# Patient Record
Sex: FEMALE | Race: WHITE | NOT HISPANIC OR LATINO | Employment: OTHER | URBAN - METROPOLITAN AREA
[De-identification: names, ages, dates, MRNs, and addresses within clinical notes are randomized per-mention and may not be internally consistent; named-entity substitution may affect disease eponyms.]

---

## 2017-02-28 ENCOUNTER — TRANSFERRED RECORDS (OUTPATIENT)
Dept: HEALTH INFORMATION MANAGEMENT | Facility: CLINIC | Age: 82
End: 2017-02-28

## 2017-03-03 ENCOUNTER — TRANSFERRED RECORDS (OUTPATIENT)
Dept: HEALTH INFORMATION MANAGEMENT | Facility: CLINIC | Age: 82
End: 2017-03-03

## 2017-03-07 ENCOUNTER — TRANSFERRED RECORDS (OUTPATIENT)
Dept: HEALTH INFORMATION MANAGEMENT | Facility: CLINIC | Age: 82
End: 2017-03-07

## 2017-03-16 ENCOUNTER — HOSPITAL ENCOUNTER (OUTPATIENT)
Dept: BONE DENSITY | Facility: CLINIC | Age: 82
End: 2017-03-16
Attending: FAMILY MEDICINE
Payer: MEDICARE

## 2017-03-16 ENCOUNTER — HOSPITAL ENCOUNTER (OUTPATIENT)
Dept: MAMMOGRAPHY | Facility: CLINIC | Age: 82
Discharge: HOME | End: 2017-03-16
Attending: FAMILY MEDICINE | Admitting: FAMILY MEDICINE
Payer: MEDICARE

## 2017-03-16 DIAGNOSIS — Z12.31 ENCOUNTER FOR SCREENING MAMMOGRAM FOR MALIGNANT NEOPLASM OF BREAST: ICD-10-CM

## 2017-03-16 DIAGNOSIS — M89.9 DISORDER OF BONE: ICD-10-CM

## 2017-03-16 DIAGNOSIS — Z00.00 ENCOUNTER FOR ROUTINE ADULT HEALTH EXAMINATION WITHOUT ABNORMAL FINDINGS: ICD-10-CM

## 2017-03-16 PROCEDURE — G0202 SCR MAMMO BI INCL CAD: HCPCS

## 2017-03-16 PROCEDURE — 77080 DXA BONE DENSITY AXIAL: CPT

## 2017-03-16 PROCEDURE — 77063 BREAST TOMOSYNTHESIS BI: CPT

## 2017-03-23 ENCOUNTER — HOSPITAL ENCOUNTER (OUTPATIENT)
Dept: ULTRASOUND IMAGING | Facility: CLINIC | Age: 82
Discharge: HOME | End: 2017-03-23
Attending: FAMILY MEDICINE | Admitting: FAMILY MEDICINE
Payer: MEDICARE

## 2017-03-23 DIAGNOSIS — R92.8 ABNORMAL MAMMOGRAM: ICD-10-CM

## 2017-03-23 PROCEDURE — 76642 ULTRASOUND BREAST LIMITED: CPT | Mod: LT

## 2017-04-12 ENCOUNTER — TRANSFERRED RECORDS (OUTPATIENT)
Dept: HEALTH INFORMATION MANAGEMENT | Facility: CLINIC | Age: 82
End: 2017-04-12

## 2017-04-18 ENCOUNTER — OFFICE VISIT (OUTPATIENT)
Dept: SURGERY | Facility: CLINIC | Age: 82
End: 2017-04-18
Attending: NEUROLOGICAL SURGERY
Payer: MEDICARE

## 2017-04-18 DIAGNOSIS — M54.16 LUMBAR RADICULOPATHY, RIGHT: Primary | ICD-10-CM

## 2017-04-18 NOTE — LETTER
4/18/2017       RE: Indy Reyes  [address] [facility] [address]     Dear Colleague,    Thank you for referring your patient, Indy Reyes, to the [facility] SURGICAL CARE OUTPATIENT at [facility]. Please see a copy of my visit note below.    It was a pleasure to see Indy Reyes today in Neurosurgery Clinic. she is a 86 year old female with a few months of RLE pain. These may have been brought on by a long car trip. This pain starts in the back/buttock area and radiates down the lateral leg to the top of the foot. She has tried physical therapy and medications and some kind of cyst drainage procedure without significant improvement in her symptoms. She does not have any symptoms in the LLE or bowel or bladder symptoms. No neck or arm symptoms.    She is otherwise in good health.    There were no vitals filed for this visit.  There is no height or weight on file to calculate BMI.  Data Unavailable     Awake, alert.    Strength 5/5 BLE. Sensation intact to light touch. Reflexes normal at patella/achilles.    MRI report is available to me. There are significant degenerative changes, but the main problems appear to be at L4-5, likely contributing to her right L5 symptoms.    A: Lumbar spondylosis with right L5 radiculopathy.    P: Once I am able to review her images, we will work on a surgical plan as she has failed conservative measures to this point. We discussed a likely right L4-5 decompression. The risks benefits and alternatives to the procedure were discussed, questions solicited and answered, and the patient wishes to proceed with surgery.           Again, thank you for allowing me to participate in the care of your patient.      Sincerely,    SOC PROVIDER

## 2017-04-18 NOTE — MR AVS SNAPSHOT
After Visit Summary   4/18/2017    Indy Reyes    MRN: [id number]           Patient Information     Date Of Birth          2/27/1931        Visit Information        Provider Department      4/18/2017 1:00 PM [facility] SOC PROVIDER [facility] Surgical Care Outpatient        Today's Diagnoses     Lumbar radiculopathy, right    -  1       Follow-ups after your visit        Your next 10 appointments already scheduled     Apr 28, 2017   Procedure with Fady Woo MD   [facility], [place], Same Day Surgery (--)    [address]  [place] [id number] [phone]              Who to contact     Please call your clinic at [phone] to:    Ask questions about your health    Make or cancel appointments    Discuss your medicines    Learn about your test results    Speak to your doctor   If you have compliments or concerns about an experience at your clinic, or if you wish to file a complaint, please contact [facility] Patient Relations at [phone] or email us at [email or web address]         Additional Information About Your Visit        MyChart Information     [employer] gives you secure access to your electronic health record. If you see a primary care provider, you can also send messages to your care team and make appointments. If you have questions, please call your primary care clinic.  If you do not have a primary care provider, please call [phone] and they will assist you.      [employer] is an electronic gateway that provides easy, online access to your medical records. With [employer], you can request a clinic appointment, read your test results, renew a prescription or communicate with your care team.     To access your existing account, please contact your [facility] Clinic or call [phone] for assistance.        Care EveryWhere ID     This is your Care EveryWhere ID. This could be used by other organizations to access your [place]  medical records  [id number]         Blood Pressure from Last 3 Encounters:   04/25/17 147/77   11/30/16 116/64   11/16/16 110/62    Weight from Last 3 Encounters:   04/25/17 53.3 kg (117 lb 6.4 oz)   11/30/16 51.7 kg (114 lb)   11/16/16 53.1 kg (117 lb)              Today, you had the following     No orders found for display       Primary Care Provider Office Phone # Fax #    Batool Hermelindo Kulkarni [phone]       [facility] [address] [address] [facility] [address]        Thank you!     Thank you for choosing [facility] SURGICAL CARE OUTPATIENT  for your care. Our goal is always to provide you with excellent care. Hearing back from our patients is one way we can continue to improve our services. Please take a few minutes to complete the written survey that you may receive in the mail after your visit with us. Thank you!             Your Updated Medication List - Protect others around you: Learn how to safely use, store and throw away your medicines at www.disposemymeds.org.          This list is accurate as of: 4/18/17 11:59 PM.  Always use your most recent med list.                   Brand Name Dispense Instructions for use    fluticasone 50 MCG/ACT spray    FLONASE    16 g    Spray 2 sprays into both nostrils daily

## 2017-04-18 NOTE — PROGRESS NOTES
It was a pleasure to see Indy Reyes today in Neurosurgery Clinic. she is a 86 year old female with a few months of RLE pain. These may have been brought on by a long car trip. This pain starts in the back/buttock area and radiates down the lateral leg to the top of the foot. She has tried physical therapy and medications and some kind of cyst drainage procedure without significant improvement in her symptoms. She does not have any symptoms in the LLE or bowel or bladder symptoms. No neck or arm symptoms.    She is otherwise in good health.    There were no vitals filed for this visit.  There is no height or weight on file to calculate BMI.  Data Unavailable     Awake, alert.    Strength 5/5 BLE. Sensation intact to light touch. Reflexes normal at patella/achilles.    MRI report is available to me. There are significant degenerative changes, but the main problems appear to be at L4-5, likely contributing to her right L5 symptoms.    A: Lumbar spondylosis with right L5 radiculopathy.    P: Once I am able to review her images, we will work on a surgical plan as she has failed conservative measures to this point. We discussed a likely right L4-5 decompression. The risks benefits and alternatives to the procedure were discussed, questions solicited and answered, and the patient wishes to proceed with surgery.

## 2017-04-19 ENCOUNTER — TELEPHONE (OUTPATIENT)
Dept: NEUROSURGERY | Facility: CLINIC | Age: 82
End: 2017-04-19

## 2017-04-19 DIAGNOSIS — M54.16 LUMBAR RADICULOPATHY, RIGHT: Primary | ICD-10-CM

## 2017-04-19 NOTE — TELEPHONE ENCOUNTER
Spoke to patient and  about MRI findings. I have recommended R L4-5 foraminotomy. The risks benefits and alternatives to the procedure were discussed, questions solicited and answered, and the patient wishes to proceed with surgery.

## 2017-04-24 ENCOUNTER — TELEPHONE (OUTPATIENT)
Dept: NEUROSURGERY | Facility: CLINIC | Age: 82
End: 2017-04-24

## 2017-04-24 DIAGNOSIS — M54.16 LUMBAR RADICULOPATHY: Primary | ICD-10-CM

## 2017-04-24 NOTE — TELEPHONE ENCOUNTER
Telephone Pre-op Teaching    Procedure: Right L4-5 Foraminotomy  Planned Surgery Date: 4/28/2017  Surgeon: Chilo                  Follow-up call to discuss pre-op instructions/routine and requirements to include:  surgical procedure, post-op recovery and expectations, need for H&P/PAC visit, NPO prior to OR, pre-op antibacterial showers, pain control and importance of follow-up visits.  Surgery scheduling will coordinate OR time/date and update patient as appropriate.  3C will call with more instructions 24-48 hour pre-op.  Ample time was provided for patient questions and in-depth discussion of topics of heightened interest.  Reviewed medication list and provided instructions regaring what medications to stop prior to surgery: None.   Two four ounce bottles of antibacterial soap solution will be provided at PAC visit as well as specific instructions for use.  Patient verbalized understanding of instructions.  Approximately 15 minutes spent on the telephone with patient discussing and reviewing specific instructions.      Patient was provided triage contact number for questions or concerns that may arise prior to surgery.

## 2017-04-25 ENCOUNTER — ALLIED HEALTH/NURSE VISIT (OUTPATIENT)
Dept: SURGERY | Facility: CLINIC | Age: 82
End: 2017-04-25

## 2017-04-25 ENCOUNTER — OFFICE VISIT (OUTPATIENT)
Dept: SURGERY | Facility: CLINIC | Age: 82
End: 2017-04-25

## 2017-04-25 ENCOUNTER — ANESTHESIA EVENT (OUTPATIENT)
Dept: SURGERY | Facility: CLINIC | Age: 82
End: 2017-04-25
Payer: MEDICARE

## 2017-04-25 VITALS
BODY MASS INDEX: 20.04 KG/M2 | OXYGEN SATURATION: 97 % | WEIGHT: 117.4 LBS | HEIGHT: 64 IN | SYSTOLIC BLOOD PRESSURE: 147 MMHG | RESPIRATION RATE: 16 BRPM | HEART RATE: 100 BPM | DIASTOLIC BLOOD PRESSURE: 77 MMHG | TEMPERATURE: 98.4 F

## 2017-04-25 DIAGNOSIS — Z01.818 PREOP GENERAL PHYSICAL EXAM: ICD-10-CM

## 2017-04-25 DIAGNOSIS — Z01.818 PREOP GENERAL PHYSICAL EXAM: Primary | ICD-10-CM

## 2017-04-25 LAB
ALBUMIN UR-MCNC: NEGATIVE MG/DL
ANION GAP SERPL CALCULATED.3IONS-SCNC: 8 MMOL/L (ref 3–14)
APPEARANCE UR: CLEAR
BILIRUB UR QL STRIP: NEGATIVE
BUN SERPL-MCNC: 15 MG/DL (ref 7–30)
CALCIUM SERPL-MCNC: 9.2 MG/DL (ref 8.5–10.1)
CHLORIDE SERPL-SCNC: 103 MMOL/L (ref 94–109)
CO2 SERPL-SCNC: 28 MMOL/L (ref 20–32)
COLOR UR AUTO: YELLOW
CREAT SERPL-MCNC: 0.58 MG/DL (ref 0.52–1.04)
ERYTHROCYTE [DISTWIDTH] IN BLOOD BY AUTOMATED COUNT: 15.1 % (ref 10–15)
GFR SERPL CREATININE-BSD FRML MDRD: ABNORMAL ML/MIN/1.7M2
GLUCOSE SERPL-MCNC: 120 MG/DL (ref 70–99)
GLUCOSE UR STRIP-MCNC: NEGATIVE MG/DL
HCT VFR BLD AUTO: 38.5 % (ref 35–47)
HGB BLD-MCNC: 12.6 G/DL (ref 11.7–15.7)
HGB UR QL STRIP: NEGATIVE
KETONES UR STRIP-MCNC: NEGATIVE MG/DL
LEUKOCYTE ESTERASE UR QL STRIP: NEGATIVE
MCH RBC QN AUTO: 27.2 PG (ref 26.5–33)
MCHC RBC AUTO-ENTMCNC: 32.7 G/DL (ref 31.5–36.5)
MCV RBC AUTO: 83 FL (ref 78–100)
NITRATE UR QL: NEGATIVE
PH UR STRIP: 7 PH (ref 5–7)
PLATELET # BLD AUTO: 166 10E9/L (ref 150–450)
POTASSIUM SERPL-SCNC: 4.1 MMOL/L (ref 3.4–5.3)
RBC # BLD AUTO: 4.63 10E12/L (ref 3.8–5.2)
SODIUM SERPL-SCNC: 139 MMOL/L (ref 133–144)
SP GR UR STRIP: 1.01 (ref 1–1.03)
URN SPEC COLLECT METH UR: NORMAL
UROBILINOGEN UR STRIP-MCNC: 0 MG/DL (ref 0–2)
WBC # BLD AUTO: 6.7 10E9/L (ref 4–11)

## 2017-04-25 RX ORDER — VIT A/VIT C/VIT E/ZINC/COPPER 7160-113
2 TABLET, DELAYED RELEASE (ENTERIC COATED) ORAL EVERY MORNING
COMMUNITY
End: 2017-06-09

## 2017-04-25 RX ORDER — MV-MN/OM3/DHA/EPA/FISH/LUT/ZEA 250-5-1 MG
2 CAPSULE ORAL EVERY MORNING
COMMUNITY
End: 2018-05-22

## 2017-04-25 RX ORDER — PHENOL 1.4 %
2 AEROSOL, SPRAY (ML) MUCOUS MEMBRANE EVERY MORNING
COMMUNITY
End: 2022-06-01

## 2017-04-25 ASSESSMENT — LIFESTYLE VARIABLES: TOBACCO_USE: 1

## 2017-04-25 NOTE — H&P
Pre-Operative H & P     CC:  Preoperative exam to assess for increased cardiopulmonary risk while undergoing surgery and anesthesia.    Date of Encounter: 4/25/2017  Primary Care Physician:  Batool Kulkarni    Indy Reyes is a 87 yo female scheduled for Right Lumbar 4-5 Foraminotomy on 4/28/2017 by Dr. Woo. PAC referral by Dr. Woo for assessment and optimization of anesthesia due to back pain. Previous anesthesia without complications.  1) Cardiac: No current cardiac diagnosis or symptom. EKG today NSR.  2) Pulmonary: Former smoker, quit many years ago. No current pulmonary symptoms.  3) Msk: back pain radiating down RLE. Currently uses a walker for long distances due to pain    Bilateral hearing aides. Feasible airway      Past Medical History  Past Medical History:   Diagnosis Date     Back pain        Past Surgical History  Past Surgical History:   Procedure Laterality Date     APPENDECTOMY      at age of 16     CATARACT IOL, RT/LT  5/09    both     TONSILLECTOMY  1954       Hx of Blood transfusions/reactions: none    Hx of abnormal bleeding or anti-platelet use: none    Menstrual history: No LMP recorded. Patient is postmenopausal.:     Steroid use in the last year: none    Personal or FH with difficulty with Anesthesia:  None          Prior to Admission Medications  Current Outpatient Prescriptions   Medication Sig Dispense Refill     Multiple Vitamins-Minerals (OCUVITE ADULT 50+) CAPS Take 2 capsules by mouth every morning       Multiple Vitamins-Minerals (ICAPS AREDS FORMULA) TABS Take 2 capsules by mouth every morning       Multiple Vitamins-Minerals (MULTIVITAMIN ADULTS 50+) TABS Take 2 capsules by mouth every morning       fluticasone (FLONASE) 50 MCG/ACT nasal spray Spray 2 sprays into both nostrils daily (Patient taking differently: Spray 2 sprays into both nostrils as needed ) 16 g 6       Allergies  Allergies   Allergen Reactions     Tetracycline Rash       Social History  Social  History     Social History     Marital status:      Spouse name: N/A     Number of children: 4     Years of education: N/A     Occupational History      Retired     Social History Main Topics     Smoking status: Former Smoker     Types: Cigarettes     Quit date: 1960     Smokeless tobacco: Never Used      Comment:      Alcohol use Yes      Comment: SOCIAL-2 drinks per month      Drug use: No     Sexual activity: No     Other Topics Concern     Parent/Sibling W/ Cabg, Mi Or Angioplasty Before 65f 55m? No     Social History Narrative     since -going well, working partime at [employer], 3 kids, one daughter  with pancreatic cancer at age of 42-in , 3 alive kids-3 sons, 9 grandkids,no pets-3  great grandkids        Family History  Family History   Problem Relation Age of Onset     Family History Negative Mother       in accident     CANCER Father      prostate     HEART DISEASE Brother      60s-4-one brother with heart attack in his 60's     CANCER Sister      breast-7-one  wtih breast ca,one with stroke, one sis  from diabetes     Breast Cancer Sister      diagnosed at age of 52- 5 sisters alive     Cancer - colorectal No family hx of                  Anesthesia Evaluation     . Pt has had prior anesthetic. Type: General and MAC           ROS/MED HX    ENT/Pulmonary:     (+)tobacco use, Past use , . .    Neurologic:  - neg neurologic ROS     Cardiovascular:  - neg cardiovascular ROS   (+) ----. : . . . :. . Previous cardiac testing date:results:date: results:ECG reviewed date:2017 results: date: results:          METS/Exercise Tolerance:  4 - Raking leaves, gardening   Hematologic:  - neg hematologic  ROS       Musculoskeletal:   (+) , , other musculoskeletal- back pain      GI/Hepatic:  - neg GI/hepatic ROS       Renal/Genitourinary:  - ROS Renal section negative       Endo:  - neg endo ROS       Psychiatric:  - neg psychiatric ROS       Infectious Disease:  - neg  "infectious disease ROS       Malignancy:      - no malignancy   Other:    (+) No chance of pregnancy C-spine cleared: N/A, no H/O Chronic Pain,no other significant disability   - neg other ROS         Physical Exam  Normal systems: cardiovascular, pulmonary and dental    Airway   Mallampati: II  TM distance: >3 FB  Neck ROM: full    Dental   Normal    Cardiovascular   Rhythm and rate: regular and normal      Pulmonary    breath sounds clear to auscultation               The complete review of systems is negative other than noted in the HPI or here.   Temp: 98.4  F (36.9  C) Temp src: Oral BP: 147/77 Pulse: 100   Resp: 16 SpO2: 97 %         117 lbs 6.4 oz  5' 4\"   Body mass index is 20.15 kg/(m^2).       Physical Exam  Constitutional: Awake, alert, cooperative, no apparent distress, and appears stated age.  Eyes: Pupils equal, round and reactive to light, extra ocular muscles intact, sclera clear, conjunctiva normal.  HENT: Normocephalic, oral pharynx with moist mucus membranes, good dentition. No goiter appreciated. Bilateral hearing aides.  Respiratory: Clear to auscultation bilaterally, no crackles or wheezing.  Cardiovascular: Regular rate and rhythm, normal S1 and S2, and no murmur noted.  Carotids +2, no bruits. No edema. Palpable pulses to radial  DP and PT arteries.   GI: Normal bowel sounds, soft, non-distended, non-tender, no masses palpated, no hepatosplenomegaly.    Lymph/Hematologic: No cervical lymphadenopathy and no supraclavicular lymphadenopathy.  Genitourinary: deferred  Skin: Warm and dry.  No rashes at anticipated surgical site.   Musculoskeletal: Full ROM of neck. There is no redness, warmth, or swelling of the joints. General weakness  Neurologic: Awake, alert, oriented to name, place and time. Cranial nerves II-XII are grossly intact. Gait is slow due to back and right leg pain  Neuropsychiatric: Calm, cooperative. Normal affect.     Labs: (personally reviewed)  Lab Results   Component Value " Date    WBC 6.7 2017     Lab Results   Component Value Date    RBC 4.63 2017     Lab Results   Component Value Date    HGB 12.6 2017     Lab Results   Component Value Date    HCT 38.5 2017     No components found for: MCT  Lab Results   Component Value Date    MCV 83 2017     Lab Results   Component Value Date    MCH 27.2 2017     Lab Results   Component Value Date    MCHC 32.7 2017     Lab Results   Component Value Date    RDW 15.1 2017     Lab Results   Component Value Date     2017       Last Basic Metabolic Panel:  Lab Results   Component Value Date     2017      Lab Results   Component Value Date    POTASSIUM 4.1 2017     Lab Results   Component Value Date    CHLORIDE 103 2017     Lab Results   Component Value Date    IMAN 9.2 2017     Lab Results   Component Value Date    CO2 28 2017     Lab Results   Component Value Date    BUN 15 2017     Lab Results   Component Value Date    CR 0.58 2017     Lab Results   Component Value Date     2017       UA RESULTS:  Recent Labs   Lab Test  17   1104   COLOR  Yellow   APPEARANCE  Clear   URINEGLC  Negative   URINEBILI  Negative   URINEKETONE  Negative   SG  1.011   UBLD  Negative   URINEPH  7.0   PROTEIN  Negative   NITRITE  Negative   LEUKEST  Negative         EKG: Personally reviewed but formal cardiology read pendin2017 NSR, possible LVH        ASSESSMENT and PLAN  Indy Reyes is a 86 year old female scheduled to undergo Right Lumbar 4-5 Foraminotomy. She has the following specific operative considerations:   - RCRI : Low serious cardiac risks.  0.4% risk of major adverse cardiac event.   - Anesthesia considerations:  Refer to PAC assessment in anesthesia records  - VTE risk: 0.5% due to age  - TIM # of risks 2/8 = 25%  - Post-op delirium risk: high risk due to age    Pt optimized for surgery. AVS with information on surgery  time/arrival time, meds and NPO status given by nursing staff        Patient was discussed with Dr Llanes.    RAMANA Shaw CNS  Preoperative Assessment Center  [facility]  Clinic and Surgery Center  Phone: [phone]  Fax: [phone]

## 2017-04-25 NOTE — PATIENT INSTRUCTIONS
Preparing for Your Surgery      Name:  Indy Reyes   MRN:  [id number]   :  1931   Today's Date:  2017     Arriving for surgery:  Surgery date:  17  Surgery time:  4:10 pm  Arrival time:  2:10 pm  Please come to:       [facility] [address].   Please note the following visitor restrictions: No visitors under 5 are allowed to visit. Also, visitors who are unvaccinated for measles and those who are currently ill are also asked to refrain from visiting  [address] [place]  [address]    -  [employer] parking is available in front of the hospital from 5:15 am to 8:00 pm    -  Stop at the Information Desk in the lobby. Ask for a wheelchair if needed.    -   Inform the information person that you are here for surgery. An escort to 3c will be provided. If you would not like an escort, please proceed to 3C on the 3rd floor. [phone].  What can I eat or drink?   -  You may have solid food or milk products until 8 hours prior to your surgery- 8:10 am  -  You may have water, apple juice or 7up/Sprite until 2 hours prior to your surgery - 2:10 pm    Which medicines can I take? No Aspirin, vitamins, or supplements for 7 days before surgery. No Naproxen/Aleve for 2 days before surgery. No Ibuprofen for 24 hours before surgery.    -  It is OKAY to take these medications:   Flonase if needed.    How do I prepare myself?  -  Take two showers: one the night before surgery; and one the morning of surgery.         Use 1/2 bottle of Scrubcare to wash from neck down for each shower.  You may use your own shampoo and conditioner.   -  Do NOT use lotion, powder, deodorant, or hair spray/gel on the day of your surgery.  -  Do NOT wear any makeup, fingernail polish or jewelry.    -Do not bring your own medications to the hospital.  -  Bring your ID and insurance card.    Questions or Concerns:  If you have questions or concerns, please call the  Preoperative Assessment Center,  Monday-Friday 7AM-7PM:  [phone]    AFTER YOUR SURGERY  Breathing exercises   Breathing exercises help you recover faster. Take deep breaths and let the air out slowly. This will:     Help you wake up after surgery.    Help prevent complications like pneumonia.  Preventing complications will help you go home sooner.   We may give you a breathing device (incentive spirometer) to encourage you to breathe deeply.   Nausea and vomiting   You may feel sick to your stomach after surgery; if so, let your nurse know.    Pain control:  After surgery, you may have pain. Our goal is to help you manage your pain. Pain medicine will help you feel comfortable enough to do activities that will help you heal.  These activities may include breathing exercises, walking and physical therapy.   To help your health care team treat your pain we will ask: 1) If you have pain  2) where it is located 3) describe your pain in your words  Methods of pain control include medications given by mouth, vein or by nerve block for some surgeries.  We may give you a pain control pump that will:  1) Deliver the medicine through a tube placed in your vein  2) Control the amount of medicine you receive  3) Allow you to push a button to deliver a dose of pain medicine  Sequential Compression Device (SCD) or Pneumo Boots:  You may need to wear SCD S on your legs or feet. These are wraps connected to a machine that pumps in air and releases it. The repeated pumping helps prevent blood clots from forming.

## 2017-04-25 NOTE — ANESTHESIA PREPROCEDURE EVALUATION
Anesthesia Evaluation     . Pt has had prior anesthetic. Type: General and MAC           ROS/MED HX    ENT/Pulmonary:     (+)tobacco use, Past use , . .    Neurologic:  - neg neurologic ROS     Cardiovascular:  - neg cardiovascular ROS   (+) ----. : . . . :. . Previous cardiac testing date:results:date: results:ECG reviewed date:4/25/2017 results:NSR date: results:          METS/Exercise Tolerance:  4 - Raking leaves, gardening   Hematologic:  - neg hematologic  ROS       Musculoskeletal:   (+) , , other musculoskeletal- back pain      GI/Hepatic:  - neg GI/hepatic ROS       Renal/Genitourinary:  - ROS Renal section negative       Endo:  - neg endo ROS   (+) thyroid problem .      Psychiatric:  - neg psychiatric ROS       Infectious Disease:  - neg infectious disease ROS       Malignancy:      - no malignancy   Other:    (+) No chance of pregnancy C-spine cleared: N/A, no H/O Chronic Pain,no other significant disability   - neg other ROS                 Physical Exam  Normal systems: cardiovascular, pulmonary and dental    Airway   Mallampati: II  TM distance: >3 FB  Neck ROM: full    Dental     Cardiovascular   Rhythm and rate: regular and normal      Pulmonary    breath sounds clear to auscultation    Other findings: Lab Results      Component                Value               Date                      WBC                      6.7                 04/25/2017            Lab Results      Component                Value               Date                      RBC                      4.63                04/25/2017            Lab Results      Component                Value               Date                      HGB                      12.6                04/25/2017            Lab Results      Component                Value               Date                      HCT                      38.5                04/25/2017            No components found for: MCT  Lab Results      Component                Value                Date                      MCV                      83                  04/25/2017            Lab Results      Component                Value               Date                      MCH                      27.2                04/25/2017            Lab Results      Component                Value               Date                      MCHC                     32.7                04/25/2017            Lab Results      Component                Value               Date                      RDW                      15.1                04/25/2017            Lab Results      Component                Value               Date                      PLT                      166                 04/25/2017                Last Basic Metabolic Panel:  Lab Results      Component                Value               Date                      NA                       139                 04/25/2017             Lab Results      Component                Value               Date                      POTASSIUM                4.1                 04/25/2017            Lab Results      Component                Value               Date                      CHLORIDE                 103                 04/25/2017            Lab Results      Component                Value               Date                      IMAN                      9.2                 04/25/2017            Lab Results      Component                Value               Date                      CO2                      28                  04/25/2017            Lab Results      Component                Value               Date                      BUN                      15                  04/25/2017            Lab Results      Component                Value               Date                      CR                       0.58                04/25/2017            Lab Results      Component                Value               Date                      GLC                       120                 04/25/2017                UA RESULTS:  Lab Test        04/25/17                       1104          COLOR        Yellow        APPEARANCE   Clear         URINEGLC     Negative      URINEBILI    Negative      URINEKETONE  Negative      SG           1.011         UBLD         Negative      URINEPH      7.0           PROTEIN      Negative      NITRITE      Negative      LEUKEST      Negative               PAC Discussion and Assessment    ASA Classification: 2  Case is suitable for: [place]  Anesthetic techniques and relevant risks discussed: GA  Invasive monitoring and risk discussed: Yes  Types:   Possibility and Risk of blood transfusion discussed: Yes  NPO instructions given:   Additional anesthetic preparation and risks discussed:   Needs early admission to pre-op area:   Other:     PAC Resident/NP Anesthesia Assessment:  Indy Reyes is a 85 yo female scheduled for Right Lumbar 4-5 Foraminotomy on 4/28/2017 by Dr. Woo. PAC referral by Dr. Woo for assessment and optimization of anesthesia due to back pain. Previous anesthesia without complications.    1) Cardiac: No current cardiac diagnosis or symptom. EKG today NSR.  2) Pulmonary: Former smoker, quit many years ago. No current pulmonary symptoms.  3) Msk: back pain radiating down RLE. Currently uses a walker for long distances due to pain    Bilateral hearing aides. Feasible airway     Pt also evaluated by Dr. Llanes. See recommendations below. Labs drawn today.  I spent 20 minutes face to face with pt, assessing, examining, and educating        Reviewed and Signed by PAC Mid-Level Provider/Resident  Mid-Level Provider/Resident: RAMANA Mccord  Date: 4/25/2017  Time: 1000    Attending Anesthesiologist Anesthesia Assessment:  I have reviewed the chart and examined the patient.  I have discussed the patient with the ZAID and concur with her assessment.  The patient is appropriate for general anesthetic without further testing per  ACC/AHA guidelines.      Reviewed and Signed by PAC Anesthesiologist  Anesthesiologist: Jose F Llanes MD  Date: 04/25/2017  Time: 1037  Pass/Fail:   Disposition:     PAC Pharmacist Assessment:        Pharmacist:   Date:   Time:      Anesthesia Plan      History & Physical Review  History and physical reviewed and following examination; no interval change.    ASA Status:  2 .    NPO Status:  > 2 hours and > 8 hours (Ate a sandwich at 1130am )    Plan for General and ETT with Intravenous induction. Maintenance will be Balanced.    PONV prophylaxis:  Ondansetron (or other 5HT-3) and Dexamethasone or Solumedrol  Additional equipment: 2nd IV      Postoperative Care  Postoperative pain management:  Oral pain medications and Multi-modal analgesia.      Consents  Anesthetic plan, risks, benefits and alternatives discussed with:  Patient..                          .

## 2017-04-28 ENCOUNTER — HOSPITAL ENCOUNTER (OUTPATIENT)
Facility: CLINIC | Age: 82
Discharge: HOME | End: 2017-04-29
Attending: NEUROLOGICAL SURGERY | Admitting: NEUROLOGICAL SURGERY
Payer: MEDICARE

## 2017-04-28 ENCOUNTER — ANESTHESIA (OUTPATIENT)
Dept: SURGERY | Facility: CLINIC | Age: 82
End: 2017-04-28
Payer: MEDICARE

## 2017-04-28 ENCOUNTER — SURGERY (OUTPATIENT)
Age: 82
End: 2017-04-28

## 2017-04-28 ENCOUNTER — APPOINTMENT (OUTPATIENT)
Dept: GENERAL RADIOLOGY | Facility: CLINIC | Age: 82
End: 2017-04-28
Attending: NEUROLOGICAL SURGERY
Payer: MEDICARE

## 2017-04-28 DIAGNOSIS — M54.16 LUMBAR RADICULOPATHY, RIGHT: Primary | ICD-10-CM

## 2017-04-28 PROCEDURE — 25000128 H RX IP 250 OP 636: Performed by: NEUROLOGICAL SURGERY

## 2017-04-28 PROCEDURE — 25000128 H RX IP 250 OP 636: Performed by: ANESTHESIOLOGY

## 2017-04-28 PROCEDURE — 36000066 ZZH SURGERY LEVEL 4 W FLUORO 1ST 30 MIN - UMMC: Performed by: NEUROLOGICAL SURGERY

## 2017-04-28 PROCEDURE — 36000064 ZZH SURGERY LEVEL 4 EA 15 ADDTL MIN - UMMC: Performed by: NEUROLOGICAL SURGERY

## 2017-04-28 PROCEDURE — 25000132 ZZH RX MED GY IP 250 OP 250 PS 637: Mod: GY | Performed by: STUDENT IN AN ORGANIZED HEALTH CARE EDUCATION/TRAINING PROGRAM

## 2017-04-28 PROCEDURE — 25000125 ZZHC RX 250: Performed by: ANESTHESIOLOGY

## 2017-04-28 PROCEDURE — 37000009 ZZH ANESTHESIA TECHNICAL FEE, EACH ADDTL 15 MIN: Performed by: NEUROLOGICAL SURGERY

## 2017-04-28 PROCEDURE — 25800025 ZZH RX 258: Performed by: ANESTHESIOLOGY

## 2017-04-28 PROCEDURE — 37000008 ZZH ANESTHESIA TECHNICAL FEE, 1ST 30 MIN: Performed by: NEUROLOGICAL SURGERY

## 2017-04-28 PROCEDURE — 40000170 ZZH STATISTIC PRE-PROCEDURE ASSESSMENT II: Performed by: NEUROLOGICAL SURGERY

## 2017-04-28 PROCEDURE — 71000014 ZZH RECOVERY PHASE 1 LEVEL 2 FIRST HR: Performed by: NEUROLOGICAL SURGERY

## 2017-04-28 PROCEDURE — 25000131 ZZH RX MED GY IP 250 OP 636 PS 637: Mod: GY | Performed by: STUDENT IN AN ORGANIZED HEALTH CARE EDUCATION/TRAINING PROGRAM

## 2017-04-28 PROCEDURE — 27210995 ZZH RX 272: Performed by: NEUROLOGICAL SURGERY

## 2017-04-28 PROCEDURE — 25000125 ZZHC RX 250: Performed by: NEUROLOGICAL SURGERY

## 2017-04-28 PROCEDURE — 25000128 H RX IP 250 OP 636: Performed by: STUDENT IN AN ORGANIZED HEALTH CARE EDUCATION/TRAINING PROGRAM

## 2017-04-28 PROCEDURE — A9270 NON-COVERED ITEM OR SERVICE: HCPCS | Mod: GY | Performed by: STUDENT IN AN ORGANIZED HEALTH CARE EDUCATION/TRAINING PROGRAM

## 2017-04-28 PROCEDURE — 25000565 ZZH ISOFLURANE, EA 15 MIN: Performed by: NEUROLOGICAL SURGERY

## 2017-04-28 PROCEDURE — 40000278 XR SURGERY CARM FLUORO LESS THAN 5 MIN: Mod: TC

## 2017-04-28 PROCEDURE — 27210794 ZZH OR GENERAL SUPPLY STERILE: Performed by: NEUROLOGICAL SURGERY

## 2017-04-28 RX ORDER — ONDANSETRON 4 MG/1
4 TABLET, ORALLY DISINTEGRATING ORAL EVERY 6 HOURS PRN
Status: DISCONTINUED | OUTPATIENT
Start: 2017-04-28 | End: 2017-04-29 | Stop reason: HOSPADM

## 2017-04-28 RX ORDER — FLUTICASONE PROPIONATE 50 MCG
2 SPRAY, SUSPENSION (ML) NASAL DAILY PRN
Status: DISCONTINUED | OUTPATIENT
Start: 2017-04-28 | End: 2017-04-29 | Stop reason: HOSPADM

## 2017-04-28 RX ORDER — METHYLPREDNISOLONE 4 MG/1
4 TABLET ORAL
Status: DISCONTINUED | OUTPATIENT
Start: 2017-04-29 | End: 2017-04-29 | Stop reason: HOSPADM

## 2017-04-28 RX ORDER — ACETAMINOPHEN 650 MG/1
650 SUPPOSITORY RECTAL EVERY 4 HOURS PRN
Status: DISCONTINUED | OUTPATIENT
Start: 2017-04-28 | End: 2017-04-29 | Stop reason: HOSPADM

## 2017-04-28 RX ORDER — NALOXONE HYDROCHLORIDE 0.4 MG/ML
.1-.4 INJECTION, SOLUTION INTRAMUSCULAR; INTRAVENOUS; SUBCUTANEOUS
Status: DISCONTINUED | OUTPATIENT
Start: 2017-04-28 | End: 2017-04-29 | Stop reason: HOSPADM

## 2017-04-28 RX ORDER — SODIUM CHLORIDE 9 MG/ML
INJECTION, SOLUTION INTRAVENOUS CONTINUOUS
Status: ACTIVE | OUTPATIENT
Start: 2017-04-28 | End: 2017-04-29

## 2017-04-28 RX ORDER — SODIUM CHLORIDE, SODIUM LACTATE, POTASSIUM CHLORIDE, CALCIUM CHLORIDE 600; 310; 30; 20 MG/100ML; MG/100ML; MG/100ML; MG/100ML
INJECTION, SOLUTION INTRAVENOUS CONTINUOUS
Status: DISCONTINUED | OUTPATIENT
Start: 2017-04-28 | End: 2017-04-28 | Stop reason: HOSPADM

## 2017-04-28 RX ORDER — HYDROMORPHONE HCL/0.9% NACL/PF 0.2MG/0.2
0.2 SYRINGE (ML) INTRAVENOUS
Status: DISPENSED | OUTPATIENT
Start: 2017-04-28 | End: 2017-04-29

## 2017-04-28 RX ORDER — METHYLPREDNISOLONE 8 MG/1
8 TABLET ORAL ONCE
Status: COMPLETED | OUTPATIENT
Start: 2017-04-29 | End: 2017-04-29

## 2017-04-28 RX ORDER — OXYCODONE HYDROCHLORIDE 5 MG/1
5 TABLET ORAL
Status: DISCONTINUED | OUTPATIENT
Start: 2017-04-28 | End: 2017-04-29 | Stop reason: HOSPADM

## 2017-04-28 RX ORDER — METHYLPREDNISOLONE 4 MG/1
4 TABLET ORAL AT BEDTIME
Status: DISCONTINUED | OUTPATIENT
Start: 2017-04-30 | End: 2017-04-29 | Stop reason: HOSPADM

## 2017-04-28 RX ORDER — ONDANSETRON 2 MG/ML
INJECTION INTRAMUSCULAR; INTRAVENOUS PRN
Status: DISCONTINUED | OUTPATIENT
Start: 2017-04-28 | End: 2017-04-28

## 2017-04-28 RX ORDER — METHYLPREDNISOLONE 8 MG/1
8 TABLET ORAL AT BEDTIME
Status: DISCONTINUED | OUTPATIENT
Start: 2017-04-29 | End: 2017-04-29 | Stop reason: HOSPADM

## 2017-04-28 RX ORDER — METHYLPREDNISOLONE 4 MG/1
4 TABLET ORAL ONCE
Status: COMPLETED | OUTPATIENT
Start: 2017-04-29 | End: 2017-04-29

## 2017-04-28 RX ORDER — ACETAMINOPHEN 325 MG/1
650 TABLET ORAL EVERY 4 HOURS PRN
Status: DISCONTINUED | OUTPATIENT
Start: 2017-04-28 | End: 2017-04-29 | Stop reason: HOSPADM

## 2017-04-28 RX ORDER — LIDOCAINE HYDROCHLORIDE 20 MG/ML
INJECTION, SOLUTION INFILTRATION; PERINEURAL PRN
Status: DISCONTINUED | OUTPATIENT
Start: 2017-04-28 | End: 2017-04-28

## 2017-04-28 RX ORDER — ACETAMINOPHEN 325 MG/1
650 TABLET ORAL EVERY 4 HOURS PRN
Qty: 100 TABLET | Refills: 0 | Status: SHIPPED | OUTPATIENT
Start: 2017-04-28 | End: 2022-05-24

## 2017-04-28 RX ORDER — SODIUM CHLORIDE, SODIUM LACTATE, POTASSIUM CHLORIDE, CALCIUM CHLORIDE 600; 310; 30; 20 MG/100ML; MG/100ML; MG/100ML; MG/100ML
INJECTION, SOLUTION INTRAVENOUS CONTINUOUS PRN
Status: DISCONTINUED | OUTPATIENT
Start: 2017-04-28 | End: 2017-04-28

## 2017-04-28 RX ORDER — CEFAZOLIN SODIUM 2 G/100ML
2 INJECTION, SOLUTION INTRAVENOUS
Status: COMPLETED | OUTPATIENT
Start: 2017-04-28 | End: 2017-04-28

## 2017-04-28 RX ORDER — BUPIVACAINE HYDROCHLORIDE AND EPINEPHRINE 2.5; 5 MG/ML; UG/ML
INJECTION, SOLUTION INFILTRATION; PERINEURAL PRN
Status: DISCONTINUED | OUTPATIENT
Start: 2017-04-28 | End: 2017-04-28 | Stop reason: HOSPADM

## 2017-04-28 RX ORDER — KETAMINE HYDROCHLORIDE 10 MG/ML
INJECTION, SOLUTION INTRAMUSCULAR; INTRAVENOUS PRN
Status: DISCONTINUED | OUTPATIENT
Start: 2017-04-28 | End: 2017-04-28

## 2017-04-28 RX ORDER — DEXAMETHASONE SODIUM PHOSPHATE 4 MG/ML
INJECTION, SOLUTION INTRA-ARTICULAR; INTRALESIONAL; INTRAMUSCULAR; INTRAVENOUS; SOFT TISSUE PRN
Status: DISCONTINUED | OUTPATIENT
Start: 2017-04-28 | End: 2017-04-28

## 2017-04-28 RX ORDER — FENTANYL CITRATE 50 UG/ML
INJECTION, SOLUTION INTRAMUSCULAR; INTRAVENOUS PRN
Status: DISCONTINUED | OUTPATIENT
Start: 2017-04-28 | End: 2017-04-28

## 2017-04-28 RX ORDER — GLYCOPYRROLATE 0.2 MG/ML
INJECTION, SOLUTION INTRAMUSCULAR; INTRAVENOUS PRN
Status: DISCONTINUED | OUTPATIENT
Start: 2017-04-28 | End: 2017-04-28

## 2017-04-28 RX ORDER — NEOSTIGMINE METHYLSULFATE 1 MG/ML
VIAL (ML) INJECTION PRN
Status: DISCONTINUED | OUTPATIENT
Start: 2017-04-28 | End: 2017-04-28

## 2017-04-28 RX ORDER — NALOXONE HYDROCHLORIDE 0.4 MG/ML
.1-.4 INJECTION, SOLUTION INTRAMUSCULAR; INTRAVENOUS; SUBCUTANEOUS
Status: DISCONTINUED | OUTPATIENT
Start: 2017-04-28 | End: 2017-04-28 | Stop reason: HOSPADM

## 2017-04-28 RX ORDER — FENTANYL CITRATE 50 UG/ML
25-50 INJECTION, SOLUTION INTRAMUSCULAR; INTRAVENOUS
Status: DISCONTINUED | OUTPATIENT
Start: 2017-04-28 | End: 2017-04-28 | Stop reason: HOSPADM

## 2017-04-28 RX ORDER — ONDANSETRON 2 MG/ML
4 INJECTION INTRAMUSCULAR; INTRAVENOUS EVERY 30 MIN PRN
Status: DISCONTINUED | OUTPATIENT
Start: 2017-04-28 | End: 2017-04-28 | Stop reason: HOSPADM

## 2017-04-28 RX ORDER — ONDANSETRON 4 MG/1
4 TABLET, ORALLY DISINTEGRATING ORAL EVERY 30 MIN PRN
Status: DISCONTINUED | OUTPATIENT
Start: 2017-04-28 | End: 2017-04-28 | Stop reason: HOSPADM

## 2017-04-28 RX ORDER — HYDROMORPHONE HCL/0.9% NACL/PF 0.2MG/0.2
.2-.4 SYRINGE (ML) INTRAVENOUS EVERY 10 MIN PRN
Status: DISCONTINUED | OUTPATIENT
Start: 2017-04-28 | End: 2017-04-28 | Stop reason: HOSPADM

## 2017-04-28 RX ORDER — CEFAZOLIN SODIUM 1 G/3ML
1 INJECTION, POWDER, FOR SOLUTION INTRAMUSCULAR; INTRAVENOUS SEE ADMIN INSTRUCTIONS
Status: DISCONTINUED | OUTPATIENT
Start: 2017-04-28 | End: 2017-04-28 | Stop reason: HOSPADM

## 2017-04-28 RX ORDER — ONDANSETRON 2 MG/ML
4 INJECTION INTRAMUSCULAR; INTRAVENOUS EVERY 6 HOURS PRN
Status: DISCONTINUED | OUTPATIENT
Start: 2017-04-28 | End: 2017-04-29 | Stop reason: HOSPADM

## 2017-04-28 RX ORDER — HYDROXYZINE HYDROCHLORIDE 25 MG/1
25 TABLET, FILM COATED ORAL EVERY 6 HOURS PRN
Status: DISCONTINUED | OUTPATIENT
Start: 2017-04-28 | End: 2017-04-29 | Stop reason: HOSPADM

## 2017-04-28 RX ORDER — PROPOFOL 10 MG/ML
INJECTION, EMULSION INTRAVENOUS PRN
Status: DISCONTINUED | OUTPATIENT
Start: 2017-04-28 | End: 2017-04-28

## 2017-04-28 RX ADMIN — SODIUM CHLORIDE 800 ML: 900 IRRIGANT IRRIGATION at 20:23

## 2017-04-28 RX ADMIN — PROPOFOL 30 MG: 10 INJECTION, EMULSION INTRAVENOUS at 19:46

## 2017-04-28 RX ADMIN — SODIUM CHLORIDE 100 ML/HR: 9 INJECTION, SOLUTION INTRAVENOUS at 21:09

## 2017-04-28 RX ADMIN — DEXAMETHASONE SODIUM PHOSPHATE 8 MG: 4 INJECTION, SOLUTION INTRA-ARTICULAR; INTRALESIONAL; INTRAMUSCULAR; INTRAVENOUS; SOFT TISSUE at 19:26

## 2017-04-28 RX ADMIN — CEFAZOLIN SODIUM 2 G: 2 INJECTION, SOLUTION INTRAVENOUS at 19:26

## 2017-04-28 RX ADMIN — ROCURONIUM BROMIDE 30 MG: 10 INJECTION INTRAVENOUS at 19:26

## 2017-04-28 RX ADMIN — PROPOFOL 20 MG: 10 INJECTION, EMULSION INTRAVENOUS at 19:26

## 2017-04-28 RX ADMIN — GLYCOPYRROLATE 0.5 MG: 0.2 INJECTION, SOLUTION INTRAMUSCULAR; INTRAVENOUS at 20:39

## 2017-04-28 RX ADMIN — FENTANYL CITRATE 25 MCG: 50 INJECTION INTRAMUSCULAR; INTRAVENOUS at 21:08

## 2017-04-28 RX ADMIN — KETAMINE HYDROCHLORIDE 20 MG: 10 INJECTION, SOLUTION INTRAMUSCULAR; INTRAVENOUS at 19:26

## 2017-04-28 RX ADMIN — FENTANYL CITRATE 50 MCG: 50 INJECTION, SOLUTION INTRAMUSCULAR; INTRAVENOUS at 19:26

## 2017-04-28 RX ADMIN — SODIUM CHLORIDE, POTASSIUM CHLORIDE, SODIUM LACTATE AND CALCIUM CHLORIDE: 600; 310; 30; 20 INJECTION, SOLUTION INTRAVENOUS at 19:26

## 2017-04-28 RX ADMIN — SODIUM CHLORIDE, POTASSIUM CHLORIDE, SODIUM LACTATE AND CALCIUM CHLORIDE: 600; 310; 30; 20 INJECTION, SOLUTION INTRAVENOUS at 19:13

## 2017-04-28 RX ADMIN — FENTANYL CITRATE 25 MCG: 50 INJECTION, SOLUTION INTRAMUSCULAR; INTRAVENOUS at 20:46

## 2017-04-28 RX ADMIN — FENTANYL CITRATE 25 MCG: 50 INJECTION INTRAMUSCULAR; INTRAVENOUS at 21:11

## 2017-04-28 RX ADMIN — HYDROMORPHONE HYDROCHLORIDE 0.2 MG: 1 INJECTION, SOLUTION INTRAMUSCULAR; INTRAVENOUS; SUBCUTANEOUS at 21:38

## 2017-04-28 RX ADMIN — Medication 2.5 MG: at 20:39

## 2017-04-28 RX ADMIN — METHYLPREDNISOLONE 8 MG: 8 TABLET ORAL at 23:38

## 2017-04-28 RX ADMIN — LIDOCAINE HYDROCHLORIDE 100 MG: 20 INJECTION, SOLUTION INFILTRATION; PERINEURAL at 19:26

## 2017-04-28 RX ADMIN — THROMBIN, TOPICAL (BOVINE) 5000 UNITS: KIT at 20:23

## 2017-04-28 RX ADMIN — FENTANYL CITRATE 50 MCG: 50 INJECTION INTRAMUSCULAR; INTRAVENOUS at 21:20

## 2017-04-28 RX ADMIN — OXYCODONE HYDROCHLORIDE 5 MG: 5 TABLET ORAL at 23:38

## 2017-04-28 RX ADMIN — HYDROMORPHONE HYDROCHLORIDE 0.3 MG: 1 INJECTION, SOLUTION INTRAMUSCULAR; INTRAVENOUS; SUBCUTANEOUS at 21:28

## 2017-04-28 RX ADMIN — HYDROMORPHONE HYDROCHLORIDE 0.3 MG: 1 INJECTION, SOLUTION INTRAMUSCULAR; INTRAVENOUS; SUBCUTANEOUS at 21:51

## 2017-04-28 RX ADMIN — ONDANSETRON 4 MG: 2 INJECTION INTRAMUSCULAR; INTRAVENOUS at 20:25

## 2017-04-28 RX ADMIN — PROPOFOL 20 MG: 10 INJECTION, EMULSION INTRAVENOUS at 20:23

## 2017-04-28 RX ADMIN — ROCURONIUM BROMIDE 10 MG: 10 INJECTION INTRAVENOUS at 19:56

## 2017-04-28 RX ADMIN — BUPIVACAINE HYDROCHLORIDE AND EPINEPHRINE BITARTRATE 20 ML: 2.5; .005 INJECTION, SOLUTION INFILTRATION; PERINEURAL at 19:45

## 2017-04-28 ASSESSMENT — PAIN DESCRIPTION - DESCRIPTORS: DESCRIPTORS: ACHING

## 2017-04-28 NOTE — PROGRESS NOTES
SPIRITUAL HEALTH SERVICES  [facility] ([place] 3C   PRE-SURGERY VISIT    Had pre-surgery visit with pt, , and two daughters.  Provided spiritual support, prayer.   Willi Islas M.Div (Bill)., [facility]  Staff   Pager [phone]

## 2017-04-28 NOTE — IP AVS SNAPSHOT
Unit 6D Observation [address] [address]    Phone:  [phone]    Fax:  [phone]                                       After Visit Summary   4/28/2017    Indy Reyes    MRN: [id number]           After Visit Summary Signature Page     I have received my discharge instructions, and my questions have been answered. I have discussed any challenges I see with this plan with the nurse or doctor.    ..........................................................................................................................................  Patient/Patient Representative Signature      ..........................................................................................................................................  Patient Representative Print Name and Relationship to Patient    ..................................................               ................................................  Date                                            Time    ..........................................................................................................................................  Reviewed by Signature/Title    ...................................................              ..............................................  Date                                                            Time

## 2017-04-28 NOTE — IP AVS SNAPSHOT
MRN:[id number]                      After Visit Summary   4/28/2017    Indy Reyes    MRN: [id number]           Thank you!     Thank you for choosing [facility] for your care. Our goal is always to provide you with excellent care. Hearing back from our patients is one way we can continue to improve our services. Please take a few minutes to complete the written survey that you may receive in the mail after you visit with us. Thank you!        Patient Information     Date Of Birth          2/27/1931        Designated Caregiver       Most Recent Value    Caregiver    Will someone help with your care after discharge? yes    Name of designated caregiver Darnell    Phone number of caregiver [phone]    Caregiver address [address] [facility] [address] [facility] [address]      About your hospital stay     You were admitted on:  April 28, 2017 You last received care in the:  Unit 6D Observation [facility]    You were discharged on:  April 29, 2017        Reason for your hospital stay       Postop L4 radiculopathy                  Who to Call     For medical emergencies, please call 911.  For non-urgent questions about your medical care, please call your primary care provider or clinic, [phone]  For questions related to your surgery, please call your surgery clinic        Attending Provider     Provider Specialty    Fady Woo MD Neurosurgery       Primary Care Provider Office Phone # Fax #    [place] Hermelindo Kulkarni [phone]       [place] [address] [address] [place] [id number]         When to contact your care team       Please call or go to the closest Emergency Room if you have increased pain, redness, drainage, or swelling at your incision, temperature > 101.5 degrees Farenheit, changes in neurologic status (such as headache, lightheadedness, dizziness, confusion, or numbness, tingling, or weakness in your face, arms, or legs) or if you have any other  questions or concerns.    You may reach the Neurosurgery clinic at [phone] during regular business hours. You can call the hospital  at [phone] and ask for the on-call Pediatric Neurosurgery Resident at all other times.                  After Care Instructions     Activity       Your activity upon discharge: activity as tolerated            Diet       Follow this diet upon discharge: Regular Diet            Wound care and dressings       You have absorbable sutures. You should keep the wound undressed and open to air. You are allowed to take showers and get the wound wet starting on post-operative day #3 5/1/2017, but you may not scrub or soak the wound or keep it submerged under water. If you do happen to get the wound wet, be sure to pat dry it rather than scrubbing it with a towel.                  Follow-up Appointments     Adult [facility] Follow-up and recommended labs and tests       You should follow up with Dr. Woo or associate in neurosurgery clinic in 2 weeks for wound check, and general post-operative care. You should call [phone] or [phone] if you have not heard from the hospital within 7 days of discharge regarding your follow-up appointment.      Appointments on [place] and/or [facility] (with [facility] or [facility] provider or service). Call [phone] if you haven't heard regarding these appointments within 7 days of discharge.                  Further instructions from your care team              Tips for taking pain medications    To get the best pain relief possible , remember these points:      Take pain medications as directed, before pain becomes severe      Pain medication can upset your stomach: taking it with food may help      Constipation is a common side effect of pain medication. Drink plenty of  Fluids      Eat foods high in fiber. Take a stool softener  if recommended by your doctor or  Pharmacist.        Do not drink alcohol, drive or operate  machinery while taking pain medications.      Ask about other ways to control pain, such as with heat, ice or relaxation.    [facility], [place]  Same-Day Surgery   Adult Discharge Orders & Instructions     For 24 hours after surgery    1. Get plenty of rest.  A responsible adult must stay with you for at least 24 hours after you leave the hospital.   2. Do not drive or use heavy equipment.  If you have weakness or tingling, don't drive or use heavy equipment until this feeling goes away.  3. Do not drink alcohol.  4. Avoid strenuous or risky activities.  Ask for help when climbing stairs.   5. You may feel lightheaded.  IF so, sit for a few minutes before standing.  Have someone help you get up.   6. If you have nausea (feel sick to your stomach): Drink only clear liquids such as apple juice, ginger ale, broth or 7-Up.  Rest may also help.  Be sure to drink enough fluids.  Move to a regular diet as you feel able.  7. You may have a slight fever. Call the doctor if your fever is over 100 F (37.7 C) (taken under the tongue) or lasts longer than 24 hours.  8. You may have a dry mouth, a sore throat, muscle aches or trouble sleeping.  These should go away after 24 hours.  9. Do not make important or legal decisions.   Call your doctor for any of the followin.  Signs of infection (fever, growing tenderness at the surgery site, a large amount of drainage or bleeding, severe pain, foul-smelling drainage, redness, swelling).    2. It has been over 8 to 10 hours since surgery and you are still not able to urinate (pass water).    3.  Headache for over 24 hours.      To contact a doctor, call: Dr Woo's office @ [phone] or:        [phone] and ask for the resident on call for   ____Neurological Surgery / Surgery____ (answered 24 hours a day)      Emergency Department:    [facility]: [phone]       (TTY for hearing impaired: [phone])    [facility]:  "[phone]       (TTY for hearing impaired: [phone])        Pending Results     No orders found for last 3 day(s).            Statement of Approval     Ordered          04/29/17 0803  I have reviewed and agree with all the recommendations and orders detailed in this document.  EFFECTIVE NOW     Approved and electronically signed by:  Summer Jackson MD             Admission Information     Date & Time Provider Department Dept. Phone    4/28/2017 Fady Woo MD Unit 6D Observation [facility] [place] [phone]      Your Vitals Were     Blood Pressure Pulse Temperature Respirations Height Weight    134/83 99 97.8  F (36.6  C) (Oral) 16 1.63 m (5' 4.17\") 53.3 kg (117 lb 8.1 oz)    Pulse Oximetry BMI (Body Mass Index)                99% 20.06 kg/m2          MyChart Information     [employer] gives you secure access to your electronic health record. If you see a primary care provider, you can also send messages to your care team and make appointments. If you have questions, please call your primary care clinic.  If you do not have a primary care provider, please call [phone] and they will assist you.        Care EveryWhere ID     This is your Care EveryWhere ID. This could be used by other organizations to access your [place] medical records  [id number]           Review of your medicines      START taking        Dose / Directions    acetaminophen 325 MG tablet   Commonly known as:  TYLENOL   Used for:  Lumbar radiculopathy, right        Dose:  650 mg   Take 2 tablets (650 mg) by mouth every 4 hours as needed for other (mild pain)   Quantity:  100 tablet   Refills:  0       methylPREDNISolone 4 MG tablet   Commonly known as:  MEDROL DOSEPAK   Used for:  Lumbar radiculopathy, right        Follow package instructions   Quantity:  21 tablet   Refills:  0       oxyCODONE 5 MG IR tablet   Commonly known as:  ROXICODONE   Used for:  Lumbar radiculopathy, right        Dose:  5 mg   Take 1 tablet (5 mg) by " mouth every 3 hours as needed for moderate to severe pain   Quantity:  25 tablet   Refills:  0       senna 8.6 MG tablet   Commonly known as:  SENOKOT   Used for:  Lumbar radiculopathy, right        Dose:  1 tablet   Take 1 tablet by mouth daily   Quantity:  120 tablet   Refills:  0         CONTINUE these medicines which may have CHANGED, or have new prescriptions. If we are uncertain of the size of tablets/capsules you have at home, strength may be listed as something that might have changed.        Dose / Directions    fluticasone 50 MCG/ACT spray   Commonly known as:  FLONASE   This may have changed:    - when to take this  - reasons to take this   Used for:  Postnasal drip        Dose:  2 spray   Spray 2 sprays into both nostrils daily   Quantity:  16 g   Refills:  6         CONTINUE these medicines which have NOT CHANGED        Dose / Directions    * OCUVITE ADULT 50+ Caps        Dose:  2 capsule   Take 2 capsules by mouth every morning   Refills:  0       * ICAPS AREDS FORMULA Tabs        Dose:  2 capsule   Take 2 capsules by mouth every morning   Refills:  0       * MULTIVITAMIN ADULTS 50+ Tabs        Dose:  2 capsule   Take 2 capsules by mouth every morning   Refills:  0       * Notice:  This list has 3 medication(s) that are the same as other medications prescribed for you. Read the directions carefully, and ask your doctor or other care provider to review them with you.         Where to get your medicines      These medications were sent to [facility] [place], [place] - [address] [place] [address]     Phone:  [phone]     acetaminophen 325 MG tablet    methylPREDNISolone 4 MG tablet    senna 8.6 MG tablet         Some of these will need a paper prescription and others can be bought over the counter. Ask your nurse if you have questions.     Bring a paper prescription for each of these medications     oxyCODONE 5 MG IR tablet                Protect  others around you: Learn how to safely use, store and throw away your medicines at www.disposemymeds.org.             Medication List: This is a list of all your medications and when to take them. Check marks below indicate your daily home schedule. Keep this list as a reference.      Medications           Morning Afternoon Evening Bedtime As Needed    acetaminophen 325 MG tablet   Commonly known as:  TYLENOL   Take 2 tablets (650 mg) by mouth every 4 hours as needed for other (mild pain)                                fluticasone 50 MCG/ACT spray   Commonly known as:  FLONASE   Spray 2 sprays into both nostrils daily                                methylPREDNISolone 4 MG tablet   Commonly known as:  MEDROL DOSEPAK   Follow package instructions                                * OCUVITE ADULT 50+ Caps   Take 2 capsules by mouth every morning                                * ICAPS AREDS FORMULA Tabs   Take 2 capsules by mouth every morning                                * MULTIVITAMIN ADULTS 50+ Tabs   Take 2 capsules by mouth every morning                                oxyCODONE 5 MG IR tablet   Commonly known as:  ROXICODONE   Take 1 tablet (5 mg) by mouth every 3 hours as needed for moderate to severe pain   Last time this was given:  5 mg on 4/28/2017 11:38 PM                                senna 8.6 MG tablet   Commonly known as:  SENOKOT   Take 1 tablet by mouth daily                                * Notice:  This list has 3 medication(s) that are the same as other medications prescribed for you. Read the directions carefully, and ask your doctor or other care provider to review them with you.

## 2017-04-29 VITALS
DIASTOLIC BLOOD PRESSURE: 83 MMHG | TEMPERATURE: 97.8 F | HEIGHT: 64 IN | SYSTOLIC BLOOD PRESSURE: 134 MMHG | BODY MASS INDEX: 20.06 KG/M2 | WEIGHT: 117.5 LBS | OXYGEN SATURATION: 99 % | RESPIRATION RATE: 16 BRPM | HEART RATE: 99 BPM

## 2017-04-29 LAB — GLUCOSE BLDC GLUCOMTR-MCNC: 138 MG/DL (ref 70–99)

## 2017-04-29 PROCEDURE — 25000128 H RX IP 250 OP 636: Performed by: STUDENT IN AN ORGANIZED HEALTH CARE EDUCATION/TRAINING PROGRAM

## 2017-04-29 PROCEDURE — 40000893 ZZH STATISTIC PT IP EVAL DEFER

## 2017-04-29 PROCEDURE — 25000131 ZZH RX MED GY IP 250 OP 636 PS 637: Mod: GY | Performed by: STUDENT IN AN ORGANIZED HEALTH CARE EDUCATION/TRAINING PROGRAM

## 2017-04-29 PROCEDURE — 82962 GLUCOSE BLOOD TEST: CPT

## 2017-04-29 RX ORDER — METHYLPREDNISOLONE 4 MG
TABLET, DOSE PACK ORAL
Qty: 21 TABLET | Refills: 0 | Status: SHIPPED | OUTPATIENT
Start: 2017-04-29 | End: 2017-06-09

## 2017-04-29 RX ORDER — METHYLPREDNISOLONE 4 MG
TABLET, DOSE PACK ORAL
Qty: 21 TABLET | Refills: 0 | Status: SHIPPED | OUTPATIENT
Start: 2017-04-29 | End: 2017-04-29

## 2017-04-29 RX ORDER — SENNOSIDES A AND B 8.6 MG/1
1 TABLET, FILM COATED ORAL DAILY
Qty: 120 TABLET | Refills: 0 | Status: SHIPPED | OUTPATIENT
Start: 2017-04-29 | End: 2017-06-09

## 2017-04-29 RX ORDER — OXYCODONE HYDROCHLORIDE 5 MG/1
5 TABLET ORAL
Qty: 25 TABLET | Refills: 0 | Status: SHIPPED | OUTPATIENT
Start: 2017-04-29 | End: 2017-06-09

## 2017-04-29 RX ADMIN — METHYLPREDNISOLONE 4 MG: 4 TABLET ORAL at 08:48

## 2017-04-29 RX ADMIN — HYDROMORPHONE HYDROCHLORIDE 0.2 MG: 10 INJECTION, SOLUTION INTRAMUSCULAR; INTRAVENOUS; SUBCUTANEOUS at 02:08

## 2017-04-29 RX ADMIN — METHYLPREDNISOLONE 8 MG: 8 TABLET ORAL at 00:04

## 2017-04-29 RX ADMIN — METHYLPREDNISOLONE 4 MG: 4 TABLET ORAL at 00:04

## 2017-04-29 RX ADMIN — METHYLPREDNISOLONE 4 MG: 4 TABLET ORAL at 00:03

## 2017-04-29 ASSESSMENT — PAIN DESCRIPTION - DESCRIPTORS
DESCRIPTORS: ACHING
DESCRIPTORS: ACHING

## 2017-04-29 NOTE — BRIEF OP NOTE
[facility], [place]    Pre-operative diagnosis: Right Lumbar 5 Radiculopathy    Post-operative diagnosis * No post-op diagnosis entered *   Procedure: Procedure(s):  Right Lumbar 4-5 Foraminotomy - Wound Class: I-Clean   Surgeon: Fady Woo MD   Assistants(s): Noe Rodrigues MD   Anesthesia: General    Estimated blood loss: Less than 50 ml    Total IV fluids: (See anesthesia record)   Blood transfusion: No transfusion was given during surgery   Total urine output: (See anesthesia record)  Not measured   Drains: None   Specimens: None   Implants: None   Findings: None.   Complications: None.   Condition: Stable   Comments: See dictated operative report for full details

## 2017-04-29 NOTE — DISCHARGE INSTRUCTIONS
Tips for taking pain medications    To get the best pain relief possible , remember these points:      Take pain medications as directed, before pain becomes severe      Pain medication can upset your stomach: taking it with food may help      Constipation is a common side effect of pain medication. Drink plenty of  Fluids      Eat foods high in fiber. Take a stool softener  if recommended by your doctor or  Pharmacist.        Do not drink alcohol, drive or operate machinery while taking pain medications.      Ask about other ways to control pain, such as with heat, ice or relaxation.    [facility], [place]  Same-Day Surgery   Adult Discharge Orders & Instructions     For 24 hours after surgery    1. Get plenty of rest.  A responsible adult must stay with you for at least 24 hours after you leave the hospital.   2. Do not drive or use heavy equipment.  If you have weakness or tingling, don't drive or use heavy equipment until this feeling goes away.  3. Do not drink alcohol.  4. Avoid strenuous or risky activities.  Ask for help when climbing stairs.   5. You may feel lightheaded.  IF so, sit for a few minutes before standing.  Have someone help you get up.   6. If you have nausea (feel sick to your stomach): Drink only clear liquids such as apple juice, ginger ale, broth or 7-Up.  Rest may also help.  Be sure to drink enough fluids.  Move to a regular diet as you feel able.  7. You may have a slight fever. Call the doctor if your fever is over 100 F (37.7 C) (taken under the tongue) or lasts longer than 24 hours.  8. You may have a dry mouth, a sore throat, muscle aches or trouble sleeping.  These should go away after 24 hours.  9. Do not make important or legal decisions.   Call your doctor for any of the followin.  Signs of infection (fever, growing tenderness at the surgery site, a large amount of drainage or bleeding, severe pain, foul-smelling drainage, redness,  swelling).    2. It has been over 8 to 10 hours since surgery and you are still not able to urinate (pass water).    3.  Headache for over 24 hours.      To contact a doctor, call: Dr Woo's office @ [phone] or:        [phone] and ask for the resident on call for   ____Neurological Surgery / Surgery____ (answered 24 hours a day)      Emergency Department:    [facility]: [phone]       (TTY for hearing impaired: [phone])    [facility]: [phone]       (TTY for hearing impaired: [phone])

## 2017-04-29 NOTE — PROGRESS NOTES
"/83  Pulse 99  Temp 97.8  F (36.6  C) (Oral)  Resp 16  Ht 1.63 m (5' 4.17\")  Wt 53.3 kg (117 lb 8.1 oz)  SpO2 99%  BMI 20.06 kg/m2    All goals met for discharge. Patient denies pain Dressing remains CDI. Discharge instructions given to patient and her son, all questions answered. Both patient and her son were able to verbalize understanding of instructions. PIV discontinued. All belongings with patient. Patient discharged to home.   "

## 2017-04-29 NOTE — DISCHARGE SUMMARY
[facility] Discharge Summary and Instructions    Indy Reyes MRN# [id number]   Age: 86 year old YOB: 1931     Date of Admission:  4/28/2017  Date of Discharge::  4/29/2017  Admitting Physician:  Fady Woo MD  Discharge Physician:  Fady Woo MD          Admission Diagnoses:   Right Lumbar 5 Radiculopathy   Lumbar radiculopathy          Discharge Diagnosis:   Right Lumbar 5 Radiculopathy   Lumbar radiculopathy          Procedures:   Right lumbar 4-5 foraminotomy (4/28/17)           Brief History of Illness:   Ms. Reyes is an 86 year old female with a few months of RLE pain, perhaps after a long car trip, described as having the pain in her back radiating down the side of her right leg to the top of her foot. She has tried conservative methods of treatment including physical therapy and medications, without significant improvement. For this reason, the risks, benefits, and alternatives of a surgical decompression to address her L4-5 foraminal stenosis seen on imaging that is likely causing her symptoms, was discussed. Patient elected to undergo above-mentioned procedure.           Hospital Course:   Patient underwent above-mentioned procedure on 4/28/17. The operation was uncomplicated and she was admitted to the surgical floor for routine post operative cares. On post operative day 1 she was doing well and transferred to the floor. On post operative day 1, she was ambulating, voiding without a billings, eating a regular diet, pain was well controlled and therefore she was discharged home today. Patient states her pain is completely resolved today prior to discharge.           Discharge Medications:     Current Discharge Medication List      START taking these medications    Details   oxyCODONE (ROXICODONE) 5 MG IR tablet Take 1 tablet (5 mg) by mouth every 3 hours as needed for moderate to severe pain  Qty: 25 tablet, Refills: 0    Associated Diagnoses: Lumbar  radiculopathy, right      senna (SENOKOT) 8.6 MG tablet Take 1 tablet by mouth daily  Qty: 120 tablet, Refills: 0    Comments: Take while on oxycodone  Associated Diagnoses: Lumbar radiculopathy, right      methylPREDNISolone (MEDROL DOSEPAK) 4 MG tablet Follow package instructions  Qty: 21 tablet, Refills: 0    Associated Diagnoses: Lumbar radiculopathy, right      acetaminophen (TYLENOL) 325 MG tablet Take 2 tablets (650 mg) by mouth every 4 hours as needed for other (mild pain)  Qty: 100 tablet, Refills: 0    Associated Diagnoses: Lumbar radiculopathy, right         CONTINUE these medications which have NOT CHANGED    Details   Multiple Vitamins-Minerals (OCUVITE ADULT 50+) CAPS Take 2 capsules by mouth every morning      !! Multiple Vitamins-Minerals (ICAPS AREDS FORMULA) TABS Take 2 capsules by mouth every morning      !! Multiple Vitamins-Minerals (MULTIVITAMIN ADULTS 50+) TABS Take 2 capsules by mouth every morning      fluticasone (FLONASE) 50 MCG/ACT nasal spray Spray 2 sprays into both nostrils daily  Qty: 16 g, Refills: 6    Associated Diagnoses: Postnasal drip       !! - Potential duplicate medications found. Please discuss with provider.                  Discharge Instructions and Follow-Up:   Discharge diet: Regular   Discharge activity: You may advance activity as tolerated. No strenuous exercise or heay lifting greater than 10 lbs for 4 weeks or until seen and cleared in clinic.   Discharge follow-up: Follow-up with Dr. Fady Woo MD in 2 weeks.    You underwent surgery on your  lumbar spine for decompression by Dr. Fady Woo MD, Chester Beckham MD.     - You will have follow up with your Primary Care Provider in 2 weeks for a wound check or in neurosurgery clinic with our neurosurgical PA.  Your sutures are absorbable and do not need to be removed.      - You will see your surgeon at 6 weeks and 3 months with your surgeon.      - You may take medications like Advil, Motrin, Ibuprofen,  Celebrex, Aleve or aspirin to help with your pain control if needed.     -If you have not heard from our clinic about your follow up visit by 3-4 days following your discharge, please call our clinic at [phone] to schedule an appointment with Dr. Woo.     After discharge, your activity restrictions are:   - We encourage short frequent walks, increasing as tolerated.  - Avoid housework, vacuuming, laundry, leaf raking, lawn mowing and snow removal.   - No driving until you are seen in clinic and cleared by your neurosurgeon. You should not drive while on narcotic pain medication.   -If you have a brace, please wear it as directed until you are seen in follow up.  Often times these need to be worn for up to 3 months.  - No strenuous activity.  - No lifting more than 10 pounds until you are seen in clinic (a gallon of milk weighs approximately 8 pounds)  - You are ok to shower, but do not soak your incisions. Pat them dry if they get damp.   - Avoid coloring your hair or permanent styling until cleared by your surgeon  - No baths, hot tubs or pools for 4-6 weeks after surgery.     Wound cares after surgery  - You are ok to shower, but do not soak your incisions. Pat them dry if they get damp.   - Avoid coloring your hair or permanent styling until cleared by your surgeon  - No baths, hot tubs or pools for 4-6 weeks after surgery.   - Sutures are absorbable and do not need to be removed.       Call if you have any of the followin. Temperature greater than 101.5 F.   2. Any redness, swelling or discharge from the wound.   3. Any new weakness, numbness or altered mental status.  4. Worsening pain that is not improving with the pain medications you were prescribed.     Call [phone] or after 5:00 pm or on weekends call [phone] and ask for the neurosurgery resident on call. Thank You.         Wound care: Ok to shower,however no scrubbing of the wound and no soaking of the wound, meaning no bathtubs or  swimming pools. Pat dry only. Leave wound open to air.  Sutures are absorbable and do not need to be removed.      Please call if you have:  1. increased pain, redness, drainage, swelling at your incision  2. fevers > 101.5 F degrees  3. with any questions or concerns.  You may reach the Neurosurgery clinic at [phone] during regular work hours. ER at [phone].    and ask for the Neurosurgery Resident on call at [phone], during off hours or weekends.         Discharge Disposition:   Discharged to home        Agree with resident's note.  Seen and examined today with team.  Chester Beckham

## 2017-04-29 NOTE — PLAN OF CARE
Problem: Goal Outcome Summary  Goal: Goal Outcome Summary  PT 6D: Discussed with RN/pt as well as reviewed chart. No acute PT needs identified. Safe to d/c home this AM. Will sign off.

## 2017-04-29 NOTE — ANESTHESIA CARE TRANSFER NOTE
Patient: Indy Reyes    Procedure(s):  Right Lumbar 4-5 Foraminotomy - Wound Class: I-Clean    Diagnosis: Right Lumbar 5 Radiculopathy   Diagnosis Additional Information: No value filed.    Anesthesia Type:   General, ETT     Note:  Airway :Face Mask  Patient transferred to:PACU  Comments: VSS. Breathing spontaneously at a regular rate with adequate tidal volumes and maintaining O2 sats on 6L facemask. Denies nausea or pain. No apparent complications from anesthesia.     Bang Multani MD      Vitals: (Last set prior to Anesthesia Care Transfer)    CRNA VITALS  4/28/2017 2019 - 4/28/2017 2058 4/28/2017             Resp Rate (set): 10                Electronically Signed By: Bang Multani MD  April 28, 2017  8:58 PM

## 2017-04-29 NOTE — OR NURSING
Dr. Wilson notified for sign out on pt. MD aware of SBP in 160's, denies need for intervention. MD states to send pt up to 6D and will place sign out when able.

## 2017-04-29 NOTE — ANESTHESIA POSTPROCEDURE EVALUATION
Patient: Indy Reyes    Procedure(s):  Right Lumbar 4-5 Foraminotomy - Wound Class: I-Clean    Diagnosis:Right Lumbar 5 Radiculopathy   Diagnosis Additional Information: No value filed.    Anesthesia Type:  General, ETT    Note:  Anesthesia Post Evaluation    Patient location during evaluation: PACU  Patient participation: Able to fully participate in evaluation  Level of consciousness: awake and alert  Pain management: adequate  Airway patency: patent  Cardiovascular status: acceptable  Respiratory status: acceptable  Hydration status: acceptable  PONV: none     Anesthetic complications: None          Last vitals:  Vitals:    04/28/17 2130 04/28/17 2145 04/28/17 2150   BP:      Resp: 16 12 12   Temp: 36.8  C (98.3  F)  36.8  C (98.3  F)   SpO2:            Electronically Signed By: Jacquelyn Wilson MD  April 28, 2017  10:16 PM

## 2017-04-29 NOTE — PROGRESS NOTES
"Neurosurgery Daily Progress Note  4/29/2017    Overnight events/subjective: No issues overnight. States that she is pain-free in her R leg.     O/ /62 (BP Location: Left arm)  Pulse 99  Temp 98.2  F (36.8  C) (Oral)  Resp 17  Ht 1.63 m (5' 4.17\")  Wt 53.3 kg (117 lb 8.1 oz)  SpO2 98%  BMI 20.06 kg/m2  Exam:   Gen: Laying in bed, not in acute distress  MS: A&Ox3, Speech fluent and conversant   CN: Pupils round and reactive, extraocular movements intact, face symmetric, tongue midline, uvula & palate elevate symmetrically   Motor: 5/5 in b/l UE& LEs  Sensory: intact to light touch   No drift    Non labored breathing    IMG: no postop imaging     LABS: no new labs      A/P: Indy Reyes is a 86 year old POD#1 s/p R L4/L5 foraminotomy   -pain under adequate control  -ambulate and may then d/c this AM   -tolerating diet this AM     Anticipate d/c this AM.     Noe Rodrigues MD   Neurosurgery PGY-3  Please contact the neurosurgery resident on call with questions by dialing * [phone], then entering [phone] when prompted    Agree with resident's note.  Seen and examined today with team.  Chester Beckham            "

## 2017-04-30 NOTE — OP NOTE
DATE OF SERVICE:  04/28/2017.        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      PROCEDURES:   1.  Right L4-L5 hemilaminectomy and foraminotomy and excision of synovial cyst.   2.  Intraoperative microdissection.   3.  Intraoperative fluoroscopy.      ATTENDING SURGEON:  Fady Woo MD      ASSISTANT:  Noe Rodrigues MD      ANESTHESIA:  General endotracheal anesthesia plus local anesthetic.      ESTIMATED BLOOD LOSS:  Less than 50 mL.      INDICATIONS:  Indy Reyes is an 86-year-old female with right leg pain consistent with an L5 radiculopathy.  This has failed conservative measures and she has clear compression of the right L5 nerve root on her MRI from L4-L5 spondylosis and a synovial cyst.  She was brought for surgical decompression of the right L5 nerve.      DESCRIPTION OF PROCEDURE:  The patient was brought to the operating room, general endotracheal anesthesia was induced.  The patient was then rolled into the prone position on the Jorge Luis frame and the back was prepped and draped in a sterile fashion.  The C-arm was brought into the field sterilely draped and the L4-L5 level localized.  A midline incision was made and a right-sided exposure performed at the L4-5 level.  The fluoroscopy was again used to confirm that the right L4-L5 level was exposed.  Once this was done, the microscope was sterilely draped and brought into the field.  The high-speed drill was used to remove the right hemilamina and medial facet at L4-L5.  The ligamentum flavum was exposed.  A synovial cyst was also noted underneath the ligamentum flavum.  The ligament was removed with a combination of curettes and Kerrison punches.  The underlying thecal sac was identified and was noted to be well decompressed from across the segment.  Once this was done, hemostasis was achieved, the wound was  copiously irrigated.  More local anesthetic was infiltrated into the wound.  The fascia was closed with 0 interrupted Vicryl, 2-0 inverted interrupted Vicryl was used for subcutaneous layer, 4-0 Monocryl was used for subcuticular stitch and Dermabond as a sterile dressing.  The patient was then rolled back in supine position, awakened, extubated and taken to the recovery room in good condition.         DIONISIO OLGUIN MD             D: 2017 20:54   T: 2017 21:05   MT:       Name:     KVNG JACOBSEN   MRN:      [id number]        Account:        [id number]   :      1931           Procedure Date: 2017      Document: [id number]

## 2017-05-12 ENCOUNTER — OFFICE VISIT (OUTPATIENT)
Dept: NEUROSURGERY | Facility: CLINIC | Age: 82
End: 2017-05-12

## 2017-05-12 VITALS
OXYGEN SATURATION: 98 % | SYSTOLIC BLOOD PRESSURE: 130 MMHG | HEART RATE: 82 BPM | DIASTOLIC BLOOD PRESSURE: 52 MMHG | HEIGHT: 64 IN | RESPIRATION RATE: 20 BRPM | TEMPERATURE: 98.2 F

## 2017-05-12 DIAGNOSIS — Z98.890 POST-OPERATIVE STATE: ICD-10-CM

## 2017-05-12 DIAGNOSIS — M54.16 LUMBAR RADICULOPATHY, RIGHT: Primary | ICD-10-CM

## 2017-05-12 ASSESSMENT — PAIN SCALES - GENERAL: PAINLEVEL: NO PAIN (0)

## 2017-05-12 NOTE — MR AVS SNAPSHOT
"              After Visit Summary   5/12/2017    Indy Reyes    MRN: [id number]           Patient Information     Date Of Birth          2/27/1931        Visit Information        Provider Department      5/12/2017 12:00 PM Libby Gavin PA-C [facility] Neurosurgery        Today's Diagnoses     Lumbar radiculopathy, right    -  1    Post-operative state           Follow-ups after your visit        Who to contact     Please call your clinic at [phone] to:    Ask questions about your health    Make or cancel appointments    Discuss your medicines    Learn about your test results    Speak to your doctor   If you have compliments or concerns about an experience at your clinic, or if you wish to file a complaint, please contact [facility] Patient Relations at [phone] or email us at [email or web address]         Additional Information About Your Visit        MyChart Information     [employer] gives you secure access to your electronic health record. If you see a primary care provider, you can also send messages to your care team and make appointments. If you have questions, please call your primary care clinic.  If you do not have a primary care provider, please call [phone] and they will assist you.      [employer] is an electronic gateway that provides easy, online access to your medical records. With [employer], you can request a clinic appointment, read your test results, renew a prescription or communicate with your care team.     To access your existing account, please contact your [facility] Clinic or call [phone] for assistance.        Care EveryWhere ID     This is your Care EveryWhere ID. This could be used by other organizations to access your [facility] medical records  [id number]        Your Vitals Were     Pulse Temperature Respirations Height Pulse Oximetry       82 98.2  F (36.8  C) (Oral) 20 1.632 m (5' 4.25\") 98%        Blood " Pressure from Last 3 Encounters:   05/12/17 130/52   04/29/17 134/83   04/25/17 147/77    Weight from Last 3 Encounters:   04/28/17 53.3 kg (117 lb 8.1 oz)   04/25/17 53.3 kg (117 lb 6.4 oz)   11/30/16 51.7 kg (114 lb)              Today, you had the following     No orders found for display         Today's Medication Changes          These changes are accurate as of: 5/12/17 12:16 PM.  If you have any questions, ask your nurse or doctor.               These medicines have changed or have updated prescriptions.        Dose/Directions    fluticasone 50 MCG/ACT spray   Commonly known as:  FLONASE   This may have changed:    - when to take this  - reasons to take this   Used for:  Postnasal drip        Dose:  2 spray   Spray 2 sprays into both nostrils daily   Quantity:  16 g   Refills:  6                Primary Care Provider Office Phone # Fax #    Batool Kulkarni [phone]       [address] [address]        Thank you!     Thank you for choosing [facility]  for your care. Our goal is always to provide you with excellent care. Hearing back from our patients is one way we can continue to improve our services. Please take a few minutes to complete the written survey that you may receive in the mail after your visit with us. Thank you!             Your Updated Medication List - Protect others around you: Learn how to safely use, store and throw away your medicines at www.disposemymeds.org.          This list is accurate as of: 5/12/17 12:16 PM.  Always use your most recent med list.                   Brand Name Dispense Instructions for use    acetaminophen 325 MG tablet    TYLENOL    100 tablet    Take 2 tablets (650 mg) by mouth every 4 hours as needed for other (mild pain)       fluticasone 50 MCG/ACT spray    FLONASE    16 g    Spray 2 sprays into both nostrils daily       methylPREDNISolone 4 MG tablet    MEDROL DOSEPAK    21 tablet     Follow package instructions       * OCUVITE ADULT 50+ Caps      Take 2 capsules by mouth every morning       * ICAPS AREDS FORMULA Tabs      Take 2 capsules by mouth every morning       * MULTIVITAMIN ADULTS 50+ Tabs      Take 2 capsules by mouth every morning       oxyCODONE 5 MG IR tablet    ROXICODONE    25 tablet    Take 1 tablet (5 mg) by mouth every 3 hours as needed for moderate to severe pain       senna 8.6 MG tablet    SENOKOT    120 tablet    Take 1 tablet by mouth daily       * Notice:  This list has 3 medication(s) that are the same as other medications prescribed for you. Read the directions carefully, and ask your doctor or other care provider to review them with you.

## 2017-05-12 NOTE — PROGRESS NOTES
"  Neurosurgery clinic post op wound check  Date of visit: 5/12/2017      DATE OF SURGERY: 04/28/2017. 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       PROCEDURES:   1. Right L4-L5 hemilaminectomy and foraminotomy and excision of synovial cyst.   2. Intraoperative microdissection.   3. Intraoperative fluoroscopy.       INDICATIONS: Indy Reyes is an 86-year-old female with right leg pain consistent with an L5 radiculopathy. This has failed conservative measures and she has clear compression of the right L5 nerve root on her MRI from L4-L5 spondylosis and a synovial cyst. She was brought for surgical decompression of the right L5 nerve.  HPI:   She is now 2 weeks status post the above procedure.   The procedure itself was without incident. She was admitted to the surgical floor for routine post operative cares.  On post operative day 1, she was ambulating, voiding without a billings, eating a regular diet, pain was well controlled and therefore she was discharged home. Patient stated her pain is completely resolved today prior to discharge. .  Since then her leg feels \"awesome\".  Her incision is fine but the skin is tender around it.   She presents for routine wound check.    STATUS REPORT  WORK:          Retired  ACTIVITY:     Has been following activity restrictions.  Now wants to start: gardening     MEDS:                  Pain          Taking nothing        NICOTINE:   No:    Patient Supplied Answers To the [facility] Pain Questionnaire  [facility] Pain -  Patient Entered Questionnaire/Answers 5/11/2017   What number best describes your pain right now:  0 = No pain  to  10 = Worst pain imaginable 1   How would you describe the pain? other   Which of the following worsen your pain? standing   Which of the following improve or reduce your pain?  lying down   What number best describes your average pain for the past week:  0 = No pain  to  10 = Worst pain imaginable 1   What number best " "describes your LOWEST pain in past 24 hours:  0 = No pain  to  10 = Worst pain imaginable 0   What number best describes your WORST pain in past 24 hours:  0 = No pain  to  10 = Worst pain imaginable 1   When is your pain worst? AM   What non-medicine treatments have you already had for your pain? none   Have you tried treating your pain with medication?  Yes   Are you currently taking medications for your pain? No       Current Outpatient Prescriptions:      oxyCODONE (ROXICODONE) 5 MG IR tablet, Take 1 tablet (5 mg) by mouth every 3 hours as needed for moderate to severe pain, Disp: 25 tablet, Rfl: 0     senna (SENOKOT) 8.6 MG tablet, Take 1 tablet by mouth daily, Disp: 120 tablet, Rfl: 0     methylPREDNISolone (MEDROL DOSEPAK) 4 MG tablet, Follow package instructions, Disp: 21 tablet, Rfl: 0     acetaminophen (TYLENOL) 325 MG tablet, Take 2 tablets (650 mg) by mouth every 4 hours as needed for other (mild pain), Disp: 100 tablet, Rfl: 0     Multiple Vitamins-Minerals (OCUVITE ADULT 50+) CAPS, Take 2 capsules by mouth every morning, Disp: , Rfl:      Multiple Vitamins-Minerals (ICAPS AREDS FORMULA) TABS, Take 2 capsules by mouth every morning, Disp: , Rfl:      Multiple Vitamins-Minerals (MULTIVITAMIN ADULTS 50+) TABS, Take 2 capsules by mouth every morning, Disp: , Rfl:      fluticasone (FLONASE) 50 MCG/ACT nasal spray, Spray 2 sprays into both nostrils daily (Patient taking differently: Spray 2 sprays into both nostrils as needed ), Disp: 16 g, Rfl: 6  Allergies   Allergen Reactions     Tetracycline Rash   PMH, SOC HIST, FAM HIST, PROBLEM LIST:  All reviewed in EPIC.    OBJECTIVE:  /52 (BP Location: Right arm, Patient Position: Chair, Cuff Size: Adult Regular)  Pulse 82  Temp 98.2  F (36.8  C) (Oral)  Resp 20  Ht 1.632 m (5' 4.25\")  SpO2 98%    Imaging:  None new.    EXAM:  Well developed well nourished female found seated comfortably in exam chair.  No apparent distress. She is accompanied.  A&O X3.  " Mood and affect WNL. Language and fund of knowledge intact.  Is able to sit and rise independently.   She has a nicely healing incision.  A little resolving ecchymosis distal to the incision.     Assessment:  1. Lumbar radiculopathy, right    2. Post-operative state    3.     Indy Reyes is doing well and feels so good she wants to get very active again.  We discussed that at length and she has agreed to keep her activities a little quiet for at least a couple more weeks. She may or may not need PT at that point.  I think she won't but she'll let us know and we can order it then. The wound is healthy .     PLAN:  We discussed wound care.  *  May shower and shampoo with mild soap/shampoo including the incision. No hair conditioners, hair treatments or skin cremes for two more weeks.  *  May swim or bathe when all scabbing is gone from the incision.  We discussed medication.    *  Medications prescribed today: none today    We discussed activities and return to work.  *   We recommend she return to normal activities as able for 2 more weeks.  *   She can return to driving if: she is off narcotic.    We discussed follow up   *  Return to clinic in 6-12 weeks with Dr Woo.  Imaging for that visit: None  *  All the patient's questions have been answered and they demonstrate good understanding of the above.    *   The patient has our contact information and is aware that she should call if she has questions comments or concerns.     We appreciate the opportunity to be of service in the care of this pleasant patient.  Please do call if there is anything more we can do    Libby Gavin PA-C  [facility]  Department of Neurosurgery  Phone: [phone]  Fax: [phone]

## 2017-05-12 NOTE — LETTER
"5/12/2017       RE: Indy Reyes  [address]  [facility] [address]     Dear Colleague,    Thank you for referring your patient, Indy Reyes, to the [facility] NEUROSURGERY at [facility]. Please see a copy of my visit note below.      Neurosurgery clinic post op wound check  Date of visit: 5/12/2017      DATE OF SURGERY: 04/28/2017. 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       PROCEDURES:   1. Right L4-L5 hemilaminectomy and foraminotomy and excision of synovial cyst.   2. Intraoperative microdissection.   3. Intraoperative fluoroscopy.       INDICATIONS: Indy Reyes is an 86-year-old female with right leg pain consistent with an L5 radiculopathy. This has failed conservative measures and she has clear compression of the right L5 nerve root on her MRI from L4-L5 spondylosis and a synovial cyst. She was brought for surgical decompression of the right L5 nerve.  HPI:   She is now 2 weeks status post the above procedure.   The procedure itself was without incident. She was admitted to the surgical floor for routine post operative cares.  On post operative day 1, she was ambulating, voiding without a billings, eating a regular diet, pain was well controlled and therefore she was discharged home. Patient stated her pain is completely resolved today prior to discharge. .  Since then her leg feels \"awesome\".  Her incision is fine but the skin is tender around it.   She presents for routine wound check.    STATUS REPORT  WORK:          Retired  ACTIVITY:     Has been following activity restrictions.  Now wants to start: gardening     MEDS:                  Pain          Taking nothing        NICOTINE:   No:    Patient Supplied Answers To the [facility] Pain Questionnaire  [facility] Pain -  Patient Entered Questionnaire/Answers 5/11/2017   What number best describes your pain right now:  0 = No pain  to  10 = Worst pain imaginable 1   How " would you describe the pain? other   Which of the following worsen your pain? standing   Which of the following improve or reduce your pain?  lying down   What number best describes your average pain for the past week:  0 = No pain  to  10 = Worst pain imaginable 1   What number best describes your LOWEST pain in past 24 hours:  0 = No pain  to  10 = Worst pain imaginable 0   What number best describes your WORST pain in past 24 hours:  0 = No pain  to  10 = Worst pain imaginable 1   When is your pain worst? AM   What non-medicine treatments have you already had for your pain? none   Have you tried treating your pain with medication?  Yes   Are you currently taking medications for your pain? No       Current Outpatient Prescriptions:      oxyCODONE (ROXICODONE) 5 MG IR tablet, Take 1 tablet (5 mg) by mouth every 3 hours as needed for moderate to severe pain, Disp: 25 tablet, Rfl: 0     senna (SENOKOT) 8.6 MG tablet, Take 1 tablet by mouth daily, Disp: 120 tablet, Rfl: 0     methylPREDNISolone (MEDROL DOSEPAK) 4 MG tablet, Follow package instructions, Disp: 21 tablet, Rfl: 0     acetaminophen (TYLENOL) 325 MG tablet, Take 2 tablets (650 mg) by mouth every 4 hours as needed for other (mild pain), Disp: 100 tablet, Rfl: 0     Multiple Vitamins-Minerals (OCUVITE ADULT 50+) CAPS, Take 2 capsules by mouth every morning, Disp: , Rfl:      Multiple Vitamins-Minerals (ICAPS AREDS FORMULA) TABS, Take 2 capsules by mouth every morning, Disp: , Rfl:      Multiple Vitamins-Minerals (MULTIVITAMIN ADULTS 50+) TABS, Take 2 capsules by mouth every morning, Disp: , Rfl:      fluticasone (FLONASE) 50 MCG/ACT nasal spray, Spray 2 sprays into both nostrils daily (Patient taking differently: Spray 2 sprays into both nostrils as needed ), Disp: 16 g, Rfl: 6  Allergies   Allergen Reactions     Tetracycline Rash   PMH, SOC HIST, FAM HIST, PROBLEM LIST:  All reviewed in EPIC.    OBJECTIVE:  /52 (BP Location: Right arm, Patient  "Position: Chair, Cuff Size: Adult Regular)  Pulse 82  Temp 98.2  F (36.8  C) (Oral)  Resp 20  Ht 1.632 m (5' 4.25\")  SpO2 98%    Imaging:  None new.    EXAM:  Well developed well nourished female found seated comfortably in exam chair.  No apparent distress. She is accompanied.  A&O X3.  Mood and affect WNL. Language and fund of knowledge intact.  Is able to sit and rise independently.   She has a nicely healing incision.  A little resolving ecchymosis distal to the incision.     Assessment:  1. Lumbar radiculopathy, right    2. Post-operative state    3.     Indy Reyes is doing well and feels so good she wants to get very active again.  We discussed that at length and she has agreed to keep her activities a little quiet for at least a couple more weeks. She may or may not need PT at that point.  I think she won't but she'll let us know and we can order it then. The wound is healthy .     PLAN:  We discussed wound care.  *  May shower and shampoo with mild soap/shampoo including the incision. No hair conditioners, hair treatments or skin cremes for two more weeks.  *  May swim or bathe when all scabbing is gone from the incision.  We discussed medication.    *  Medications prescribed today: none today    We discussed activities and return to work.  *   We recommend she return to normal activities as able for 2 more weeks.  *   She can return to driving if: she is off narcotic.    We discussed follow up   *  Return to clinic in 6-12 weeks with Dr Woo.  Imaging for that visit: None  *  All the patient's questions have been answered and they demonstrate good understanding of the above.    *   The patient has our contact information and is aware that she should call if she has questions comments or concerns.     We appreciate the opportunity to be of service in the care of this pleasant patient.  Please do call if there is anything more we can do    Libby Gavin PA-C  [facility]  Department " of Neurosurgery  Phone: [phone]  Fax: [phone]

## 2017-06-09 ENCOUNTER — OFFICE VISIT (OUTPATIENT)
Dept: FAMILY MEDICINE | Facility: CLINIC | Age: 82
End: 2017-06-09
Payer: COMMERCIAL

## 2017-06-09 VITALS
DIASTOLIC BLOOD PRESSURE: 56 MMHG | TEMPERATURE: 98.3 F | HEART RATE: 80 BPM | WEIGHT: 118.6 LBS | OXYGEN SATURATION: 97 % | SYSTOLIC BLOOD PRESSURE: 120 MMHG | BODY MASS INDEX: 20.25 KG/M2 | HEIGHT: 64 IN

## 2017-06-09 DIAGNOSIS — R30.0 DYSURIA: ICD-10-CM

## 2017-06-09 DIAGNOSIS — R41.3 MEMORY LOSS: ICD-10-CM

## 2017-06-09 DIAGNOSIS — R10.84 ABDOMINAL PAIN, GENERALIZED: Primary | ICD-10-CM

## 2017-06-09 LAB
ALBUMIN UR-MCNC: NEGATIVE MG/DL
APPEARANCE UR: CLEAR
BILIRUB UR QL STRIP: NEGATIVE
COLOR UR AUTO: YELLOW
GLUCOSE UR STRIP-MCNC: NEGATIVE MG/DL
HGB UR QL STRIP: NEGATIVE
KETONES UR STRIP-MCNC: NEGATIVE MG/DL
LEUKOCYTE ESTERASE UR QL STRIP: ABNORMAL
NITRATE UR QL: NEGATIVE
PH UR STRIP: 7.5 PH (ref 5–7)
RBC #/AREA URNS AUTO: NORMAL /HPF (ref 0–2)
SP GR UR STRIP: 1.02 (ref 1–1.03)
URN SPEC COLLECT METH UR: ABNORMAL
UROBILINOGEN UR STRIP-ACNC: 0.2 EU/DL (ref 0.2–1)
WBC #/AREA URNS AUTO: NORMAL /HPF (ref 0–2)

## 2017-06-09 PROCEDURE — 99213 OFFICE O/P EST LOW 20 MIN: CPT | Performed by: NURSE PRACTITIONER

## 2017-06-09 PROCEDURE — 36415 COLL VENOUS BLD VENIPUNCTURE: CPT | Performed by: NURSE PRACTITIONER

## 2017-06-09 PROCEDURE — 80048 BASIC METABOLIC PNL TOTAL CA: CPT | Performed by: NURSE PRACTITIONER

## 2017-06-09 PROCEDURE — 81001 URINALYSIS AUTO W/SCOPE: CPT | Performed by: NURSE PRACTITIONER

## 2017-06-09 PROCEDURE — 87086 URINE CULTURE/COLONY COUNT: CPT | Performed by: NURSE PRACTITIONER

## 2017-06-09 RX ORDER — CIPROFLOXACIN 250 MG/1
250 TABLET, FILM COATED ORAL 2 TIMES DAILY
Qty: 6 TABLET | Refills: 0 | Status: SHIPPED | OUTPATIENT
Start: 2017-06-09 | End: 2017-06-14

## 2017-06-09 NOTE — PROGRESS NOTES
SUBJECTIVE:                                                    Indy Reyes is a 86 year old female who presents to clinic today for the following health issues:      ABDOMINAL PAIN     Onset: 1 month     Description:   Character: Fullness  Location: general   Radiation: None    Intensity: mild, moderate    Progression of Symptoms:  same    Accompanying Signs & Symptoms:  Fever/Chills?: no   Gas/Bloating: YES, excessive belching   Nausea: no   Vomitting: no   Diarrhea?: no   Constipation:no   Dysuria or Hematuria: incontinence.    History:   Trauma: no   Previous similar pain: no    Previous tests done: none    Precipitating factors:   Does the pain change with:     Food: YES- fullness      BM: no     Urination: no     Alleviating factors:      Therapies Tried and outcome: none     LMP:  not applicable     Mary is here with concerns about increased urinary frequency, bloating and fatigue. She has noticed that she is using the restroom more frequently and getting up several times during the night. Recently retired from [employer] at age 86 after working there for nearly 40 years. Is struggling with the change in lifestyle and feels as though she needs more to do to keep her occupied. She's been volunteering at her grandchildren school and  helping with projects in the community. Is noticing some change in her memory and attributes that to not having enough until stimulation. Is becoming increasingly worried about her forgetfulness.      Problem list and histories reviewed & adjusted, as indicated.  Additional history: none    Patient Active Problem List   Diagnosis     Hyperlipidemia LDL goal <130     Osteopenia     Advanced directives, counseling/discussion     Impaired fasting glucose     Family history of breast cancer     Family history of coronary artery disease     Thrombocytopenia (H)     Thyroid nodule     Postnasal drip     Rhinitis     Elevated TSH     Elevated blood pressure reading     Osteoarthrosis of  "knee     Lumbar pain     Cervical radiculitis     Lumbar radiculopathy, right     Lumbar radiculopathy     Past Surgical History:   Procedure Laterality Date     APPENDECTOMY      at age of 16     CATARACT IOL, RT/LT      both     FORAMINOTOMY LUMBAR POSTERIOR ONE LEVEL Right 2017    Procedure: FORAMINOTOMY LUMBAR POSTERIOR ONE LEVEL;  Right L4-L5 hemilaminectomy and foraminotomy and excision of synovial cyst. ;  Surgeon: Fady Woo MD;  Location: [facility] OR     TONSILLECTOMY         Social History   Substance Use Topics     Smoking status: Former Smoker     Types: Cigarettes     Quit date: 1960     Smokeless tobacco: Never Used      Comment:      Alcohol use Yes      Comment: SOCIAL-2 drinks per month      Family History   Problem Relation Age of Onset     Family History Negative Mother       in accident     CANCER Father      prostate     HEART DISEASE Brother      60s-4-one brother with heart attack in his 60's     CANCER Sister      breast-7-one  wtih breast ca,one with stroke, one sis  from diabetes     Breast Cancer Sister      diagnosed at age of 52- 5 sisters alive     Cancer - colorectal No family hx of            Reviewed and updated as needed this visit by clinical staff  Tobacco  Allergies  Med Hx  Surg Hx  Fam Hx  Soc Hx      Reviewed and updated as needed this visit by Provider         ROS:  Constitutional, HEENT, cardiovascular, pulmonary, gi and gu systems are negative, except as otherwise noted.    OBJECTIVE:                                                    /56 (BP Location: Right arm, Patient Position: Chair, Cuff Size: Adult Regular)  Pulse 80  Temp 98.3  F (36.8  C) (Oral)  Ht 5' 4.25\" (1.632 m)  Wt 118 lb 9.6 oz (53.8 kg)  SpO2 97%  Breastfeeding? No  BMI 20.2 kg/m2  Body mass index is 20.2 kg/(m^2).  GENERAL: healthy, alert and no distress  RESP: lungs clear to auscultation - no rales, rhonchi or wheezes  CV: regular rate and rhythm, " normal S1 S2, no S3 or S4, no murmur, click or rub, no peripheral edema and peripheral pulses strong  ABDOMEN: soft, nontender, no hepatosplenomegaly, no masses and bowel sounds hyepractive.   PSYCH: mentation appears normal, affect normal/bright    Results for orders placed or performed in visit on 06/09/17 (from the past 24 hour(s))   UA reflex to Microscopic and Culture   Result Value Ref Range    Color Urine Yellow     Appearance Urine Clear     Glucose Urine Negative NEG mg/dL    Bilirubin Urine Negative NEG    Ketones Urine Negative NEG mg/dL    Specific Gravity Urine 1.020 1.003 - 1.035    Blood Urine Negative NEG    pH Urine 7.5 (H) 5.0 - 7.0 pH    Protein Albumin Urine Negative NEG mg/dL    Urobilinogen Urine 0.2 0.2 - 1.0 EU/dL    Nitrite Urine Negative NEG    Leukocyte Esterase Urine Small (A) NEG    Source Midstream Urine    Urine Microscopic   Result Value Ref Range    WBC Urine O - 2 0 - 2 /HPF    RBC Urine O - 2 0 - 2 /HPF        ASSESSMENT/PLAN:                                                            1. Abdominal pain, generalized  Urine shows trace leukocytes so will send for culture. Due to the weekend and knowing the patient will start on ciprofloxacin for three days. Patient is under more stress than normal and admits that she may be making more of her symptoms than they actually are.   - UA reflex to Microscopic and Culture  - Urine Culture Aerobic Bacterial  - Urine Microscopic    2. Memory loss  Will check sodium level today. Will talk with Dr. Kulkarni to see if she has any ideas about best way to manage memory issues.   - Basic metabolic panel    3. Dysuria    - ciprofloxacin (CIPRO) 250 MG tablet; Take 1 tablet (250 mg) by mouth 2 times daily  Dispense: 6 tablet; Refill: 0    Follow up in one week.     Rubina Ramirez, NP  [facility]

## 2017-06-09 NOTE — NURSING NOTE
"Chief Complaint   Patient presents with     Abdominal Pain       Initial /56 (BP Location: Right arm, Patient Position: Chair, Cuff Size: Adult Regular)  Pulse 80  Temp 98.3  F (36.8  C) (Oral)  Ht 5' 4.25\" (1.632 m)  Wt 118 lb 9.6 oz (53.8 kg)  SpO2 97%  Breastfeeding? No  BMI 20.2 kg/m2 Estimated body mass index is 20.2 kg/(m^2) as calculated from the following:    Height as of this encounter: 5' 4.25\" (1.632 m).    Weight as of this encounter: 118 lb 9.6 oz (53.8 kg).  Medication Reconciliation: complete   DAPHNE Howard      "

## 2017-06-09 NOTE — MR AVS SNAPSHOT
After Visit Summary   6/9/2017    Indy Reyes    MRN: [id number]           Patient Information     Date Of Birth          2/27/1931        Visit Information        Provider Department      6/9/2017 3:30 PM Rubina Ramirez NP [facility]        Today's Diagnoses     Abdominal pain, generalized    -  1    Memory loss        Dysuria           Follow-ups after your visit        Your next 10 appointments already scheduled     Jun 13, 2017  8:30 AM CDT   Office Visit with Batool Kulkarni MD   [facility]    [id number] [place] [address] [phone]           Bring a current list of meds and any records pertaining to this visit.  For Physicals, please bring immunization records and any forms needing to be filled out.  Please arrive 10 minutes early to complete paperwork.            Jul 24, 2017 11:30 AM CDT   (Arrive by 11:15 AM)   Return Visit with Fady Woo MD   [facility] ([facility])    [address] [place] [address] [phone]              Who to contact     If you have questions or need follow up information about today's clinic visit or your schedule please contact [facility] directly at [phone].  Normal or non-critical lab and imaging results will be communicated to you by MyChart, letter or phone within 4 business days after the clinic has received the results. If you do not hear from us within 7 days, please contact the clinic through MyChart or phone. If you have a critical or abnormal lab result, we will notify you by phone as soon as possible.  Submit refill requests through [employer] or call your pharmacy and they will forward the refill request to us. Please allow 3 business days for your refill to be completed.          Additional Information About Your Visit        [employer] Information     [employer] gives you  "secure access to your electronic health record. If you see a primary care provider, you can also send messages to your care team and make appointments. If you have questions, please call your primary care clinic.  If you do not have a primary care provider, please call [phone] and they will assist you.        Care EveryWhere ID     This is your Care EveryWhere ID. This could be used by other organizations to access your [facility] medical records  [id number]        Your Vitals Were     Pulse Temperature Height Pulse Oximetry Breastfeeding? BMI (Body Mass Index)    80 98.3  F (36.8  C) (Oral) 5' 4.25\" (1.632 m) 97% No 20.2 kg/m2       Blood Pressure from Last 3 Encounters:   06/09/17 120/56   05/12/17 130/52   04/29/17 134/83    Weight from Last 3 Encounters:   06/09/17 118 lb 9.6 oz (53.8 kg)   04/28/17 117 lb 8.1 oz (53.3 kg)   04/25/17 117 lb 6.4 oz (53.3 kg)              We Performed the Following     Basic metabolic panel     UA reflex to Microscopic and Culture     Urine Culture Aerobic Bacterial          Today's Medication Changes          These changes are accurate as of: 6/9/17  4:22 PM.  If you have any questions, ask your nurse or doctor.               Start taking these medicines.        Dose/Directions    ciprofloxacin 250 MG tablet   Commonly known as:  CIPRO   Used for:  Dysuria   Started by:  Rubina Ramirez NP        Dose:  250 mg   Take 1 tablet (250 mg) by mouth 2 times daily   Quantity:  6 tablet   Refills:  0         These medicines have changed or have updated prescriptions.        Dose/Directions    fluticasone 50 MCG/ACT spray   Commonly known as:  FLONASE   This may have changed:    - when to take this  - reasons to take this   Used for:  Postnasal drip        Dose:  2 spray   Spray 2 sprays into both nostrils daily   Quantity:  16 g   Refills:  6            Where to get your medicines      These medications were sent to [facility]/pharmacy #[id number] [facility] [address] [facility]  [id number], [facility] [address]    Hours:  [facility] Phone:  [phone]     ciprofloxacin 250 MG tablet                Primary Care Provider Office Phone # Fax #    Batool Hermelindo Kulkarni [phone]       [facility] [address]  [facility] [address]        Thank you!     Thank you for choosing [facility]  for your care. Our goal is always to provide you with excellent care. Hearing back from our patients is one way we can continue to improve our services. Please take a few minutes to complete the written survey that you may receive in the mail after your visit with us. Thank you!             Your Updated Medication List - Protect others around you: Learn how to safely use, store and throw away your medicines at www.disposemymeds.org.          This list is accurate as of: 6/9/17  4:22 PM.  Always use your most recent med list.                   Brand Name Dispense Instructions for use    acetaminophen 325 MG tablet    TYLENOL    100 tablet    Take 2 tablets (650 mg) by mouth every 4 hours as needed for other (mild pain)       ciprofloxacin 250 MG tablet    CIPRO    6 tablet    Take 1 tablet (250 mg) by mouth 2 times daily       fluticasone 50 MCG/ACT spray    FLONASE    16 g    Spray 2 sprays into both nostrils daily       * OCUVITE ADULT 50+ Caps      Take 2 capsules by mouth every morning       * MULTIVITAMIN ADULTS 50+ Tabs      Take 2 capsules by mouth every morning       * Notice:  This list has 2 medication(s) that are the same as other medications prescribed for you. Read the directions carefully, and ask your doctor or other care provider to review them with you.

## 2017-06-10 LAB
ANION GAP SERPL CALCULATED.3IONS-SCNC: 5 MMOL/L (ref 3–14)
BUN SERPL-MCNC: 19 MG/DL (ref 7–30)
CALCIUM SERPL-MCNC: 8.8 MG/DL (ref 8.5–10.1)
CHLORIDE SERPL-SCNC: 106 MMOL/L (ref 94–109)
CO2 SERPL-SCNC: 29 MMOL/L (ref 20–32)
CREAT SERPL-MCNC: 0.78 MG/DL (ref 0.52–1.04)
GFR SERPL CREATININE-BSD FRML MDRD: 70 ML/MIN/1.7M2
GLUCOSE SERPL-MCNC: 94 MG/DL (ref 70–99)
POTASSIUM SERPL-SCNC: 4.7 MMOL/L (ref 3.4–5.3)
SODIUM SERPL-SCNC: 140 MMOL/L (ref 133–144)

## 2017-06-11 LAB
BACTERIA SPEC CULT: NORMAL
MICRO REPORT STATUS: NORMAL
SPECIMEN SOURCE: NORMAL

## 2017-06-13 ENCOUNTER — TELEPHONE (OUTPATIENT)
Dept: FAMILY MEDICINE | Facility: CLINIC | Age: 82
End: 2017-06-13

## 2017-06-13 NOTE — TELEPHONE ENCOUNTER
LM with  to callback since patient missed her appt this morning and has been having memory loss.    Paddy De Souza RN, BSN

## 2017-06-14 ENCOUNTER — OFFICE VISIT (OUTPATIENT)
Dept: FAMILY MEDICINE | Facility: CLINIC | Age: 82
End: 2017-06-14
Payer: COMMERCIAL

## 2017-06-14 VITALS
SYSTOLIC BLOOD PRESSURE: 118 MMHG | OXYGEN SATURATION: 98 % | RESPIRATION RATE: 16 BRPM | WEIGHT: 118 LBS | TEMPERATURE: 98.5 F | HEIGHT: 64 IN | DIASTOLIC BLOOD PRESSURE: 62 MMHG | HEART RATE: 85 BPM | BODY MASS INDEX: 20.14 KG/M2

## 2017-06-14 DIAGNOSIS — G31.84 MILD COGNITIVE IMPAIRMENT: Primary | ICD-10-CM

## 2017-06-14 PROCEDURE — 99214 OFFICE O/P EST MOD 30 MIN: CPT | Performed by: FAMILY MEDICINE

## 2017-06-14 NOTE — NURSING NOTE
"Chief Complaint   Patient presents with     Memory Loss       Initial /62 (BP Location: Right arm, Patient Position: Sitting, Cuff Size: Adult Regular)  Pulse 85  Temp 98.5  F (36.9  C) (Oral)  Resp 16  Ht 5' 4.25\" (1.632 m)  Wt 118 lb (53.5 kg)  SpO2 98%  BMI 20.1 kg/m2 Estimated body mass index is 20.1 kg/(m^2) as calculated from the following:    Height as of this encounter: 5' 4.25\" (1.632 m).    Weight as of this encounter: 118 lb (53.5 kg).  Medication Reconciliation: complete     Bryce Johnson CMA      "

## 2017-06-14 NOTE — PROGRESS NOTES
SUBJECTIVE:                                                    Indy Reyes is a 86 year old female who presents to clinic today for the following health issues:     Discuss memory loss x several months    Both she and her  have noticed a change in her memory over the past year.   A year ago, both she and her family feel she did not have any cognitive deficits.    She recalls that she will be in the middle of one task, leave it to attend to a different tasks, and then forget what her first task was.    Feels she has no trouble with long-term memory, recalling family members names and significant life events.    Does have some trouble with word recall. No trouble understanding what is spoken to her.  No weakness, numbness or tingling, coordination deficits.  No changes in speech or vision.  Unable to hear well without the use of her hearing aids.    She reports some stress in regards to managing her 's health conditions.  No significant mood changes, although she is worried regarding her changes in memory.        Problem list and histories reviewed & adjusted, as indicated.  Additional history: none    Patient Active Problem List   Diagnosis     Hyperlipidemia LDL goal <130     Osteopenia     Advanced directives, counseling/discussion     Impaired fasting glucose     Family history of breast cancer     Family history of coronary artery disease     Thrombocytopenia (H)     Thyroid nodule     Postnasal drip     Rhinitis     Elevated TSH     Elevated blood pressure reading     Osteoarthrosis of knee     Lumbar pain     Cervical radiculitis     Lumbar radiculopathy, right     Lumbar radiculopathy     Past Surgical History:   Procedure Laterality Date     APPENDECTOMY      at age of 16     CATARACT IOL, RT/LT  5/09    both     FORAMINOTOMY LUMBAR POSTERIOR ONE LEVEL Right 4/28/2017    Procedure: FORAMINOTOMY LUMBAR POSTERIOR ONE LEVEL;  Right L4-L5 hemilaminectomy and foraminotomy and excision of  "synovial cyst. ;  Surgeon: Fady Woo MD;  Location: [facility] OR     TONSILLECTOMY         Social History   Substance Use Topics     Smoking status: Former Smoker     Types: Cigarettes     Quit date: 1960     Smokeless tobacco: Never Used      Comment:      Alcohol use Yes      Comment: SOCIAL-2 drinks per month      Family History   Problem Relation Age of Onset     Family History Negative Mother       in accident     CANCER Father      prostate     HEART DISEASE Brother      60s-4-one brother with heart attack in his 60's     CANCER Sister      breast-7-one  wtih breast ca,one with stroke, one sis  from diabetes     Breast Cancer Sister      diagnosed at age of 52- 5 sisters alive     Cancer - colorectal No family hx of            Reviewed and updated as needed this visit by clinical staff       Reviewed and updated as needed this visit by Provider         ROS:  Constitutional, HEENT, cardiovascular, pulmonary, gi and gu systems are negative, except as otherwise noted.    OBJECTIVE:                                                    /62 (BP Location: Right arm, Patient Position: Sitting, Cuff Size: Adult Regular)  Pulse 85  Temp 98.5  F (36.9  C) (Oral)  Resp 16  Ht 5' 4.25\" (1.632 m)  Wt 118 lb (53.5 kg)  SpO2 98%  BMI 20.1 kg/m2  Body mass index is 20.1 kg/(m^2).  GENERAL: healthy, alert and no distress  RESP: lungs clear to auscultation - no rales, rhonchi or wheezes  CV: regular rate and rhythm, normal S1 S2, no S3 or S4, no murmur, click or rub, no peripheral edema and peripheral pulses strong  NEURO: Cranial nerves II through XII intact, normal strength and sensation in all extremities, speech normal, normal gait, no ataxia, no cogwheeling    Diagnostic Test Results:  [place] cognitive assessment-scored 21 out of 30, struggled with delayed recall, serial sevens, copying a cube, recalling the day of the week      ASSESSMENT/PLAN:                                        "               1. Mild cognitive impairment - discussed likely possibility of mild cognitive impairment based on the results of our testing today. Suggested consultation with memory care to determine if further testing is needed and how they can best help her go forward.  - MEMORY CLINIC REFERRAL    25 minutes with patient, greater than 50% in counseling    Batool Kulkarni MD  [facility]

## 2017-06-14 NOTE — Clinical Note
Based on the referral, looks like memory care will call them to schedule, but can you call and give her the info anyway? Thanks

## 2017-06-14 NOTE — MR AVS SNAPSHOT
After Visit Summary   6/14/2017    Indy Reyes    MRN: [id number]           Patient Information     Date Of Birth          2/27/1931        Visit Information        Provider Department      6/14/2017 2:00 PM Batool Kulkarni MD [facility]        Today's Diagnoses     Mild cognitive impairment    -  1       Follow-ups after your visit        Additional Services     MEMORY CLINIC REFERRAL       Your provider has referred you to: [facility] - [phone]     Please answer the following questions to ensure a successful referral:    Has the patient been diagnosed with any positive neuro-cognitive impairment? Yes, if so what mild cognitive impairment based on 21/30 MOCA    Primary objective or goal of consultation (please provide some detail):  New symptoms  and Patient/family education     What is the name of the person that should be contacted to schedule the appt?  Mary - patient     What is the relationship to the patient? Self    What is the best number to reach them at?  [phone]      The patient/family will be contacted within 1-2 business days to schedule your appointment. If you haven't heard from us within that timeframe, please call the number above.     Please be aware that coverage of these services is subject to the terms and limitations of your health insurance plan.  Call member services at your health plan with any benefit or coverage questions.      Please bring the following to your appointment:  >>   Any x-rays, CTs or MRIs which have been performed.  Contact the facility where they were done to arrange for  prior to your scheduled appointment.  Any new CT, MRI or other procedures ordered by your specialist must be performed at a [place] facility or coordinated by your clinic's referral office.    >>   List of current medications   >>   This referral request   >>   Any documents/labs given to you for this referral       "            Your next 10 appointments already scheduled     Jul 24, 2017 11:30 AM CDT   (Arrive by 11:15 AM)   Return Visit with Fady Woo MD   [facility] ([facility])    [address] [place] [address] [phone]              Who to contact     If you have questions or need follow up information about today's clinic visit or your schedule please contact [facility] directly at [phone].  Normal or non-critical lab and imaging results will be communicated to you by Senex[employer]hart, letter or phone within 4 business days after the clinic has received the results. If you do not hear from us within 7 days, please contact the clinic through [employer]t or phone. If you have a critical or abnormal lab result, we will notify you by phone as soon as possible.  Submit refill requests through [employer] or call your pharmacy and they will forward the refill request to us. Please allow 3 business days for your refill to be completed.          Additional Information About Your Visit        Senex[employer] Information     [employer] gives you secure access to your electronic health record. If you see a primary care provider, you can also send messages to your care team and make appointments. If you have questions, please call your primary care clinic.  If you do not have a primary care provider, please call [phone] and they will assist you.        Care EveryWhere ID     This is your Care EveryWhere ID. This could be used by other organizations to access your [place] medical records  [id number]        Your Vitals Were     Pulse Temperature Respirations Height Pulse Oximetry BMI (Body Mass Index)    85 98.5  F (36.9  C) (Oral) 16 5' 4.25\" (1.632 m) 98% 20.1 kg/m2       Blood Pressure from Last 3 Encounters:   06/14/17 118/62   06/09/17 120/56   05/12/17 130/52    Weight from Last 3 Encounters:   06/14/17 118 lb (53.5 kg)   06/09/17 118 lb 9.6 oz (53.8 " kg)   04/28/17 117 lb 8.1 oz (53.3 kg)              We Performed the Following     MEMORY CLINIC REFERRAL          Today's Medication Changes          These changes are accurate as of: 6/14/17  3:25 PM.  If you have any questions, ask your nurse or doctor.               These medicines have changed or have updated prescriptions.        Dose/Directions    fluticasone 50 MCG/ACT spray   Commonly known as:  FLONASE   This may have changed:    - when to take this  - reasons to take this   Used for:  Postnasal drip        Dose:  2 spray   Spray 2 sprays into both nostrils daily   Quantity:  16 g   Refills:  6                Primary Care Provider Office Phone # Fax #    Batool Hermelindo Kulkarni -[phone]       [facility] [address]  [facility] [address]        Thank you!     Thank you for choosing [facility]  for your care. Our goal is always to provide you with excellent care. Hearing back from our patients is one way we can continue to improve our services. Please take a few minutes to complete the written survey that you may receive in the mail after your visit with us. Thank you!             Your Updated Medication List - Protect others around you: Learn how to safely use, store and throw away your medicines at www.disposemymeds.org.          This list is accurate as of: 6/14/17  3:25 PM.  Always use your most recent med list.                   Brand Name Dispense Instructions for use    acetaminophen 325 MG tablet    TYLENOL    100 tablet    Take 2 tablets (650 mg) by mouth every 4 hours as needed for other (mild pain)       fluticasone 50 MCG/ACT spray    FLONASE    16 g    Spray 2 sprays into both nostrils daily       * OCUVITE ADULT 50+ Caps      Take 2 capsules by mouth every morning       * MULTIVITAMIN ADULTS 50+ Tabs      Take 2 capsules by mouth every morning       * Notice:  This list has 2 medication(s) that are the same as other medications prescribed for  you. Read the directions carefully, and ask your doctor or other care provider to review them with you.

## 2017-07-24 ENCOUNTER — OFFICE VISIT (OUTPATIENT)
Dept: NEUROSURGERY | Facility: CLINIC | Age: 82
End: 2017-07-24

## 2017-07-24 VITALS — DIASTOLIC BLOOD PRESSURE: 59 MMHG | HEART RATE: 74 BPM | HEIGHT: 64 IN | SYSTOLIC BLOOD PRESSURE: 133 MMHG

## 2017-07-24 DIAGNOSIS — M54.16 LUMBAR RADICULOPATHY, RIGHT: Primary | ICD-10-CM

## 2017-07-24 ASSESSMENT — PAIN SCALES - GENERAL: PAINLEVEL: NO PAIN (0)

## 2017-07-24 NOTE — MR AVS SNAPSHOT
After Visit Summary   7/24/2017    Indy Reyes    MRN: [id number]           Patient Information     Date Of Birth          2/27/1931        Visit Information        Provider Department      7/24/2017 11:30 AM Fady Woo MD [facility] Neurosurgery        Today's Diagnoses     Lumbar radiculopathy, right    -  1       Follow-ups after your visit        Your next 10 appointments already scheduled     Nov 09, 2017  2:30 PM CST   [facility]    [address]  [address]  [facility] [id number] [phone]            Nov 09, 2017  2:30 PM CST   New Visit with AVIS Stroud   [facility]    [address]  [address]  [facility] [id number] [phone]              Who to contact     Please call your clinic at [phone] to:    Ask questions about your health    Make or cancel appointments    Discuss your medicines    Learn about your test results    Speak to your doctor   If you have compliments or concerns about an experience at your clinic, or if you wish to file a complaint, please contact [facility] Patient Relations at [phone] or email us at [email or web address]         Additional Information About Your Visit        MyChart Information     S[employer]data gives you secure access to your electronic health record. If you see a primary care provider, you can also send messages to your care team and make appointments. If you have questions, please call your primary care clinic.  If you do not have a primary care provider, please call [phone] and they will assist you.      Sensdata is an electronic gateway that provides easy, online access to your medical records. With Sensdata, you can request a clinic appointment, read  "your test results, renew a prescription or communicate with your care team.     To access your existing account, please contact your [facility] or call [phone] for assistance.        Care EveryWhere ID     This is your Care EveryWhere ID. This could be used by other organizations to access your [place] medical records  [id number]        Your Vitals Were     Pulse Height                74 1.632 m (5' 4.25\")           Blood Pressure from Last 3 Encounters:   07/24/17 133/59   06/14/17 118/62   06/09/17 120/56    Weight from Last 3 Encounters:   06/14/17 53.5 kg (118 lb)   06/09/17 53.8 kg (118 lb 9.6 oz)   04/28/17 53.3 kg (117 lb 8.1 oz)              Today, you had the following     No orders found for display         Today's Medication Changes          These changes are accurate as of: 7/24/17 11:59 PM.  If you have any questions, ask your nurse or doctor.               These medicines have changed or have updated prescriptions.        Dose/Directions    fluticasone 50 MCG/ACT spray   Commonly known as:  FLONASE   This may have changed:    - when to take this  - reasons to take this   Used for:  Postnasal drip        Dose:  2 spray   Spray 2 sprays into both nostrils daily   Quantity:  16 g   Refills:  6                Primary Care Provider Office Phone # Fax #    Batool Hermelindo Kulkarni [phone]       [address] [address]        Equal Access to Services     SOFIA MOISE AH: Hadii guille clark hadasho Soomaali, waaxda luqadaha, qaybta kaalmada adeegyaedmundo, yasmeen nash. So [facility] [phone].    ATENCIÓN: Si habla español, tiene a saini disposición servicios gratuitos de asistencia lingüística. Llame al [phone].    We comply with applicable federal civil rights laws and [place] laws. We do not discriminate on the basis of race, color, national origin, age, disability sex, sexual orientation or " gender identity.            Thank you!     Thank you for choosing [facility] NEUROSURGERY  for your care. Our goal is always to provide you with excellent care. Hearing back from our patients is one way we can continue to improve our services. Please take a few minutes to complete the written survey that you may receive in the mail after your visit with us. Thank you!             Your Updated Medication List - Protect others around you: Learn how to safely use, store and throw away your medicines at www.disposemymeds.org.          This list is accurate as of: 7/24/17 11:59 PM.  Always use your most recent med list.                   Brand Name Dispense Instructions for use Diagnosis    acetaminophen 325 MG tablet    TYLENOL    100 tablet    Take 2 tablets (650 mg) by mouth every 4 hours as needed for other (mild pain)    Lumbar radiculopathy, right       fluticasone 50 MCG/ACT spray    FLONASE    16 g    Spray 2 sprays into both nostrils daily    Postnasal drip       * OCUVITE ADULT 50+ Caps      Take 2 capsules by mouth every morning        * MULTIVITAMIN ADULTS 50+ Tabs      Take 2 capsules by mouth every morning        * Notice:  This list has 2 medication(s) that are the same as other medications prescribed for you. Read the directions carefully, and ask your doctor or other care provider to review them with you.

## 2017-07-24 NOTE — PROGRESS NOTES
"It was a pleasure to see Mrs. antecolic today in neurosurgery clinic. She is a 86-year-old female who underwent:  DATE OF SERVICE:  04/28/2017.         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       PROCEDURES:   1.  Right L4-L5 hemilaminectomy and foraminotomy and excision of synovial cyst.   2.  Intraoperative microdissection.   3.  Intraoperative fluoroscopy.       ATTENDING SURGEON:  Fady Woo MD       ASSISTANT:  Noe Rodrigues MD   She is doing quite well. She has no leg pain. She has minimal to no back pain. She is working in her garden.    Vitals:    07/24/17 1155   BP: 133/59   Pulse: 74   Height: 1.632 m (5' 4.25\")     No Pain (0)    Her incision is well-healed.    Her strength in both lower extremities is 5 out of 5 in all muscle groups.    Assessment: Status post lumbar foraminotomy.    Plan: Return to clinic p.r.n.  "

## 2017-07-24 NOTE — NURSING NOTE
Chief Complaint   Patient presents with     RECHECK     F/U Right Lumbar 5 Radiculopathy Per Libby, s/p Right L4-L5 hemilaminectomy and foraminotomy and excision of synovial cyst. DOS: 4/28/17   Stephanie Can [facility]

## 2017-07-24 NOTE — LETTER
"7/24/2017       RE: Indy Reyes  [address] [facility] [address]     Dear Colleague,    Thank you for referring your patient, Indy Reyes, to the [facility] NEUROSURGERY at [facility]. Please see a copy of my visit note below.    It was a pleasure to see Mrs. antecolic today in neurosurgery clinic. She is a 86-year-old female who underwent:  DATE OF SERVICE:  04/28/2017.         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       PROCEDURES:   1.  Right L4-L5 hemilaminectomy and foraminotomy and excision of synovial cyst.   2.  Intraoperative microdissection.   3.  Intraoperative fluoroscopy.       ATTENDING SURGEON:  Fady Woo MD       ASSISTANT:  Noe Rodrigues MD   She is doing quite well. She has no leg pain. She has minimal to no back pain. She is working in her garden.    Vitals:    07/24/17 1155   BP: 133/59   Pulse: 74   Height: 1.632 m (5' 4.25\")     No Pain (0)    Her incision is well-healed.    Her strength in both lower extremities is 5 out of 5 in all muscle groups.    Assessment: Status post lumbar foraminotomy.    Plan: Return to clinic p.r.n.    Again, thank you for allowing me to participate in the care of your patient.      Sincerely,    Fady Woo MD      "

## 2017-08-24 DIAGNOSIS — R09.82 POSTNASAL DRIP: ICD-10-CM

## 2017-08-24 RX ORDER — FLUTICASONE PROPIONATE 50 MCG
2 SPRAY, SUSPENSION (ML) NASAL PRN
Qty: 16 G | Refills: 6 | Status: SHIPPED | OUTPATIENT
Start: 2017-08-24 | End: 2018-05-08

## 2017-08-24 NOTE — TELEPHONE ENCOUNTER
Prescription approved per [facility].  Paddy De Souza RN, BSN

## 2017-08-24 NOTE — TELEPHONE ENCOUNTER
Pending Prescriptions:                       Disp   Refills    fluticasone (FLONASE) 50 MCG/ACT spray    16 g   6            Sig: Spray 2 sprays into both nostrils daily    Fluticasone       Last Written Prescription Date: 12/31/2015  Last Fill Quantity: 16, # refills: 6    Last Office Visit with G, [facility] or [facility] prescribing provider:  06/14/2017   Future Office Visit:       Date of Last Asthma Action Plan Letter:   There are no preventive care reminders to display for this patient.   Asthma Control Test: No flowsheet data found.    Date of Last Spirometry Test:   No results found for this or any previous visit.    Salma Sheppard

## 2017-09-19 ENCOUNTER — TRANSFERRED RECORDS (OUTPATIENT)
Dept: HEALTH INFORMATION MANAGEMENT | Facility: CLINIC | Age: 82
End: 2017-09-19

## 2017-11-09 ENCOUNTER — OFFICE VISIT (OUTPATIENT)
Dept: GERIATRIC MEDICINE | Facility: CLINIC | Age: 82
End: 2017-11-09
Payer: COMMERCIAL

## 2017-11-09 VITALS
HEART RATE: 107 BPM | WEIGHT: 116 LBS | SYSTOLIC BLOOD PRESSURE: 122 MMHG | OXYGEN SATURATION: 97 % | HEIGHT: 62 IN | BODY MASS INDEX: 21.35 KG/M2 | TEMPERATURE: 98.3 F | DIASTOLIC BLOOD PRESSURE: 50 MMHG

## 2017-11-09 VITALS
BODY MASS INDEX: 21.35 KG/M2 | WEIGHT: 116 LBS | SYSTOLIC BLOOD PRESSURE: 122 MMHG | TEMPERATURE: 98.3 F | DIASTOLIC BLOOD PRESSURE: 50 MMHG | HEIGHT: 62 IN | HEART RATE: 107 BPM | OXYGEN SATURATION: 97 %

## 2017-11-09 DIAGNOSIS — F43.22 ADJUSTMENT DISORDER WITH ANXIOUS MOOD: Primary | ICD-10-CM

## 2017-11-09 DIAGNOSIS — R41.3 MEMORY CHANGES: Primary | ICD-10-CM

## 2017-11-09 DIAGNOSIS — R41.3 MEMORY CHANGES: ICD-10-CM

## 2017-11-09 PROCEDURE — 90834 PSYTX W PT 45 MINUTES: CPT | Performed by: SOCIAL WORKER

## 2017-11-09 PROCEDURE — 99354 C PROLONGED SERV,OFFICE,1ST HR: CPT | Performed by: NURSE PRACTITIONER

## 2017-11-09 PROCEDURE — 82607 VITAMIN B-12: CPT | Performed by: NURSE PRACTITIONER

## 2017-11-09 PROCEDURE — 36415 COLL VENOUS BLD VENIPUNCTURE: CPT | Performed by: NURSE PRACTITIONER

## 2017-11-09 PROCEDURE — 99215 OFFICE O/P EST HI 40 MIN: CPT | Performed by: NURSE PRACTITIONER

## 2017-11-09 ASSESSMENT — MONTREAL COGNITIVE ASSESSMENT (MOCA)
WHAT LEVEL OF EDUCATION WAS ATTAINED: 0
7. [VIGILENCE] TAP WHEN HEARING DESIGNATED LETTER: 1
WHAT IS THE TOTAL SCORE (OUT OF 30): 27
6. READ LIST OF DIGITS [FORWARD/BACKWARD]: 1
9. REPEAT EACH SENTENCE: 1
13. ORIENTATION SUBSCORE: 6
11. FOR EACH PAIR OF WORDS, WHAT CATEGORY DO THEY BELONG TO (OUT OF 2): 2
10. [FLUENCY] NAME WORDS STARTING WITH DESIGNATED LETTER: 1
VISUOSPATIAL/EXECUTIVE SUBSCORE: 5
4. NAME EACH OF THE THREE ANIMALS SHOWN: 3
12. MEMORY INDEX SCORE: 4
8. SERIAL SUBTRACTION OF 7S: 3

## 2017-11-09 NOTE — MR AVS SNAPSHOT
"              After Visit Summary   11/9/2017    Indy Reyes    MRN: [id number]           Patient Information     Date Of Birth          2/27/1931        Visit Information        Provider Department      11/9/2017 2:30 PM Payton Snyder APRN [facility]        Today's Diagnoses     Memory changes    -  1      Care Instructions    .Dear Indy Reyes:    Thank you for your visit to the memory clinic today. Following is a summary of our assessment and the \"plan\" we discussed:     Assessment    You described forgetfulness and difficulty way-finding  especially noticeable in the last 6 months. Also, some times you have difficulty falling asleep because your mind is racing--thinking about what went well today and what you need to do tomorrow. These can contribute to changes in thinking, including decreased memory    Your labs--- TSH, Kidney function  were normal this year.  Thus, we know these are NOT contributing to the changes you describe    One of the assessments we conducted today was called a [place] Cognitive Assessment, a snapshot of your thinking today. Your score on this assessment was 27 out of 30.    Following are average scores and associated neurocognitive function (per [place]/ [place] neuropsychologist summary of existing evidence);    Normal > 26    Early/ Mild neurocognitive impairment: 21-25     Moderate neurocognitive impairment 15-20    Severe neurocognitive impairment 0-14    Given the current situation we talked we suggested the following plan:    Some strategies we talked about to minimize \"overwhelmed\" feeling    Boxed meals --companies like \"hello fresh\", \"blue apron\" are meals that come in a box. You need to prep them, and you choose which meals you want every week. Check these out ---they reduce the need to shop     Some strategies we talked about to help your sleep    No caffeine after 2pm    Try 5mg Melatonin every night (needed " or not)    Journaling before bed    If you continue to have problems, please call us and we will trouble-shoot, find additional solutions    Stop at the lab today to check your B12      Stay active. In general, we always advise people to stay   active because it helps their brain health.     Keep your body active. Your 50 minutes of exercise per day is awesome. Keep it up    Exercise your brain. You can play cards, do a puzzle or a word game. The brain exercises can help keep your memory sharp.    Learn something new. Try art or writing or a new hobby.    Keep doing your hobbies. You are so engaged in the community and activities that have meaning to you--also good for your brain    Continue to Socialize. Visit with your friends and family.        Continue to Eat healthy. Try to eat foods rich in antioxidants.   Blueberries, spinach, and strawberries are all rich in antioxidants. Use olive oil in place of canola, vegetable or corn oil when you cook.      Stay away from over-the-counter medicines that may   get in the way of your memory and thinking. Stay away from: Tylenol PM, cold tablets and cough medicines.    Here are some general tips for       Organize  Keep up the calendar!! Write scheduled events in your calendar. Add a few notes to the event. Keep the calendar in the same, central location. Use it daily. Invite others to write in it as well.     Short term illnesses   Visit your doctor if you have a sudden change in your memory or having difficulty doing things you are usually able to do. New confusion may be a sign of a bladder infection or another short term illness.         We talked about a lot of topics today. Please do not hesitate to call us with questions. Plan to come back in a year---or sooner if things get worse!!    Best regards,    Payton Snyder RN, CNP  Destiny Marte, Pharm D  Vadim Roman LCSW  Phone: [phone]              Follow-ups after your visit        Who to contact     If you have  "questions or need follow up information about today's clinic visit or your schedule please contact [facility] directly at [phone].  Normal or non-critical lab and imaging results will be communicated to you by MyChart, letter or phone within 4 business days after the clinic has received the results. If you do not hear from us within 7 days, please contact the clinic through MyChart or phone. If you have a critical or abnormal lab result, we will notify you by phone as soon as possible.  Submit refill requests through [employer] or call your pharmacy and they will forward the refill request to us. Please allow 3 business days for your refill to be completed.          Additional Information About Your Visit        [employer] Information     [employer] gives you secure access to your electronic health record. If you see a primary care provider, you can also send messages to your care team and make appointments. If you have questions, please call your primary care clinic.  If you do not have a primary care provider, please call [phone] and they will assist you.        Care EveryWhere ID     This is your Care EveryWhere ID. This could be used by other organizations to access your [place] medical records  [id number]        Your Vitals Were     Pulse Temperature Height Pulse Oximetry BMI (Body Mass Index)       107 98.3  F (36.8  C) (Oral) 5' 1.75\" (1.568 m) 97% 21.39 kg/m2        Blood Pressure from Last 3 Encounters:   11/09/17 122/50   11/09/17 122/50   07/24/17 133/59    Weight from Last 3 Encounters:   11/09/17 116 lb (52.6 kg)   11/09/17 116 lb (52.6 kg)   06/14/17 118 lb (53.5 kg)              We Performed the Following     PROLONGED SERV,OFFICE,1ST HR        Primary Care Provider Office Phone # Fax #    Batool Kulkarni [phone] [address]  [facility] [address]        Equal Access to Services     SOFIA MOISE AH: Hadii young Delgado " christian bonillamaurisio alexyasmeen soriano. So [facility] [phone].    ATENCIÓN: Si jada reyes, tiene a saini disposición servicios gratuitos de asistencia lingüística. Abdifatah al [phone].    We comply with applicable federal civil rights laws and [place] laws. We do not discriminate on the basis of race, color, national origin, age, disability, sex, sexual orientation, or gender identity.            Thank you!     Thank you for choosing [facility]  for your care. Our goal is always to provide you with excellent care. Hearing back from our patients is one way we can continue to improve our services. Please take a few minutes to complete the written survey that you may receive in the mail after your visit with us. Thank you!             Your Updated Medication List - Protect others around you: Learn how to safely use, store and throw away your medicines at www.disposemymeds.org.          This list is accurate as of: 11/9/17  5:01 PM.  Always use your most recent med list.                   Brand Name Dispense Instructions for use Diagnosis    acetaminophen 325 MG tablet    TYLENOL    100 tablet    Take 2 tablets (650 mg) by mouth every 4 hours as needed for other (mild pain)    Lumbar radiculopathy, right       fluticasone 50 MCG/ACT spray    FLONASE    16 g    Spray 2 sprays into both nostrils as needed    Postnasal drip       * OCUVITE ADULT 50+ Caps      Take 2 capsules by mouth every morning        * MULTIVITAMIN ADULTS 50+ Tabs      Take 2 capsules by mouth every morning        * Notice:  This list has 2 medication(s) that are the same as other medications prescribed for you. Read the directions carefully, and ask your doctor or other care provider to review them with you.

## 2017-11-09 NOTE — PROGRESS NOTES
.PCP: Batool Kulkarni  CC: memory loss    HPI: Patient says that in the last 6 weeks she has noticed memory changes, but if you asked her  he would say they have been present in the last year. Examples include difficulty way finding in an unfamiliar place, she gets worried about losing her  in the grocery store.       Effect of memory changes on behavior None  Effect of memory changes on relationships None      Recent labs:   Component      Latest Ref Rng & Units 4/25/2017 4/29/2017   Color Urine       Yellow    Appearance Urine       Clear    Glucose Urine      NEG mg/dL Negative    Bilirubin Urine      NEG Negative    Ketones Urine      NEG mg/dL Negative    Specific Gravity Urine      1.003 - 1.035 1.011    Blood Urine      NEG Negative    pH Urine      5.0 - 7.0 pH 7.0    Protein Albumin Urine      NEG mg/dL Negative    Urobilinogen mg/dL      0.0 - 2.0 mg/dL 0.0    Nitrite Urine      NEG Negative    Leukocyte Esterase Urine      NEG Negative    Source       Midstream Urine    Urobilinogen Urine      0.2 - 1.0 EU/dL     Sodium      133 - 144 mmol/L     Potassium      3.4 - 5.3 mmol/L     Chloride      94 - 109 mmol/L     Carbon Dioxide      20 - 32 mmol/L     Anion Gap      3 - 14 mmol/L     Glucose      70 - 99 mg/dL  138 (H)   Urea Nitrogen      7 - 30 mg/dL     Creatinine      0.52 - 1.04 mg/dL     GFR Estimate      >60 mL/min/1.7m2     GFR Estimate If Black      >60 mL/min/1.7m2     Calcium      8.5 - 10.1 mg/dL     WBC      4.0 - 11.0 10e9/L 6.7    RBC Count      3.8 - 5.2 10e12/L 4.63    Hemoglobin      11.7 - 15.7 g/dL 12.6    Hematocrit      35.0 - 47.0 % 38.5    MCV      78 - 100 fl 83    MCH      26.5 - 33.0 pg 27.2    MCHC      31.5 - 36.5 g/dL 32.7    RDW      10.0 - 15.0 % 15.1 (H)    Platelet Count      150 - 450 10e9/L 166    Specimen Description           Culture Micro           Micro Report Status           WBC Urine      0 - 2 /HPF     RBC Urine      0 - 2 /HPF        Component      Latest Ref Rng & Units 6/9/2017   Color Urine       Yellow   Appearance Urine       Clear   Glucose Urine      NEG mg/dL Negative   Bilirubin Urine      NEG Negative   Ketones Urine      NEG mg/dL Negative   Specific Gravity Urine      1.003 - 1.035 1.020   Blood Urine      NEG Negative   pH Urine      5.0 - 7.0 pH 7.5 (H)   Protein Albumin Urine      NEG mg/dL Negative   Urobilinogen mg/dL      0.0 - 2.0 mg/dL    Nitrite Urine      NEG Negative   Leukocyte Esterase Urine      NEG Small (A)   Source       Midstream Urine   Urobilinogen Urine      0.2 - 1.0 EU/dL 0.2   Sodium      133 - 144 mmol/L 140   Potassium      3.4 - 5.3 mmol/L 4.7   Chloride      94 - 109 mmol/L 106   Carbon Dioxide      20 - 32 mmol/L 29   Anion Gap      3 - 14 mmol/L 5   Glucose      70 - 99 mg/dL 94   Urea Nitrogen      7 - 30 mg/dL 19   Creatinine      0.52 - 1.04 mg/dL 0.78   GFR Estimate      >60 mL/min/1.7m2 70   GFR Estimate If Black      >60 mL/min/1.7m2 85   Calcium      8.5 - 10.1 mg/dL 8.8   WBC      4.0 - 11.0 10e9/L    RBC Count      3.8 - 5.2 10e12/L    Hemoglobin      11.7 - 15.7 g/dL    Hematocrit      35.0 - 47.0 %    MCV      78 - 100 fl    MCH      26.5 - 33.0 pg    MCHC      31.5 - 36.5 g/dL    RDW      10.0 - 15.0 %    Platelet Count      150 - 450 10e9/L    Specimen Description       Midstream Urine   Culture Micro       <10,000 colonies/mL urogenital deirdre   Micro Report Status       FINAL 06/11/2017   WBC Urine      0 - 2 /HPF O - 2   RBC Urine      0 - 2 /HPF O - 2   B12 pending    Social History  Social History Narrative    Social History     Social History     Marital status:      Spouse name: N/A     Number of children: 4     Years of education: N/A     Occupational History      Retired     Social History Main Topics     Smoking status: Former Smoker     Types: Cigarettes     Quit date: 1/1/1960     Smokeless tobacco: Never Used      Comment: 1960     Alcohol use Yes      Comment:  SOCIAL-2 drinks per month      Drug use: No     Sexual activity: No     Other Topics Concern     Parent/Sibling W/ Cabg, Mi Or Angioplasty Before 65f 55m? No     Social History Narrative     since 89-going well, working partime at [employer], 3 kids, one daughter  with pancreatic cancer at age of 42-in , 3 alive kids-3 sons, 9 grandkids,no pets-3  great grandkids         .Living situation (location, living with others?): Lives in home with , for whom she is a primary caregiver        Activities of daily living (e.g., dressing, eating, walking): Independent        Instrumental activities of daily living (e.g., finance management, housekeeping, meal planning/ prep): Independent        Finances: spouse does this    Driving: yes    Medication: self         Meaningful Activities (e.g., hobbies, work): Volunteer at [employer], [employer], [employer], reading     Physic al activity: works out 50 minutes per day with aerobics and strength training    Support: family     Community Resources Used/ Interested in: none at this time         Current Outpatient Prescriptions:      acetaminophen (TYLENOL) 325 MG tablet, Take 2 tablets (650 mg) by mouth every 4 hours as needed for other (mild pain), Disp: 100 tablet, Rfl: 0     Multiple Vitamins-Minerals (OCUVITE ADULT 50+) CAPS, Take 2 capsules by mouth every morning, Disp: , Rfl:      Multiple Vitamins-Minerals (MULTIVITAMIN ADULTS 50+) TABS, Take 2 capsules by mouth every morning, Disp: , Rfl:      fluticasone (FLONASE) 50 MCG/ACT spray, Spray 2 sprays into both nostrils as needed (Patient not taking: Reported on 2017), Disp: 16 g, Rfl: 6    Patient Active Problem List   Diagnosis     Hyperlipidemia LDL goal <130     Osteopenia     Advanced directives, counseling/discussion     Impaired fasting glucose     Family history of breast cancer     Family history of coronary artery disease     Thrombocytopenia (H)     Thyroid nodule     Postnasal drip     Rhinitis      Elevated TSH     Elevated blood pressure reading     Osteoarthrosis of knee     Lumbar pain     Cervical radiculitis     Lumbar radiculopathy, right     Lumbar radiculopathy     Memory changes           Cognitive screening.    [place] Cognitive Assessment:    Visuospatial/Executive : 5  Naming: 3  Attention - Digits: 1  Attention - Letters: 1  Attention - Subtraction: 3  Language - Repeat: 1  Language - Fluency : 1  Abstraction: 2  Delayed Recall: 4  Orientation: 6  Education: 0  MOCA Score: 27  Administered by: : john     [place] Cognitive Assessment Score:  27     [place] Cognitive Assessment Norms:     Normal: 26 - 30  Mild Cognitive Impairment 21-25  Moderate 15 - 20  Severe 0 -14      ROS:  Denies pain  Denies change in appetite or weight  Sleeps well: most of the time but trouble falling asleep sometimes--improves with melatonin. What keeps her up she says, is worry about whether she did things right that day (she is the primary caregiver for her  and volunteers a lot, and then anticipates what she will need for the next day)   She denies having nightmares, sleep walking or acting out dreams.   No falls in last year.  Denies feeling imbalanced   Denies dizziness, headaches, syncope or seizure  Denies chest pain   Denies palpitations  Denies shortness of breath Denies cough  Denies GI distress, constipation or diarrhea  Denies dysuria or incontinence    Screening for Geriatric Syndromes:   Delirium: No. Attention span okay. LOC not fluctuating. Cognitive function not acutely different  Polypharmacy: No. Patient on less than 9 medications. No medications in regimen are considered avoidable in the older adult population.   Depression: No per history and Geriatric Depression Scale 2/15 (5 or greater suggestive of probable depression)  Falls: Minimal risk. 0-1 fall in last year without injury, taking less than 4 medications (none centrally acting or known to cause fall risk), balance is okay (see exam),  "gait steady.   Sensory Deficits: wears bilateral hearing aids, wears glasses  Malnutrition: no changes intake or weight noted, no swallowing difficulty noted  Functional Decline: no changes in function over the past year    Physical exam:  ./50 (BP Location: Right arm, Patient Position: Chair, Cuff Size: Adult Regular)  Pulse 107  Temp 98.3  F (36.8  C) (Oral)  Ht 5' 1.75\" (1.568 m)  Wt 116 lb (52.6 kg)  SpO2 97%  BMI 21.39 kg/m2  APPEARANCE:  Adult female, well-groomed  no acute distress   HEENT: Not wearing glasses;  reads magazine print ok.,  Conjunctiva clear and white sclera. Lids without redness or swelling.   NECK: Supple. No bruit.   SKIN; pale  warm dry and intact; no lesions or rashes.   CV: Regular rhythm, No leg edema   RESP: non labored. no wheezes.   MUSCULOSKELETAL: functional range of motion. No joint /spine redness, tenderness, or swelling. Does not use arms to get out of chair. Gait is steady. .   MENTAL HEALTH: Mood is calm. Admits to periodic feelings of being overwhelmed.   Memory ok. Insight and judgment are ok.. No hallucinations or delusions. No perseverations. Attention span is good. Maintains eye contact.   NEURO: Awake, Alert, CNII-XII grossly intact. Muscle strength 5/5 x 4 extremities, no rigidity, no obvious bradykinesia, no tremor, no dyskinesia. P Thought process is clear. Speech is fluent  without paraphrasia, neologisms and content logical/ linear. No apraxia or agnosia.    Patient Goals: Find out if I have changes and what I can do to curb them  Partner / Family goals:       Assessment and Plan:     ICD-10-CM    1. Memory changes R41.3 Vitamin B12     CANCELED: Vitamin B12   .Memory changes  Assessment: Consistent with age related changes. Trouble way finding in places she does not frequent. Contributors include stress of multiple roles at this time and also difficulty falling asleep at times  Plan:   1) Check B12 today  2) We discussed spectrum of thinking/ cognitive " function including age related changes, MCI, dementia  3) We discussed how sleep deprivation and stress can contribute to memory loss.   4) Patient's biggest stress right not is meal planning so we discussed/ introduced the idea of meals in a box like [employer] or [employer], which brings pre-planned meals home. She will get help from family to choose the right service for her and her weekly meals.   5) patient also agrees to address her trouble falling asleep with these strategies: a) no caffeine after 2pm, b) melatonin 5mg per night, c) try journaling before bed. If her sleep pattern worsens or does not improve she will call us. She might also try sleepy time tea and talking more with our counselor, Vadim Nice LCSW  6) We discussed general brain health and that staying active, physically/ mentally/ socially, promotes this.   7) provided information about memory changes across the spectrum  8) return to clinic in one year or sooner, if things get worse or do not get better.     Total visit was 75 minutes, more than 50 % of which was spent discussing the spectrum of memory change, stress management, sleep.

## 2017-11-09 NOTE — ASSESSMENT & PLAN NOTE
Assessment: Consistent with age related changes. Trouble way finding in places she does not frequent. Contributors include stress of multiple roles at this time and also difficulty falling asleep at times  Plan:   1) Check B12 today  2) We discussed spectrum of thinking/ cognitive function including age related changes, MCI, dementia  3) We discussed how sleep deprivation and stress can contribute to memory loss.   4) Patient's biggest stress right not is meal planning so we discussed/ introduced the idea of meals in a box like [employer] or [employer], which brings pre-planned meals home. She will get help from family to choose the right service for her and her weekly meals.   5) patient also agrees to address her trouble falling asleep with these strategies: a) no caffeine after 2pm, b) melatonin 5mg per night, c) try journaling before bed. If her sleep pattern worsens or does not improve she will call us. She might also try sleepy time tea and talking more with our counselor, Vadim Nice LCSW  6) We discussed general brain health and that staying active, physically/ mentally/ socially, promotes this.   7) provided information about memory changes across the spectrum  8) return to clinic in one year or sooner, if things get worse or do not get better.     Total visit was 75 minutes, more than 50 % of which was spent discussing the spectrum of memory change, stress management, sleep.

## 2017-11-09 NOTE — PATIENT INSTRUCTIONS
".Dear Indy Reyes:    Thank you for your visit to the memory clinic today. Following is a summary of our assessment and the \"plan\" we discussed:     Assessment    You described forgetfulness and difficulty way-finding  especially noticeable in the last 6 months. Also, some times you have difficulty falling asleep because your mind is racing--thinking about what went well today and what you need to do tomorrow. These can contribute to changes in thinking, including decreased memory    Your labs--- TSH, Kidney function  were normal this year.  Thus, we know these are NOT contributing to the changes you describe    One of the assessments we conducted today was called a [place] Cognitive Assessment, a snapshot of your thinking today. Your score on this assessment was 27 out of 30.    Following are average scores and associated neurocognitive function (per [place]/ [place] neuropsychologist summary of existing evidence);    Normal > 26    Early/ Mild neurocognitive impairment: 21-25     Moderate neurocognitive impairment 15-20    Severe neurocognitive impairment 0-14    Given the current situation we talked we suggested the following plan:    Some strategies we talked about to minimize \"overwhelmed\" feeling    Boxed meals --companies like \"hello fresh\", \"blue ap[employer]\" are meals that come in a box. You need to prep them, and you choose which meals you want every week. Check these out ---they reduce the need to shop     Some strategies we talked about to help your sleep    No caffeine after 2pm    Try 5mg Melatonin every night (needed or not)    Journaling before bed    If you continue to have problems, please call us and we will trouble-shoot, find additional solutions    Stop at the lab today to check your B12      Stay active. In general, we always advise people to stay   active because it helps their brain health.     Keep your body active. Your 50 minutes of exercise per day is awesome. Keep it " up    Exercise your brain. You can play cards, do a puzzle or a word game. The brain exercises can help keep your memory sharp.    Learn something new. Try art or writing or a new hobby.    Keep doing your hobbies. You are so engaged in the community and activities that have meaning to you--also good for your brain    Continue to Socialize. Visit with your friends and family.        Continue to Eat healthy. Try to eat foods rich in antioxidants.   Blueberries, spinach, and strawberries are all rich in antioxidants. Use olive oil in place of canola, vegetable or corn oil when you cook.      Stay away from over-the-counter medicines that may   get in the way of your memory and thinking. Stay away from: Tylenol PM, cold tablets and cough medicines.    Here are some general tips for       Organize  Keep up the calendar!! Write scheduled events in your calendar. Add a few notes to the event. Keep the calendar in the same, central location. Use it daily. Invite others to write in it as well.     Short term illnesses   Visit your doctor if you have a sudden change in your memory or having difficulty doing things you are usually able to do. New confusion may be a sign of a bladder infection or another short term illness.         We talked about a lot of topics today. Please do not hesitate to call us with questions. Plan to come back in a year---or sooner if things get worse!!    Best regards,    Payton Snyder RN, CNP  Destiny Marte, Pharm D  [facility], [facility]  Phone: [phone]

## 2017-11-09 NOTE — NURSING NOTE
"Chief Complaint   Patient presents with     Consult     pt here with daughter in law, pt concerned with getting lost like in a store etc. pt is the main  since uziel had shoulder surgery.        Initial /50 (BP Location: Right arm, Patient Position: Chair, Cuff Size: Adult Regular)  Pulse 107  Temp 98.3  F (36.8  C) (Oral)  Ht 5' 1.75\" (1.568 m)  Wt 116 lb (52.6 kg)  SpO2 97%  BMI 21.39 kg/m2 Estimated body mass index is 21.39 kg/(m^2) as calculated from the following:    Height as of this encounter: 5' 1.75\" (1.568 m).    Weight as of this encounter: 116 lb (52.6 kg).  Medication Reconciliation: complete    "

## 2017-11-10 LAB — VIT B12 SERPL-MCNC: 848 PG/ML (ref 193–986)

## 2017-11-15 ENCOUNTER — OFFICE VISIT (OUTPATIENT)
Dept: FAMILY MEDICINE | Facility: CLINIC | Age: 82
End: 2017-11-15
Payer: COMMERCIAL

## 2017-11-15 VITALS
HEIGHT: 62 IN | DIASTOLIC BLOOD PRESSURE: 58 MMHG | HEART RATE: 95 BPM | WEIGHT: 116 LBS | BODY MASS INDEX: 21.35 KG/M2 | TEMPERATURE: 98.8 F | SYSTOLIC BLOOD PRESSURE: 118 MMHG

## 2017-11-15 DIAGNOSIS — L98.9 SKIN LESION: ICD-10-CM

## 2017-11-15 DIAGNOSIS — R19.09 OTHER INTRA-ABDOMINAL AND PELVIC SWELLING, MASS AND LUMP: ICD-10-CM

## 2017-11-15 DIAGNOSIS — R14.0 BLOATED ABDOMEN: ICD-10-CM

## 2017-11-15 DIAGNOSIS — R19.06 EPIGASTRIC FULLNESS: Primary | ICD-10-CM

## 2017-11-15 DIAGNOSIS — L85.8 KERATOACANTHOMA OF LOWER LEG: ICD-10-CM

## 2017-11-15 DIAGNOSIS — R68.81 EARLY SATIETY: ICD-10-CM

## 2017-11-15 DIAGNOSIS — K29.00 ACUTE GASTRITIS WITHOUT HEMORRHAGE, UNSPECIFIED GASTRITIS TYPE: ICD-10-CM

## 2017-11-15 LAB
ALBUMIN SERPL-MCNC: 3.5 G/DL (ref 3.4–5)
ALP SERPL-CCNC: 94 U/L (ref 40–150)
ALT SERPL W P-5'-P-CCNC: 24 U/L (ref 0–50)
ANION GAP SERPL CALCULATED.3IONS-SCNC: 7 MMOL/L (ref 3–14)
AST SERPL W P-5'-P-CCNC: 22 U/L (ref 0–45)
BASOPHILS # BLD AUTO: 0 10E9/L (ref 0–0.2)
BASOPHILS NFR BLD AUTO: 0.8 %
BILIRUB SERPL-MCNC: 0.3 MG/DL (ref 0.2–1.3)
BUN SERPL-MCNC: 22 MG/DL (ref 7–30)
CALCIUM SERPL-MCNC: 9.1 MG/DL (ref 8.5–10.1)
CANCER AG125 SERPL-ACNC: 8 U/ML (ref 0–30)
CHLORIDE SERPL-SCNC: 104 MMOL/L (ref 94–109)
CO2 SERPL-SCNC: 29 MMOL/L (ref 20–32)
CREAT SERPL-MCNC: 0.69 MG/DL (ref 0.52–1.04)
DIFFERENTIAL METHOD BLD: ABNORMAL
EOSINOPHIL # BLD AUTO: 0.1 10E9/L (ref 0–0.7)
EOSINOPHIL NFR BLD AUTO: 1.6 %
ERYTHROCYTE [DISTWIDTH] IN BLOOD BY AUTOMATED COUNT: 15.7 % (ref 10–15)
GFR SERPL CREATININE-BSD FRML MDRD: 81 ML/MIN/1.7M2
GLUCOSE SERPL-MCNC: 83 MG/DL (ref 70–99)
HCT VFR BLD AUTO: 37.1 % (ref 35–47)
HGB BLD-MCNC: 12.1 G/DL (ref 11.7–15.7)
LYMPHOCYTES # BLD AUTO: 1 10E9/L (ref 0.8–5.3)
LYMPHOCYTES NFR BLD AUTO: 18.9 %
MCH RBC QN AUTO: 26.9 PG (ref 26.5–33)
MCHC RBC AUTO-ENTMCNC: 32.6 G/DL (ref 31.5–36.5)
MCV RBC AUTO: 82 FL (ref 78–100)
MONOCYTES # BLD AUTO: 0.5 10E9/L (ref 0–1.3)
MONOCYTES NFR BLD AUTO: 9.8 %
NEUTROPHILS # BLD AUTO: 3.5 10E9/L (ref 1.6–8.3)
NEUTROPHILS NFR BLD AUTO: 68.9 %
PLATELET # BLD AUTO: 153 10E9/L (ref 150–450)
POTASSIUM SERPL-SCNC: 4.1 MMOL/L (ref 3.4–5.3)
PROT SERPL-MCNC: 6.6 G/DL (ref 6.8–8.8)
RBC # BLD AUTO: 4.5 10E12/L (ref 3.8–5.2)
SODIUM SERPL-SCNC: 140 MMOL/L (ref 133–144)
WBC # BLD AUTO: 5.1 10E9/L (ref 4–11)

## 2017-11-15 PROCEDURE — 36415 COLL VENOUS BLD VENIPUNCTURE: CPT | Performed by: FAMILY MEDICINE

## 2017-11-15 PROCEDURE — 86304 IMMUNOASSAY TUMOR CA 125: CPT | Performed by: FAMILY MEDICINE

## 2017-11-15 PROCEDURE — 88305 TISSUE EXAM BY PATHOLOGIST: CPT | Performed by: FAMILY MEDICINE

## 2017-11-15 PROCEDURE — 99213 OFFICE O/P EST LOW 20 MIN: CPT | Mod: 25 | Performed by: FAMILY MEDICINE

## 2017-11-15 PROCEDURE — 80053 COMPREHEN METABOLIC PANEL: CPT | Performed by: FAMILY MEDICINE

## 2017-11-15 PROCEDURE — 85025 COMPLETE CBC W/AUTO DIFF WBC: CPT | Performed by: FAMILY MEDICINE

## 2017-11-15 PROCEDURE — 11301 SHAVE SKIN LESION 0.6-1.0 CM: CPT | Performed by: FAMILY MEDICINE

## 2017-11-15 NOTE — PROGRESS NOTES
SUBJECTIVE:   Indy Reyes is a 86 year old female who presents to clinic today for the following health issues:      Abdominal Pain      Duration: 2 months    Description (location/character/radiation): center abdomen       Associated flank pain: None    Intensity:  2/10    Accompanying signs and symptoms:        Fever/Chills: no        Gas/Bloating: YES- bloating       Nausea/vomitting: YES- nausea       Diarrhea: no        Dysuria or Hematuria: no     History (previous similar pain/trauma/previous testing): no    Precipitating or alleviating factors:       Pain worse with eating/BM/urination: eating       Pain relieved by BM: no     Therapies tried and outcome: tums helps    LMP:  not applicable      Mostly bothered after eating but not every meal.   Cant eat as much as she would like to any longer.   Feels bloated after eating, sometimes without eating.   Baseline sensation of fullness/heaviness. Worse after eating.   Some increased burping.   Normal soft brown stool.   No dysuria.   No chest pain or dyspnea.   Hx of appy.   Breast cancer in sister.     Mass right leg. No pain. Growing quickly over past 6 weeks. Raised. No other similar.         Problem list and histories reviewed & adjusted, as indicated.  Additional history: as documented    Current Outpatient Prescriptions   Medication Sig Dispense Refill     fluticasone (FLONASE) 50 MCG/ACT spray Spray 2 sprays into both nostrils as needed 16 g 6     acetaminophen (TYLENOL) 325 MG tablet Take 2 tablets (650 mg) by mouth every 4 hours as needed for other (mild pain) 100 tablet 0     Multiple Vitamins-Minerals (OCUVITE ADULT 50+) CAPS Take 2 capsules by mouth every morning       Multiple Vitamins-Minerals (MULTIVITAMIN ADULTS 50+) TABS Take 2 capsules by mouth every morning         Reviewed and updated as needed this visit by clinical staff     Reviewed and updated as needed this visit by Provider         ROS:  Constitutional, HEENT, cardiovascular,  "pulmonary, gi and gu systems are negative, except as otherwise noted.      OBJECTIVE:   /58 (BP Location: Right arm, Patient Position: Chair, Cuff Size: Adult Regular)  Pulse 95  Temp 98.8  F (37.1  C) (Oral)  Ht 5' 1.75\" (1.568 m)  Wt 116 lb (52.6 kg)  Breastfeeding? No  BMI 21.39 kg/m2  Body mass index is 21.39 kg/(m^2).  GENERAL: healthy, alert and no distress  RESP: lungs clear to auscultation - no rales, rhonchi or wheezes  CV: regular rate and rhythm, normal S1 S2, no S3 or S4, no murmur, click or rub, no peripheral edema and peripheral pulses strong  ABDOMEN: soft, ttp in RLQ, no hepatosplenomegaly, no masses and bowel sounds normal  SKIN: raised, horned lesion on posterior right calf    Diagnostic Test Results:  none     Procedure: shave biopsy  Anesthesia: 1% lidocaine with epi  Patient informed of the risks (including bleeding and infection) and benefits of the   procedure and verbal informed consent obtained.    The lesion and surrounding area were given a sterile prep using alcohol and draped in the usual sterile fashion. A scalpel was used to shave an area of skin approximately 1cm by 1cm.  Hemostasis achieved with firm pressure. Antibiotic ointment and a sterile dressing applied. The specimen was sent for pathologic examination. The patient tolerated the procedure well.  Complications:  none.      ASSESSMENT/PLAN:     1. Epigastric fullness - discussed likely gastritis as her symptoms are indicative. See below for further work up. If negative, will suggest a  3month course of H2B    2. Bloated abdomen - will r/o ovarian cancer with lab work first, as she has early satiety and abd bloating, in addition to RLQ abd pain to deep palpation  -   - CBC with platelets and differential  - Comprehensive metabolic panel (BMP + Alb, Alk Phos, ALT, AST, Total. Bili, TP)    3. Early satiety - see above  -   - CBC with platelets and differential  - Comprehensive metabolic panel (BMP + Alb, Alk " Phos, ALT, AST, Total. Bili, TP)    4. Acute gastritis without hemorrhage, unspecified gastritis type - likely diagnosis based on constellation of symptoms.     5. Other intra-abdominal and pelvic swelling, mass and lump  - needed medicare diagnosis  -     6. Skin lesion - removed today, sent to pathology for confirmation - seb K  - Surgical pathology exam      Batool Kulkarni MD  [facility]

## 2017-11-15 NOTE — MR AVS SNAPSHOT
After Visit Summary   11/15/2017    Indy Reyes    MRN: [id number]           Patient Information     Date Of Birth          2/27/1931        Visit Information        Provider Department      11/15/2017 8:00 AM Batool Kulkarni MD [facility]        Today's Diagnoses     Epigastric fullness    -  1    Bloated abdomen        Early satiety        Acute gastritis without hemorrhage, unspecified gastritis type        Other intra-abdominal and pelvic swelling, mass and lump         Skin lesion           Follow-ups after your visit        Who to contact     If you have questions or need follow up information about today's clinic visit or your schedule please contact [facility] directly at [phone].  Normal or non-critical lab and imaging results will be communicated to you by MyChart, letter or phone within 4 business days after the clinic has received the results. If you do not hear from us within 7 days, please contact the clinic through [employer] or phone. If you have a critical or abnormal lab result, we will notify you by phone as soon as possible.  Submit refill requests through [employer] or call your pharmacy and they will forward the refill request to us. Please allow 3 business days for your refill to be completed.          Additional Information About Your Visit        MyChart Information     [employer] gives you secure access to your electronic health record. If you see a primary care provider, you can also send messages to your care team and make appointments. If you have questions, please call your primary care clinic.  If you do not have a primary care provider, please call [phone] and they will assist you.        Care EveryWhere ID     This is your Care EveryWhere ID. This could be used by other organizations to access your [facility] medical records  [id number]        Your Vitals Were     Pulse Temperature Height Breastfeeding? BMI (Body Mass Index)  "      95 98.8  F (37.1  C) (Oral) 5' 1.75\" (1.568 m) No 21.39 kg/m2        Blood Pressure from Last 3 Encounters:   11/15/17 118/58   11/09/17 122/50   11/09/17 122/50    Weight from Last 3 Encounters:   11/15/17 116 lb (52.6 kg)   11/09/17 116 lb (52.6 kg)   11/09/17 116 lb (52.6 kg)              We Performed the Following          CBC with platelets and differential     Comprehensive metabolic panel (BMP + Alb, Alk Phos, ALT, AST, Total. Bili, TP)     Surgical pathology exam        Primary Care Provider Office Phone # Fax #    Batool Hermelindo Kulkarni [phone] [address] [facility] [address]        Equal Access to Services     MAMIE MOISE : Hadii aad ku hadasho Soomaali, waaxda luqadaha, qaybta kaalmada adeegyada, yasmeen de león hayarnoldo beyer . So [facility] [phone].    ATENCIÓN: Si habla español, tiene a saini disposición servicios gratuitos de asistencia lingüística. Abdifatah al [phone].    We comply with applicable federal civil rights laws and [place] laws. We do not discriminate on the basis of race, color, national origin, age, disability, sex, sexual orientation, or gender identity.            Thank you!     Thank you for choosing [facility]  for your care. Our goal is always to provide you with excellent care. Hearing back from our patients is one way we can continue to improve our services. Please take a few minutes to complete the written survey that you may receive in the mail after your visit with us. Thank you!             Your Updated Medication List - Protect others around you: Learn how to safely use, store and throw away your medicines at www.disposemymeds.org.          This list is accurate as of: 11/15/17  5:08 PM.  Always use your most recent med list.                   Brand Name Dispense Instructions for use Diagnosis    acetaminophen 325 MG tablet    TYLENOL    100 tablet    Take 2 tablets (650 mg) by mouth every 4 hours as needed " for other (mild pain)    Lumbar radiculopathy, right       fluticasone 50 MCG/ACT spray    FLONASE    16 g    Spray 2 sprays into both nostrils as needed    Postnasal drip       * OCUVITE ADULT 50+ Caps      Take 2 capsules by mouth every morning        * MULTIVITAMIN ADULTS 50+ Tabs      Take 2 capsules by mouth every morning        * Notice:  This list has 2 medication(s) that are the same as other medications prescribed for you. Read the directions carefully, and ask your doctor or other care provider to review them with you.

## 2017-11-15 NOTE — NURSING NOTE
"Chief Complaint   Patient presents with     Mass     Abdominal Pain       Initial /58 (BP Location: Right arm, Patient Position: Chair, Cuff Size: Adult Regular)  Pulse 95  Temp 98.8  F (37.1  C) (Oral)  Ht 5' 1.75\" (1.568 m)  Wt 116 lb (52.6 kg)  Breastfeeding? No  BMI 21.39 kg/m2 Estimated body mass index is 21.39 kg/(m^2) as calculated from the following:    Height as of this encounter: 5' 1.75\" (1.568 m).    Weight as of this encounter: 116 lb (52.6 kg).  Medication Reconciliation: complete     Health maintenance- fall risk done    Juan F Faustin CMA            "

## 2017-11-16 LAB — COPATH REPORT: NORMAL

## 2017-11-17 NOTE — PROGRESS NOTES
"[facility]  November 9, 2017      Behavioral Health Clinician Progress Note    Patient Name: Indy Reyes           Service Type:  Individual      Service Location:   Face to Face in Clinic     Session Start Time: 2:30 pm.  Session End Time: 3:20 p.m.      Session Length: 38 - 52      Attendees: Patient and daughter-in-law Marlen; RAMANA Trinidad CNP    Visit Activities (Refresh list every visit): [facility] and [facility]    Diagnostic Assessment Date: to be completed at next visit  Treatment Plan Review Date: to be completed at next visit  See Flowsheets for today's PHQ-9 and KAYLAN-7 results  Previous PHQ-9:   PHQ-9 SCORE 5/30/2014   Total Score 1     Previous KAYLAN-7:   KAYLAN-7 SCORE 5/30/2014   Total Score 1       RENE LEVEL:  RENE Score (Last Two) 11/9/2011   RENE Raw Score 40   Activation Score 60   RENE Level 3       DATA  Extended Session (60+ minutes): No  Interactive Complexity: No  Crisis: No  [facility] Patient: No    Treatment Objective(s) Addressed in This Session:  Target Behavior(s): concerns about memory problems    Adjustment Difficulties: will develop coping/problem-solving skills to facilitate more adaptive adjustment    Current Stressors / Issues:  [facility] co-visit with patient, her daughter-in-law,and RAMANA Trinidad CNP. [facility] to assess memory changes which she's noticed over the past few weeks. Patient completed the MOCA with Payton Snyder, and scored within the normal range.   Patient has been the primary caregiver for her  and worked at [employer] until she retired two years ago. She continues to volunteer at her [employer] and states, \"I can't say no\". Patient agreed that at times she is overwhelmed by her commitments, and that she is a perfectionist. She reports that she has trouble falling asleep some nights due to ruminating thoughts about \"what I could've done better\". Patient acknowledged a general sense of constant anxiety " "about what she has done throughout the day, and what she has to plan for the future. Patient was able to identify one stressor she is willing to change, and states that planning for meal preparation is particularly stressful. Discussed alternatives to shopping and menu planning, including sevices like [employer] and [employer]. Patient is open to exploring these. Patient states that despite being overwhelmed at times, she believes her anxiety keeps her functioning and prevents her from missing details. Patient discussed sleep hygiene, including beginning a ritual to encourage relaxation: making lists, having a cup of herbal tea, etc.   Patient reports feeling well-supported by her sons and daughter-in-law who states, \"She's happy all the time\" Patient denies a history of mental health concerns and states that overall her mood is stable. She agreed to call the clinic if additional support is needed.      Progress on Treatment Objective(s) / Homework:  New Objective established this session - PREPARATION (Decided to change - considering how); Intervened by negotiating a change plan and determining options / strategies for behavior change, identifying triggers, exploring social supports, and working towards setting a date to begin behavior change    Motivational Interviewing    MI Intervention: Expressed Empathy/Understanding, Supported Autonomy, Collaboration, Evocation, Permission to raise concern or advise and Open-ended questions     Change Talk Expressed by the Patient: Need to change Committment to change    Provider Response to Change Talk: E - Evoked more info from patient about behavior change, A - Affirmed patient's thoughts, decisions, or attempts at behavior change, R - Reflected patient's change talk and S - Summarized patient's change talk statements        Care Plan review completed: No    Medication Review:  No current psychiatric medications prescribed    Medication Compliance:  NA    Changes in " Health Issues:  Please see medical note by RAMANA Trinidad, CNP    Chemical Use Review:   Substance Use: Chemical use reviewed, no active concerns identified      Tobacco Use: No current tobacco use.      Assessment: Current Emotional / Mental Status (status of significant symptoms):  Risk status (Self / Other harm or suicidal ideation)  Patient denies a history of suicidal ideation, suicide attempts, self-injurious behavior, homicidal ideation, homicidal behavior and and other safety concerns  Patient denies current fears or concerns for personal safety.  Patient denies current or recent suicidal ideation or behaviors.  Patient denies current or recent homicidal ideation or behaviors.  Patient denies current or recent self injurious behavior or ideation.  Patient denies other safety concerns.  A safety and risk management plan has not been developed at this time, however patient was encouraged to call [place] [phone] should there be a change in any of these risk factors.    Appearance:   Appropriate   Eye Contact:   Good   Psychomotor Behavior: Normal   Attitude:   Cooperative   Orientation:   All  Speech   Rate / Production: Normal    Volume:  Normal   Mood:    Anxious   Affect:    Appropriate   Thought Content:  Clear   Thought Form:  Coherent  Logical   Insight:    Good     Provisional Diagnoses:  1. Adjustment disorder with anxious mood        Collateral Reports Completed:  Not Applicable    Plan: (Homework, other):  Patient was given information about behavioral services and encouraged to schedule a follow up appointment with the clinic [facility] as needed.  She was also given information about mental health symptoms and treatment options .  CD Recommendations: No indications of CD issues.     AVIS [place], [facility]

## 2018-01-02 ENCOUNTER — RADIANT APPOINTMENT (OUTPATIENT)
Dept: GENERAL RADIOLOGY | Facility: CLINIC | Age: 83
End: 2018-01-02
Attending: FAMILY MEDICINE
Payer: COMMERCIAL

## 2018-01-02 ENCOUNTER — OFFICE VISIT (OUTPATIENT)
Dept: FAMILY MEDICINE | Facility: CLINIC | Age: 83
End: 2018-01-02
Payer: COMMERCIAL

## 2018-01-02 VITALS
DIASTOLIC BLOOD PRESSURE: 60 MMHG | TEMPERATURE: 98.7 F | OXYGEN SATURATION: 97 % | HEART RATE: 94 BPM | SYSTOLIC BLOOD PRESSURE: 129 MMHG

## 2018-01-02 DIAGNOSIS — M25.551 HIP PAIN, RIGHT: Primary | ICD-10-CM

## 2018-01-02 DIAGNOSIS — M25.551 HIP PAIN, RIGHT: ICD-10-CM

## 2018-01-02 PROCEDURE — 73502 X-RAY EXAM HIP UNI 2-3 VIEWS: CPT

## 2018-01-02 PROCEDURE — 99213 OFFICE O/P EST LOW 20 MIN: CPT | Performed by: FAMILY MEDICINE

## 2018-01-02 RX ORDER — NABUMETONE 500 MG/1
500-1000 TABLET, FILM COATED ORAL 2 TIMES DAILY PRN
Qty: 30 TABLET | Refills: 1 | Status: SHIPPED | OUTPATIENT
Start: 2018-01-02 | End: 2018-05-08

## 2018-01-02 NOTE — PROGRESS NOTES
SUBJECTIVE:   Indy Reyes is a 86 year old female who presents to clinic today for the following health issues:      Pulled muscle      Duration: x10 days    Description (location/character/radiation): R groin    Intensity:  severe    Accompanying signs and symptoms: pain    History (similar episodes/previous evaluation): Pulled muscle while playing solitaire    Precipitating or alleviating factors: None    Therapies tried and outcome: ASA did not help             Problem list and histories reviewed & adjusted, as indicated.  Additional history:     Patient Active Problem List   Diagnosis     Hyperlipidemia LDL goal <130     Osteopenia     Advanced directives, counseling/discussion     Impaired fasting glucose     Family history of breast cancer     Family history of coronary artery disease     Thrombocytopenia (H)     Thyroid nodule     Postnasal drip     Rhinitis     Elevated TSH     Elevated blood pressure reading     Osteoarthrosis of knee     Lumbar pain     Cervical radiculitis     Lumbar radiculopathy, right     Lumbar radiculopathy     Memory changes     Past Surgical History:   Procedure Laterality Date     APPENDECTOMY      at age of 16     CATARACT IOL, RT/LT      both     FORAMINOTOMY LUMBAR POSTERIOR ONE LEVEL Right 2017    Procedure: FORAMINOTOMY LUMBAR POSTERIOR ONE LEVEL;  Right L4-L5 hemilaminectomy and foraminotomy and excision of synovial cyst. ;  Surgeon: Fady Woo MD;  Location: [facility] OR     TONSILLECTOMY         Social History   Substance Use Topics     Smoking status: Former Smoker     Types: Cigarettes     Quit date: 1960     Smokeless tobacco: Never Used      Comment:      Alcohol use Yes      Comment: SOCIAL-2 drinks per month      Family History   Problem Relation Age of Onset     Family History Negative Mother       in accident     CANCER Father      prostate     HEART DISEASE Brother      60s-4-one brother with heart attack in his 60's     CANCER  Sister      breast-7-one  wtih breast ca,one with stroke, one sis  from diabetes     Breast Cancer Sister      diagnosed at age of 52- 5 sisters alive     Cancer - colorectal No family hx of              Reviewed and updated as needed this visit by clinical staff     Reviewed and updated as needed this visit by Provider         ROS:  Constitutional, HEENT, cardiovascular, pulmonary, gi and gu systems are negative, except as otherwise noted.      OBJECTIVE:   /60 (BP Location: Right arm, Patient Position: Chair, Cuff Size: Adult Regular)  Pulse 94  Temp 98.7  F (37.1  C) (Oral)  SpO2 97%  There is no height or weight on file to calculate BMI.  GENERAL: healthy, alert and no distress  RESP: lungs clear to auscultation - no rales, rhonchi or wheezes  CV: regular rate and rhythm, normal S1 S2, no S3 or S4, no murmur, click or rub, no peripheral edema and peripheral pulses strong  ABDOMEN: soft, nontender, no hepatosplenomegaly, no masses and bowel sounds normal  MS: Right hip good internal/external rotation no tenderness to palpation other than the right groin.  Also some discomfort on the anterior aspect of the leg.    Diagnostic Test Results:  Xray; negative for abnormality of the hip    ASSESSMENT/PLAN:               ICD-10-CM    1. Hip pain, right M25.551 XR Hip Right 2-3 Views     nabumetone (RELAFEN) 500 MG tablet   ; This appears to be muscular also could be tendinous in nature    2.  Groin pain.    I would have him return to his primary care at that time for reevaluation    Plan is to use a nonsteroidal medication will also have him ice the area at least twice a day    Manuel Ventura MD  [facility]

## 2018-01-02 NOTE — MR AVS SNAPSHOT
After Visit Summary   1/2/2018    Indy Reyes    MRN: [id number]           Patient Information     Date Of Birth          2/27/1931        Visit Information        Provider Department      1/2/2018 4:15 PM Manuel Ventura MD [facility]        Today's Diagnoses     Hip pain, right    -  1       Follow-ups after your visit        Who to contact     If you have questions or need follow up information about today's clinic visit or your schedule please contact [facility] directly at [phone].  Normal or non-critical lab and imaging results will be communicated to you by [employer], letter or phone within 4 business days after the clinic has received the results. If you do not hear from us within 7 days, please contact the clinic through [employer] or phone. If you have a critical or abnormal lab result, we will notify you by phone as soon as possible.  Submit refill requests through [employer] or call your pharmacy and they will forward the refill request to us. Please allow 3 business days for your refill to be completed.          Additional Information About Your Visit        MyChart Information     [employer] gives you secure access to your electronic health record. If you see a primary care provider, you can also send messages to your care team and make appointments. If you have questions, please call your primary care clinic.  If you do not have a primary care provider, please call [phone] and they will assist you.        Care EveryWhere ID     This is your Care EveryWhere ID. This could be used by other organizations to access your [place] medical records  [id number]        Your Vitals Were     Pulse Temperature Pulse Oximetry             94 98.7  F (37.1  C) (Oral) 97%          Blood Pressure from Last 3 Encounters:   01/11/18 164/86   01/02/18 129/60   11/15/17 118/58    Weight from Last 3 Encounters:   11/15/17 116 lb (52.6 kg)   11/09/17 116 lb (52.6 kg)    11/09/17 116 lb (52.6 kg)                 Today's Medication Changes          These changes are accurate as of: 1/2/18 11:59 PM.  If you have any questions, ask your nurse or doctor.               Start taking these medicines.        Dose/Directions    nabumetone 500 MG tablet   Commonly known as:  RELAFEN   Used for:  Hip pain, right   Started by:  Manuel Ventura MD        Dose:  500-1000 mg   Take 1-2 tablets (500-1,000 mg) by mouth 2 times daily as needed for moderate pain   Quantity:  30 tablet   Refills:  1            Where to get your medicines      These medications were sent to [facility]/pharmacy #[id number] - [place], [place] - [address] [address]  [address] [facility] [address]    Hours:  [facility] Phone:  [phone]     nabumetone 500 MG tablet                Primary Care Provider Office Phone # Fax #    Batool Hermelindo Kulkarni [phone] [address]  [facility] [address]        Equal Access to Services     MAMIE MOISE AH: Hadii guille ku hadasho Soomaali, waaxda luqadaha, qaybta kaalmada adeegyada, waxay genet hayarnoldo beyer . So [facility] [phone].    ATENCIÓN: Si habla español, tiene a saini disposición servicios gratuitos de asistencia lingüística. Llame al [phone].    We comply with applicable federal civil rights laws and [place] laws. We do not discriminate on the basis of race, color, national origin, age, disability, sex, sexual orientation, or gender identity.            Thank you!     Thank you for choosing [facility]  for your care. Our goal is always to provide you with excellent care. Hearing back from our patients is one way we can continue to improve our services. Please take a few minutes to complete the written survey that you may receive in the mail after your visit with us. Thank you!             Your Updated Medication List - Protect others around you: Learn how to safely use, store and throw away your medicines  at www.disposemymeds.org.          This list is accurate as of: 1/2/18 11:59 PM.  Always use your most recent med list.                   Brand Name Dispense Instructions for use Diagnosis    acetaminophen 325 MG tablet    TYLENOL    100 tablet    Take 2 tablets (650 mg) by mouth every 4 hours as needed for other (mild pain)    Lumbar radiculopathy, right       fluticasone 50 MCG/ACT spray    FLONASE    16 g    Spray 2 sprays into both nostrils as needed    Postnasal drip       nabumetone 500 MG tablet    RELAFEN    30 tablet    Take 1-2 tablets (500-1,000 mg) by mouth 2 times daily as needed for moderate pain    Hip pain, right       * OCUVITE ADULT 50+ Caps      Take 2 capsules by mouth every morning        * MULTIVITAMIN ADULTS 50+ Tabs      Take 2 capsules by mouth every morning        ranitidine 150 MG tablet    ZANTAC    60 tablet    Take 1 tablet (150 mg) by mouth 2 times daily    Epigastric fullness       * Notice:  This list has 2 medication(s) that are the same as other medications prescribed for you. Read the directions carefully, and ask your doctor or other care provider to review them with you.

## 2018-01-02 NOTE — NURSING NOTE
"Chief Complaint   Patient presents with     Musculoskeletal Problem     Pulled muscle       Initial /60 (BP Location: Right arm, Patient Position: Chair, Cuff Size: Adult Regular)  Pulse 94  Temp 98.7  F (37.1  C) (Oral)  SpO2 97% Estimated body mass index is 21.39 kg/(m^2) as calculated from the following:    Height as of 11/15/17: 5' 1.75\" (1.568 m).    Weight as of 11/15/17: 116 lb (52.6 kg).  Medication Reconciliation: complete     Cheryl Brewster CMA (AAMA)        "

## 2018-01-04 ENCOUNTER — TRANSFERRED RECORDS (OUTPATIENT)
Dept: HEALTH INFORMATION MANAGEMENT | Facility: CLINIC | Age: 83
End: 2018-01-04

## 2018-01-11 ENCOUNTER — APPOINTMENT (OUTPATIENT)
Dept: CT IMAGING | Facility: CLINIC | Age: 83
End: 2018-01-11
Attending: EMERGENCY MEDICINE
Payer: COMMERCIAL

## 2018-01-11 ENCOUNTER — HOSPITAL ENCOUNTER (EMERGENCY)
Facility: CLINIC | Age: 83
Discharge: HOME | End: 2018-01-11
Attending: EMERGENCY MEDICINE | Admitting: EMERGENCY MEDICINE
Payer: COMMERCIAL

## 2018-01-11 ENCOUNTER — APPOINTMENT (OUTPATIENT)
Dept: GENERAL RADIOLOGY | Facility: CLINIC | Age: 83
End: 2018-01-11
Attending: EMERGENCY MEDICINE
Payer: COMMERCIAL

## 2018-01-11 VITALS
DIASTOLIC BLOOD PRESSURE: 86 MMHG | SYSTOLIC BLOOD PRESSURE: 164 MMHG | TEMPERATURE: 98.5 F | RESPIRATION RATE: 16 BRPM | HEART RATE: 95 BPM | OXYGEN SATURATION: 100 %

## 2018-01-11 DIAGNOSIS — S20.211A CONTUSION OF RIGHT CHEST WALL, INITIAL ENCOUNTER: ICD-10-CM

## 2018-01-11 DIAGNOSIS — S00.83XA CONTUSION OF FACE, SCALP AND NECK, INITIAL ENCOUNTER: ICD-10-CM

## 2018-01-11 DIAGNOSIS — S00.03XA CONTUSION OF FACE, SCALP AND NECK, INITIAL ENCOUNTER: ICD-10-CM

## 2018-01-11 DIAGNOSIS — S10.93XA CONTUSION OF FACE, SCALP AND NECK, INITIAL ENCOUNTER: ICD-10-CM
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
## 2018-01-11 PROCEDURE — 99284 EMERGENCY DEPT VISIT MOD MDM: CPT | Mod: 25

## 2018-01-11 PROCEDURE — A9270 NON-COVERED ITEM OR SERVICE: HCPCS | Mod: GY | Performed by: EMERGENCY MEDICINE

## 2018-01-11 PROCEDURE — 70450 CT HEAD/BRAIN W/O DYE: CPT

## 2018-01-11 PROCEDURE — 71046 X-RAY EXAM CHEST 2 VIEWS: CPT

## 2018-01-11 PROCEDURE — 25000132 ZZH RX MED GY IP 250 OP 250 PS 637: Mod: GY | Performed by: EMERGENCY MEDICINE

## 2018-01-11 RX ORDER — ACETAMINOPHEN 325 MG/1
650 TABLET ORAL ONCE
Status: COMPLETED | OUTPATIENT
Start: 2018-01-11 | End: 2018-01-11

## 2018-01-11 RX ADMIN — ACETAMINOPHEN 650 MG: 325 TABLET, FILM COATED ORAL at 15:08

## 2018-01-11 ASSESSMENT — ENCOUNTER SYMPTOMS
NECK PAIN: 0
ABDOMINAL PAIN: 0
VOMITING: 0
NAUSEA: 0
SHORTNESS OF BREATH: 0
MYALGIAS: 0
BACK PAIN: 0
HEADACHES: 1
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
## 2018-01-11 NOTE — ED AVS SNAPSHOT
[facility] Emergency Department    [address]    [facility] [address]    Phone:  [phone]    Fax:  [phone]                                       Indy Reyes   MRN: [id number]    Department:  [facility] Emergency Department   Date of Visit:  1/11/2018           After Visit Summary Signature Page     I have received my discharge instructions, and my questions have been answered. I have discussed any challenges I see with this plan with the nurse or doctor.    ..........................................................................................................................................  Patient/Patient Representative Signature      ..........................................................................................................................................  Patient Representative Print Name and Relationship to Patient    ..................................................               ................................................  Date                                            Time    ..........................................................................................................................................  Reviewed by Signature/Title    ...................................................              ..............................................  Date                                                            Time
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
## 2018-01-11 NOTE — ED NOTES
MD in to see patient and discuss diagnosis, test results, and discharge plan. Patient meets discharge criteria. Discussed AVS with patient. Questions answered. Patient verbalized understanding. Patient reports being ready to go home. Patient discharged home with family by car with all necessary information.
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
## 2018-01-19 ENCOUNTER — TRANSFERRED RECORDS (OUTPATIENT)
Dept: HEALTH INFORMATION MANAGEMENT | Facility: CLINIC | Age: 83
End: 2018-01-19

## 2018-01-26 ENCOUNTER — TRANSFERRED RECORDS (OUTPATIENT)
Dept: HEALTH INFORMATION MANAGEMENT | Facility: CLINIC | Age: 83
End: 2018-01-26

## 2018-03-19 ENCOUNTER — HOSPITAL ENCOUNTER (OUTPATIENT)
Dept: MAMMOGRAPHY | Facility: CLINIC | Age: 83
Discharge: HOME | End: 2018-03-19
Attending: FAMILY MEDICINE | Admitting: FAMILY MEDICINE
Payer: MEDICARE

## 2018-03-19 DIAGNOSIS — Z12.31 VISIT FOR SCREENING MAMMOGRAM: ICD-10-CM

## 2018-03-19 PROCEDURE — 77067 SCR MAMMO BI INCL CAD: CPT

## 2018-05-08 ENCOUNTER — OFFICE VISIT (OUTPATIENT)
Dept: FAMILY MEDICINE | Facility: CLINIC | Age: 83
End: 2018-05-08
Payer: COMMERCIAL

## 2018-05-08 VITALS
WEIGHT: 121 LBS | BODY MASS INDEX: 22.26 KG/M2 | TEMPERATURE: 99.1 F | HEIGHT: 62 IN | DIASTOLIC BLOOD PRESSURE: 58 MMHG | HEART RATE: 83 BPM | SYSTOLIC BLOOD PRESSURE: 108 MMHG

## 2018-05-08 DIAGNOSIS — N32.81 OVERACTIVE BLADDER: Primary | ICD-10-CM

## 2018-05-08 DIAGNOSIS — R41.3 MEMORY CHANGES: ICD-10-CM

## 2018-05-08 DIAGNOSIS — R53.83 FATIGUE, UNSPECIFIED TYPE: ICD-10-CM

## 2018-05-08 DIAGNOSIS — R39.15 URINARY URGENCY: ICD-10-CM

## 2018-05-08 DIAGNOSIS — N81.89 PELVIC FLOOR WEAKNESS: ICD-10-CM

## 2018-05-08 PROBLEM — M54.16 LUMBAR RADICULOPATHY: Status: RESOLVED | Noted: 2017-04-28 | Resolved: 2018-05-08

## 2018-05-08 LAB
ALBUMIN SERPL-MCNC: 3.7 G/DL (ref 3.4–5)
ALBUMIN UR-MCNC: NEGATIVE MG/DL
ALP SERPL-CCNC: 105 U/L (ref 40–150)
ALT SERPL W P-5'-P-CCNC: 28 U/L (ref 0–50)
ANION GAP SERPL CALCULATED.3IONS-SCNC: 6 MMOL/L (ref 3–14)
APPEARANCE UR: CLEAR
AST SERPL W P-5'-P-CCNC: 22 U/L (ref 0–45)
BILIRUB SERPL-MCNC: 0.3 MG/DL (ref 0.2–1.3)
BILIRUB UR QL STRIP: NEGATIVE
BUN SERPL-MCNC: 18 MG/DL (ref 7–30)
CALCIUM SERPL-MCNC: 9.4 MG/DL (ref 8.5–10.1)
CHLORIDE SERPL-SCNC: 106 MMOL/L (ref 94–109)
CO2 SERPL-SCNC: 29 MMOL/L (ref 20–32)
COLOR UR AUTO: YELLOW
CREAT SERPL-MCNC: 0.71 MG/DL (ref 0.52–1.04)
ERYTHROCYTE [DISTWIDTH] IN BLOOD BY AUTOMATED COUNT: 15.3 % (ref 10–15)
GFR SERPL CREATININE-BSD FRML MDRD: 77 ML/MIN/1.7M2
GLUCOSE SERPL-MCNC: 98 MG/DL (ref 70–99)
GLUCOSE UR STRIP-MCNC: NEGATIVE MG/DL
HCT VFR BLD AUTO: 38.6 % (ref 35–47)
HGB BLD-MCNC: 12.5 G/DL (ref 11.7–15.7)
HGB UR QL STRIP: ABNORMAL
KETONES UR STRIP-MCNC: NEGATIVE MG/DL
LEUKOCYTE ESTERASE UR QL STRIP: ABNORMAL
MCH RBC QN AUTO: 26.7 PG (ref 26.5–33)
MCHC RBC AUTO-ENTMCNC: 32.4 G/DL (ref 31.5–36.5)
MCV RBC AUTO: 82 FL (ref 78–100)
NITRATE UR QL: NEGATIVE
PH UR STRIP: 5.5 PH (ref 5–7)
PLATELET # BLD AUTO: 161 10E9/L (ref 150–450)
POTASSIUM SERPL-SCNC: 5.1 MMOL/L (ref 3.4–5.3)
PROT SERPL-MCNC: 6.9 G/DL (ref 6.8–8.8)
RBC # BLD AUTO: 4.69 10E12/L (ref 3.8–5.2)
RBC #/AREA URNS AUTO: NORMAL /HPF
SODIUM SERPL-SCNC: 141 MMOL/L (ref 133–144)
SOURCE: ABNORMAL
SP GR UR STRIP: 1.01 (ref 1–1.03)
T4 FREE SERPL-MCNC: 0.91 NG/DL (ref 0.76–1.46)
TSH SERPL DL<=0.005 MIU/L-ACNC: 6.43 MU/L (ref 0.4–4)
UROBILINOGEN UR STRIP-ACNC: 0.2 EU/DL (ref 0.2–1)
WBC # BLD AUTO: 6.2 10E9/L (ref 4–11)
WBC #/AREA URNS AUTO: NORMAL /HPF

## 2018-05-08 PROCEDURE — 36415 COLL VENOUS BLD VENIPUNCTURE: CPT | Performed by: FAMILY MEDICINE

## 2018-05-08 PROCEDURE — 99214 OFFICE O/P EST MOD 30 MIN: CPT | Performed by: FAMILY MEDICINE

## 2018-05-08 PROCEDURE — 84439 ASSAY OF FREE THYROXINE: CPT | Performed by: FAMILY MEDICINE

## 2018-05-08 PROCEDURE — 85027 COMPLETE CBC AUTOMATED: CPT | Performed by: FAMILY MEDICINE

## 2018-05-08 PROCEDURE — 80053 COMPREHEN METABOLIC PANEL: CPT | Performed by: FAMILY MEDICINE

## 2018-05-08 PROCEDURE — 84443 ASSAY THYROID STIM HORMONE: CPT | Performed by: FAMILY MEDICINE

## 2018-05-08 PROCEDURE — 81001 URINALYSIS AUTO W/SCOPE: CPT | Performed by: FAMILY MEDICINE

## 2018-05-08 NOTE — NURSING NOTE
"  Chief Complaint   Patient presents with     Urinary Problem       Initial /58 (BP Location: Right arm, Patient Position: Chair, Cuff Size: Adult Regular)  Pulse 83  Temp 99.1  F (37.3  C) (Oral)  Ht 5' 1.75\" (1.568 m)  Wt 121 lb (54.9 kg)  Breastfeeding? No  BMI 22.31 kg/m2 Estimated body mass index is 22.31 kg/(m^2) as calculated from the following:    Height as of this encounter: 5' 1.75\" (1.568 m).    Weight as of this encounter: 121 lb (54.9 kg).  Medication Reconciliation: complete      Health Maintenance addressed:  Up to date        "

## 2018-05-08 NOTE — PROGRESS NOTES
SUBJECTIVE:   Indy Reyes is a 87 year old female who presents to clinic today for the following health issues:      URINARY TRACT SYMPTOMS      Duration: off and on for a year getting more frequent    Description  frequency    Intensity:  mild    Accompanying signs and symptoms:  Fever/chills: no   Flank pain YES- low back  Nausea and vomiting: no   Vaginal symptoms: none  Abdominal/Pelvic Pain: YES- abdomen at times     History  History of frequent UTI's: no   History of kidney stones: no   Sexually Active: no   Possibility of pregnancy: No    Precipitating or alleviating factors: None    Therapies tried and outcome: none        Reports trouble getting to the restroom fast enough, losing her urine. Leaking urine.     Was told to drink a lot of water by her grand daughter.     No dysuria.   No nocturia.     Having more fatigue lately. Admits she doesn't exercise like she used to, still doing stretches in the AM. Still eating well. Sleeping well. No prescription medications. No weakness in arms or legs. No chest pain or dyspnea.     Has questions regarding lipogen for dementia. Read an article on it and is interested in trying it.         Problem list and histories reviewed & adjusted, as indicated.  Additional history: none    Patient Active Problem List   Diagnosis     Hyperlipidemia LDL goal <130     Osteopenia     Advanced directives, counseling/discussion     Impaired fasting glucose     Family history of breast cancer     Family history of coronary artery disease     Thrombocytopenia (H)     Thyroid nodule     Postnasal drip     Rhinitis     Elevated TSH     Elevated blood pressure reading     Osteoarthrosis of knee     Lumbar pain     Cervical radiculitis     Lumbar radiculopathy, right     Lumbar radiculopathy     Memory changes     Past Surgical History:   Procedure Laterality Date     APPENDECTOMY      at age of 16     CATARACT IOL, RT/LT  5/09    both     FORAMINOTOMY LUMBAR POSTERIOR ONE LEVEL  "Right 2017    Procedure: FORAMINOTOMY LUMBAR POSTERIOR ONE LEVEL;  Right L4-L5 hemilaminectomy and foraminotomy and excision of synovial cyst. ;  Surgeon: Fady Woo MD;  Location: [facility] OR     TONSILLECTOMY         Social History   Substance Use Topics     Smoking status: Former Smoker     Types: Cigarettes     Quit date: 1960     Smokeless tobacco: Never Used      Comment:      Alcohol use Yes      Comment: SOCIAL-2 drinks per month      Family History   Problem Relation Age of Onset     Family History Negative Mother       in accident     CANCER Father      prostate     HEART DISEASE Brother      60s-4-one brother with heart attack in his 60's     CANCER Sister      breast-7-one  wtih breast ca,one with stroke, one sis  from diabetes     Breast Cancer Sister      diagnosed at age of 52- 5 sisters alive     Cancer - colorectal No family hx of            Reviewed and updated as needed this visit by clinical staff       Reviewed and updated as needed this visit by Provider         ROS:  Constitutional, HEENT, cardiovascular, pulmonary, gi and gu systems are negative, except as otherwise noted.    OBJECTIVE:     /58 (BP Location: Right arm, Patient Position: Chair, Cuff Size: Adult Regular)  Pulse 83  Temp 99.1  F (37.3  C) (Oral)  Ht 5' 1.75\" (1.568 m)  Wt 121 lb (54.9 kg)  Breastfeeding? No  BMI 22.31 kg/m2  Body mass index is 22.31 kg/(m^2).  GENERAL: healthy, alert and no distress  ABDOMEN: soft, nontender, no hepatosplenomegaly, no masses and bowel sounds normal    Diagnostic Test Results:  Results for orders placed or performed in visit on 18 (from the past 24 hour(s))   UA reflex to Microscopic and Culture   Result Value Ref Range    Color Urine Yellow     Appearance Urine Clear     Glucose Urine Negative NEG^Negative mg/dL    Bilirubin Urine Negative NEG^Negative    Ketones Urine Negative NEG^Negative mg/dL    Specific Gravity Urine 1.010 1.003 - 1.035    " Blood Urine Trace (A) NEG^Negative    pH Urine 5.5 5.0 - 7.0 pH    Protein Albumin Urine Negative NEG^Negative mg/dL    Urobilinogen Urine 0.2 0.2 - 1.0 EU/dL    Nitrite Urine Negative NEG^Negative    Leukocyte Esterase Urine Trace (A) NEG^Negative    Source Midstream Urine    Urine Microscopic   Result Value Ref Range    WBC Urine 0 - 5 OTO5^0 - 5 /HPF    RBC Urine O - 2 OTO2^O - 2 /HPF   CBC with platelets   Result Value Ref Range    WBC 6.2 4.0 - 11.0 10e9/L    RBC Count 4.69 3.8 - 5.2 10e12/L    Hemoglobin 12.5 11.7 - 15.7 g/dL    Hematocrit 38.6 35.0 - 47.0 %    MCV 82 78 - 100 fl    MCH 26.7 26.5 - 33.0 pg    MCHC 32.4 31.5 - 36.5 g/dL    RDW 15.3 (H) 10.0 - 15.0 %    Platelet Count 161 150 - 450 10e9/L       ASSESSMENT/PLAN:     1. Overactive bladder - discussed diagnosis, with trouble retaining urine, suggested PT consultation  - UA reflex to Microscopic and Culture  - Urine Microscopic  - [facility] RE[facility]    2. Pelvic floor weakness - as above  - UA reflex to Microscopic and Culture  - Urine Microscopic  - [facility] REFERRAL    3. Urinary urgency - likely from OAB, negative UA today  - UA reflex to Microscopic and Culture  - Urine Microscopic  - [facility] REFERRAL    4. Fatigue, unspecified type - will recheck lab work, hx of subclinical hypothyroidism  - TSH with free T4 reflex  - CBC with platelets  - Comprehensive metabolic panel (BMP + Alb, Alk Phos, ALT, AST, Total. Bili, TP)    5. Memory changes - I will investigate lipogen and get back to her    25 minutes spent with patient face-to-face, > 50% in counseling and coordination of care regarding the issues addressed in the assessment and plan.       Batool Kulkarni MD  [facility]

## 2018-05-08 NOTE — MR AVS SNAPSHOT
After Visit Summary   5/8/2018    Indy Reyes    MRN: [id number]           Patient Information     Date Of Birth          2/27/1931        Visit Information        Provider Department      5/8/2018 10:00 AM Batool Kulkarni MD [facility]        Today's Diagnoses     Overactive bladder    -  1    Pelvic floor weakness        Urinary urgency           Follow-ups after your visit        Additional Services     [facility] PT, HAND, AND CHIROPRACTIC REFERRAL       **This order will print in the [facility] Scheduling Office**    Physical Therapy, Hand Therapy and Chiropractic Care are available through:    *[facility]  *[facility]  *[facility]    Call one number to schedule at any of the above locations: [phone].    Your provider has referred you to: Physical Therapy at [facility] or [facility] [place] [place]    Indication/Reason for Referral: Women's Health (Please Complete Special Programs SmartList)  Onset of Illness:1 year, incontinence, urinary urgency  Therapy Orders: Evaluate and Treat  Special Programs: Women's Health: Pelvic Floor Weakness: Incontinence, biofeedback  Special Request: None    Keri Saavedra      Additional Comments for the Therapist or Chiropractor:    Please be aware that coverage of these services is subject to the terms and limitations of your health insurance plan.  Call member services at your health plan with any benefit or coverage questions.      Please bring the following to your appointment:    *Your personal calendar for scheduling future appointments  *Comfortable clothing                  Who to contact     If you have questions or need follow up information about today's clinic visit or your schedule please contact [facility] directly at [phone].  Normal or non-critical lab and imaging results will be communicated to you by MyChart, letter or phone within 4 business days after the clinic has received  "the results. If you do not hear from us within 7 days, please contact the clinic through TurboHeads or phone. If you have a critical or abnormal lab result, we will notify you by phone as soon as possible.  Submit refill requests through TurboHeads or call your pharmacy and they will forward the refill request to us. Please allow 3 business days for your refill to be completed.          Additional Information About Your Visit        TurboHeads Information     TurboHeads gives you secure access to your electronic health record. If you see a primary care provider, you can also send messages to your care team and make appointments. If you have questions, please call your primary care clinic.  If you do not have a primary care provider, please call [phone] and they will assist you.        Care EveryWhere ID     This is your Care EveryWhere ID. This could be used by other organizations to access your [facility] medical records  [id number]        Your Vitals Were     Pulse Temperature Height Breastfeeding? BMI (Body Mass Index)       83 99.1  F (37.3  C) (Oral) 5' 1.75\" (1.568 m) No 22.31 kg/m2        Blood Pressure from Last 3 Encounters:   05/08/18 108/58   01/11/18 164/86   01/02/18 129/60    Weight from Last 3 Encounters:   05/08/18 121 lb (54.9 kg)   11/15/17 116 lb (52.6 kg)   11/09/17 116 lb (52.6 kg)              We Performed the Following     [facility] PT, HAND, AND CHIROPRACTIC REFERRAL     UA reflex to Microscopic and Culture     Urine Microscopic          Today's Medication Changes          These changes are accurate as of 5/8/18 10:44 AM.  If you have any questions, ask your nurse or doctor.               Stop taking these medicines if you haven't already. Please contact your care team if you have questions.     fluticasone 50 MCG/ACT spray   Commonly known as:  FLONASE   Stopped by:  Batool Kulkarni MD           nabumetone 500 MG tablet   Commonly known as:  RELAFEN   Stopped by:  Batool Kulkarni MD           " ranitidine 150 MG tablet   Commonly known as:  ZANTAC   Stopped by:  Batool Kulkarni MD                    Primary Care Provider Office Phone # Fax #    Batool Kulkarni [phone] [address]  [facility] [address]        Equal Access to Services     [facility]: Hadii aad ku hadasho Soomaali, waaxda luqadaha, qaybta kaalmada adeegyada, waxay idiin hayaan adeeg kharash la'aan . So [facility] [phone].    ATENCIÓN: Si habla español, tiene a saini disposición servicios gratuitos de asistencia lingüística. Llame al [phone].    We comply with applicable federal civil rights laws and [place] laws. We do not discriminate on the basis of race, color, national origin, age, disability, sex, sexual orientation, or gender identity.            Thank you!     Thank you for choosing [facility]  for your care. Our goal is always to provide you with excellent care. Hearing back from our patients is one way we can continue to improve our services. Please take a few minutes to complete the written survey that you may receive in the mail after your visit with us. Thank you!             Your Updated Medication List - Protect others around you: Learn how to safely use, store and throw away your medicines at www.disposemymeds.org.          This list is accurate as of 5/8/18 10:44 AM.  Always use your most recent med list.                   Brand Name Dispense Instructions for use Diagnosis    acetaminophen 325 MG tablet    TYLENOL    100 tablet    Take 2 tablets (650 mg) by mouth every 4 hours as needed for other (mild pain)    Lumbar radiculopathy, right       * OCUVITE ADULT 50+ Caps      Take 2 capsules by mouth every morning        * MULTIVITAMIN ADULTS 50+ Tabs      Take 2 capsules by mouth every morning        * Notice:  This list has 2 medication(s) that are the same as other medications prescribed for you. Read the directions carefully, and ask your doctor or other care  provider to review them with you.

## 2018-05-09 ENCOUNTER — THERAPY VISIT (OUTPATIENT)
Dept: PHYSICAL THERAPY | Facility: CLINIC | Age: 83
End: 2018-05-09
Payer: COMMERCIAL

## 2018-05-09 DIAGNOSIS — M62.81 GENERALIZED MUSCLE WEAKNESS: Primary | ICD-10-CM

## 2018-05-09 DIAGNOSIS — N39.3 FEMALE STRESS INCONTINENCE: ICD-10-CM

## 2018-05-09 PROCEDURE — 97110 THERAPEUTIC EXERCISES: CPT | Mod: GP | Performed by: PHYSICAL THERAPIST

## 2018-05-09 PROCEDURE — 97535 SELF CARE MNGMENT TRAINING: CPT | Mod: GP | Performed by: PHYSICAL THERAPIST

## 2018-05-09 PROCEDURE — 97161 PT EVAL LOW COMPLEX 20 MIN: CPT | Mod: GP | Performed by: PHYSICAL THERAPIST

## 2018-05-09 NOTE — PROGRESS NOTES
[facility] Initial Evaluation  Subjective:  Patient is a 87 year old female presenting with rehab pelvic hpi.   Indy Reyes is a 87 year old female with a incontinence (stress incontinence) condition.      This is a new condition  Patient has chief complaint of urinary stress incontinence which started about a year ago and has progressively worsened. She has no previous history of leaking. She also has mild urinary frequency.No pelvic pain; no bowel problems. Patient is very active with walking and going to the [facility]. She wears 2 pads/day and does not get up at night to void..               Symptoms are exacerbated by sneezing, coughing, laughing and walking (walking downhill) and relieved by nothing.  Since onset symptoms are gradually worsening.        General health as reported by patient is excellent.                                              Objective:  System                                 Pelvic Dysfunction Evaluation:    Bladder/Pelvic Problems:    Storage Problem:  Frequency and stress incontinence        Diagnostic Tests:  none                        Flexibility:  normal      Abdominal Wall:  normal              External Assessment:    Skin Condition:  Atrophic    Bearing Down/Coughing:  Normal  Tissue Symmetry:  Normal  Introitus:  Normal  Muscle Contraction/Perineal Mobility:  Elevation and urogential triangle descent  Internal Assessment:    Sensory Exam:  Normal  Contraction/Grade:  Good squeeze, good hold with lift, repeatable (4)          SEMG Biofeedback:  NA                  Additional History:  Delivery History:  Vaginal delivery  Number of Pregnancies: 4  Number of Live Births: 4                       General     ROS    Assessment/Plan:    Patient is a 87 year old female with pelvic complaints.    Patient has the following significant findings with corresponding treatment plan.                Diagnosis 1:  Stress incontinence  Decreased strength - therapeutic  exercise, therapeutic activities and home program  Impaired muscle performance - neuro re-education and home program  Decreased function - therapeutic activities and home program    Therapy Evaluation Codes:   1) History comprised of:   Personal factors that impact the plan of care:      None.    Comorbidity factors that impact the plan of care are:      None.     Medications impacting care: None.  2) Examination of Body Systems comprised of:   Body structures and functions that impact the plan of care:      Pelvis.   Activity limitations that impact the plan of care are:      Frequency and Stress incontinence.  3) Clinical presentation characteristics are:   Stable/Uncomplicated.  4) Decision-Making    Low complexity using standardized patient assessment instrument and/or measureable assessment of functional outcome.  Cumulative Therapy Evaluation is: Low complexity.    Previous and current functional limitations:  (See Goal Flow Sheet for this information)    Short term and Long term goals: (See Goal Flow Sheet for this information)     Communication ability:  Patient appears to be able to clearly communicate and understand verbal and written communication and follow directions correctly.  Treatment Explanation - The following has been discussed with the patient:   RX ordered/plan of care  Anticipated outcomes  Possible risks and side effects  This patient would benefit from PT intervention to resume normal activities.   Rehab potential is excellent.    Frequency:  1 X week, once daily  Duration:  for 4 weeks  Discharge Plan:  Achieve all LTG.  Independent in home treatment program.  Reach maximal therapeutic benefit.    Please refer to the daily flowsheet for treatment today, total treatment time and time spent performing 1:1 timed codes.

## 2018-05-22 ENCOUNTER — OFFICE VISIT (OUTPATIENT)
Dept: FAMILY MEDICINE | Facility: CLINIC | Age: 83
End: 2018-05-22
Payer: COMMERCIAL

## 2018-05-22 VITALS
TEMPERATURE: 98.9 F | HEART RATE: 70 BPM | BODY MASS INDEX: 22.08 KG/M2 | WEIGHT: 120 LBS | DIASTOLIC BLOOD PRESSURE: 66 MMHG | SYSTOLIC BLOOD PRESSURE: 114 MMHG | HEIGHT: 62 IN

## 2018-05-22 DIAGNOSIS — N39.3 FEMALE STRESS INCONTINENCE: ICD-10-CM

## 2018-05-22 DIAGNOSIS — E03.8 SUBCLINICAL HYPOTHYROIDISM: ICD-10-CM

## 2018-05-22 DIAGNOSIS — R79.89 ELEVATED TSH: Primary | ICD-10-CM

## 2018-05-22 DIAGNOSIS — R41.3 MEMORY CHANGES: ICD-10-CM

## 2018-05-22 LAB
ALBUMIN UR-MCNC: NEGATIVE MG/DL
APPEARANCE UR: CLEAR
BILIRUB UR QL STRIP: NEGATIVE
COLOR UR AUTO: YELLOW
GLUCOSE UR STRIP-MCNC: NEGATIVE MG/DL
HGB UR QL STRIP: NEGATIVE
KETONES UR STRIP-MCNC: NEGATIVE MG/DL
LEUKOCYTE ESTERASE UR QL STRIP: NEGATIVE
NITRATE UR QL: NEGATIVE
PH UR STRIP: 7 PH (ref 5–7)
SOURCE: NORMAL
SP GR UR STRIP: 1.01 (ref 1–1.03)
UROBILINOGEN UR STRIP-ACNC: 0.2 EU/DL (ref 0.2–1)

## 2018-05-22 PROCEDURE — 99214 OFFICE O/P EST MOD 30 MIN: CPT | Performed by: FAMILY MEDICINE

## 2018-05-22 PROCEDURE — 81003 URINALYSIS AUTO W/O SCOPE: CPT | Performed by: FAMILY MEDICINE

## 2018-05-22 RX ORDER — VIT A/VIT C/VIT E/ZINC/COPPER 4296-226
CAPSULE ORAL 2 TIMES DAILY
COMMUNITY
End: 2020-11-06

## 2018-05-22 RX ORDER — LEVOTHYROXINE SODIUM 25 UG/1
25 TABLET ORAL DAILY
Qty: 90 TABLET | Refills: 0 | Status: SHIPPED | OUTPATIENT
Start: 2018-05-22 | End: 2018-08-17

## 2018-05-22 NOTE — MR AVS SNAPSHOT
After Visit Summary   5/22/2018    Indy Reyes    MRN: [id number]           Patient Information     Date Of Birth          2/27/1931        Visit Information        Provider Department      5/22/2018 11:00 AM Batool Kulkarni MD [facility]        Today's Diagnoses     Elevated TSH    -  1    Urinary incontinence          Care Instructions    Take levothyroxine in the AM every day for the next 8 weeks.     Schedule a lab only visit in 8 weeks to recheck the thyroid function    A couple days after you have the lab work, I will get the results and call you with them          Follow-ups after your visit        Your next 10 appointments already scheduled     May 24, 2018 10:10 AM CDT   [facility] For Women Only with Zaida Ace PT   [facility] [place] [facility]    [address]  [facility] [address] [phone]              Who to contact     If you have questions or need follow up information about today's clinic visit or your schedule please contact [facility] directly at [phone].  Normal or non-critical lab and imaging results will be communicated to you by Toroleohart, letter or phone within 4 business days after the clinic has received the results. If you do not hear from us within 7 days, please contact the clinic through Toroleohart or phone. If you have a critical or abnormal lab result, we will notify you by phone as soon as possible.  Submit refill requests through Meiyou or call your pharmacy and they will forward the refill request to us. Please allow 3 business days for your refill to be completed.          Additional Information About Your Visit        MyChart Information     Meiyou gives you secure access to your electronic health record. If you see a primary care provider, you can also send messages to your care team and make appointments. If you have questions, please call your primary care clinic.  If you do not  "have a primary care provider, please call [phone] and they will assist you.        Care EveryWhere ID     This is your Care EveryWhere ID. This could be used by other organizations to access your [place] medical records  [id number]        Your Vitals Were     Pulse Temperature Height Breastfeeding? BMI (Body Mass Index)       70 98.9  F (37.2  C) (Oral) 5' 1.75\" (1.568 m) No 22.13 kg/m2        Blood Pressure from Last 3 Encounters:   05/22/18 114/66   05/08/18 108/58   01/11/18 164/86    Weight from Last 3 Encounters:   05/22/18 120 lb (54.4 kg)   05/08/18 121 lb (54.9 kg)   11/15/17 116 lb (52.6 kg)              We Performed the Following     UA reflex to Microscopic and Culture          Today's Medication Changes          These changes are accurate as of 5/22/18 11:33 AM.  If you have any questions, ask your nurse or doctor.               Start taking these medicines.        Dose/Directions    levothyroxine 25 MCG tablet   Commonly known as:  SYNTHROID/LEVOTHROID   Used for:  Elevated TSH   Started by:  Batool Kulkarni MD        Dose:  25 mcg   Take 1 tablet (25 mcg) by mouth daily   Quantity:  90 tablet   Refills:  0         These medicines have changed or have updated prescriptions.        Dose/Directions    * MULTIVITAMIN ADULTS 50+ Tabs   This may have changed:  Another medication with the same name was removed. Continue taking this medication, and follow the directions you see here.   Changed by:  Batool Kulkarni MD        Dose:  2 capsule   Take 2 capsules by mouth every morning   Refills:  0       * PRESERVISION AREDS Caps   This may have changed:  Another medication with the same name was removed. Continue taking this medication, and follow the directions you see here.   Changed by:  Batool Kulkarni MD        Take by mouth 2 times daily   Refills:  0       * Notice:  This list has 2 medication(s) that are the same as other medications prescribed for you. Read the directions " carefully, and ask your doctor or other care provider to review them with you.         Where to get your medicines      These medications were sent to [facility]/pharmacy #[id number] - [place], [place] - [address] [address]  [address] [address], [facility] [address]    Hours:  [facility] converted to [facility] Phone:  [phone]     levothyroxine 25 MCG tablet                Primary Care Provider Office Phone # Fax #    Batool Kulkarni -[phone] [address]  [facility] [address]        Equal Access to Services     [facility]: Hadii aad ku hadasho Soomaali, waaxda luqadaha, qaybta kaalmada adeegyada, yasmeen beyer . So [facility] [phone].    ATENCIÓN: Si habla español, tiene a saini disposición servicios gratuitos de asistencia lingüística. LlThe [facility] [phone].    We comply with applicable federal civil rights laws and [place] laws. We do not discriminate on the basis of race, color, national origin, age, disability, sex, sexual orientation, or gender identity.            Thank you!     Thank you for choosing [facility]  for your care. Our goal is always to provide you with excellent care. Hearing back from our patients is one way we can continue to improve our services. Please take a few minutes to complete the written survey that you may receive in the mail after your visit with us. Thank you!             Your Updated Medication List - Protect others around you: Learn how to safely use, store and throw away your medicines at www.disposemymeds.org.          This list is accurate as of 5/22/18 11:33 AM.  Always use your most recent med list.                   Brand Name Dispense Instructions for use Diagnosis    acetaminophen 325 MG tablet    TYLENOL    100 tablet    Take 2 tablets (650 mg) by mouth every 4 hours as needed for other (mild pain)    Lumbar radiculopathy, right       levothyroxine 25 MCG tablet    SYNTHROID/LEVOTHROID    90 tablet    Take 1  tablet (25 mcg) by mouth daily    Elevated TSH       * MULTIVITAMIN ADULTS 50+ Tabs      Take 2 capsules by mouth every morning        * PRESERVISION AREDS Caps      Take by mouth 2 times daily        * Notice:  This list has 2 medication(s) that are the same as other medications prescribed for you. Read the directions carefully, and ask your doctor or other care provider to review them with you.

## 2018-05-22 NOTE — NURSING NOTE
"  Chief Complaint   Patient presents with     Urinary Problem     f/u       Initial /66 (BP Location: Right arm, Patient Position: Sitting, Cuff Size: Adult Regular)  Pulse 70  Temp 98.9  F (37.2  C) (Oral)  Ht 5' 1.75\" (1.568 m)  Wt 120 lb (54.4 kg)  Breastfeeding? No  BMI 22.13 kg/m2 Estimated body mass index is 22.13 kg/(m^2) as calculated from the following:    Height as of this encounter: 5' 1.75\" (1.568 m).    Weight as of this encounter: 120 lb (54.4 kg).  Medication Reconciliation: complete      Health Maintenance addressed:  NONE    Juan F Faustin CMA          "

## 2018-05-22 NOTE — PROGRESS NOTES
SUBJECTIVE:   Indy Reyes is a 87 year old female who presents to clinic today for the following health issues:      Urinary incontinence-Met with apparent through [facility]. She was given some exercises to help strengthen her pelvic floor muscles. Feels these are helpful for her. Has upcoming follow-up.    Thyroid dysfunction-I sent her message after rechecking her thyroid levels a couple weeks back. Her TSH levels have been climbing and are still in the subclinical hypothyroidism range, but she is having some memory difficulties over the same amount of time, so I wanted to discuss starting levothyroxine with her. She is agreeable to starting levothyroxine and seeing if it improves her memory function. She also feels that she has gained weight over the past couple of years, but does admit that she is less active than she used to be.      Problem list and histories reviewed & adjusted, as indicated.  Additional history: none    Patient Active Problem List   Diagnosis     Hyperlipidemia LDL goal <130     Osteopenia     Advanced directives, counseling/discussion     Impaired fasting glucose     Family history of breast cancer     Family history of coronary artery disease     Thrombocytopenia (H)     Thyroid nodule     Postnasal drip     Rhinitis     Elevated TSH     Osteoarthrosis of knee     Cervical radiculitis     Lumbar radiculopathy, right     Memory changes     Generalized muscle weakness     Female stress incontinence     Past Surgical History:   Procedure Laterality Date     APPENDECTOMY      at age of 16     CATARACT IOL, RT/LT  5/09    both     FORAMINOTOMY LUMBAR POSTERIOR ONE LEVEL Right 4/28/2017    Procedure: FORAMINOTOMY LUMBAR POSTERIOR ONE LEVEL;  Right L4-L5 hemilaminectomy and foraminotomy and excision of synovial cyst. ;  Surgeon: Fady Woo MD;  Location: [facility] OR     TONSILLECTOMY  1954       Social History   Substance Use Topics     Smoking status: Former  "Smoker     Types: Cigarettes     Quit date: 1960     Smokeless tobacco: Never Used      Comment:      Alcohol use Yes      Comment: SOCIAL-2 drinks per month      Family History   Problem Relation Age of Onset     Family History Negative Mother       in accident     CANCER Father      prostate     HEART DISEASE Brother      60s-4-one brother with heart attack in his 60's     CANCER Sister      breast-7-one  wtih breast ca,one with stroke, one sis  from diabetes     Breast Cancer Sister      diagnosed at age of 52- 5 sisters alive     Cancer - colorectal No family hx of            Reviewed and updated as needed this visit by clinical staff       Reviewed and updated as needed this visit by Provider         ROS:  Constitutional, HEENT, cardiovascular, pulmonary, gi and gu systems are negative, except as otherwise noted.    OBJECTIVE:     /66 (BP Location: Right arm, Patient Position: Sitting, Cuff Size: Adult Regular)  Pulse 70  Temp 98.9  F (37.2  C) (Oral)  Ht 5' 1.75\" (1.568 m)  Wt 120 lb (54.4 kg)  Breastfeeding? No  BMI 22.13 kg/m2  Body mass index is 22.13 kg/(m^2).  GENERAL: healthy, alert and no distress  NECK: no adenopathy  RESP: lungs clear to auscultation - no rales, rhonchi or wheezes  CV: regular rate and rhythm, normal S1 S2, no S3 or S4, no murmur, click or rub, no peripheral edema and peripheral pulses strong  PSYCH: mentation appears normal, affect normal/bright    Diagnostic Test Results:  none     ASSESSMENT/PLAN:     1. Elevated TSH - will treat as she's had changes in memory function, hopefully this will help, recheck levels in 8 weeks, advised 6 month trial to see if this improves her memory function - if not, could stop medicine.  - levothyroxine (SYNTHROID/LEVOTHROID) 25 MCG tablet; Take 1 tablet (25 mcg) by mouth daily  Dispense: 90 tablet; Refill: 0    2. Subclinical hypothyroidism - as above    3. Memory changes - as above    4. Urinary incontinence - working " with PT, seeing improvement with pelvic floor rehab  - UA reflex to Microscopic and Culture    5. Female stress incontinence - as above      Batool Kulkarni MD  [facility]

## 2018-05-22 NOTE — PATIENT INSTRUCTIONS
Take levothyroxine in the AM every day for the next 8 weeks.     Schedule a lab only visit in 8 weeks to recheck the thyroid function    A couple days after you have the lab work, I will get the results and call you with them

## 2018-05-24 ENCOUNTER — THERAPY VISIT (OUTPATIENT)
Dept: PHYSICAL THERAPY | Facility: CLINIC | Age: 83
End: 2018-05-24
Payer: COMMERCIAL

## 2018-05-24 DIAGNOSIS — N39.3 FEMALE STRESS INCONTINENCE: ICD-10-CM

## 2018-05-24 DIAGNOSIS — M62.81 GENERALIZED MUSCLE WEAKNESS: ICD-10-CM

## 2018-05-24 PROCEDURE — 97535 SELF CARE MNGMENT TRAINING: CPT | Mod: GP | Performed by: PHYSICAL THERAPIST

## 2018-05-24 PROCEDURE — 97110 THERAPEUTIC EXERCISES: CPT | Mod: GP | Performed by: PHYSICAL THERAPIST

## 2018-07-03 ENCOUNTER — ALLIED HEALTH/NURSE VISIT (OUTPATIENT)
Dept: NURSING | Facility: CLINIC | Age: 83
End: 2018-07-03
Payer: COMMERCIAL

## 2018-07-03 VITALS — SYSTOLIC BLOOD PRESSURE: 130 MMHG | DIASTOLIC BLOOD PRESSURE: 60 MMHG | HEART RATE: 80 BPM

## 2018-07-03 DIAGNOSIS — E03.8 SUBCLINICAL HYPOTHYROIDISM: Primary | ICD-10-CM

## 2018-07-03 PROCEDURE — 99207 ZZC NO CHARGE NURSE ONLY: CPT

## 2018-07-03 NOTE — PROGRESS NOTES
Indy Reyes is a 87 year old female who presents with loss of voice.    NURSING ASSESSMENT:  Description:  Pt states that over the past 2 weeks she has had a loss of voice and sometimes lightheadedness and fatigue  Onset/duration:  2 weeks  Precip. factors:  Prescribed levothyroxine back in may and she thinks she is having a reaction  Associated symptoms:  Increased hair loss  Improves/worsens symptoms:  none  Pain scale (0-10)   0/10  LMP/preg/breast feeding:  Na/  Last exam/Treatment:  5/22/18  Allergies:   Allergies   Allergen Reactions     Tetracycline Rash       MEDICATIONS:   Taking medication(s) as prescribed? Yes  Taking over the counter medication(s?) No  Any medication side effects? Unsure if having a reaction    Any barriers to taking medication(s) as prescribed?  No  Medication(s) improving/managing symptoms?  N/A  Medication reconciliation completed: Yes      NURSING PLAN: Huddle with provider, plan includes pt shoudl follow up in clinic with PCP next week or in UC in not doing better    RECOMMENDED DISPOSITION:  Home care advice - increase water intake due to the heat and humidity  Will comply with recommendation: Yes  If further questions/concerns or if symptoms do not improve, worsen or new symptoms develop, call your PCP or [facility] as soon as possible.      Guideline used:  Huddle with Beth De Souza RN

## 2018-07-03 NOTE — MR AVS SNAPSHOT
After Visit Summary   7/3/2018    Indy Reyes    MRN: [id number]           Patient Information     Date Of Birth          2/27/1931        Visit Information        Provider Department      7/3/2018 11:15 AM LV RN [facility]        Today's Diagnoses     Subclinical hypothyroidism    -  1       Follow-ups after your visit        Who to contact     If you have questions or need follow up information about today's clinic visit or your schedule please contact [facility] directly at [phone].  Normal or non-critical lab and imaging results will be communicated to you by MyChart, letter or phone within 4 business days after the clinic has received the results. If you do not hear from us within 7 days, please contact the clinic through [employer] or phone. If you have a critical or abnormal lab result, we will notify you by phone as soon as possible.  Submit refill requests through [employer] or call your pharmacy and they will forward the refill request to us. Please allow 3 business days for your refill to be completed.          Additional Information About Your Visit        MyChart Information     [employer] gives you secure access to your electronic health record. If you see a primary care provider, you can also send messages to your care team and make appointments. If you have questions, please call your primary care clinic.  If you do not have a primary care provider, please call [phone] and they will assist you.        Care EveryWhere ID     This is your Care EveryWhere ID. This could be used by other organizations to access your [place] medical records  [id number]        Your Vitals Were     Pulse                   80            Blood Pressure from Last 3 Encounters:   07/03/18 130/60   05/22/18 114/66   05/08/18 108/58    Weight from Last 3 Encounters:   05/22/18 120 lb (54.4 kg)   05/08/18 121 lb (54.9 kg)   11/15/17 116 lb (52.6 kg)              Today, you  had the following     No orders found for display       Primary Care Provider Office Phone # Fax #    Batool Hermelindo Kulkarni [phone] [address]  [facility] [address]        Equal Access to Services     SOFIA MOISE : Hadii guille ku papao Soomaali, waaxda luqadaha, qaybta kaalmada adeegyada, yasmeen rodrickin hayaan alexayse holt pepito nash. So [facility] [phone].    ATENCIÓN: Si habla español, tiene a saini disposición servicios gratuitos de asistencia lingüística. Llame al [phone].    We comply with applicable federal civil rights laws and [place] laws. We do not discriminate on the basis of race, color, national origin, age, disability, sex, sexual orientation, or gender identity.            Thank you!     Thank you for choosing [facility]  for your care. Our goal is always to provide you with excellent care. Hearing back from our patients is one way we can continue to improve our services. Please take a few minutes to complete the written survey that you may receive in the mail after your visit with us. Thank you!             Your Updated Medication List - Protect others around you: Learn how to safely use, store and throw away your medicines at www.disposemymeds.org.          This list is accurate as of 7/3/18 11:31 AM.  Always use your most recent med list.                   Brand Name Dispense Instructions for use Diagnosis    acetaminophen 325 MG tablet    TYLENOL    100 tablet    Take 2 tablets (650 mg) by mouth every 4 hours as needed for other (mild pain)    Lumbar radiculopathy, right       levothyroxine 25 MCG tablet    SYNTHROID/LEVOTHROID    90 tablet    Take 1 tablet (25 mcg) by mouth daily    Elevated TSH       * MULTIVITAMIN ADULTS 50+ Tabs      Take 2 capsules by mouth every morning        * PRESERVISION AREDS Caps      Take by mouth 2 times daily        * Notice:  This list has 2 medication(s) that are the same as other medications prescribed for you. Read  the directions carefully, and ask your doctor or other care provider to review them with you.

## 2018-07-12 PROBLEM — N39.3 FEMALE STRESS INCONTINENCE: Status: RESOLVED | Noted: 2018-05-09 | Resolved: 2018-07-12

## 2018-07-12 PROBLEM — M62.81 GENERALIZED MUSCLE WEAKNESS: Status: RESOLVED | Noted: 2018-05-09 | Resolved: 2018-07-12

## 2018-07-12 NOTE — PROGRESS NOTES
Subjective:  HPI                    Objective:  System    Physical Exam    General     ROS    Assessment/Plan:    DISCHARGE REPORT    Progress reporting period is from 5/9/2018 to 5/24/2018.     SUBJECTIVE  Subjective: Patient reports significant improvement, with very little urinary leaking.    Current Pain level: 0/10   Initial Pain level: 0/10   Changes in function: Yes, see goal flow sheet for change in function   Adverse reactions: None    OBJECTIVE    Objective: Good technique with pelvic floor exercises. Progressed to sitting and standing pelvic floor exercises      ASSESSMENT/PLAN    STG/LTGs have been met or progress has been made towards goals:  Yes (See Goal flow sheet completed today.)  Assessment of Progress: The patient's condition is improving.  The patient's condition has potential to improve.  Self Management Plans:  Patient is independent in a home treatment program.  Patient is independent in self management of symptoms.    Indy continues to require the following intervention to meet STG and LTG's: PT intervention is no longer required to meet STG/LTG.      Recommendations:    This patient is ready to be discharged from therapy and continue their home treatment program.    Please refer to the daily flowsheet for treatment today, total treatment time and time spent performing 1:1 timed codes.

## 2018-08-07 ENCOUNTER — TRANSFERRED RECORDS (OUTPATIENT)
Dept: HEALTH INFORMATION MANAGEMENT | Facility: CLINIC | Age: 83
End: 2018-08-07

## 2018-08-17 DIAGNOSIS — R79.89 ELEVATED TSH: ICD-10-CM

## 2018-08-17 NOTE — TELEPHONE ENCOUNTER
"Requested Prescriptions   Pending Prescriptions Disp Refills     levothyroxine (SYNTHROID/LEVOTHROID) 25 MCG tablet 90 tablet 0    Last Written Prescription Date:  05/22/2018  Last Fill Quantity 90 tablet ,  # refills: 0  Last office visit: 5/22/2018 with prescribing provider:  5/22/2018   Future Office Visit:   Sig: Take 1 tablet (25 mcg) by mouth daily    Thyroid Protocol Failed    8/17/2018  9:16 AM       Failed - Normal TSH on file in past 12 months    Recent Labs   Lab Test  05/08/18   1049   TSH  6.43*             Passed - Patient is 12 years or older       Passed - Recent (12 mo) or future (30 days) visit within the authorizing provider's specialty    Patient had office visit in the last 12 months or has a visit in the next 30 days with authorizing provider or within the authorizing provider's specialty.  See \"Patient Info\" tab in inbasket, or \"Choose Columns\" in Meds & Orders section of the refill encounter.           Passed - No active pregnancy on record    If patient is pregnant or has had a positive pregnancy test, please check TSH.         Passed - No positive pregnancy test in past 12 months    If patient is pregnant or has had a positive pregnancy test, please check TSH.          Beth MAYO(R)  "

## 2018-08-20 DIAGNOSIS — R79.89 ELEVATED TSH: ICD-10-CM

## 2018-08-20 PROCEDURE — 36415 COLL VENOUS BLD VENIPUNCTURE: CPT | Performed by: FAMILY MEDICINE

## 2018-08-20 PROCEDURE — 84443 ASSAY THYROID STIM HORMONE: CPT | Performed by: FAMILY MEDICINE

## 2018-08-20 NOTE — TELEPHONE ENCOUNTER
Spoke with pt and scheduled for lab only today at 11.  Please advise on refill with lab results    Paddy De Souza RN, BSN

## 2018-08-21 LAB — TSH SERPL DL<=0.005 MIU/L-ACNC: 3.78 MU/L (ref 0.4–4)

## 2018-08-21 RX ORDER — LEVOTHYROXINE SODIUM 25 UG/1
25 TABLET ORAL DAILY
Qty: 90 TABLET | Refills: 1 | Status: SHIPPED | OUTPATIENT
Start: 2018-08-21 | End: 2019-02-13

## 2018-08-21 NOTE — TELEPHONE ENCOUNTER
Patient called to check status of the refill, pharmacy gave her one pill for tomorrow. If we could review results and send Rx to the pharmacy for patient please.    Salma Sheppard

## 2018-09-28 DIAGNOSIS — M54.16 LUMBAR RADICULOPATHY, RIGHT: Primary | ICD-10-CM

## 2018-10-05 ENCOUNTER — HOSPITAL ENCOUNTER (OUTPATIENT)
Dept: MRI IMAGING | Facility: CLINIC | Age: 83
Discharge: HOME | End: 2018-10-05
Attending: NEUROLOGICAL SURGERY | Admitting: NEUROLOGICAL SURGERY
Payer: MEDICARE

## 2018-10-05 ENCOUNTER — OFFICE VISIT (OUTPATIENT)
Dept: SURGERY | Facility: CLINIC | Age: 83
End: 2018-10-05
Attending: NEUROLOGICAL SURGERY
Payer: MEDICARE

## 2018-10-05 VITALS — HEART RATE: 90 BPM | SYSTOLIC BLOOD PRESSURE: 146 MMHG | OXYGEN SATURATION: 100 % | DIASTOLIC BLOOD PRESSURE: 82 MMHG

## 2018-10-05 DIAGNOSIS — M43.17 ACQUIRED SPONDYLOLISTHESIS OF LUMBOSACRAL REGION: ICD-10-CM

## 2018-10-05 DIAGNOSIS — M54.16 LUMBAR RADICULOPATHY, RIGHT: ICD-10-CM

## 2018-10-05 DIAGNOSIS — M54.50 CHRONIC MIDLINE LOW BACK PAIN WITHOUT SCIATICA: Primary | ICD-10-CM

## 2018-10-05 DIAGNOSIS — G89.29 CHRONIC MIDLINE LOW BACK PAIN WITHOUT SCIATICA: Primary | ICD-10-CM

## 2018-10-05 PROCEDURE — A9585 GADOBUTROL INJECTION: HCPCS | Performed by: NEUROLOGICAL SURGERY

## 2018-10-05 PROCEDURE — 72158 MRI LUMBAR SPINE W/O & W/DYE: CPT

## 2018-10-05 PROCEDURE — 25500064 ZZH RX 255 OP 636: Performed by: NEUROLOGICAL SURGERY

## 2018-10-05 RX ORDER — GADOBUTROL 604.72 MG/ML
7.5 INJECTION INTRAVENOUS ONCE
Status: COMPLETED | OUTPATIENT
Start: 2018-10-05 | End: 2018-10-05

## 2018-10-05 RX ADMIN — GADOBUTROL 5 ML: 604.72 INJECTION INTRAVENOUS at 13:50

## 2018-10-05 NOTE — LETTER
Date:October 8, 2018      Patient was self referred, no letter generated. Do not send.        [facility] Information

## 2018-10-05 NOTE — MR AVS SNAPSHOT
After Visit Summary   10/5/2018    Indy Reyes    MRN: [id number]           Patient Information     Date Of Birth          2/27/1931        Visit Information        Provider Department      10/5/2018 3:00 PM [facility] SOC PROVIDER [facility] Surgical Care Outpatient        Today's Diagnoses     Chronic midline low back pain without sciatica    -  1    Acquired spondylolisthesis of lumbosacral region           Follow-ups after your visit        Who to contact     Please call your clinic at [phone] to:    Ask questions about your health    Make or cancel appointments    Discuss your medicines    Learn about your test results    Speak to your doctor            Additional Information About Your Visit        MyChart Information     [employer] gives you secure access to your electronic health record. If you see a primary care provider, you can also send messages to your care team and make appointments. If you have questions, please call your primary care clinic.  If you do not have a primary care provider, please call [phone] and they will assist you.      [employer] is an electronic gateway that provides easy, online access to your medical records. With [employer], you can request a clinic appointment, read your test results, renew a prescription or communicate with your care team.     To access your existing account, please contact your [facility] or call [phone] for assistance.        Care EveryWhere ID     This is your Care EveryWhere ID. This could be used by other organizations to access your [place] medical records  [id number]        Your Vitals Were     Pulse Pulse Oximetry                90 100%           Blood Pressure from Last 3 Encounters:   10/05/18 146/82   07/03/18 130/60   05/22/18 114/66    Weight from Last 3 Encounters:   05/22/18 54.4 kg (120 lb)   05/08/18 54.9 kg (121 lb)   11/15/17 52.6 kg (116 lb)               Primary Care Provider Office Phone # Fap  #    Batool Kulkarni -[phone]       [address]  [facility] [address]        Equal Access to Services     ANA MMAMIE JOSE MARIA : Bill aad ku hadteenawin Tereseyanelis, young isidrocalha, christian kamaurisio [place], yasmeen dejesus alexayse holt lamarco araghavendra nash. So [facility] [phone].    ATENCIÓN: Si habla español, tiene a saini disposición servicios gratuitos de asistencia lingüística. Llame al [phone].    We comply with applicable federal civil rights laws and [place] laws. We do not discriminate on the basis of race, color, national origin, age, disability, sex, sexual orientation, or gender identity.            Thank you!     Thank you for choosing [facility] SURGICAL CARE OUTPATIENT  for your care. Our goal is always to provide you with excellent care. Hearing back from our patients is one way we can continue to improve our services. Please take a few minutes to complete the written survey that you may receive in the mail after your visit with us. Thank you!             Your Updated Medication List - Protect others around you: Learn how to safely use, store and throw away your medicines at www.disposemymeds.org.          This list is accurate as of 10/5/18 11:59 PM.  Always use your most recent med list.                   Brand Name Dispense Instructions for use Diagnosis    acetaminophen 325 MG tablet    TYLENOL    100 tablet    Take 2 tablets (650 mg) by mouth every 4 hours as needed for other (mild pain)    Lumbar radiculopathy, right       levothyroxine 25 MCG tablet    SYNTHROID/LEVOTHROID    90 tablet    Take 1 tablet (25 mcg) by mouth daily    Elevated TSH       * MULTIVITAMIN ADULTS 50+ Tabs      Take 2 capsules by mouth every morning        * PRESERVISION AREDS Caps      Take by mouth 2 times daily        * Notice:  This list has 2 medication(s) that are the same as other medications prescribed for you. Read the directions carefully, and ask your doctor or other care provider to review them with you.

## 2018-10-05 NOTE — NURSING NOTE
15:45 Pt admitted to SOC Rm: 28 for conult visit.  Pt is accompanied today by spouse. Ambulated independantly assistance.     See VS, Allergy review et initial assessment.      Total initial assessment time spent with pt: 5 minutes.     Dr. CUCA Woo paged regarding pt arrival.  15:54 Dr. CUCA Woo in room with patient for assessment/treatment.   16:03 Dr. Woo out of room following including assessment,     Discharge education complete by Dr Woo .  16:03 Pt discharged from SOC to home accompanied by spouse     Total additional time spent with pt n/aminutes.     Supplies used:N/A    Gregor Griggs RN  [facility] SOC

## 2018-10-05 NOTE — LETTER
10/5/2018       RE: Indy Reyes  [address] [facility] [address]     Dear Colleague,    Thank you for referring your patient, Indy Reyes, to the [facility] SURGICAL CARE OUTPATIENT at [facility]. Please see a copy of my visit note below.    It was a pleasure to see Indy Reyes today in Neurosurgery Clinic. She is a 87 year old female who has previously undergone:    DATE OF SERVICE:  04/28/2017.         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       PROCEDURES:   1.  Right L4-L5 hemilaminectomy and foraminotomy and excision of synovial cyst.   2.  Intraoperative microdissection.   3.  Intraoperative fluoroscopy.       ATTENDING SURGEON:  Fady Woo MD       ASSISTANT:  Noe Rodrigues MD     She did well with that operation, but for the past six months has had pain centered over the sacrum without radiation. She thinks she may have overdone it with yoga and/or lifting. Her symptoms are not near her previous surgery. She does not have any neurologic symptoms. She has been doing some ASA and ibuprofen with some relief.      Vitals:    10/05/18 1554   BP: 146/82   Pulse: 90   SpO2: 100%     There is no height or weight on file to calculate BMI.  Data Unavailable    Awake, alert.  Incision well healed on lumbar spine.  Strength 5/5 BLE  Reflexes symmetric.    Imaging: New MRI shows good decompression on R at L4-5. The spondylolisthesis there does not seem to have changed. No other specific abnormalities in sacral region to explain her pain. The imaging was shown to the patient and reviewed in clinic.     Assessment: Low back pain without radiation.    Plan: I have recommended scheduled ibuprofen, taking it easy and heat and ice to the area for a week to see if that helps. I am also going to order some flexion extension  xrays to make sure there is no obvious instability. If she does not improve we will send her for PT.     Again, thank you for allowing me to participate in the care of your patient.      Sincerely,    SOC PROVIDER

## 2018-10-06 NOTE — PROGRESS NOTES
It was a pleasure to see Indy Reyes today in Neurosurgery Clinic. She is a 87 year old female who has previously undergone:    DATE OF SERVICE:  04/28/2017.         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       PROCEDURES:   1.  Right L4-L5 hemilaminectomy and foraminotomy and excision of synovial cyst.   2.  Intraoperative microdissection.   3.  Intraoperative fluoroscopy.       ATTENDING SURGEON:  Fady Woo MD       ASSISTANT:  Noe Rodrigues MD     She did well with that operation, but for the past six months has had pain centered over the sacrum without radiation. She thinks she may have overdone it with yoga and/or lifting. Her symptoms are not near her previous surgery. She does not have any neurologic symptoms. She has been doing some ASA and ibuprofen with some relief.      Vitals:    10/05/18 1554   BP: 146/82   Pulse: 90   SpO2: 100%     There is no height or weight on file to calculate BMI.  Data Unavailable    Awake, alert.  Incision well healed on lumbar spine.  Strength 5/5 BLE  Reflexes symmetric.    Imaging: New MRI shows good decompression on R at L4-5. The spondylolisthesis there does not seem to have changed. No other specific abnormalities in sacral region to explain her pain. The imaging was shown to the patient and reviewed in clinic.     Assessment: Low back pain without radiation.    Plan: I have recommended scheduled ibuprofen, taking it easy and heat and ice to the area for a week to see if that helps. I am also going to order some flexion extension xrays to make sure there is no obvious instability. If she does not improve we will send her for PT.

## 2019-01-14 ENCOUNTER — TRANSFERRED RECORDS (OUTPATIENT)
Dept: HEALTH INFORMATION MANAGEMENT | Facility: CLINIC | Age: 84
End: 2019-01-14

## 2019-02-13 ENCOUNTER — OFFICE VISIT (OUTPATIENT)
Dept: FAMILY MEDICINE | Facility: CLINIC | Age: 84
End: 2019-02-13
Payer: COMMERCIAL

## 2019-02-13 VITALS
BODY MASS INDEX: 21.62 KG/M2 | HEART RATE: 84 BPM | OXYGEN SATURATION: 100 % | TEMPERATURE: 98.9 F | SYSTOLIC BLOOD PRESSURE: 124 MMHG | HEIGHT: 63 IN | WEIGHT: 122 LBS | DIASTOLIC BLOOD PRESSURE: 58 MMHG

## 2019-02-13 DIAGNOSIS — R10.13 DYSPEPSIA: Primary | ICD-10-CM

## 2019-02-13 DIAGNOSIS — R79.89 ELEVATED TSH: ICD-10-CM

## 2019-02-13 DIAGNOSIS — M54.12 CERVICAL RADICULOPATHY: ICD-10-CM

## 2019-02-13 DIAGNOSIS — E03.8 SUBCLINICAL HYPOTHYROIDISM: ICD-10-CM

## 2019-02-13 PROCEDURE — 99214 OFFICE O/P EST MOD 30 MIN: CPT | Performed by: FAMILY MEDICINE

## 2019-02-13 RX ORDER — LEVOTHYROXINE SODIUM 25 UG/1
25 TABLET ORAL DAILY
Qty: 90 TABLET | Refills: 1 | Status: SHIPPED | OUTPATIENT
Start: 2019-02-13 | End: 2019-05-30

## 2019-02-13 ASSESSMENT — MIFFLIN-ST. JEOR: SCORE: 949.58

## 2019-02-13 NOTE — PROGRESS NOTES
SUBJECTIVE:   Indy Reyes is a 87 year old female who presents to clinic today for the following health issues:      Concern - left hand numbness  Onset: 3 weeks    Description: left thumb  Sometimes radiating up arm      Intensity: mild    Progression of Symptoms:  improving and worsening    Accompanying Signs & Symptoms:  no    Previous history of similar problem:   no    Precipitating factors:   Worsened by: no    Alleviating factors:  Improved by: no    Therapies Tried and outcome: hasn't tried anything      Woke up with pins and needle sensation down her left arm 3 weeks ago.  She notes appropriate strength in her left arm.  She notes normal sensation to touch down the left arm.  About a week and a half after her symptoms began she had full resolution of the paresthesia in her left arm with the exception of her left thumb.  She feels like she has normal dexterity with her fingers and thumb but does notice that the sensation is different.    No other deficits.    She notes some left lateral shoulder pain at times.  Notes most with chest press and biceps curl.  She has had this in the past and seen sports medicine.  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  She is also participated in physical therapy with some improvement in her discomfort in 2016.       Concern - heartburn   Onset: 3 weeks    Description:   Acid reflex - pressure    Intensity: moderate    Progression of Symptoms:  same    Accompanying Signs & Symptoms:  no    Previous history of similar problem:   no    Precipitating factors:   Worsened by: acidic foods makes it worse    Alleviating factors:  Improved by: tums - helps    Therapies Tried and outcome: none      Notes improvement in her cognitive function since starting levothyroxine. Despite being subclinical hypothyroid, levothyroxine was started because she was having mentation issues. Taking daily with no issues.       Problem list  and histories reviewed & adjusted, as indicated.  Additional history: none    Patient Active Problem List   Diagnosis     Hyperlipidemia LDL goal <130     Osteopenia     Advanced directives, counseling/discussion     Impaired fasting glucose     Family history of breast cancer     Family history of coronary artery disease     Thrombocytopenia (H)     Thyroid nodule     Postnasal drip     Rhinitis     Elevated TSH     Osteoarthrosis of knee     Cervical radiculitis     Lumbar radiculopathy, right     Memory changes     Subclinical hypothyroidism     Past Surgical History:   Procedure Laterality Date     APPENDECTOMY      at age of 16     CATARACT IOL, RT/LT      both     FORAMINOTOMY LUMBAR POSTERIOR ONE LEVEL Right 2017    Procedure: FORAMINOTOMY LUMBAR POSTERIOR ONE LEVEL;  Right L4-L5 hemilaminectomy and foraminotomy and excision of synovial cyst. ;  Surgeon: Fady Woo MD;  Location: [facility] OR     TONSILLECTOMY         Social History     Tobacco Use     Smoking status: Former Smoker     Types: Cigarettes     Last attempt to quit: 1960     Years since quittin.1     Smokeless tobacco: Never Used     Tobacco comment:    Substance Use Topics     Alcohol use: Yes     Comment: SOCIAL-2 drinks per month      Family History   Problem Relation Age of Onset     Family History Negative Mother          in accident     Cancer Father         prostate     Heart Disease Brother         60s-4-one brother with heart attack in his 60's     Cancer Sister         breast-7-one  wtih breast ca,one with stroke, one sis  from diabetes     Breast Cancer Sister         diagnosed at age of 52- 5 sisters alive     Cancer - colorectal No family hx of            Reviewed and updated as needed this visit by clinical staff  Tobacco  Allergies  Meds  Med Hx  Surg Hx  Fam Hx  Soc Hx      Reviewed and updated as needed this visit by Provider         ROS:  Constitutional, HEENT, cardiovascular,  "pulmonary, gi and gu systems are negative, except as otherwise noted.    OBJECTIVE:     /58   Pulse 84   Temp 98.9  F (37.2  C) (Oral)   Ht 1.588 m (5' 2.5\")   Wt 55.3 kg (122 lb)   SpO2 100%   BMI 21.96 kg/m    Body mass index is 21.96 kg/m .  GENERAL: healthy, alert and no distress  NECK: trapezius muscles not in spasm, no point tenderness over the cervical spine  ABDOMEN: soft, nontender, no hepatosplenomegaly, no masses and bowel sounds normal  MS: left shoulder - full ROM with no pain  NEURO: Normal strength, sensation and reflexes throughout BUEs, including left thumb  PSYCH: mentation appears normal, affect normal/bright    Diagnostic Test Results:  none     ASSESSMENT/PLAN:     1. Dyspepsia - suggested a 1 month course of H2B to help calm gastric lining, discussed foods that trigger acid production, eat in smaller quantities and drink a lot of water.   - ranitidine (ZANTAC) 150 MG tablet; Take 1 tablet (150 mg) by mouth daily  Dispense: 30 tablet; Refill: 0    2. Subclinical hypothyroidism - improved mental function on levothyroxine, continue, recheck 6 months.     3. Cervical radiculopathy - discussed likely diagnosis, although stroke was considered. She has regained normal sensation of the arm at this point, with only the thumb as residual. Discussed this should likely resolve over time.     25 minutes spent with patient face-to-face, > 50% in counseling and coordination of care regarding the issues addressed in the assessment and plan.     Batool Kulkarni MD  [facility]    "

## 2019-02-13 NOTE — TELEPHONE ENCOUNTER
"Requested Prescriptions   Pending Prescriptions Disp Refills     levothyroxine (SYNTHROID/LEVOTHROID) 25 MCG tablet 90 tablet 1    Last Written Prescription Date:  08/17/2018  Last Fill Quantity: 90 tablet,  # refills: 1   Last office visit: 5/22/2018 with prescribing provider:  05/22/2018   Future Office Visit:   Next 5 appointments (look out 90 days)    Feb 13, 2019  9:20 AM CST  SHORT with Batool Kulkarni MD  [facility] ([facility] [address] [facility] [address] [phone]          Sig: Take 1 tablet (25 mcg) by mouth daily    Thyroid Protocol Passed - 2/13/2019  8:20 AM       Passed - Patient is 12 years or older       Passed - Recent (12 mo) or future (30 days) visit within the authorizing provider's specialty    Patient had office visit in the last 12 months or has a visit in the next 30 days with authorizing provider or within the authorizing provider's specialty.  See \"Patient Info\" tab in inbasket, or \"Choose Columns\" in Meds & Orders section of the refill encounter.             Passed - Medication is active on med list       Passed - Normal TSH on file in past 12 months    Recent Labs   Lab Test 08/20/18  1046   TSH 3.78             Passed - No active pregnancy on record    If patient is pregnant or has had a positive pregnancy test, please check TSH.         Passed - No positive pregnancy test in past 12 months    If patient is pregnant or has had a positive pregnancy test, please check TSH.          Jose Garcia XRT  "

## 2019-02-13 NOTE — TELEPHONE ENCOUNTER
Prescription approved per [facility] Refill Protocol.  Paddy De Souza RN, BSN

## 2019-02-13 NOTE — PATIENT INSTRUCTIONS
For stomach acid ---  Take 1 month course of stomach acid reducer medication, then stop  Cut back on red meat, caffeine/coffee, citrus fruits, spicy food  Drink more water with meals      For left thumb numbness ---  Sensation should return in next month

## 2019-03-12 DIAGNOSIS — R10.13 DYSPEPSIA: ICD-10-CM

## 2019-03-13 NOTE — TELEPHONE ENCOUNTER
"Requested Prescriptions   Pending Prescriptions Disp Refills     ranitidine (ZANTAC) 150 MG tablet  Last Written Prescription Date:  2/13/2019  Last Fill Quantity: 30 tablet,  # refills: 0   Last office visit: 2/13/2019 with prescribing provider:  Shad   Future Office Visit:     30 tablet 0     Sig: Take 1 tablet (150 mg) by mouth daily    H2 Blockers Protocol Passed - 3/12/2019  7:43 PM       Passed - Patient is age 12 or older       Passed - Recent (12 mo) or future (30 days) visit within the authorizing provider's specialty    Patient had office visit in the last 12 months or has a visit in the next 30 days with authorizing provider or within the authorizing provider's specialty.  See \"Patient Info\" tab in inbasket, or \"Choose Columns\" in Meds & Orders section of the refill encounter.             Passed - Medication is active on med list          "

## 2019-03-13 NOTE — TELEPHONE ENCOUNTER
Per LOV:  Take 1 month course of stomach acid reducer medication, then stop    Paddy De Souza RN, BSN

## 2019-03-29 DIAGNOSIS — R10.13 DYSPEPSIA: ICD-10-CM

## 2019-03-29 NOTE — TELEPHONE ENCOUNTER
"Requested Prescriptions   Pending Prescriptions Disp Refills     ranitidine (ZANTAC) 150 MG tablet  Last Written Prescription Date:  2/13/2019  Last Fill Quantity: 30 tablet,  # refills: 0   Last office visit: 2/13/2019 with prescribing provider:  Shad   Future Office Visit:     30 tablet 0     Sig: Take 1 tablet (150 mg) by mouth daily    H2 Blockers Protocol Passed - 3/29/2019  5:56 PM       Passed - Patient is age 12 or older       Passed - Recent (12 mo) or future (30 days) visit within the authorizing provider's specialty    Patient had office visit in the last 12 months or has a visit in the next 30 days with authorizing provider or within the authorizing provider's specialty.  See \"Patient Info\" tab in inbasket, or \"Choose Columns\" in Meds & Orders section of the refill encounter.             Passed - Medication is active on med list          "

## 2019-03-30 NOTE — TELEPHONE ENCOUNTER
Prescription approved per [facility] Refill Protocol.  Lorri Gaming RN

## 2019-05-20 ENCOUNTER — OFFICE VISIT (OUTPATIENT)
Dept: FAMILY MEDICINE | Facility: CLINIC | Age: 84
End: 2019-05-20
Payer: COMMERCIAL

## 2019-05-20 VITALS
HEART RATE: 80 BPM | TEMPERATURE: 98.7 F | HEIGHT: 63 IN | DIASTOLIC BLOOD PRESSURE: 60 MMHG | OXYGEN SATURATION: 100 % | WEIGHT: 124.5 LBS | RESPIRATION RATE: 14 BRPM | SYSTOLIC BLOOD PRESSURE: 130 MMHG | BODY MASS INDEX: 22.06 KG/M2

## 2019-05-20 DIAGNOSIS — R21 FACIAL RASH: ICD-10-CM

## 2019-05-20 DIAGNOSIS — J06.9 UPPER RESPIRATORY TRACT INFECTION, UNSPECIFIED TYPE: Primary | ICD-10-CM

## 2019-05-20 DIAGNOSIS — R35.0 URINARY FREQUENCY: ICD-10-CM

## 2019-05-20 LAB
ALBUMIN UR-MCNC: NEGATIVE MG/DL
APPEARANCE UR: CLEAR
BILIRUB UR QL STRIP: NEGATIVE
COLOR UR AUTO: ABNORMAL
GLUCOSE UR STRIP-MCNC: NEGATIVE MG/DL
HGB UR QL STRIP: NEGATIVE
KETONES UR STRIP-MCNC: NEGATIVE MG/DL
LEUKOCYTE ESTERASE UR QL STRIP: ABNORMAL
NITRATE UR QL: NEGATIVE
PH UR STRIP: 5.5 PH (ref 5–7)
RBC #/AREA URNS AUTO: ABNORMAL /HPF
SOURCE: ABNORMAL
SP GR UR STRIP: 1.01 (ref 1–1.03)
UROBILINOGEN UR STRIP-ACNC: 0.2 EU/DL (ref 0.2–1)
WBC #/AREA URNS AUTO: ABNORMAL /HPF

## 2019-05-20 PROCEDURE — 87086 URINE CULTURE/COLONY COUNT: CPT | Performed by: FAMILY MEDICINE

## 2019-05-20 PROCEDURE — 81001 URINALYSIS AUTO W/SCOPE: CPT | Performed by: FAMILY MEDICINE

## 2019-05-20 PROCEDURE — 99214 OFFICE O/P EST MOD 30 MIN: CPT | Performed by: FAMILY MEDICINE

## 2019-05-20 ASSESSMENT — MIFFLIN-ST. JEOR: SCORE: 955.92

## 2019-05-20 NOTE — PROGRESS NOTES
SUBJECTIVE:   Indy Reyes is a 88 year old female presenting with a chief complaint of   Chief Complaint   Patient presents with     URI     possible sinus       She is an established patient of [facility].    URI Adult    Onset of URI symptoms was 2 week(s) ago.  Course of illness is improving, with decreased cough noted.  Severity of symptoms is mild to moderate.  Current and Associated symptoms: Occasionally productive cough (improving), nasal congestion, and facial pressure (resolved).  Treatment measures tried include:  Sudafed has been helpful.  The patient has also tried Claritin.  Predisposing factors include: None.  No previous history of allergies reported.    Chronic incontinence has worsened in the recent past, mainly due to the patient's cough.  Back pain was aggravated by her cough last week, but back pain is back to baseline.  Some urinary frequency reported, which the patient attributes to drinking more fluids.  No dysuria, hematuria, or history of kidney stones.  Patient would like to check a Urinalysis to rule out infection.    Review of Systems   No fever, chills, nausea,  or vomiting.  No chest pain or shortness of breath.  No wheezing.  No edema or orthopnea.  No GERD.  Facial rash, previously evaluation by Dermatology.  Patient states that she has a cream available for her rash at home, as prescribed by Dermatology.    Patient Active Problem List   Diagnosis     Hyperlipidemia LDL goal <130     Osteopenia     Advanced directives, counseling/discussion     Impaired fasting glucose     Family history of breast cancer     Family history of coronary artery disease     Thrombocytopenia (H)     Thyroid nodule     Postnasal drip     Rhinitis     Elevated TSH     Osteoarthrosis of knee     Cervical radiculitis     Lumbar radiculopathy, right     Memory changes     Subclinical hypothyroidism       Past Medical History:   Diagnosis Date     Back pain         Allergies   Allergen Reactions      "Tetracycline Rash       Family History   Problem Relation Age of Onset     Family History Negative Mother          in accident     Cancer Father         prostate     Heart Disease Brother         60s-4-one brother with heart attack in his 60's     Cancer Sister         breast-7-one  wtih breast ca,one with stroke, one sis  from diabetes     Breast Cancer Sister         diagnosed at age of 52- 5 sisters alive     Cancer - colorectal No family hx of      Current Outpatient Medications   Medication Sig Dispense Refill     acetaminophen (TYLENOL) 325 MG tablet Take 2 tablets (650 mg) by mouth every 4 hours as needed for other (mild pain) 100 tablet 0     levothyroxine (SYNTHROID/LEVOTHROID) 25 MCG tablet Take 1 tablet (25 mcg) by mouth daily 90 tablet 1     Multiple Vitamins-Minerals (MULTIVITAMIN ADULTS 50+) TABS Take 2 capsules by mouth every morning       Multiple Vitamins-Minerals (PRESERVISION AREDS) CAPS Take by mouth 2 times daily       ranitidine (ZANTAC) 150 MG tablet Take 1 tablet (150 mg) by mouth daily (Patient not taking: Reported on 2019) 90 tablet 1     Social History     Tobacco Use     Smoking status: Former Smoker     Types: Cigarettes     Last attempt to quit: 1960     Years since quittin.4     Smokeless tobacco: Never Used     Tobacco comment:    Substance Use Topics     Alcohol use: Yes     Comment: SOCIAL-2 drinks per month        OBJECTIVE  /60 (BP Location: Right arm, Patient Position: Chair, Cuff Size: Adult Regular)   Pulse 80   Temp 98.7  F (37.1  C) (Oral)   Resp 14   Ht 1.588 m (5' 2.5\")   Wt 56.5 kg (124 lb 8 oz)   SpO2 100%   Breastfeeding? Yes   BMI 22.41 kg/m      Physical Exam    GENERAL APPEARANCE:  Awake, alert, and in no acute distress.  Here today with her .  PSYCHIATRIC:  Pleasant affect.  HEENT:  Sclera anicteric.  No conjunctivitis.  PERRLA.  Extraocular movements are intact.  Patient wears hearing aids.  Bilateral TM's and " canals are within normal limits, with exception of a small amount of cerumen noted involving the outer ear canals.  Mild nasal congestion.  No sinus tenderness.  No erythema, edema, or exudates of the oral mucosa or posterior pharynx.  Mucous membranes moist.  NECK:  Spontaneous full range of motion.  No thyromegaly or mass.  No lymphadenopathy.  HEART:  Normal S1, S2.  Regular rate and rhythm.  No murmurs, rubs, or gallops.  LUNGS:  No respiratory distress.  No wheezes, rales, or rhonchi.  ABDOMEN:  Not distended.  Soft.  Not tender.  No mass.  EXTREMITIES:  Moves 4 extremities.   No edema or calf tenderness.  NEUROLOGIC:  Gait within normal limits.  No facial droop or acute neurologic deficits.  SKIN: No diaphoresis, but the patient has four 3-4 mm areas of erythema involving her upper lip and chin, suggestive of impetigo.    Labs:  No results found for this or any previous visit (from the past 24 hour(s)).    Chest X-ray discussed with and declined by patient.    ASSESSMENT:      ICD-10-CM    1. Upper respiratory tract infection, unspecified type.  Cough is improving at this time.  Sinus pressure has resolved. J06.9    2. Urinary frequency, with only 2-5 RBC's noted on Urinalysis today.  Chronic stress incontinence has worsened in the recent past, likely secondary to #1 above. R35.0 UA with Microscopic reflex to Culture     Urine Culture Aerobic Bacterial   3. Facial rash, likely impetigo.  Patient has been seen by Dermatology previously. R21       PLAN:    Patient declines Bactroban treatment (Dx-Facial Rash), as she states that she has a cream available at home.    Patient Instructions     Symptomatic cares and over-the-counter medications, only as discussed.     Follow up if:  fever, worsening symptoms, or not gradually improving over the next week.    Follow up in the ER immediately if you develop emergent symptoms (e.g. breathing difficulties), as discussed.      Follow-up with Dermatology if worsening rash,  as discussed.      We will notify and treat you if your urine tests confirm an infection.    A follow up Urinalysis is recommended in 1 month to ensure that the microscopic hematuria has cleared.    Discussed risks and benefits of treatment strategies, as noted in the Assessment and Plan sections.    The patient was discharged ambulatory and in stable condition to the care of her  post discussion of follow up.     Yadi Denney MD  [facility]

## 2019-05-20 NOTE — PATIENT INSTRUCTIONS
Symptomatic cares and over-the-counter medications, only as discussed.     Follow up if:  fever, worsening symptoms, or not gradually improving over the next week.    Follow up in the ER immediately if you develop emergent symptoms (e.g. breathing difficulties), as discussed.      Follow-up with Dermatology if worsening rash, as discussed.      We will notify and treat you if your urine tests confirm an infection.

## 2019-05-21 LAB
BACTERIA SPEC CULT: NORMAL
SPECIMEN SOURCE: NORMAL

## 2019-05-22 ENCOUNTER — TELEPHONE (OUTPATIENT)
Dept: FAMILY MEDICINE | Facility: CLINIC | Age: 84
End: 2019-05-22

## 2019-05-22 ENCOUNTER — HOSPITAL ENCOUNTER (OUTPATIENT)
Dept: MAMMOGRAPHY | Facility: CLINIC | Age: 84
Discharge: HOME | End: 2019-05-22
Attending: FAMILY MEDICINE | Admitting: FAMILY MEDICINE
Payer: COMMERCIAL

## 2019-05-22 DIAGNOSIS — Z12.31 VISIT FOR SCREENING MAMMOGRAM: ICD-10-CM

## 2019-05-22 PROCEDURE — 77067 SCR MAMMO BI INCL CAD: CPT

## 2019-05-22 NOTE — TELEPHONE ENCOUNTER
Left message for patient to return our call.  See message below.  Paddy De Souza RN          ----- Message from Yadi Denney MD sent at 5/22/2019  9:37 AM CDT -----  Please notify patient that her final Urine Culture was negative.  Thank you.

## 2019-05-22 NOTE — TELEPHONE ENCOUNTER
Gave patient the message, she had no questions for he RN. She is feeling better. Salma Sheppard

## 2019-05-29 DIAGNOSIS — R10.13 DYSPEPSIA: ICD-10-CM

## 2019-05-29 DIAGNOSIS — R79.89 ELEVATED TSH: ICD-10-CM

## 2019-05-30 RX ORDER — LEVOTHYROXINE SODIUM 25 UG/1
25 TABLET ORAL DAILY
Qty: 90 TABLET | Refills: 0 | Status: SHIPPED | OUTPATIENT
Start: 2019-05-30 | End: 2019-08-12

## 2019-05-30 NOTE — TELEPHONE ENCOUNTER
"Requested Prescriptions   Pending Prescriptions Disp Refills     ranitidine (ZANTAC) 150 MG tablet  Last Written Prescription Date:  03/30/2019  Last Fill Quantity: 90,  # refills: 1   Last office visit: 5/20/2019 with prescribing provider:  05/20/2019   Future Office Visit:     90 tablet 1     Sig: Take 1 tablet (150 mg) by mouth daily       H2 Blockers Protocol Passed - 5/29/2019  9:05 PM        Passed - Patient is age 12 or older        Passed - Recent (12 mo) or future (30 days) visit within the authorizing provider's specialty     Patient had office visit in the last 12 months or has a visit in the next 30 days with authorizing provider or within the authorizing provider's specialty.  See \"Patient Info\" tab in inbasket, or \"Choose Columns\" in Meds & Orders section of the refill encounter.              Passed - Medication is active on med list        levothyroxine (SYNTHROID/LEVOTHROID) 25 MCG tablet  Last Written Prescription Date:  02/13/2019  Last Fill Quantity: 90,  # refills: 1   Last office visit: 5/20/2019 with prescribing provider:  05/20/2019   Future Office Visit:     90 tablet 1     Sig: Take 1 tablet (25 mcg) by mouth daily       Thyroid Protocol Passed - 5/29/2019  9:05 PM        Passed - Patient is 12 years or older        Passed - Recent (12 mo) or future (30 days) visit within the authorizing provider's specialty     Patient had office visit in the last 12 months or has a visit in the next 30 days with authorizing provider or within the authorizing provider's specialty.  See \"Patient Info\" tab in inbasket, or \"Choose Columns\" in Meds & Orders section of the refill encounter.              Passed - Medication is active on med list        Passed - Normal TSH on file in past 12 months     Recent Labs   Lab Test 08/20/18  1046   TSH 3.78              Passed - No active pregnancy on record     If patient is pregnant or has had a positive pregnancy test, please check TSH.          Passed - No positive " pregnancy test in past 12 months     If patient is pregnant or has had a positive pregnancy test, please check TSH.

## 2019-06-10 ENCOUNTER — TRANSFERRED RECORDS (OUTPATIENT)
Dept: HEALTH INFORMATION MANAGEMENT | Facility: CLINIC | Age: 84
End: 2019-06-10

## 2019-08-12 DIAGNOSIS — R79.89 ELEVATED TSH: ICD-10-CM

## 2019-08-12 RX ORDER — LEVOTHYROXINE SODIUM 25 UG/1
25 TABLET ORAL DAILY
Qty: 90 TABLET | Refills: 0 | Status: SHIPPED | OUTPATIENT
Start: 2019-08-12 | End: 2019-11-15

## 2019-08-12 NOTE — TELEPHONE ENCOUNTER
Prescription approved per [facility] Refill Protocol.  Paddy De Souza RN, BSN

## 2019-08-12 NOTE — TELEPHONE ENCOUNTER
"Requested Prescriptions   Pending Prescriptions Disp Refills     levothyroxine (SYNTHROID/LEVOTHROID) 25 MCG tablet 90 tablet 0     Sig: Take 1 tablet (25 mcg) by mouth daily     Last Written Prescription Date:  05/30/2019  Last Fill Quantity: 90 tablet,  # refills: 3   Last office visit: 5/20/2019 with prescribing provider:  02/13/2019   Future Office Visit:          Thyroid Protocol Passed - 8/12/2019  7:02 AM        Passed - Patient is 12 years or older        Passed - Recent (12 mo) or future (30 days) visit within the authorizing provider's specialty     Patient had office visit in the last 12 months or has a visit in the next 30 days with authorizing provider or within the authorizing provider's specialty.  See \"Patient Info\" tab in inbasket, or \"Choose Columns\" in Meds & Orders section of the refill encounter.              Passed - Medication is active on med list        Passed - Normal TSH on file in past 12 months     Recent Labs   Lab Test 08/20/18  1046   TSH 3.78              Passed - No active pregnancy on record     If patient is pregnant or has had a positive pregnancy test, please check TSH.          Passed - No positive pregnancy test in past 12 months     If patient is pregnant or has had a positive pregnancy test, please check TSH.          Jose Garcia XRT  "

## 2019-09-17 ENCOUNTER — OFFICE VISIT (OUTPATIENT)
Dept: FAMILY MEDICINE | Facility: CLINIC | Age: 84
End: 2019-09-17
Payer: COMMERCIAL

## 2019-09-17 VITALS
HEART RATE: 90 BPM | BODY MASS INDEX: 21.97 KG/M2 | RESPIRATION RATE: 14 BRPM | WEIGHT: 124 LBS | SYSTOLIC BLOOD PRESSURE: 142 MMHG | HEIGHT: 63 IN | DIASTOLIC BLOOD PRESSURE: 69 MMHG | TEMPERATURE: 98.9 F

## 2019-09-17 DIAGNOSIS — R19.06 EPIGASTRIC MASS: Primary | ICD-10-CM

## 2019-09-17 DIAGNOSIS — R20.0 NUMBNESS OF LEFT THUMB: ICD-10-CM

## 2019-09-17 DIAGNOSIS — E03.8 SUBCLINICAL HYPOTHYROIDISM: ICD-10-CM

## 2019-09-17 DIAGNOSIS — Z23 NEED FOR PROPHYLACTIC VACCINATION AND INOCULATION AGAINST INFLUENZA: ICD-10-CM

## 2019-09-17 PROCEDURE — 90662 IIV NO PRSV INCREASED AG IM: CPT | Performed by: FAMILY MEDICINE

## 2019-09-17 PROCEDURE — 99214 OFFICE O/P EST MOD 30 MIN: CPT | Mod: 25 | Performed by: FAMILY MEDICINE

## 2019-09-17 PROCEDURE — 36415 COLL VENOUS BLD VENIPUNCTURE: CPT | Performed by: FAMILY MEDICINE

## 2019-09-17 PROCEDURE — G0008 ADMIN INFLUENZA VIRUS VAC: HCPCS | Performed by: FAMILY MEDICINE

## 2019-09-17 PROCEDURE — 84443 ASSAY THYROID STIM HORMONE: CPT | Performed by: FAMILY MEDICINE

## 2019-09-17 ASSESSMENT — MIFFLIN-ST. JEOR: SCORE: 953.65

## 2019-09-17 NOTE — LETTER
September 19, 2019      Indy Reyes  [address] [address]        Dear MsEmmanuelTroylorrie,    We are writing to inform you of your test results.    Your recent thyroid studies were normal. This is great news!  Let's continue your current dose of levothyroxine    Resulted Orders   TSH with free T4 reflex   Result Value Ref Range    TSH 3.36 0.40 - 4.00 mU/L       If you have any questions or concerns, please call the clinic at the number listed above.       Sincerely,        Batool Kulkarni MD/Roxana Garcia

## 2019-09-17 NOTE — PROGRESS NOTES
Subjective     Indy Reyes is a 88 year old female who presents to clinic today for the following health issues:    HPI   1.  Follow-up to the left thumb numbness.  Reports no better from 6 months ago.  Initially, she had numbness and tingling of her whole left arm in February 2019.  This fully resolved except she continued to have numbness of her left thumb.  This is affected her ability with fine motor movements such as buttoning her shirt.  She feels she has adequate strength in the thumb and is able to do most tasks without interfering.  She has a remote history of CT scan of the cervical spine, indicating stenosis at C3-C4 levels.    2.  Subclinical hypothyroidism-she was started on levothyroxine 25 mcg daily as she was having some new difficulties with mentation and her TSH levels were above normal.  She feels that the levothyroxine has helped to improve her processing.  Her thyroid levels 1 year ago were in range.    3.  Reports a lump in her upper abdomen.  It is nontender.  She is not having any difficulties with epigastric pain, belching, nausea.  Her  is concerned as there is a family history of pancreatic cancer.  Lump does not seem to change in size, even with a change in body position.    Patient Active Problem List   Diagnosis     Hyperlipidemia LDL goal <130     Osteopenia     Advanced directives, counseling/discussion     Impaired fasting glucose     Family history of breast cancer     Family history of coronary artery disease     Thrombocytopenia (H)     Thyroid nodule     Postnasal drip     Rhinitis     Elevated TSH     Osteoarthrosis of knee     Cervical radiculitis     Lumbar radiculopathy, right     Memory changes     Subclinical hypothyroidism     Past Surgical History:   Procedure Laterality Date     APPENDECTOMY      at age of 16     CATARACT IOL, RT/LT  5/09    both     FORAMINOTOMY LUMBAR POSTERIOR ONE LEVEL Right 4/28/2017    Procedure: FORAMINOTOMY LUMBAR POSTERIOR ONE  "LEVEL;  Right L4-L5 hemilaminectomy and foraminotomy and excision of synovial cyst. ;  Surgeon: Fady Woo MD;  Location: [facility] OR     TONSILLECTOMY         Social History     Tobacco Use     Smoking status: Former Smoker     Types: Cigarettes     Last attempt to quit: 1960     Years since quittin.7     Smokeless tobacco: Never Used     Tobacco comment:    Substance Use Topics     Alcohol use: Yes     Comment: SOCIAL-2 drinks per month      Family History   Problem Relation Age of Onset     Family History Negative Mother          in accident     Cancer Father         prostate     Heart Disease Brother         60s-4-one brother with heart attack in his 60's     Cancer Sister         breast-7-one  wtih breast ca,one with stroke, one sis  from diabetes     Breast Cancer Sister         diagnosed at age of 52- 5 sisters alive     Cancer - colorectal No family hx of          Reviewed and updated as needed this visit by Provider  Tobacco  Allergies  Meds  Problems  Med Hx  Surg Hx  Fam Hx         Review of Systems   ROS COMP: Constitutional, HEENT, cardiovascular, pulmonary, GI, , musculoskeletal, neuro, skin, endocrine and psych systems are negative, except as otherwise noted.      Objective    BP (!) 142/69 (BP Location: Right arm, Patient Position: Sitting, Cuff Size: Adult Regular)   Pulse 90   Temp 98.9  F (37.2  C) (Oral)   Resp 14   Ht 1.588 m (5' 2.5\")   Wt 56.2 kg (124 lb)   Breastfeeding? No   BMI 22.32 kg/m    Body mass index is 22.32 kg/m .  Physical Exam   GENERAL: healthy, alert and no distress  RESP: lungs clear to auscultation - no rales, rhonchi or wheezes  CV: regular rate and rhythm, normal S1 S2, no S3 or S4, no murmur  ABDOMEN: 2x2 soft tissue mass of the epigastrium, seems to be within the abdominal wall, unable to reduce, no palpable abdominal wall defects noted, not ttp  MS: LUE - no gross musculoskeletal defects noted, no edema  NEURO: LUE - " normal strength and sensation      Diagnostic Test Results:  Labs reviewed in Epic        Assessment & Plan     1. Epigastric mass - exam c/w lipoma, but may represent abdominal wall hernia as well, will get imaging to further define  - CT Abdomen w/o Contrast; Future    2. Subclinical hypothyroidism - recheck lab work today  - TSH with free T4 reflex    3. Numbness of left thumb - discussed investigating level of nerve dysfunction first, see if entrapment and if treatment is an option.   - NEUROLOGY ADULT REFERRAL    4. Need for prophylactic vaccination and inoculation against influenza  - INFLUENZA (HIGH DOSE) 3 VALENT VACCINE [51335]  - Vaccine Administration, Initial [67274]      Return in about 1 year (around 9/17/2020) for Yearly Physical Exam.    Batool Kulkarni MD  [facility]

## 2019-09-18 ENCOUNTER — HOSPITAL ENCOUNTER (OUTPATIENT)
Dept: CT IMAGING | Facility: CLINIC | Age: 84
Discharge: HOME | End: 2019-09-18
Attending: FAMILY MEDICINE | Admitting: FAMILY MEDICINE
Payer: COMMERCIAL

## 2019-09-18 DIAGNOSIS — R19.06 EPIGASTRIC MASS: ICD-10-CM

## 2019-09-18 LAB — TSH SERPL DL<=0.005 MIU/L-ACNC: 3.36 MU/L (ref 0.4–4)

## 2019-09-18 PROCEDURE — 74150 CT ABDOMEN W/O CONTRAST: CPT

## 2019-09-27 ENCOUNTER — TRANSFERRED RECORDS (OUTPATIENT)
Dept: HEALTH INFORMATION MANAGEMENT | Facility: CLINIC | Age: 84
End: 2019-09-27

## 2019-09-30 ENCOUNTER — TRANSFERRED RECORDS (OUTPATIENT)
Dept: HEALTH INFORMATION MANAGEMENT | Facility: CLINIC | Age: 84
End: 2019-09-30

## 2019-10-22 DIAGNOSIS — G56.03 BILATERAL CARPAL TUNNEL SYNDROME: Primary | ICD-10-CM

## 2019-10-29 ENCOUNTER — OFFICE VISIT (OUTPATIENT)
Dept: ORTHOPEDICS | Facility: CLINIC | Age: 84
End: 2019-10-29
Payer: COMMERCIAL

## 2019-10-29 VITALS
HEIGHT: 63 IN | SYSTOLIC BLOOD PRESSURE: 104 MMHG | DIASTOLIC BLOOD PRESSURE: 52 MMHG | BODY MASS INDEX: 21.97 KG/M2 | WEIGHT: 124 LBS

## 2019-10-29 DIAGNOSIS — G56.02 CARPAL TUNNEL SYNDROME OF LEFT WRIST: Primary | ICD-10-CM

## 2019-10-29 PROCEDURE — 99213 OFFICE O/P EST LOW 20 MIN: CPT | Performed by: FAMILY MEDICINE

## 2019-10-29 ASSESSMENT — MIFFLIN-ST. JEOR: SCORE: 953.65

## 2019-10-29 NOTE — PROGRESS NOTES
ASSESSMENT & PLAN    1. Carpal tunnel syndrome of left wrist      Seen in consultation for left wrist pain and numbness and tingling.  Previous history of carpal tunnel release 10 years ago  Recent EMG confirms carpal tunnel and also delayed signal across the ulnar nerve.  There is to have some sensory changes with exam but patient does not endorse any symptoms related to her ulnar nerve.  Hand therapy: [facility] - [phone] to   Reasonable to plan for at least one injection and dependent on duration of relief can determine if continued injections vs surgery is appropriate.    Surgical options include open vs endoscopic vs ultrasound guided    Follow-up for ultrasound guided carpal tunnel injection. Will check options today.    -----    SUBJECTIVE  Indy Reyes is a/an 88 year old Right handed female who is seen in consultation at the request of  Batool Kulkarni M.D. for evaluation of bilateral wrist pain. The patient is seen by themselves.    Onset: 4 month(s) ago. Reports insidious onset without acute precipitating event.  Location of Pain: left hand  Rating of Pain at worst: 5/10  Rating of Pain Currently: 0/10  Worsened by: patient reports pain is worse in the mornings and gradually gets better throughout the day  Better with: aspirin  Treatments tried: rest/activity avoidance, other medications: Tramadol (Ultram), previous imaging (Other EMG 9/27/19 at [facility]) and cock up wrist brace (brace at night, intermittently)  Associated symptoms: numbness of left thumb and 2nd finger  Orthopedic history: YES - patient reports h/o left CTS about 10 years ago  Relevant surgical history: YES - patient reports h/o left CTR about 10 years ago  Patient Social History: retired    Patient's past medical, surgical, social, and family histories were reviewed today and no pertinent history related to patient's presenting problem.    REVIEW OF SYSTEMS:  10 point ROS is  "negative other than symptoms noted above in HPI, Past Medical History or as stated below  Constitutional: NEGATIVE for fever, chills, change in weight  Skin: NEGATIVE for worrisome rashes, moles or lesions  GI/: NEGATIVE for bowel or bladder changes  Neuro: NEGATIVE for weakness, dizziness or paresthesias    OBJECTIVE:  /52   Ht 1.588 m (5' 2.5\")   Wt 56.2 kg (124 lb)   BMI 22.32 kg/m     General: healthy, alert and in no distress  HEENT: no scleral icterus or conjunctival erythema  Skin: no suspicious lesions or rash. No jaundice.  CV: regular rhythm by palpation  Resp: normal respiratory effort without conversational dyspnea   Psych: normal mood and affect  Gait: normal steady gait with appropriate coordination and balance  Neuro: Decreased sensation over the median and ulnar distribution of the left hand.   MSK:  LEFT WRIST  Inspection:    No swelling or obvious deformity or asymmetry  Palpation:    Tender about the diffusely around the wrist. Remainder of bony and ligamentous line marks are nontender.   Range of Motion:  Pain at extremes of flexion and extension  Strength:     strength limited by pain, decreased two-point discrimination and weak pinch strength.  Special Tests:    Positive: Phalen's    Negative: Tinel's (cubital tunnel).     Independent visualization of the below image:      Tiki Vasquez, DO [facility]      "

## 2019-10-29 NOTE — LETTER
10/29/2019         RE: Indy Reyes  [address] [place]  [facility] [address]        Dear Colleague,    Thank you for referring your patient, Indy Reyes, to the [facility]. Please see a copy of my visit note below.    ASSESSMENT & PLAN    1. Carpal tunnel syndrome of left wrist      Seen in consultation for left wrist pain and numbness and tingling.  Previous history of carpal tunnel release 10 years ago  Recent EMG confirms carpal tunnel and also delayed signal across the ulnar nerve.  There is to have some sensory changes with exam but patient does not endorse any symptoms related to her ulnar nerve.  Hand therapy: [facility] - [phone] to   Reasonable to plan for at least one injection and dependent on duration of relief can determine if continued injections vs surgery is appropriate.    Surgical options include open vs endoscopic vs ultrasound guided    Follow-up for ultrasound guided carpal tunnel injection. Will check options today.    -----    SUBJECTIVE  Indy Reyes is a/an 88 year old Right handed female who is seen in consultation at the request of  Batool Kulkarni M.D. for evaluation of bilateral wrist pain. The patient is seen by themselves.    Onset: 4 month(s) ago. Reports insidious onset without acute precipitating event.  Location of Pain: left hand  Rating of Pain at worst: 5/10  Rating of Pain Currently: 0/10  Worsened by: patient reports pain is worse in the mornings and gradually gets better throughout the day  Better with: aspirin  Treatments tried: rest/activity avoidance, other medications: Tramadol (Ultram), previous imaging (Other EMG 9/27/19 at [facility]) and cock up wrist brace (brace at night, intermittently)  Associated symptoms: numbness of left thumb and 2nd finger  Orthopedic history: YES - patient reports h/o left CTS about 10 years ago  Relevant surgical history: YES - patient reports  "h/o left CTR about 10 years ago  Patient Social History: retired    Patient's past medical, surgical, social, and family histories were reviewed today and no pertinent history related to patient's presenting problem.    REVIEW OF SYSTEMS:  10 point ROS is negative other than symptoms noted above in HPI, Past Medical History or as stated below  Constitutional: NEGATIVE for fever, chills, change in weight  Skin: NEGATIVE for worrisome rashes, moles or lesions  GI/: NEGATIVE for bowel or bladder changes  Neuro: NEGATIVE for weakness, dizziness or paresthesias    OBJECTIVE:  /52   Ht 1.588 m (5' 2.5\")   Wt 56.2 kg (124 lb)   BMI 22.32 kg/m      General: healthy, alert and in no distress  HEENT: no scleral icterus or conjunctival erythema  Skin: no suspicious lesions or rash. No jaundice.  CV: regular rhythm by palpation  Resp: normal respiratory effort without conversational dyspnea   Psych: normal mood and affect  Gait: normal steady gait with appropriate coordination and balance  Neuro: Decreased sensation over the median and ulnar distribution of the left hand.   MSK:  LEFT WRIST  Inspection:    No swelling or obvious deformity or asymmetry  Palpation:    Tender about the diffusely around the wrist. Remainder of bony and ligamentous line marks are nontender.   Range of Motion:  Pain at extremes of flexion and extension  Strength:     strength limited by pain, decreased two-point discrimination and weak pinch strength.  Special Tests:    Positive: Phalen's    Negative: Tinel's (cubital tunnel).     Independent visualization of the below image:      Tiki Vasquez DO [facility]        Again, thank you for allowing me to participate in the care of your patient.        Sincerely,        Tiki Vasquez DO    "

## 2019-10-29 NOTE — PATIENT INSTRUCTIONS
1. Carpal tunnel syndrome of left wrist      Hand therapy: [facility] - [phone] to help improve your strength and fine motor skills which can decline because of carpal tunnel syndrome  EMG confirms carpal tunnel (median nerve) and also delay across your ulnar nerve (that is what produced the sensory change across your ring finger).   Reasonable to plan for at least one injection and dependent on how long of relief you get will determine if continued injections vs surgery is appropriate.    Surgical options include open vs endoscopic vs ultrasound guided    Follow-up for ultrasound guided carpal tunnel injection. Will check options today.

## 2019-10-30 ENCOUNTER — HEALTH MAINTENANCE LETTER (OUTPATIENT)
Age: 84
End: 2019-10-30

## 2019-11-04 ENCOUNTER — OFFICE VISIT (OUTPATIENT)
Dept: ORTHOPEDICS | Facility: CLINIC | Age: 84
End: 2019-11-04
Payer: COMMERCIAL

## 2019-11-04 DIAGNOSIS — Z53.9 ERRONEOUS ENCOUNTER--DISREGARD: Primary | ICD-10-CM

## 2019-11-04 NOTE — LETTER
11/4/2019         RE: Indy Reyes  [address] [facility] [address]        Dear Colleague,    Thank you for referring your patient, Indy Reyes, to the [facility]. Please see a copy of my visit note below.      This encounter was opened in error. Please disregard.    Patient thought she was coming for therapy. Helped patient schedule a visit. This encounter is being closed.    Tiki Vasquez DO, [facility] [place]      Again, thank you for allowing me to participate in the care of your patient.        Sincerely,        Tiki Vasquez DO

## 2019-11-05 ENCOUNTER — THERAPY VISIT (OUTPATIENT)
Dept: OCCUPATIONAL THERAPY | Facility: CLINIC | Age: 84
End: 2019-11-05
Payer: COMMERCIAL

## 2019-11-05 DIAGNOSIS — G56.02 CARPAL TUNNEL SYNDROME OF LEFT WRIST: ICD-10-CM

## 2019-11-05 DIAGNOSIS — M79.642 HAND PAIN, LEFT: ICD-10-CM

## 2019-11-05 PROCEDURE — 97110 THERAPEUTIC EXERCISES: CPT | Mod: GO | Performed by: OCCUPATIONAL THERAPIST

## 2019-11-05 PROCEDURE — 97165 OT EVAL LOW COMPLEX 30 MIN: CPT | Mod: GO | Performed by: OCCUPATIONAL THERAPIST

## 2019-11-05 NOTE — PROGRESS NOTES
Hand Therapy Initial Evaluation  Current Date:  11/5/2019    Subjective:  Indy Reyes is a 88 year old R hand dominant female.    Diagnosis: CTS of L wrist  DOI:  June 2019  DOS:  2009  MD order date: 10/29/19  Procedure: carpal tunnel release in 2009    Referring MD: Dr. Vasquez    Per MD: EMG confirms carpal tunnel (median nerve) and also delay across your ulnar nerve     Patient reports symptoms of pain, weakness/loss of strength, numbness and tingling  of the L hand which occurred due to unsure, use over time. Since onset symptoms are unchanged. Special tests:  EMG.  Previous treatment: none. General health as reported by patient is good.  Pertinent medical history includes: Cancer, Incontinence, Migraines/Headaches, Numbness/Tingling, Thyroid Problems.  Medical allergies: none.  Surgical history: other: calf, right leg.  Medication history: Bone Density, Thyroid, preservision for eyes.    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 Paresthesias also present into volar wrist over carpal tunnel.     Pain Level (Scale 0-10):   11/5/2019   At Rest 1/10   With Use 5/10     Pain Description:  Date 11/5/2019   Location L thumb and index   Pain Quality Burning, radiating   Frequency constant     Pain is worst morning   Exacerbated by Sleeping position, carrying cookie sheets   Relieved by aspirin   Progression unchanged     Cervical Screen  Pain Report:  - none    + mild    ++ moderate    +++ severe       Active ROM 11/5/2019   Flexion -   Extension -   Lateral Flexion Right -   Lateral Flexion Left -   Rotation Right -   Rotation Left -   Compression NT   Traction NT     Range of Motion Wrist AROM (PROM):  Date 11/5/2019 11/5/2019   Side R L   Ext 60 60   Flex 70 60     Special Tests:  Date 11/5/2019    Side L    Phalens Discomfort at 20 sec, and increased numbness    Tinels at CT + pain    Tinels at Pronator + pain    Heredia's test -    Tinel's at Guyan's + pain    Tinels at cubital tunnel -    Elbow flexion test -           STRENGTH: (Measured in pounds, pain scale 0-10/10)    Date 11/5/2019        Trials R L R L R L R L R L R L   1 50 30             2               3               Avg               Pain  0               3 Point Pinch  Date 11/5/2019        Trials R L R L R L R L R L R L   1 10 5             2               3               Avg               Pain  0               Lateral Pinch  Date 11/5/2019        Trials R L R L R L R L R L R L   1 11 8             2               3               Avg               Pain  0               Assessment:  Patient presents with symptoms consistent with diagnosis L Carpal Tunnel Syndrome,  with conservative intervention.     Patient's limitations or Problem List includes:  Pain, Parathesias of the median nerve distribution, Decreased ROM/motion, Weakness, Decreased  and Tightness in musculature of the left wrist and hand which interferes with the patient's ability to perform Self Care Tasks (dressing, eating), Sleep Patterns, Recreational Activities, Household Chores and Driving  as compared to previous level of function.    Rehab Potential:  Excellent - Return to full activity, no limitations    Patient will benefit from skilled Occupational Therapy to increase ROM, flexibility,  strength and forearm strength and decrease pain and parathesias to return to previous activity level and resume normal daily tasks and to reach their rehab potential.    Barriers  to Learning:  No barrier    Communication Issues:  Patient appears to be able to clearly communicate and understand verbal and written communication and follow directions correctly.    Assessment of Occupational Performance:  5 or more Performance Deficits  Identified Performance Deficits: dressing, care of others, driving and community mobility, health management and maintenance, home establishment and management, meal preparation and cleanup, shopping, sleep and leisure activities      Clinical Decision Making (Complexity): Low complexity    Treatment Explanation:  The following has been discussed with the patient:    RX ordered/plan of care  Anticipated outcomes  Possible risks and side effects    P: Frequency:  1 X week, once daily  Duration:  for 6 weeks    Treatment Plan:    Modalities:  US   Therapeutic Exercise: Tendon Gliding ex, blocking  Neuromuscular Techniques: Median nerve glides both active and passive (in the clinic)  Manual Techniques:, Myofascial release of the forearm extensors and flexors, wrist mobilization techniques  Orthosis:  Wrist in netural orthosis for night wear.    Home Program:   Exercise: AROM of fingers and wrist using Tendon gliding techniques and blocking, Median nerve glides.   Orthosis: Static orthosis Wrist in neutral orthosis at night.    Activity: Avoid activities that exacerbate symptoms in the median nerve.  Avoid prolonged flexion or extension of the wrist.    Discharge Plan:    Achieve all LTG.  Independent in home treatment program.  Reach maximal therapeutic benefit.    Next Visit:  [facility]  Check effectiveness of orthosis  Nerve glides  Consider pec stretch and scapular retraction

## 2019-11-14 ENCOUNTER — THERAPY VISIT (OUTPATIENT)
Dept: OCCUPATIONAL THERAPY | Facility: CLINIC | Age: 84
End: 2019-11-14
Payer: COMMERCIAL

## 2019-11-14 DIAGNOSIS — M79.642 HAND PAIN, LEFT: ICD-10-CM

## 2019-11-14 PROCEDURE — 97110 THERAPEUTIC EXERCISES: CPT | Mod: GO | Performed by: OCCUPATIONAL THERAPIST

## 2019-11-14 PROCEDURE — 97112 NEUROMUSCULAR REEDUCATION: CPT | Mod: GO | Performed by: OCCUPATIONAL THERAPIST

## 2019-11-14 PROCEDURE — 97140 MANUAL THERAPY 1/> REGIONS: CPT | Mod: GO | Performed by: OCCUPATIONAL THERAPIST

## 2019-11-15 DIAGNOSIS — R79.89 ELEVATED TSH: ICD-10-CM

## 2019-11-15 RX ORDER — LEVOTHYROXINE SODIUM 25 UG/1
25 TABLET ORAL DAILY
Qty: 90 TABLET | Refills: 3 | Status: SHIPPED | OUTPATIENT
Start: 2019-11-15 | End: 2020-11-16

## 2019-11-15 NOTE — TELEPHONE ENCOUNTER
Prescription approved per [facility] Refill Protocol.  Lorri Gaming RN

## 2019-11-15 NOTE — TELEPHONE ENCOUNTER
"Requested Prescriptions   Pending Prescriptions Disp Refills     levothyroxine (SYNTHROID/LEVOTHROID) 25 MCG tablet 90 tablet 0     Sig: Take 1 tablet (25 mcg) by mouth daily     Last Written Prescription Date:  08/12/2019  Last Fill Quantity: 90 tablet,  # refills: 0   Last office visit: No previous visit found with prescribing provider:  09/17/2019   Future Office Visit:          Thyroid Protocol Passed - 11/15/2019 10:14 AM        Passed - Patient is 12 years or older        Passed - Recent (12 mo) or future (30 days) visit within the authorizing provider's specialty     Patient has had an office visit with the authorizing provider or a provider within the authorizing providers department within the previous 12 mos or has a future within next 30 days. See \"Patient Info\" tab in inbasket, or \"Choose Columns\" in Meds & Orders section of the refill encounter.              Passed - Medication is active on med list        Passed - Normal TSH on file in past 12 months     Recent Labs   Lab Test 09/17/19  1419   TSH 3.36              Passed - No active pregnancy on record     If patient is pregnant or has had a positive pregnancy test, please check TSH.          Passed - No positive pregnancy test in past 12 months     If patient is pregnant or has had a positive pregnancy test, please check TSH.            Jose Garcia XRT  "

## 2019-11-21 ENCOUNTER — THERAPY VISIT (OUTPATIENT)
Dept: OCCUPATIONAL THERAPY | Facility: CLINIC | Age: 84
End: 2019-11-21
Payer: COMMERCIAL

## 2019-11-21 DIAGNOSIS — M79.642 HAND PAIN, LEFT: ICD-10-CM

## 2019-11-21 PROCEDURE — 97140 MANUAL THERAPY 1/> REGIONS: CPT | Mod: GO | Performed by: OCCUPATIONAL THERAPIST

## 2019-11-21 PROCEDURE — 97110 THERAPEUTIC EXERCISES: CPT | Mod: GO | Performed by: OCCUPATIONAL THERAPIST

## 2019-11-25 ENCOUNTER — TRANSFERRED RECORDS (OUTPATIENT)
Dept: HEALTH INFORMATION MANAGEMENT | Facility: CLINIC | Age: 84
End: 2019-11-25

## 2019-11-26 ENCOUNTER — THERAPY VISIT (OUTPATIENT)
Dept: OCCUPATIONAL THERAPY | Facility: CLINIC | Age: 84
End: 2019-11-26
Payer: COMMERCIAL

## 2019-11-26 DIAGNOSIS — M79.642 HAND PAIN, LEFT: ICD-10-CM

## 2019-11-26 PROCEDURE — 97112 NEUROMUSCULAR REEDUCATION: CPT | Mod: GO | Performed by: OCCUPATIONAL THERAPIST

## 2019-11-26 PROCEDURE — 97140 MANUAL THERAPY 1/> REGIONS: CPT | Mod: GO | Performed by: OCCUPATIONAL THERAPIST

## 2019-12-15 ENCOUNTER — HEALTH MAINTENANCE LETTER (OUTPATIENT)
Age: 84
End: 2019-12-15

## 2019-12-23 ENCOUNTER — THERAPY VISIT (OUTPATIENT)
Dept: OCCUPATIONAL THERAPY | Facility: CLINIC | Age: 84
End: 2019-12-23
Payer: COMMERCIAL

## 2019-12-23 DIAGNOSIS — M79.642 HAND PAIN, LEFT: ICD-10-CM

## 2019-12-23 PROCEDURE — 97112 NEUROMUSCULAR REEDUCATION: CPT | Mod: GO | Performed by: OCCUPATIONAL THERAPIST

## 2019-12-23 PROCEDURE — 97140 MANUAL THERAPY 1/> REGIONS: CPT | Mod: GO | Performed by: OCCUPATIONAL THERAPIST

## 2019-12-23 PROCEDURE — 97110 THERAPEUTIC EXERCISES: CPT | Mod: GO | Performed by: OCCUPATIONAL THERAPIST

## 2019-12-23 NOTE — PROGRESS NOTES
Progress Note - Hand Therapy  Reporting period is 11/5/19 to 12/23/19.    Diagnosis: CTS of L wrist  DOI:  June 2019  DOS:  2009  MD order date: 10/29/19  Procedure: carpal tunnel release in 2009    Referring MD: Dr. Vasquez    Per MD: EMG confirms carpal tunnel (median nerve) and also delay across your ulnar nerve     Subjectve:   Subjective changes as noted by patient:  It's feeling like it's getting worse, It seemed like maybe the therapy was making a difference.  Functional changes noted by patient:  Decreased Performance in Household Chores  Patient has noted adverse reaction to:  None    Objective:  Changes noted in objective findings: Yes, see below    Sensation:   11/5/19: Pt notes constant paresthesias through palmar aspect of L thumb and index finger, and at times into middle finger. Paresthesias also present into volar wrist over carpal tunnel.     12/23/19: Pt notes constant paresthesias through palmar aspect of L thumb and index finger, and now constantly into middle finger. Paresthesias and pain also present into volar wrist over carpal tunnel.     Pain Level (Scale 0-10):   11/5/2019 12/23/19   At Rest 1/10 1/10   With Use 5/10 3-4/10     Pain Description:  Date 11/5/2019 12/23/19   Location L thumb and index L thumb and index   Pain Quality Burning, radiating Burning, radiating   Frequency constant   constant     Pain is worst morning morning   Exacerbated by Sleeping position, carrying cookie sheets Sleeping position, carrying cookie sheets   Relieved by aspirin Massage   Progression unchanged Worsening     Cervical Screen  Pain Report:  - none    + mild    ++ moderate    +++ severe      Active ROM 11/5/2019   Flexion -   Extension -   Lateral Flexion Right -   Lateral Flexion Left -   Rotation Right -   Rotation Left -   Compression NT   Traction NT     Range of Motion Wrist AROM (PROM):  Date 11/5/2019 11/5/2019   Side R L   Ext 60 60   Flex 70 60     Special Tests:  Date 11/5/2019 12/23/19   Side  "L L   Phalens Discomfort at 20 sec, and increased numbness Increased discomfort at 10 sec   Tinels at CT + pain +   Tinels at Pronator + pain +   Heredia's test - -   Tinel's at Guyan's + pain +   Tinels at cubital tunnel - +   Elbow flexion test - Pt notes \"increased pressure\" sensation          STRENGTH: (Measured in pounds, pain scale 0-10/10)    Date 11/5/2019 12/23/19       Trials R L R L R L R L R L R L   1 50 30 52 31           2               3               Avg               Pain  0               3 Point Pinch  Date 11/5/2019 12/23/19       Trials R L R L R L R L R L R L   1 10 5 11 5           2               3               Avg               Pain  0               Lateral Pinch  Date 11/5/2019 12/23/19       Trials R L R L R L R L R L R L   1 11 8 13 9           2               3               Avg               Pain  0               Assessment:  Response to therapy has been lack of progress in:  Strength:   and pinch  Response to therapy has been decline in:  Paresthesias:  Median nerve - Wider area of involvement    Overall Assessment:  Patient would benefit from continued therapy to achieve rehab potential  STG/LTG:  STGoals have been reviewed;  see goal sheet for details and updates.  LTGoals have been reviewed;  see goal sheet for details and updates.    I have re-evaluated this patient and find that the nature, scope, duration and intensity of the therapy is appropriate for the medical condition of the patient.    Plan:  Frequency:  1 X week, once daily  Duration:  for 6 weeks    Treatment Plan:    Modalities:  US   Therapeutic Exercise: Tendon Gliding ex, blocking  Neuromuscular Techniques: Median nerve glides both active and passive (in the clinic)  Manual Techniques:, Myofascial release of the forearm extensors and flexors, wrist mobilization techniques  Orthosis:  Wrist in netural orthosis for night wear.    Home Program:   Exercise:   Tendon glides  Median nerve glide  Pec stretch with " foam roller  T towel stretch  Massage to forearm  Orthosis: Static orthosis Wrist in neutral orthosis at night.    Activity: Avoid activities that exacerbate symptoms in the median nerve.  Avoid prolonged flexion or extension of the wrist.    Discharge Plan:    Achieve all LTG.  Independent in home treatment program.  Reach maximal therapeutic benefit.    Next Visit:  [facility]  Nerve glides  Foam roller program, progress as able

## 2019-12-31 ENCOUNTER — THERAPY VISIT (OUTPATIENT)
Dept: OCCUPATIONAL THERAPY | Facility: CLINIC | Age: 84
End: 2019-12-31
Payer: COMMERCIAL

## 2019-12-31 ENCOUNTER — TELEPHONE (OUTPATIENT)
Dept: ORTHOPEDICS | Facility: CLINIC | Age: 84
End: 2019-12-31

## 2019-12-31 DIAGNOSIS — G56.02 CARPAL TUNNEL SYNDROME OF LEFT WRIST: Primary | ICD-10-CM

## 2019-12-31 DIAGNOSIS — M79.642 HAND PAIN, LEFT: ICD-10-CM

## 2019-12-31 PROCEDURE — 97112 NEUROMUSCULAR REEDUCATION: CPT | Mod: GO | Performed by: OCCUPATIONAL THERAPIST

## 2019-12-31 PROCEDURE — 97140 MANUAL THERAPY 1/> REGIONS: CPT | Mod: GO | Performed by: OCCUPATIONAL THERAPIST

## 2019-12-31 NOTE — TELEPHONE ENCOUNTER
Seen for CTS. Hx of release 10 years ago. No relief with OT. Reached out to patient over MyChart. Recommend US guided injection first, however if she wants to proceed directly with surgical referral  - placed to Dr. Castellon or [facility]. If not covered and ok to travel, recommend Dr. Gibson at [facility].    Tiki Vasquez DO, [facility] [place].

## 2020-01-21 ENCOUNTER — THERAPY VISIT (OUTPATIENT)
Dept: OCCUPATIONAL THERAPY | Facility: CLINIC | Age: 85
End: 2020-01-21
Payer: COMMERCIAL

## 2020-01-21 DIAGNOSIS — M79.642 HAND PAIN, LEFT: ICD-10-CM

## 2020-01-21 PROCEDURE — 97140 MANUAL THERAPY 1/> REGIONS: CPT | Mod: GO | Performed by: OCCUPATIONAL THERAPIST

## 2020-01-21 PROCEDURE — 97112 NEUROMUSCULAR REEDUCATION: CPT | Mod: GO | Performed by: OCCUPATIONAL THERAPIST

## 2020-01-29 ENCOUNTER — THERAPY VISIT (OUTPATIENT)
Dept: OCCUPATIONAL THERAPY | Facility: CLINIC | Age: 85
End: 2020-01-29
Payer: COMMERCIAL

## 2020-01-29 DIAGNOSIS — M79.642 HAND PAIN, LEFT: ICD-10-CM

## 2020-01-29 PROCEDURE — 97140 MANUAL THERAPY 1/> REGIONS: CPT | Mod: GO | Performed by: OCCUPATIONAL THERAPIST

## 2020-01-29 PROCEDURE — 97110 THERAPEUTIC EXERCISES: CPT | Mod: GO | Performed by: OCCUPATIONAL THERAPIST

## 2020-02-04 ENCOUNTER — THERAPY VISIT (OUTPATIENT)
Dept: OCCUPATIONAL THERAPY | Facility: CLINIC | Age: 85
End: 2020-02-04
Payer: COMMERCIAL

## 2020-02-04 DIAGNOSIS — M79.642 HAND PAIN, LEFT: ICD-10-CM

## 2020-02-04 PROCEDURE — 97140 MANUAL THERAPY 1/> REGIONS: CPT | Mod: GO | Performed by: OCCUPATIONAL THERAPIST

## 2020-02-04 PROCEDURE — 97110 THERAPEUTIC EXERCISES: CPT | Mod: GO | Performed by: OCCUPATIONAL THERAPIST

## 2020-02-18 ENCOUNTER — THERAPY VISIT (OUTPATIENT)
Dept: OCCUPATIONAL THERAPY | Facility: CLINIC | Age: 85
End: 2020-02-18
Payer: COMMERCIAL

## 2020-02-18 DIAGNOSIS — M79.642 HAND PAIN, LEFT: ICD-10-CM

## 2020-02-18 PROCEDURE — 97140 MANUAL THERAPY 1/> REGIONS: CPT | Mod: GO | Performed by: OCCUPATIONAL THERAPIST

## 2020-02-18 PROCEDURE — 97112 NEUROMUSCULAR REEDUCATION: CPT | Mod: GO | Performed by: OCCUPATIONAL THERAPIST

## 2020-02-18 NOTE — PROGRESS NOTES
Objective note 2/18/20    STRENGTH: (Measured in pounds, pain scale 0-10/10)    Date 11/5/2019 12/23/19 2/18/20      Trials R L R L R L R L R L R L   1 50 30 52 31 50 39         2               3               Avg               Pain  0               3 Point Pinch  Date 11/5/2019 12/23/19 2/18/20      Trials R L R L R L R L R L R L   1 10 5 11 5 11 5         2               3               Avg               Pain  0               Lateral Pinch  Date 11/5/2019 12/23/19 2/18/20      Trials R L R L R L R L R L R L   1 11 8 13 9 13 10         2               3               Avg               Pain  0

## 2020-03-02 ENCOUNTER — THERAPY VISIT (OUTPATIENT)
Dept: OCCUPATIONAL THERAPY | Facility: CLINIC | Age: 85
End: 2020-03-02
Payer: COMMERCIAL

## 2020-03-02 DIAGNOSIS — M79.642 HAND PAIN, LEFT: ICD-10-CM

## 2020-03-02 PROCEDURE — 97112 NEUROMUSCULAR REEDUCATION: CPT | Mod: GO | Performed by: OCCUPATIONAL THERAPIST

## 2020-03-02 PROCEDURE — 97140 MANUAL THERAPY 1/> REGIONS: CPT | Mod: GO | Performed by: OCCUPATIONAL THERAPIST

## 2020-04-14 PROBLEM — M79.642 HAND PAIN, LEFT: Status: RESOLVED | Noted: 2019-11-05 | Resolved: 2020-04-14

## 2020-07-15 ENCOUNTER — TRANSFERRED RECORDS (OUTPATIENT)
Dept: HEALTH INFORMATION MANAGEMENT | Facility: CLINIC | Age: 85
End: 2020-07-15

## 2020-08-26 ENCOUNTER — TELEPHONE (OUTPATIENT)
Dept: FAMILY MEDICINE | Facility: CLINIC | Age: 85
End: 2020-08-26

## 2020-08-26 DIAGNOSIS — Z71.89 ENCOUNTER FOR HOME SAFETY REVIEW FOR INJURY PREVENTION: Primary | ICD-10-CM

## 2020-08-26 NOTE — TELEPHONE ENCOUNTER
General Call:     Who is calling:  Patients  In - Marlen    Reason for Call:  Marlen is looking to figure out who the  is for Mary.     What are your questions or concerns:  Are you able to help her with this? I was not able to find anything.     Date of last appointment with provider:     Okay to leave a detailed message:Yes at Cell number on file:    Telephone Information:   Mobile [phone]        Marlen's number is [phone]

## 2020-08-26 NOTE — TELEPHONE ENCOUNTER
Daughter in law, Marlen, states that pt is legally blind and safety concerns about cooking, taking care of her  who recently had a fall and has been very exhausted.    They are wondering if there is some way to get help in the home, possibly assess safety etc.    Please advise if care coordination or home care would be most appropriate. Marlen is aware that it is coming up on a year since pt was last seen.    Paddy De Souza RN, BSN

## 2020-08-27 ENCOUNTER — PATIENT OUTREACH (OUTPATIENT)
Dept: CARE COORDINATION | Facility: CLINIC | Age: 85
End: 2020-08-27

## 2020-08-27 ENCOUNTER — PATIENT OUTREACH (OUTPATIENT)
Dept: NURSING | Facility: CLINIC | Age: 85
End: 2020-08-27
Payer: COMMERCIAL

## 2020-08-27 SDOH — ECONOMIC STABILITY: INCOME INSECURITY: HOW HARD IS IT FOR YOU TO PAY FOR THE VERY BASICS LIKE FOOD, HOUSING, MEDICAL CARE, AND HEATING?: NOT HARD AT ALL

## 2020-08-27 SDOH — ECONOMIC STABILITY: TRANSPORTATION INSECURITY
IN THE PAST 12 MONTHS, HAS THE LACK OF TRANSPORTATION KEPT YOU FROM MEDICAL APPOINTMENTS OR FROM GETTING MEDICATIONS?: NO

## 2020-08-27 SDOH — SOCIAL STABILITY: SOCIAL INSECURITY: WITHIN THE LAST YEAR, HAVE YOU BEEN AFRAID OF YOUR PARTNER OR EX-PARTNER?: NO

## 2020-08-27 SDOH — SOCIAL STABILITY: SOCIAL INSECURITY
WITHIN THE LAST YEAR, HAVE TO BEEN RAPED OR FORCED TO HAVE ANY KIND OF SEXUAL ACTIVITY BY YOUR PARTNER OR EX-PARTNER?: NO

## 2020-08-27 SDOH — SOCIAL STABILITY: SOCIAL NETWORK: HOW OFTEN DO YOU ATTEND CHURCH OR RELIGIOUS SERVICES?: MORE THAN 4 TIMES PER YEAR

## 2020-08-27 SDOH — ECONOMIC STABILITY: TRANSPORTATION INSECURITY
IN THE PAST 12 MONTHS, HAS LACK OF TRANSPORTATION KEPT YOU FROM MEETINGS, WORK, OR FROM GETTING THINGS NEEDED FOR DAILY LIVING?: NO

## 2020-08-27 SDOH — SOCIAL STABILITY: SOCIAL INSECURITY: WITHIN THE LAST YEAR, HAVE YOU BEEN HUMILIATED OR EMOTIONALLY ABUSED IN OTHER WAYS BY YOUR PARTNER OR EX-PARTNER?: NO

## 2020-08-27 SDOH — ECONOMIC STABILITY: FOOD INSECURITY: WITHIN THE PAST 12 MONTHS, YOU WORRIED THAT YOUR FOOD WOULD RUN OUT BEFORE YOU GOT MONEY TO BUY MORE.: NEVER TRUE

## 2020-08-27 SDOH — SOCIAL STABILITY: SOCIAL INSECURITY
WITHIN THE LAST YEAR, HAVE YOU BEEN KICKED, HIT, SLAPPED, OR OTHERWISE PHYSICALLY HURT BY YOUR PARTNER OR EX-PARTNER?: NO

## 2020-08-27 SDOH — HEALTH STABILITY: PHYSICAL HEALTH: ON AVERAGE, HOW MANY MINUTES DO YOU ENGAGE IN EXERCISE AT THIS LEVEL?: 30 MIN

## 2020-08-27 SDOH — SOCIAL STABILITY: SOCIAL NETWORK: IN A TYPICAL WEEK, HOW MANY TIMES DO YOU TALK ON THE PHONE WITH FAMILY, FRIENDS, OR NEIGHBORS?: THREE TIMES A WEEK

## 2020-08-27 SDOH — ECONOMIC STABILITY: FOOD INSECURITY: WITHIN THE PAST 12 MONTHS, THE FOOD YOU BOUGHT JUST DIDN'T LAST AND YOU DIDN'T HAVE MONEY TO GET MORE.: NEVER TRUE

## 2020-08-27 SDOH — HEALTH STABILITY: PHYSICAL HEALTH: ON AVERAGE, HOW MANY DAYS PER WEEK DO YOU ENGAGE IN MODERATE TO STRENUOUS EXERCISE (LIKE A BRISK WALK)?: 7 DAYS

## 2020-08-27 ASSESSMENT — ACTIVITIES OF DAILY LIVING (ADL)
DEPENDENT_IADLS:: INDEPENDENT
DEPENDENT_IADLS:: INDEPENDENT

## 2020-08-27 NOTE — PROGRESS NOTES
Clinic Care Coordination Contact  [facility]/Voicemail    Referral Source: PCP  Clinical Data: Care Coordinator Outreach  Outreach attempted x 1.  Left message on patient's voicemail with call back information and requested return call.  Plan: Care Coordinator will send care coordination introduction letter with care coordinator contact information and explanation of care coordination services via [employer]. Care Coordinator will try to reach patient again in 1-2 business days.    TIGRE Payton   Care Coordination Team  [phone]

## 2020-08-27 NOTE — PROGRESS NOTES
Clinic Care Coordination Contact    Clinic Care Coordination Contact  OUTREACH    Referral Information:  Referral Source: PCP    Primary Diagnosis: Other (include Comment box)    Chief complaint:  In home servcies     Universal Utilization: 1 % Admission or ED Risk  Clinic Utilization  Difficulty keeping appointments:: No  Compliance Concerns: No  No-Show Concerns: No  No PCP office visit in Past Year: No  Utilization    Last refreshed: 8/27/2020  1:37 PM:  Hospital Admissions 0           Last refreshed: 8/27/2020  1:37 PM:  ED Visits 0           Last refreshed: 8/27/2020  1:37 PM:  No Show Count (past year) 2              Current as of: 8/27/2020  1:37 PM              Clinical Concerns:  Current Medical Concerns:    Patient Active Problem List   Diagnosis     Hyperlipidemia LDL goal <130     Osteopenia     Advanced directives, counseling/discussion     Impaired fasting glucose     Family history of breast cancer     Family history of coronary artery disease     Thrombocytopenia (H)     Thyroid nodule     Postnasal drip     Rhinitis     Elevated TSH     Osteoarthrosis of knee     Cervical radiculitis     Lumbar radiculopathy, right     Memory changes     Daughter in Law Marlen seeking information on in home support for Mary and her spouse Darnell. Consent to communicate on file for Marlen and she states Mary is interested in getting in home support.    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   She states that Mary is legally blind and it is getting more difficult for her to help her spouse with his cares.  They live in a split level home with the only walk in shower being on the lover level. MARGARETTE LARA provided her with information on [facility] and she [place] interested in [place] (and her spouse.)     Current Behavioral Concerns:  NA  Education Provided to patient: Care Coordination role, [facility]  Living Services, private pay home care options.   Pain  Pain (GOAL):: No  Health Maintenance Reviewed: Due/Overdue   ZOSTER IMMUNIZATION (1 of 2)  DTAP/TDAP/TD IMMUNIZATION (2 - Td)  MEDICARE ANNUAL WELLNESS VISIT  PHQ-2    Clinical Pathway: None    Medication Management:  NA     Functional Status:  Dependent ADLs:: Independent  Dependent IADLs:: Independent  Bed or wheelchair confined:: No  Mobility Status: Independent  Fallen 2 or more times in the past year?: No  Any fall with injury in the past year?: No    Living Situation:  Current living arrangement:: I live in a private home with spouse  Type of residence:: Private home - stairs    Lifestyle & Psychosocial Needs:  Lifestyle     Physical activity     Days per week: 7 days     Minutes per session: 30 min     Stress: Not on file     Social Needs     Financial resource strain: Not hard at all     Food insecurity     Worry: Never true     Inability: Never true     Transportation needs     Medical: No     Non-medical: No     Inadequate nutrition (GOAL):: No  Tube Feeding: No  Inadequate activity/exercise (GOAL):: No  Significant changes in sleep pattern (GOAL): No  Transportation means:: Family     Orthodoxy or spiritual beliefs that impact treatment:: No  Mental health DX:: No  Mental health management concern (GOAL):: No  Informal Support system:: Family, Friends   Socioeconomic History     Marital status:      Spouse name: Not on file     Number of children: 4     Years of education: Not on file     Highest education level: Not on file   Occupational History     Employer: RETIRED   Relationships     Social connections     Talks on phone: Three times a week     Gets together: Not on file     Attends Spiritism service: More than 4 times per year     Active member of club or organization: Not on file     Attends meetings of clubs or organizations: Not on file     Relationship status: Not on file     Intimate partner violence     Fear of current or ex  partner: No     Emotionally abused: No     Physically abused: No     Forced sexual activity: No     Tobacco Use     Smoking status: Former Smoker     Types: Cigarettes     Last attempt to quit: 1960     Years since quittin.6     Smokeless tobacco: Never Used     Tobacco comment:    Substance and Sexual Activity     Alcohol use: Yes     Comment: SOCIAL-2 drinks per month      Drug use: No     Sexual activity: Not Currently             Resources and Interventions:  Current Resources:      Community Resources: None  Supplies used at home:: None  Equipment Currently Used at Home: none    Advance Care Plan/Directive  Advanced Care Plans/Directives on file:: No  Advanced Care Plan/Directive Status: Considering Options    Referrals Placed: None     Goals:   Goals        General    1.  In home support (pt-stated)     Notes - Note created  2020  1:42 PM by Ita Galdamez LSW    Goal Statement: In the next six months I will explore receiving more support in my home  Date Goal set: 20  Barriers: I am legally blind which limits some of what I can do.  Strengths: I am in good health  Date to Achieve By: 20  Patient expressed understanding of goal: Yes  Action steps to achieve this goal:  1. My daughter in law will help me and my spouse schedule and complete an  assessment to see if we qualify for [facility] in [place] services. The number to call for this is [phone].  2. The care coordinator can help me and my family explore other options as needed.        Other (pt-stated)           Patient/Caregiver understanding: Alvin in law Marlen expressed understanding of what was discussed .    Outreach Frequency: monthly      Plan: Mary's daughter in law Marlen plans to reach out to [facility] Intake to [place] for [place] and her spouse.   CC will offer additional community resources as needed and/or requested.  Care Coordination team will outreach again in  three weeks.    TIGRE Payton   Care Coordination Team  [phone]

## 2020-09-16 ENCOUNTER — PATIENT OUTREACH (OUTPATIENT)
Dept: NURSING | Facility: CLINIC | Age: 85
End: 2020-09-16
Payer: COMMERCIAL

## 2020-09-16 NOTE — PROGRESS NOTES
Clinic Care Coordination Contact  Community Health Worker Follow Up  Spoke with patient     Goals:   Goals Addressed as of 9/16/2020 at 2:28 PM                 Today      1.  In home support (pt-stated)   10%    Added 8/27/20 by Ita Galdamez LSW     Goal Statement: In the next six months I will explore receiving more support in my home  Date Goal set: 8/27/20  Barriers: I am legally blind which limits some of what I can do.  Strengths: I am in good health  Date to Achieve By: 2/28/20  Patient expressed understanding of goal: Yes  Action steps to achieve this goal:  1. My daughter in law will help me and my spouse schedule and complete an  assessment to see if we qualify for [facility] in [place] services. The number to call for this is [phone].  2. The care coordinator can help me and my family explore other options as needed.    9/16/20 CHW:    CHW inquired if patients daughter in law has called [facility] Intake to request [place] for patient and her spouse however patient is unsure.     Patient provided CHW with Marlen's phone number [phone], CHW left voicemail for Marlen and requested a call back.           Intervention and Education during outreach: CC goal    CHW Plan: I will attempt to contact Marlen again within 3-5 business days.     GALEN Stevens   Clinic Care Coordination  [facility] Clinics:  [place], [place], [place], [place], and [place]  Phone: [phone]

## 2020-10-08 ENCOUNTER — ALLIED HEALTH/NURSE VISIT (OUTPATIENT)
Dept: FAMILY MEDICINE | Facility: CLINIC | Age: 85
End: 2020-10-08
Payer: COMMERCIAL

## 2020-10-08 VITALS — TEMPERATURE: 99 F

## 2020-10-08 DIAGNOSIS — Z23 NEED FOR PROPHYLACTIC VACCINATION AND INOCULATION AGAINST INFLUENZA: Primary | ICD-10-CM

## 2020-10-08 PROCEDURE — 90662 IIV NO PRSV INCREASED AG IM: CPT

## 2020-10-08 PROCEDURE — G0008 ADMIN INFLUENZA VIRUS VAC: HCPCS

## 2020-10-16 ENCOUNTER — PATIENT OUTREACH (OUTPATIENT)
Dept: CARE COORDINATION | Facility: CLINIC | Age: 85
End: 2020-10-16

## 2020-10-16 ASSESSMENT — ACTIVITIES OF DAILY LIVING (ADL): DEPENDENT_IADLS:: INDEPENDENT

## 2020-10-16 NOTE — PROGRESS NOTES
Clinic Care Coordination Contact  [facility]/Voicemail    Referral Source: PCP  Clinical Data: Care Coordinator Outreach  Outreach attempted x 2.  Left message on patient's voicemail with call back information and requested return call.  Plan: Care Coordinator  via . Care Coordinator will try to reach patient again in 10 business days.      TIGRE Payton   Care Coordination Team  [phone]

## 2020-10-19 ENCOUNTER — TRANSFERRED RECORDS (OUTPATIENT)
Dept: HEALTH INFORMATION MANAGEMENT | Facility: CLINIC | Age: 85
End: 2020-10-19

## 2020-10-20 ENCOUNTER — TRANSFERRED RECORDS (OUTPATIENT)
Dept: HEALTH INFORMATION MANAGEMENT | Facility: CLINIC | Age: 85
End: 2020-10-20

## 2020-10-27 ENCOUNTER — HOSPITAL ENCOUNTER (OUTPATIENT)
Dept: MAMMOGRAPHY | Facility: CLINIC | Age: 85
Discharge: HOME | End: 2020-10-27
Attending: FAMILY MEDICINE | Admitting: FAMILY MEDICINE
Payer: COMMERCIAL

## 2020-10-27 DIAGNOSIS — Z12.31 VISIT FOR SCREENING MAMMOGRAM: ICD-10-CM

## 2020-10-27 PROCEDURE — 77063 BREAST TOMOSYNTHESIS BI: CPT

## 2020-11-02 ENCOUNTER — PATIENT OUTREACH (OUTPATIENT)
Dept: NURSING | Facility: CLINIC | Age: 85
End: 2020-11-02
Payer: COMMERCIAL

## 2020-11-02 ASSESSMENT — ACTIVITIES OF DAILY LIVING (ADL): DEPENDENT_IADLS:: INDEPENDENT

## 2020-11-02 NOTE — PROGRESS NOTES
Clinic Care Coordination Contact    Follow Up Progress Note      Assessment: MARGARETTE LARA spoke by phone with daughter in law Marlen.  They have decided not to pursue the goal of having a [place] at this time.  They believe that doing it over a zoom call would not be helpful.  They may consider it again when it can be done in person.  They may consider private pay home care in the future.        Goals addressed this encounter:   Goals Addressed                 This Visit's Progress       Patient Stated      1.  In home support (pt-stated)        Goal Statement: In the next six months I will explore receiving more support in my home  Date Goal set: 8/27/20  Barriers: I am legally blind which limits some of what I can do.  Strengths: I am in good health  Date to Achieve By: 2/28/20  Patient expressed understanding of goal: Yes  Action steps to achieve this goal:  1. My daughter in law will help me and my spouse schedule and complete an  assessment to see if we qualify for county in home services. The number to call for this is [phone].  2. The care coordinator can help me and my family explore other options as needed.               Intervention/Education provided during outreach: NA     Outreach Frequency: monthly    Plan:   Marlen will contact MARGARETTE LARA if they need resources and or support in the future.   MARGARETTE LARA sent a disenrollment letter via [employer]. No further outreaches will be made at this time.    TIGRE Payton Care Coordination Team  [phone]

## 2020-11-02 NOTE — LETTER
[facility] CARE COORDINATION  [facility]  November 2, 2020    Indy Reyes  [address] [place] [facility] [address]      Dear Indy,    I have been unsuccessful in reaching you since our last contact. At this time the Care Coordination team will make no further attempts to reach you, however this does not change your ability to continue receiving care from your providers at your primary care clinic. If you need additional support from a care coordinator in the future please contact me at [phone].    All of us at [facility] are invested in your health and are here to assist you in meeting your goals.     Sincerely,    TIGRE Payton   Care Coordination Team  [phone]

## 2020-11-06 ENCOUNTER — OFFICE VISIT (OUTPATIENT)
Dept: FAMILY MEDICINE | Facility: CLINIC | Age: 85
End: 2020-11-06
Payer: COMMERCIAL

## 2020-11-06 VITALS
HEIGHT: 62 IN | WEIGHT: 121 LBS | SYSTOLIC BLOOD PRESSURE: 114 MMHG | TEMPERATURE: 99.1 F | DIASTOLIC BLOOD PRESSURE: 68 MMHG | RESPIRATION RATE: 14 BRPM | BODY MASS INDEX: 22.26 KG/M2 | HEART RATE: 98 BPM

## 2020-11-06 DIAGNOSIS — G56.02 CARPAL TUNNEL SYNDROME OF LEFT WRIST: ICD-10-CM

## 2020-11-06 DIAGNOSIS — E03.9 HYPOTHYROIDISM, UNSPECIFIED TYPE: ICD-10-CM

## 2020-11-06 DIAGNOSIS — Z01.818 PREOP GENERAL PHYSICAL EXAM: Primary | ICD-10-CM

## 2020-11-06 LAB
ANION GAP SERPL CALCULATED.3IONS-SCNC: 3 MMOL/L (ref 3–14)
BUN SERPL-MCNC: 19 MG/DL (ref 7–30)
CALCIUM SERPL-MCNC: 9.6 MG/DL (ref 8.5–10.1)
CHLORIDE SERPL-SCNC: 105 MMOL/L (ref 94–109)
CO2 SERPL-SCNC: 30 MMOL/L (ref 20–32)
CREAT SERPL-MCNC: 0.72 MG/DL (ref 0.52–1.04)
ERYTHROCYTE [DISTWIDTH] IN BLOOD BY AUTOMATED COUNT: 15.6 % (ref 10–15)
GFR SERPL CREATININE-BSD FRML MDRD: 74 ML/MIN/{1.73_M2}
GLUCOSE SERPL-MCNC: 100 MG/DL (ref 70–99)
HCT VFR BLD AUTO: 38.7 % (ref 35–47)
HGB BLD-MCNC: 12.5 G/DL (ref 11.7–15.7)
MCH RBC QN AUTO: 26.8 PG (ref 26.5–33)
MCHC RBC AUTO-ENTMCNC: 32.3 G/DL (ref 31.5–36.5)
MCV RBC AUTO: 83 FL (ref 78–100)
PLATELET # BLD AUTO: 163 10E9/L (ref 150–450)
POTASSIUM SERPL-SCNC: 4.4 MMOL/L (ref 3.4–5.3)
RBC # BLD AUTO: 4.67 10E12/L (ref 3.8–5.2)
SODIUM SERPL-SCNC: 138 MMOL/L (ref 133–144)
T4 FREE SERPL-MCNC: 1.06 NG/DL (ref 0.76–1.46)
TSH SERPL DL<=0.005 MIU/L-ACNC: 7.82 MU/L (ref 0.4–4)
WBC # BLD AUTO: 6 10E9/L (ref 4–11)

## 2020-11-06 PROCEDURE — 99214 OFFICE O/P EST MOD 30 MIN: CPT | Performed by: FAMILY MEDICINE

## 2020-11-06 PROCEDURE — 84443 ASSAY THYROID STIM HORMONE: CPT | Performed by: FAMILY MEDICINE

## 2020-11-06 PROCEDURE — 80048 BASIC METABOLIC PNL TOTAL CA: CPT | Performed by: FAMILY MEDICINE

## 2020-11-06 PROCEDURE — 93000 ELECTROCARDIOGRAM COMPLETE: CPT | Performed by: FAMILY MEDICINE

## 2020-11-06 PROCEDURE — 84439 ASSAY OF FREE THYROXINE: CPT | Performed by: FAMILY MEDICINE

## 2020-11-06 PROCEDURE — 85027 COMPLETE CBC AUTOMATED: CPT | Performed by: FAMILY MEDICINE

## 2020-11-06 PROCEDURE — 36415 COLL VENOUS BLD VENIPUNCTURE: CPT | Performed by: FAMILY MEDICINE

## 2020-11-06 RX ORDER — LEVOTHYROXINE SODIUM 25 UG/1
25 TABLET ORAL DAILY
Qty: 90 TABLET | Refills: 3 | Status: CANCELLED | OUTPATIENT
Start: 2020-11-06

## 2020-11-06 ASSESSMENT — MIFFLIN-ST. JEOR: SCORE: 927.1

## 2020-11-06 NOTE — PROGRESS NOTES
[facility]  [address] [address]  [phone]  Dept: [phone]    PRE-OP EVALUATION:  Today's date: 2020    Indy Reyes (: 1931) presents for pre-operative evaluation assessment as requested by Dr. Quinones.  She requires evaluation and anesthesia risk assessment prior to undergoing surgery/procedure for treatment of left wrist carpal tunnel.    Proposed Surgery/ Procedure: left carpal tunnel release  Date of Surgery/ Procedure: 20  Time of Surgery/ Procedure: [facility]  Hospital/Surgical Facility: [facility]  Surgery Fax Number: [phone]  Primary Physician: aBtool Kulkarni  Type of Anesthesia Anticipated: to be determined    Preoperative Questionnaire:   No - Have you ever had a heart attack or stroke?  No - Have you ever had surgery on your heart or blood vessels, such as a stent, coronary (heart) bypass, or surgery on an artery in the head, neck, heart, or legs?  No - Do you have chest pain when you are physically active?  No - Do you have a history of heart failure?  No - Do you currently have a cold, bronchitis, or symptoms of other respiratory (head and chest) infections?  No - Do you have a cough, shortness of breath, or wheezing?  No - Do you or anyone in your family have a history of blood clots?  No - Do you or anyone in your family have a serious bleeding problem, such as long-lasting bleeding after surgeries or cuts?  No - Have you ever had anemia or been told to take iron pills?  No - Have you had any abnormal blood loss such as black, tarry or bloody stools, or abnormal vaginal bleeding?  No - Have you ever had a blood transfusion?  Yes - Are you willing to have a blood transfusion if it is medically needed before, during, or after your surgery?  No - Have you or anyone in your family ever had problems with anesthesia (sedation for surgery)?  No - Do you have sleep apnea, excessive snoring, or daytime drowsiness?   No - Do you  have any artifical heart valves or other implanted medical devices, such as a pacemaker, defibrillator, or continuous glucose monitor?  No - Do you have any artifical joints?  No - Are you allergic to latex?  No - Is there any chance that you may be pregnant?    Patient has a Health Care Directive or Living Will:  YES she will bring to surgery    HPI:     HPI related to upcoming procedure: failed conservative measures for carpal tunnel syndrome      See problem list for active medical problems.  Problems all longstanding and stable, except as noted/documented.  See ROS for pertinent symptoms related to these conditions.      MEDICAL HISTORY:     Patient Active Problem List    Diagnosis Date Noted     Subclinical hypothyroidism 05/22/2018     Priority: Medium     Memory changes 11/09/2017     Priority: Medium     Consistent with age related changes. Trouble way finding in places she does not frequent. Contributors include stress of multiple roles at this time and also difficulty falling asleep at times       Lumbar radiculopathy, right 04/18/2017     Priority: Medium     Cervical radiculitis 11/16/2016     Priority: Medium     Osteoarthrosis of knee 05/19/2014     Priority: Medium     Elevated TSH 02/04/2014     Priority: Medium     Postnasal drip 05/23/2013     Priority: Medium     Rhinitis 05/23/2013     Priority: Medium     Thyroid nodule 07/25/2012     Priority: Medium     Thrombocytopenia (H) 07/23/2012     Priority: Medium     Advanced directives, counseling/discussion 11/09/2011     Priority: Medium     Advance Directive Problem List Overview:   Name Relationship Phone    Primary Health Care Agent            Alternative Health Care Agent          Patient states has Advance Directive and will bring in a copy to clinic. 11/9/2011          Impaired fasting glucose 11/09/2011     Priority: Medium     Family history of breast cancer 11/09/2011     Priority: Medium     Family history of coronary artery disease  "2011     Priority: Medium     Osteopenia 2009     Priority: Medium     Hyperlipidemia LDL goal <130 2008     Priority: Medium      Past Medical History:   Diagnosis Date     Back pain      Past Surgical History:   Procedure Laterality Date     APPENDECTOMY      at age of 16     CATARACT IOL, RT/LT      both     FORAMINOTOMY LUMBAR POSTERIOR ONE LEVEL Right 2017    Procedure: FORAMINOTOMY LUMBAR POSTERIOR ONE LEVEL;  Right L4-L5 hemilaminectomy and foraminotomy and excision of synovial cyst. ;  Surgeon: Fady Woo MD;  Location: [facility] OR     TONSILLECTOMY       Current Outpatient Medications   Medication Sig Dispense Refill     acetaminophen (TYLENOL) 325 MG tablet Take 2 tablets (650 mg) by mouth every 4 hours as needed for other (mild pain) 100 tablet 0     levothyroxine (SYNTHROID/LEVOTHROID) 25 MCG tablet Take 1 tablet (25 mcg) by mouth daily 90 tablet 3     Multiple Vitamins-Minerals (MULTIVITAMIN ADULTS 50+) TABS Take 2 capsules by mouth every morning       OTC products: None, except as noted above    Allergies   Allergen Reactions     Tetracycline Rash      Latex Allergy: NO    Social History     Tobacco Use     Smoking status: Former Smoker     Types: Cigarettes     Quit date: 1960     Years since quittin.8     Smokeless tobacco: Never Used     Tobacco comment:    Substance Use Topics     Alcohol use: Yes     Comment: SOCIAL-2 drinks per month      History   Drug Use No       REVIEW OF SYSTEMS:   Constitutional, neuro, ENT, endocrine, pulmonary, cardiac, gastrointestinal, genitourinary, musculoskeletal, integument and psychiatric systems are negative, except as otherwise noted.    EXAM:   /68 (BP Location: Right arm, Patient Position: Chair, Cuff Size: Adult Regular)   Pulse 98   Temp 99.1  F (37.3  C) (Oral)   Resp 14   Ht 1.575 m (5' 2\")   Wt 54.9 kg (121 lb)   Breastfeeding No   BMI 22.13 kg/m      GENERAL APPEARANCE: healthy, alert and no " distress     EYES: EOMI, PERRL     HENT: ear canals and TM's normal and nose and mouth without ulcers or lesions     NECK: no adenopathy, no asymmetry, masses, or scars and thyroid normal to palpation     RESP: lungs clear to auscultation - no rales, rhonchi or wheezes     CV: regular rates and rhythm, normal S1 S2, no S3 or S4 and no murmur, click or rub     ABDOMEN:  soft, nontender, no HSM or masses and bowel sounds normal     MS: extremities normal- no gross deformities noted, no evidence of inflammation in joints, FROM in all extremities.     SKIN: no suspicious lesions or rashes     NEURO: Normal strength and tone, sensory exam grossly normal, mentation intact and speech normal     PSYCH: mentation appears normal. and affect normal/bright     LYMPHATICS: No cervical adenopathy    DIAGNOSTICS:   EKG: appears normal, NSR, normal axis, normal intervals, no acute ST/T changes c/w ischemia, no LVH by voltage criteria, unchanged from previous tracings    Recent Labs   Lab Test 05/08/18  1049 11/15/17  0849   HGB 12.5 12.1    153    140   POTASSIUM 5.1 4.1   CR 0.71 0.69        IMPRESSION:   Diagnosis/reason for consult: Preoperative clearance for left carpal tunnel release under presumed general anesthesia    The proposed surgical procedure is considered LOW risk.    REVISED CARDIAC RISK INDEX  The patient has the following serious cardiovascular risks for perioperative complications such as (MI, PE, VFib and 3  AV Block):  No serious cardiac risks  INTERPRETATION: 0 risks: Class I (very low risk - 0.4% complication rate)    The patient has the following additional risks for perioperative complications:  No identified additional risks      ICD-10-CM    1. Preop general physical exam  Z01.818 CBC with platelets     Basic metabolic panel  (Ca, Cl, CO2, Creat, Gluc, K, Na, BUN)     EKG 12-lead complete w/read - Clinics   2. Carpal tunnel syndrome of left wrist  G56.02    3. Hypothyroidism, unspecified type    E03.9 TSH with free T4 reflex       RECOMMENDATIONS:     --Patient is on no chronic medications    APPROVAL GIVEN to proceed with proposed procedure, without further diagnostic evaluation       Signed Electronically by: Batool Kulkarni MD    Copy of this evaluation report is provided to requesting physician.

## 2020-11-06 NOTE — PATIENT INSTRUCTIONS

## 2020-11-16 DIAGNOSIS — R79.89 ELEVATED TSH: ICD-10-CM

## 2020-11-16 RX ORDER — LEVOTHYROXINE SODIUM 25 UG/1
25 TABLET ORAL DAILY
Qty: 90 TABLET | Refills: 3 | Status: SHIPPED | OUTPATIENT
Start: 2020-11-16 | End: 2021-11-04

## 2020-11-16 NOTE — TELEPHONE ENCOUNTER
Routing refill request to provider for review/approval because:  Labs out of range:    Normal TSH on file in past 12 months        Recent Labs   Lab Test 11/06/20  1140   TSH 7.82*        Josefina Mauricio RN Flex

## 2021-01-15 ENCOUNTER — HEALTH MAINTENANCE LETTER (OUTPATIENT)
Age: 86
End: 2021-01-15

## 2021-02-01 ENCOUNTER — OFFICE VISIT (OUTPATIENT)
Dept: FAMILY MEDICINE | Facility: CLINIC | Age: 86
End: 2021-02-01
Payer: COMMERCIAL

## 2021-02-01 VITALS
BODY MASS INDEX: 21.71 KG/M2 | HEART RATE: 96 BPM | SYSTOLIC BLOOD PRESSURE: 126 MMHG | TEMPERATURE: 98.5 F | DIASTOLIC BLOOD PRESSURE: 62 MMHG | RESPIRATION RATE: 16 BRPM | HEIGHT: 62 IN | OXYGEN SATURATION: 98 % | WEIGHT: 118 LBS

## 2021-02-01 DIAGNOSIS — Z78.0 ASYMPTOMATIC MENOPAUSE: ICD-10-CM

## 2021-02-01 DIAGNOSIS — Z00.00 ENCOUNTER FOR MEDICARE ANNUAL WELLNESS EXAM: Primary | ICD-10-CM

## 2021-02-01 DIAGNOSIS — D69.6 THROMBOCYTOPENIA (H): ICD-10-CM

## 2021-02-01 DIAGNOSIS — R79.89 ELEVATED TSH: ICD-10-CM

## 2021-02-01 DIAGNOSIS — E03.4 ATROPHY OF THYROID (ACQUIRED): ICD-10-CM

## 2021-02-01 LAB
ERYTHROCYTE [DISTWIDTH] IN BLOOD BY AUTOMATED COUNT: 15.9 % (ref 10–15)
HBA1C MFR BLD: 5.9 % (ref 0–5.6)
HCT VFR BLD AUTO: 40.5 % (ref 35–47)
HGB BLD-MCNC: 13 G/DL (ref 11.7–15.7)
MCH RBC QN AUTO: 26.5 PG (ref 26.5–33)
MCHC RBC AUTO-ENTMCNC: 32.1 G/DL (ref 31.5–36.5)
MCV RBC AUTO: 83 FL (ref 78–100)
PLATELET # BLD AUTO: 175 10E9/L (ref 150–450)
RBC # BLD AUTO: 4.9 10E12/L (ref 3.8–5.2)
WBC # BLD AUTO: 5.5 10E9/L (ref 4–11)

## 2021-02-01 PROCEDURE — 85027 COMPLETE CBC AUTOMATED: CPT | Performed by: FAMILY MEDICINE

## 2021-02-01 PROCEDURE — 84439 ASSAY OF FREE THYROXINE: CPT | Performed by: FAMILY MEDICINE

## 2021-02-01 PROCEDURE — 83036 HEMOGLOBIN GLYCOSYLATED A1C: CPT | Performed by: FAMILY MEDICINE

## 2021-02-01 PROCEDURE — 80061 LIPID PANEL: CPT | Performed by: FAMILY MEDICINE

## 2021-02-01 PROCEDURE — 99397 PER PM REEVAL EST PAT 65+ YR: CPT | Performed by: FAMILY MEDICINE

## 2021-02-01 PROCEDURE — 84443 ASSAY THYROID STIM HORMONE: CPT | Performed by: FAMILY MEDICINE

## 2021-02-01 PROCEDURE — 80053 COMPREHEN METABOLIC PANEL: CPT | Performed by: FAMILY MEDICINE

## 2021-02-01 PROCEDURE — 36415 COLL VENOUS BLD VENIPUNCTURE: CPT | Performed by: FAMILY MEDICINE

## 2021-02-01 ASSESSMENT — MIFFLIN-ST. JEOR: SCORE: 913.49

## 2021-02-01 NOTE — PROGRESS NOTES
"  SUBJECTIVE:   Indy Reyes is a 89 year old female who presents for Preventive Visit.      Patient has been advised of split billing requirements and indicates understanding: Yes  Are you in the first 12 months of your Medicare Part B coverage?  No    Physical Health:    In general, how would you rate your overall physical health? excellent    Outside of work, how many days during the week do you exercise? 2-3 days/week    Outside of work, approximately how many minutes a day do you exercise?30-45 minutes    If you drink alcohol do you typically have >3 drinks per day or >7 drinks per week? No    Do you usually eat at least 4 servings of fruit and vegetables a day, include whole grains & fiber and avoid regularly eating high fat or \"junk\" foods? Yes    Do you have any problems taking medications regularly?  No    Do you have any side effects from medications? none    Needs assistance for the following daily activities: no assistance needed    Which of the following safety concerns are present in your home?  none identified     Hearing impairment: no issues    In the past 6 months, have you been bothered by leaking of urine? yes    Mental Health:    In general, how would you rate your overall mental or emotional health? good  PHQ-2 Score:      Do you feel safe in your environment? Yes    Have you ever done Advance Care Planning? (For example, a Health Directive, POLST, or a discussion with a medical provider or your loved ones about your wishes): Yes, advance care planning is on file.    Additional concerns to address? Has some gas issues, may be GERD    Fall risk:  Fallen 2 or more times in the past year?: No  Any fall with injury in the past year?: No    Cognitive Screenin) Repeat 3 items (Leader, Season, Table)    2) Clock draw: NORMAL  3) 3 item recall: Recalls 2 objects   Results: NORMAL clock, 1-2 items recalled: COGNITIVE IMPAIRMENT LESS LIKELY    Mini-CogTM Copyright S Sherlyn. Licensed by the " author for use in [facility]; reprinted with permission (melissa@.[facility]). All rights reserved.      Do you have sleep apnea, excessive snoring or daytime drowsiness?: no      Back to taking her levothyroxine daily. Her TSH levels were high when we checked 2020, subclinical levels, was advised to resume her levothyroxine.     History of osteopenia, needing surveillance imaging. On MV daily, getting weight bearing exercise, although challenging with COVID restrictions.     Had left carpal tunnel release in 2002, doing well since then. Symptoms have fully subsided.       Reviewed and updated as needed this visit by clinical staff  Tobacco  Allergies    Med Hx  Surg Hx  Fam Hx  Soc Hx        Reviewed and updated as needed this visit by Provider                Social History     Tobacco Use     Smoking status: Former Smoker     Types: Cigarettes     Quit date: 1960     Years since quittin.1     Smokeless tobacco: Never Used     Tobacco comment:    Substance Use Topics     Alcohol use: Yes     Comment: SOCIAL-2 drinks per month                            Current providers sharing in care for this patient include:   Patient Care Team:  Batool Kulkarni MD as PCP - General (Family Practice)  Batool Kulkarni MD as Assigned PCP  Marsha De Souza RN as Personal Advocate & Liaison (PAL) (Family Practice)    The following health maintenance items are reviewed in Epic and correct as of today:  Health Maintenance   Topic Date Due     COVID-19 Vaccine (1 of 2) 1947     ZOSTER IMMUNIZATION (1 of 2) 1981     DTAP/TDAP/TD IMMUNIZATION (2 - Td) 2013     FALL RISK ASSESSMENT  2021     ANNUAL REVIEW OF HM ORDERS  2021     LIPID  2021     MEDICARE ANNUAL WELLNESS VISIT  2022     ADVANCE CARE PLANNING  2026     PHQ-2  Completed     INFLUENZA VACCINE  Completed     Pneumococcal Vaccine: 65+ Years  Completed     Pneumococcal Vaccine: Pediatrics (0  to 5 Years) and At-Risk Patients (6 to 64 Years)  Aged Out     IPV IMMUNIZATION  Aged Out     MENINGITIS IMMUNIZATION  Aged Out     HEPATITIS B IMMUNIZATION  Aged Out     Patient Active Problem List   Diagnosis     Hyperlipidemia LDL goal <130     Osteopenia     Advanced directives, counseling/discussion     Impaired fasting glucose     Family history of breast cancer     Family history of coronary artery disease     Thrombocytopenia (H)     Thyroid nodule     Postnasal drip     Rhinitis     Elevated TSH     Osteoarthrosis of knee     Cervical radiculitis     Lumbar radiculopathy, right     Memory changes     Subclinical hypothyroidism     Past Surgical History:   Procedure Laterality Date     APPENDECTOMY      at age of 16     CATARACT IOL, RT/LT      both     FORAMINOTOMY LUMBAR POSTERIOR ONE LEVEL Right 2017    Procedure: FORAMINOTOMY LUMBAR POSTERIOR ONE LEVEL;  Right L4-L5 hemilaminectomy and foraminotomy and excision of synovial cyst. ;  Surgeon: Fady Woo MD;  Location: [facility] OR     TONSILLECTOMY         Social History     Tobacco Use     Smoking status: Former Smoker     Types: Cigarettes     Quit date: 1960     Years since quittin.1     Smokeless tobacco: Never Used     Tobacco comment:    Substance Use Topics     Alcohol use: Yes     Comment: SOCIAL-2 drinks per month      Family History   Problem Relation Age of Onset     Family History Negative Mother          in accident     Cancer Father         prostate     Heart Disease Brother         60s-4-one brother with heart attack in his 60's     Cancer Sister         breast-7-one  wtih breast ca,one with stroke, one sis  from diabetes     Breast Cancer Sister         diagnosed at age of 52- 5 sisters alive     Cancer - colorectal No family hx of              ROS:  Constitutional, HEENT, cardiovascular, pulmonary, GI, , musculoskeletal, neuro, skin, endocrine and psych systems are negative, except as otherwise  "noted.    OBJECTIVE:   /62 (BP Location: Left leg, Patient Position: Chair, Cuff Size: Adult Regular)   Pulse 96   Temp 98.5  F (36.9  C) (Oral)   Resp 16   Ht 1.575 m (5' 2\")   Wt 53.5 kg (118 lb)   SpO2 98%   BMI 21.58 kg/m   Estimated body mass index is 21.58 kg/m  as calculated from the following:    Height as of this encounter: 1.575 m (5' 2\").    Weight as of this encounter: 53.5 kg (118 lb).  EXAM:   GENERAL APPEARANCE: healthy, alert and no distress  EYES: Eyes grossly normal to inspection, PERRL and conjunctivae and sclerae normal  HENT: ear canals and TM's normal, nose and mouth without ulcers or lesions, oropharynx clear and oral mucous membranes moist  NECK: no adenopathy, no asymmetry, masses, or scars and thyroid normal to palpation  RESP: lungs clear to auscultation - no rales, rhonchi or wheezes  BREAST: declined  CV: regular rate and rhythm, normal S1 S2, no S3 or S4, no murmur, click or rub, no peripheral edema and peripheral pulses strong  ABDOMEN: soft, nontender, no hepatosplenomegaly, no masses and bowel sounds normal  MS: no musculoskeletal defects are noted and gait is age appropriate without ataxia  SKIN: no suspicious lesions or rashes  NEURO: Normal strength and tone, sensory exam grossly normal, mentation intact and speech normal  PSYCH: mentation appears normal and affect normal/bright    Diagnostic Test Results:  Labs reviewed in Epic    ASSESSMENT / PLAN:   1. Encounter for Medicare annual wellness exam  - Lipid panel reflex to direct LDL Fasting  - Hemoglobin A1c  - CBC with platelets  - Comprehensive metabolic panel (BMP + Alb, Alk Phos, ALT, AST, Total. Bili, TP)    2. Asymptomatic menopause  - DX Hip/Pelvis/Spine; Future    3. Elevated TSH - has been taking levothyroxine, needs surveillance labs today  - TSH  - T4, free    4. Atrophy of thyroid (acquired) - as above  - TSH  - T4, free    5. Thrombocytopenia (H) - surveillance labs today  - CBC with platelets    Patient " "has been advised of split billing requirements and indicates understanding: Yes    COUNSELING:  Reviewed preventive health counseling, as reflected in patient instructions    Estimated body mass index is 21.58 kg/m  as calculated from the following:    Height as of this encounter: 1.575 m (5' 2\").    Weight as of this encounter: 53.5 kg (118 lb).      She reports that she quit smoking about 61 years ago. Her smoking use included cigarettes. She has never used smokeless tobacco.    Appropriate preventive services were discussed with this patient, including applicable screening as appropriate for cardiovascular disease, diabetes, osteopenia/osteoporosis, and glaucoma.  As appropriate for age/gender, discussed screening for colorectal cancer, prostate cancer, breast cancer, and cervical cancer. Checklist reviewing preventive services available has been given to the patient.    Reviewed patients plan of care and provided an AVS. The Basic Care Plan (routine screening as documented in Health Maintenance) for Indy meets the Care Plan requirement. This Care Plan has been established and reviewed with the Patient.    Batool Kulkarni MD  [facility]  "

## 2021-02-01 NOTE — NURSING NOTE
Future Appointments   Date Time Provider Department Center   2/1/2021 10:20 AM Batool Kulkarni MD [facility]     Appointment Notes for this encounter:   medicare wellness visit    Health Maintenance Due   Topic Date Due     COVID-19 Vaccine (1 of 2) 02/27/1947     ZOSTER IMMUNIZATION (1 of 2) 02/27/1981     DTAP/TDAP/TD IMMUNIZATION (2 - Td) 09/01/2013     MEDICARE ANNUAL WELLNESS VISIT  11/30/2017     PHQ-2  01/01/2021     Health Maintenance addressed:  NONE        MyChart Status:  Active and Using

## 2021-02-01 NOTE — PATIENT INSTRUCTIONS
Patient Education   Personalized Prevention Plan  You are due for the preventive services outlined below.  Your care team is available to assist you in scheduling these services.  If you have already completed any of these items, please share that information with your care team to update in your medical record.  Health Maintenance Due   Topic Date Due     COVID-19 Vaccine (1 of 2) 02/27/1947     Zoster (Shingles) Vaccine (1 of 2) 02/27/1981     Diptheria Tetanus Pertussis (DTAP/TDAP/TD) Vaccine (2 - Td) 09/01/2013     PHQ-2  01/01/2021

## 2021-02-01 NOTE — PROGRESS NOTES
"SUBJECTIVE:   Indy Reyes is a 89 year old female who presents for Preventive Visit.    {Split Bill scripting  The purpose of this visit is to discuss your medical history and prevent health problems before you are sick. You may be responsible for a co-pay, coinsurance, or deductible if your visit today includes services such as checking on a sore throat, having an x-ray or lab test, or treating and evaluating a new or existing condition :115939}  Patient has been advised of split billing requirements and indicates understanding: {Yes and No:652818}   Are you in the first 12 months of your Medicare coverage?  { :320342::\"No\"}    HPI  Do you feel safe in your environment? { :426714}    Have you ever done Advance Care Planning? (For example, a Health Directive, POLST, or a discussion with a medical provider or your loved ones about your wishes): { :050518}    {Hearing Test Done (Optional):757149}   Fall risk  { :245935}  {If any of the above assessments are answered yes, consider ordering appropriate referrals (Optional):185910::\"click delete button to remove this line now\"}  Cognitive Screening { :277482}    {Do you have sleep apnea, excessive snoring or daytime drowsiness? (Optional):857728}    Reviewed and updated as needed this visit by clinical staff                 Reviewed and updated as needed this visit by Provider                Social History     Tobacco Use     Smoking status: Former Smoker     Types: Cigarettes     Quit date: 1960     Years since quittin.1     Smokeless tobacco: Never Used     Tobacco comment:    Substance Use Topics     Alcohol use: Yes     Comment: SOCIAL-2 drinks per month      {Rooming Staff- Complete this question if Prescreen response is not shown below for today's visit. If you drink alcohol do you typically have >3 drinks per day or >7 drinks per week? (Optional):723638}    Alcohol Use 2016   Prescreen: >3 drinks/day or >7 drinks/week? The patient does " "not drink >3 drinks per day nor >7 drinks per week.   {add AUDIT responses (Optional) (A score of 7 for adult men is an indication of hazardous drinking; a score of 8 or more is an indication of an alcohol use disorder.  A score of 7 or more for adult women is an indication of hazardous drinking or an alchohol use disorder):224524}    {Outside tests to abstract? :991870}    {additional problems to add (Optional):049153}    Current providers sharing in care for this patient include: {Rooming staff:  Please update Care Team in Rooming Activity, refresh this note and then delete this statement}  Patient Care Team:  Batool Kulkarni MD as PCP - General (Family Practice)  Batool Kulkarni MD as Assigned PCP  Marsha De Souza RN as Personal Advocate & Liaison (PAL) ([facility])    The following health maintenance items are reviewed in Epic and correct as of today:  Health Maintenance   Topic Date Due     COVID-19 Vaccine (1 of 2) 02/27/1947     ZOSTER IMMUNIZATION (1 of 2) 02/27/1981     DTAP/TDAP/TD IMMUNIZATION (2 - Td) 09/01/2013     MEDICARE ANNUAL WELLNESS VISIT  11/30/2017     PHQ-2  01/01/2021     FALL RISK ASSESSMENT  08/27/2021     ANNUAL REVIEW OF HM ORDERS  11/06/2021     ADVANCE CARE PLANNING  11/14/2021     LIPID  11/30/2021     INFLUENZA VACCINE  Completed     Pneumococcal Vaccine: 65+ Years  Completed     Pneumococcal Vaccine: Pediatrics (0 to 5 Years) and At-Risk Patients (6 to 64 Years)  Aged Out     IPV IMMUNIZATION  Aged Out     MENINGITIS IMMUNIZATION  Aged Out     HEPATITIS B IMMUNIZATION  Aged Out     {Chronicprobdata (optional):002371}  {Mammo Decision Support :134320}    Review of Systems  {ROS COMP (Optional):958893}    OBJECTIVE:   There were no vitals taken for this visit. Estimated body mass index is 22.13 kg/m  as calculated from the following:    Height as of 11/6/20: 1.575 m (5' 2\").    Weight as of 11/6/20: 54.9 kg (121 lb).  Physical Exam  {Exam (Optional) " ":088031}    {Diagnostic Test Results (Optional):490173::\"Diagnostic Test Results:\",\"Labs reviewed in Epic\"}    ASSESSMENT / PLAN:   {[facility] Picklist:238440}    Patient has been advised of split billing requirements and indicates understanding: {YES / NO:266564::\"Yes\"}  COUNSELING:  {Medicare Counselin}    Estimated body mass index is 22.13 kg/m  as calculated from the following:    Height as of 20: 1.575 m (5' 2\").    Weight as of 20: 54.9 kg (121 lb).    {Weight Management Plan (ACO) Complete if BMI is abnormal-  Ages 18-64  BMI >24.9.  Age 65+ with BMI <23 or >30 (Optional):819495}    She reports that she quit smoking about 61 years ago. Her smoking use included cigarettes. She has never used smokeless tobacco.      Appropriate preventive services were discussed with this patient, including applicable screening as appropriate for cardiovascular disease, diabetes, osteopenia/osteoporosis, and glaucoma.  As appropriate for age/gender, discussed screening for colorectal cancer, prostate cancer, breast cancer, and cervical cancer. Checklist reviewing preventive services available has been given to the patient.    Reviewed patients plan of care and provided an AVS. The {CarePlan:670918} for Indy meets the Care Plan requirement. This Care Plan has been established and reviewed with the {PATIENT, FAMILY MEMBER, CAREGIVER:066923}.    Counseling Resources:  ATP IV Guidelines  Pooled Cohorts Equation Calculator  Breast Cancer Risk Calculator  Breast Cancer: Medication to Reduce Risk  FRAX Risk Assessment  ICSI Preventive Guidelines  Dietary Guidelines for Americans,   [employer]'s MyPlate  ASA Prophylaxis  Lung CA Screening    Batool Kulkarni MD  [facility]    Identified Health Risks:  "

## 2021-02-02 LAB
ALBUMIN SERPL-MCNC: 4 G/DL (ref 3.4–5)
ALP SERPL-CCNC: 96 U/L (ref 40–150)
ALT SERPL W P-5'-P-CCNC: 30 U/L (ref 0–50)
ANION GAP SERPL CALCULATED.3IONS-SCNC: 5 MMOL/L (ref 3–14)
AST SERPL W P-5'-P-CCNC: 26 U/L (ref 0–45)
BILIRUB SERPL-MCNC: 0.4 MG/DL (ref 0.2–1.3)
BUN SERPL-MCNC: 16 MG/DL (ref 7–30)
CALCIUM SERPL-MCNC: 9.5 MG/DL (ref 8.5–10.1)
CHLORIDE SERPL-SCNC: 106 MMOL/L (ref 94–109)
CHOLEST SERPL-MCNC: 267 MG/DL
CO2 SERPL-SCNC: 27 MMOL/L (ref 20–32)
CREAT SERPL-MCNC: 0.76 MG/DL (ref 0.52–1.04)
GFR SERPL CREATININE-BSD FRML MDRD: 70 ML/MIN/{1.73_M2}
GLUCOSE SERPL-MCNC: 105 MG/DL (ref 70–99)
HDLC SERPL-MCNC: 54 MG/DL
LDLC SERPL CALC-MCNC: 181 MG/DL
NONHDLC SERPL-MCNC: 213 MG/DL
POTASSIUM SERPL-SCNC: 4.9 MMOL/L (ref 3.4–5.3)
PROT SERPL-MCNC: 7.4 G/DL (ref 6.8–8.8)
SODIUM SERPL-SCNC: 138 MMOL/L (ref 133–144)
T4 FREE SERPL-MCNC: 1.11 NG/DL (ref 0.76–1.46)
TRIGL SERPL-MCNC: 162 MG/DL
TSH SERPL DL<=0.005 MIU/L-ACNC: 4.93 MU/L (ref 0.4–4)

## 2021-02-17 ENCOUNTER — IMMUNIZATION (OUTPATIENT)
Dept: NURSING | Facility: CLINIC | Age: 86
End: 2021-02-17
Payer: COMMERCIAL

## 2021-02-17 PROCEDURE — 0001A PR COVID VAC PFIZER DIL RECON 30 MCG/0.3 ML IM: CPT

## 2021-02-17 PROCEDURE — 91300 PR COVID VAC PFIZER DIL RECON 30 MCG/0.3 ML IM: CPT

## 2021-02-18 ENCOUNTER — ANCILLARY PROCEDURE (OUTPATIENT)
Dept: BONE DENSITY | Facility: CLINIC | Age: 86
End: 2021-02-18
Attending: FAMILY MEDICINE
Payer: COMMERCIAL

## 2021-02-18 DIAGNOSIS — Z78.0 ASYMPTOMATIC MENOPAUSE: ICD-10-CM

## 2021-02-18 PROCEDURE — 77085 DXA BONE DENSITY AXL VRT FX: CPT | Performed by: INTERNAL MEDICINE

## 2021-03-08 ENCOUNTER — TRANSFERRED RECORDS (OUTPATIENT)
Dept: HEALTH INFORMATION MANAGEMENT | Facility: CLINIC | Age: 86
End: 2021-03-08

## 2021-03-10 ENCOUNTER — IMMUNIZATION (OUTPATIENT)
Dept: NURSING | Facility: CLINIC | Age: 86
End: 2021-03-10
Attending: FAMILY MEDICINE
Payer: COMMERCIAL

## 2021-03-10 PROCEDURE — 0002A PR COVID VAC PFIZER DIL RECON 30 MCG/0.3 ML IM: CPT

## 2021-03-10 PROCEDURE — 91300 PR COVID VAC PFIZER DIL RECON 30 MCG/0.3 ML IM: CPT

## 2021-06-03 ENCOUNTER — TRANSFERRED RECORDS (OUTPATIENT)
Dept: HEALTH INFORMATION MANAGEMENT | Facility: CLINIC | Age: 86
End: 2021-06-03

## 2021-09-04 ENCOUNTER — HEALTH MAINTENANCE LETTER (OUTPATIENT)
Age: 86
End: 2021-09-04

## 2021-09-29 ENCOUNTER — TELEPHONE (OUTPATIENT)
Dept: FAMILY MEDICINE | Facility: CLINIC | Age: 86
End: 2021-09-29

## 2021-09-29 NOTE — TELEPHONE ENCOUNTER
Reason for Call:  Same Day Appointment, Requested Provider:  Dr Kulkarni    PCP: Batool Kulkarni    Reason for visit: pain by stomach/esophagus x a couple of months   ck arteries in neck     Duration of symptoms:     Have you been treated for this in the past? no    Additional comments: is wondering if she could be worked in sooner then 11-4    Can we leave a detailed message on this number? YES    Phone number patient can be reached at: Home number on file [phone] (home)    Best Time: any    Call taken on 9/29/2021 at 11:54 AM by Sarai Richardson

## 2021-10-14 ENCOUNTER — OFFICE VISIT (OUTPATIENT)
Dept: FAMILY MEDICINE | Facility: CLINIC | Age: 86
End: 2021-10-14
Payer: COMMERCIAL

## 2021-10-14 VITALS
SYSTOLIC BLOOD PRESSURE: 134 MMHG | OXYGEN SATURATION: 97 % | DIASTOLIC BLOOD PRESSURE: 62 MMHG | RESPIRATION RATE: 14 BRPM | HEIGHT: 62 IN | WEIGHT: 115 LBS | HEART RATE: 89 BPM | BODY MASS INDEX: 21.16 KG/M2

## 2021-10-14 DIAGNOSIS — R10.13 DYSPEPSIA: Primary | ICD-10-CM

## 2021-10-14 DIAGNOSIS — H35.30 MACULAR DEGENERATION (SENILE) OF RETINA: ICD-10-CM

## 2021-10-14 DIAGNOSIS — L84 CALLUS: ICD-10-CM

## 2021-10-14 PROCEDURE — 99214 OFFICE O/P EST MOD 30 MIN: CPT | Performed by: FAMILY MEDICINE

## 2021-10-14 RX ORDER — OMEPRAZOLE 40 MG/1
40 CAPSULE, DELAYED RELEASE ORAL DAILY
Qty: 60 CAPSULE | Refills: 0 | Status: SHIPPED | OUTPATIENT
Start: 2021-10-14 | End: 2022-02-18

## 2021-10-14 ASSESSMENT — MIFFLIN-ST. JEOR: SCORE: 894.89

## 2021-10-14 NOTE — PROGRESS NOTES
Assessment & Plan     Dyspepsia - suggested 2 months of PPI to help with her symptoms. Following this, will determine if long term acid reduction is needed.   - omeprazole (PRILOSEC) 40 MG DR capsule; Take 1 capsule (40 mg) by mouth daily  Dispense: 60 capsule; Refill: 0    Callus - shaved today, discussed aftercare and common causes.       Return in about 4 months (around 2/14/2022) for Yearly Preventive Exam.    Batool Kulkarni MD  [facility] SANDRA Hernandez is a 89 year old who presents for the following health issues     History of Present Illness       She eats 4 or more servings of fruits and vegetables daily.She consumes 0 sweetened beverage(s) daily.She exercises with enough effort to increase her heart rate 30 to 60 minutes per day.  She exercises with enough effort to increase her heart rate 3 or less days per week.   She is taking medications regularly.     Abdominal Pain  Onset/Duration: 6 months  Description:   Character: Dull ache  Location: right upper quadrant left upper quadrant  Radiation: None  Intensity: mild  Progression of Symptoms:  same and constant  Accompanying Signs & Symptoms:  Fever/Chills: no  Gas/Bloating: YES  Nausea: no  Vomitting: no  Diarrhea: no  Constipation: no  Dysuria or Hematuria: no  History:   Trauma: no  Previous similar pain: no  Previous tests done: none  Precipitating factors:   Does the pain change with:     Food: no    Bowel Movement: no    Urination: no   Other factors:  no  Therapies tried and outcome: None  No LMP recorded. Patient is postmenopausal.      Reports increased epigastric abdominal pain over the past 6 months, describes as an ache.   Increase in belching and passing gas.   No change in stooling.   No known food triggers.   Reports significant increase in stress. Has macular degeneration, unable to do same household chores as she was previously.  determined to keep them in their home living independently,  "despite not helping with household chores. This has created familial stress.       Review of Systems   Constitutional, HEENT, cardiovascular, pulmonary, gi and gu systems are negative, except as otherwise noted.      Objective    /62 (BP Location: Right arm, Patient Position: Sitting, Cuff Size: Adult Small)   Pulse 89   Resp 14   Ht 1.575 m (5' 2\")   Wt 52.2 kg (115 lb)   SpO2 97%   BMI 21.03 kg/m    Body mass index is 21.03 kg/m .  Physical Exam   GENERAL: healthy, alert and no distress  RESP: lungs clear to auscultation - no rales, rhonchi or wheezes  CV: regular rate and rhythm, normal S1 S2, no S3 or S4, no murmur, click or rub, no peripheral edema and peripheral pulses strong  ABDOMEN: soft, nontender, no hepatosplenomegaly, no masses and bowel sounds normal  SKIN: callous bottom of left foot          "

## 2021-11-04 ENCOUNTER — OFFICE VISIT (OUTPATIENT)
Dept: FAMILY MEDICINE | Facility: CLINIC | Age: 86
End: 2021-11-04
Payer: COMMERCIAL

## 2021-11-04 VITALS
BODY MASS INDEX: 20.98 KG/M2 | SYSTOLIC BLOOD PRESSURE: 124 MMHG | WEIGHT: 114 LBS | DIASTOLIC BLOOD PRESSURE: 62 MMHG | RESPIRATION RATE: 12 BRPM | HEART RATE: 91 BPM | HEIGHT: 62 IN | OXYGEN SATURATION: 99 % | TEMPERATURE: 98.3 F

## 2021-11-04 DIAGNOSIS — R79.89 ELEVATED TSH: ICD-10-CM

## 2021-11-04 DIAGNOSIS — Z91.199 FAILURE TO ATTEND APPOINTMENT: ICD-10-CM

## 2021-11-04 DIAGNOSIS — Z00.00 ENCOUNTER FOR MEDICARE ANNUAL WELLNESS EXAM: Primary | ICD-10-CM

## 2021-11-04 RX ORDER — LEVOTHYROXINE SODIUM 25 UG/1
25 TABLET ORAL DAILY
Qty: 90 TABLET | Refills: 0 | Status: SHIPPED | OUTPATIENT
Start: 2021-11-04 | End: 2022-02-11

## 2021-11-04 ASSESSMENT — MIFFLIN-ST. JEOR: SCORE: 890.35

## 2021-11-04 NOTE — PROGRESS NOTES
"  SUBJECTIVE:   Indy Reyes is a 89 year old female who presents for Preventive Visit.      Patient has been advised of split billing requirements and indicates understanding: Yes  Are you in the first 12 months of your Medicare Part B coverage?  No    Physical Health:    In general, how would you rate your overall physical health? excellent    Outside of work, how many days during the week do you exercise? none    Outside of work, approximately how many minutes a day do you exercise?not applicable    If you drink alcohol do you typically have >3 drinks per day or >7 drinks per week? No    Do you usually eat at least 4 servings of fruit and vegetables a day, include whole grains & fiber and avoid regularly eating high fat or \"junk\" foods? Yes    Do you have any problems taking medications regularly?  No    Do you have any side effects from medications? none    Needs assistance for the following daily activities: telephone use, transportation, shopping, preparing meals, money management and taking medicine    Which of the following safety concerns are present in your home?  none identified     Hearing impairment: Yes, Difficulty understanding soft or whispered speech.    In the past 6 months, have you been bothered by leaking of urine? yes    Mental Health:    In general, how would you rate your overall mental or emotional health? excellent  PHQ-2 Score:      Do you feel safe in your environment? Yes    Have you ever done Advance Care Planning? (For example, a Health Directive, POLST, or a discussion with a medical provider or your loved ones about your wishes): Yes, advance care planning is on file.    Additional concerns to address?  No    Fall risk:  Fallen 2 or more times in the past year?: No  Any fall with injury in the past year?: No    Cognitive Screenin) Repeat 3 items (Leader, Season, Table)    2) Clock draw: NORMAL  3) 3 item recall: Recalls 3 objects  Results: 3 items recalled: COGNITIVE " IMPAIRMENT LESS LIKELY    Mini-CogTM Copyright JEREMY Plata. Licensed by the author for use in [facility]; reprinted with permission (sorishi@.[facility]). All rights reserved.      {Do you have sleep apnea, excessive snoring or daytime drowsiness? (Optional):759425}    {Outside tests to abstract? :834768}    {additional problems to add (Optional):178464}    Reviewed and updated as needed this visit by clinical staff  Tobacco  Allergies       Soc Hx        Reviewed and updated as needed this visit by Provider                Social History     Tobacco Use     Smoking status: Former Smoker     Types: Cigarettes     Quit date: 1960     Years since quittin.8     Smokeless tobacco: Never Used     Tobacco comment:    Substance Use Topics     Alcohol use: Yes     Comment: SOCIAL-2 drinks per month                            Current providers sharing in care for this patient include: {Rooming staff:  Please update Care Team in Rooming Activity, refresh this note and then delete this statement}  Patient Care Team:  Batool Kulkarni MD as PCP - General (Family Practice)  Batool Kulkarni MD as Assigned PCP    The following health maintenance items are reviewed in Epic and correct as of today:  Health Maintenance   Topic Date Due     ZOSTER IMMUNIZATION (1 of 2) Never done     DTAP/TDAP/TD IMMUNIZATION (2 - Td or Tdap) 2013     INFLUENZA VACCINE (1) 2021     COVID-19 Vaccine (3 - Booster for Pfizer series) 09/10/2021     ANNUAL REVIEW OF HM ORDERS  2021     FALL RISK ASSESSMENT  2022     MEDICARE ANNUAL WELLNESS VISIT  2022     DEXA  2024     LIPID  2026     ADVANCE CARE PLANNING  2026     PHQ-2  Completed     Pneumococcal Vaccine: 65+ Years  Completed     IPV IMMUNIZATION  Aged Out     MENINGITIS IMMUNIZATION  Aged Out     HEPATITIS B IMMUNIZATION  Aged Out     {Chronicprobdata (Optional):307480}  {Decision Support (Optional):480790}    ROS:  {ROS  "COMP:965365}    OBJECTIVE:   /62 (BP Location: Right arm, Patient Position: Sitting, Cuff Size: Adult Regular)   Pulse 91   Temp 98.3  F (36.8  C) (Oral)   Resp 12   Ht 1.575 m (5' 2\")   Wt 51.7 kg (114 lb)   SpO2 99%   BMI 20.85 kg/m   Estimated body mass index is 20.85 kg/m  as calculated from the following:    Height as of this encounter: 1.575 m (5' 2\").    Weight as of this encounter: 51.7 kg (114 lb).  EXAM:   {Exam :335709}    {Diagnostic Test Results (Optional):575118::\"Diagnostic Test Results:\",\"Labs reviewed in Epic\"}    ASSESSMENT / PLAN:   {Dia Picklist:336920}    Patient has been advised of split billing requirements and indicates understanding: {YES / NO:928214::\"Yes\"}    COUNSELING:  {Medicare Counselin}    Estimated body mass index is 20.85 kg/m  as calculated from the following:    Height as of this encounter: 1.575 m (5' 2\").    Weight as of this encounter: 51.7 kg (114 lb).    {Weight Management Plan (ACO) Complete if BMI is abnormal-  Ages 18-64  BMI >24.9.  Age 65+ with BMI <23 or >30 (Optional):065858}    She reports that she quit smoking about 61 years ago. Her smoking use included cigarettes. She has never used smokeless tobacco.    Appropriate preventive services were discussed with this patient, including applicable screening as appropriate for cardiovascular disease, diabetes, osteopenia/osteoporosis, and glaucoma.  As appropriate for age/gender, discussed screening for colorectal cancer, prostate cancer, breast cancer, and cervical cancer. Checklist reviewing preventive services available has been given to the patient.    Reviewed patients plan of care and provided an AVS. The {CarePlan:068477} for Indy meets the Care Plan requirement. This Care Plan has been established and reviewed with the {PATIENT, FAMILY MEMBER, CAREGIVER:294173}.    Counseling Resources:  ATP IV Guidelines  Pooled Cohorts Equation Calculator  Breast Cancer Risk Calculator  BRCA-Related " Cancer Risk Assessment: FHS-7 Tool  FRAX Risk Assessment  ICSI Preventive Guidelines  Dietary Guidelines for Americans, 2010  USDA's MyPlate  ASA Prophylaxis  Lung CA Screening    Batool Kulkarni MD  [facility]

## 2021-11-04 NOTE — PATIENT INSTRUCTIONS
Patient Education   Personalized Prevention Plan  You are due for the preventive services outlined below.  Your care team is available to assist you in scheduling these services.  If you have already completed any of these items, please share that information with your care team to update in your medical record.  Health Maintenance Due   Topic Date Due     Zoster (Shingles) Vaccine (1 of 2) Never done     Diptheria Tetanus Pertussis (DTAP/TDAP/TD) Vaccine (2 - Td or Tdap) 09/01/2013     Flu Vaccine (1) 09/01/2021     COVID-19 Vaccine (3 - Booster for Pfizer series) 09/10/2021     ANNUAL REVIEW OF HM ORDERS  11/06/2021

## 2021-12-13 ENCOUNTER — OFFICE VISIT (OUTPATIENT)
Dept: FAMILY MEDICINE | Facility: CLINIC | Age: 86
End: 2021-12-13
Payer: COMMERCIAL

## 2021-12-13 VITALS
WEIGHT: 117 LBS | HEIGHT: 62 IN | RESPIRATION RATE: 14 BRPM | BODY MASS INDEX: 21.53 KG/M2 | TEMPERATURE: 99 F | HEART RATE: 79 BPM | SYSTOLIC BLOOD PRESSURE: 124 MMHG | DIASTOLIC BLOOD PRESSURE: 69 MMHG

## 2021-12-13 DIAGNOSIS — R10.13 DYSPEPSIA: Primary | ICD-10-CM

## 2021-12-13 DIAGNOSIS — Z23 COVID-19 VACCINE ADMINISTERED: ICD-10-CM

## 2021-12-13 PROCEDURE — 0004A PR COVID VAC PFIZER DIL RECON 30 MCG/0.3 ML IM: CPT | Performed by: FAMILY MEDICINE

## 2021-12-13 PROCEDURE — 99212 OFFICE O/P EST SF 10 MIN: CPT | Performed by: FAMILY MEDICINE

## 2021-12-13 PROCEDURE — 91300 PR COVID VAC PFIZER DIL RECON 30 MCG/0.3 ML IM: CPT | Performed by: FAMILY MEDICINE

## 2021-12-13 ASSESSMENT — MIFFLIN-ST. JEOR: SCORE: 903.96

## 2021-12-13 NOTE — PROGRESS NOTES
"  Assessment & Plan     Dyspepsia - treated with two months of PPI, now complete. Discussed trigger foods, she will try to limit. No further intervention needed unless symptoms recur.     Return in about 3 months (around 3/13/2022) for Already has visit scheduled.    Batool Kulkarni MD  [facility] SANDRA Hernandez is a 89 year old who presents for the following health issues     HPI     Treated for dyspepsia with PPI for the past 2 months.   Reports she has completed medication and feeling great.    Moving to [facility] with her  at the end of the month.   This is a big change for her as they have lived independently for years.       Review of Systems   Constitutional, HEENT, cardiovascular, pulmonary, gi and gu systems are negative, except as otherwise noted.      Objective    /69 (BP Location: Right arm, Patient Position: Chair, Cuff Size: Adult Regular)   Pulse 79   Temp 99  F (37.2  C) (Oral)   Resp 14   Ht 1.575 m (5' 2\")   Wt 53.1 kg (117 lb)   Breastfeeding No   BMI 21.40 kg/m    Body mass index is 21.4 kg/m .  Physical Exam   GENERAL: healthy, alert and no distress  PSYCH: mentation appears normal, affect normal/bright        "

## 2022-01-27 ENCOUNTER — TRANSFERRED RECORDS (OUTPATIENT)
Dept: HEALTH INFORMATION MANAGEMENT | Facility: CLINIC | Age: 87
End: 2022-01-27
Payer: COMMERCIAL

## 2022-02-10 DIAGNOSIS — R79.89 ELEVATED TSH: ICD-10-CM

## 2022-02-10 NOTE — TELEPHONE ENCOUNTER
Routing refill request to provider for review/approval because:  Labs out of range:  TSH    Patient has upcoming appointment 2/18/22.     TSH   Date Value Ref Range Status   02/01/2021 4.93 (H) 0.40 - 4.00 mU/L Xena THURMAN RN

## 2022-02-11 RX ORDER — LEVOTHYROXINE SODIUM 25 UG/1
25 TABLET ORAL DAILY
Qty: 90 TABLET | Refills: 0 | Status: SHIPPED | OUTPATIENT
Start: 2022-02-11 | End: 2022-02-18

## 2022-02-11 NOTE — TELEPHONE ENCOUNTER
Just did AWV November 2021.     Does this visit need to be canceled or just discussing cognitive decline?    JH

## 2022-02-18 ENCOUNTER — OFFICE VISIT (OUTPATIENT)
Dept: FAMILY MEDICINE | Facility: CLINIC | Age: 87
End: 2022-02-18
Payer: COMMERCIAL

## 2022-02-18 VITALS
OXYGEN SATURATION: 99 % | BODY MASS INDEX: 20.85 KG/M2 | DIASTOLIC BLOOD PRESSURE: 58 MMHG | RESPIRATION RATE: 14 BRPM | HEART RATE: 88 BPM | TEMPERATURE: 98 F | SYSTOLIC BLOOD PRESSURE: 120 MMHG | WEIGHT: 114 LBS

## 2022-02-18 DIAGNOSIS — Z00.00 ENCOUNTER FOR ANNUAL WELLNESS EXAM IN MEDICARE PATIENT: Primary | ICD-10-CM

## 2022-02-18 DIAGNOSIS — R79.89 ELEVATED TSH: ICD-10-CM

## 2022-02-18 DIAGNOSIS — R41.3 MEMORY CHANGES: ICD-10-CM

## 2022-02-18 PROCEDURE — 99213 OFFICE O/P EST LOW 20 MIN: CPT | Mod: 25 | Performed by: FAMILY MEDICINE

## 2022-02-18 PROCEDURE — 99397 PER PM REEVAL EST PAT 65+ YR: CPT | Performed by: FAMILY MEDICINE

## 2022-02-18 RX ORDER — LEVOTHYROXINE SODIUM 25 UG/1
25 TABLET ORAL DAILY
Qty: 90 TABLET | Refills: 3 | Status: SHIPPED | OUTPATIENT
Start: 2022-02-18 | End: 2022-06-01

## 2022-02-18 NOTE — PROGRESS NOTES
Bilateral ear wash performed per Dr. Kulkarni. Patient tolerated well. Removed enough cerumen to clear out the ear canal.    Juan F Faustin [facility]        Answers for HPI/ROS submitted by the patient on 2/18/2022  How many servings of fruits and vegetables do you eat daily?: 2-3  On average, how many sweetened beverages do you drink each day (Examples: soda, juice, sweet tea, etc.  Do NOT count diet or artificially sweetened beverages)?: 0  How many minutes a day do you exercise enough to make your heart beat faster?: 10 to 19  How many days a week do you exercise enough to make your heart beat faster?: 3 or less  How many days per week do you miss taking your medication?: 0  What is the reason for your visit today?: Memory issues and  incontinence  When did your symptoms begin?: More than a month  What are your symptoms?: Memory issues and incontinence  Are your symptoms:: Worsening  Have you had these symptoms before?: No

## 2022-02-18 NOTE — PROGRESS NOTES
{PROVIDER CHARTING PREFERENCE:712666}    Gerardo Hernandez is a 89 year old who presents for the following health issues {ACCOMPANIED BY STATEMENT (Optional):197016}    History of Present Illness     Reason for visit:  Memory issues and  incontinence  Symptom onset:  More than a month  Symptoms include:  Memory issues and incontinence  Symptom progression:  Worsening  Had these symptoms before:  No    She eats 2-3 servings of fruits and vegetables daily.She consumes 0 sweetened beverage(s) daily.She exercises with enough effort to increase her heart rate 10 to 19 minutes per day.  She exercises with enough effort to increase her heart rate 3 or less days per week.   She is taking medications regularly.       {SUPERLIST (Optional):446081}  {additonal problems for provider to add (Optional):681481}    Review of Systems   {ROS COMP (Optional):928506}      Objective    /58   Pulse 88   Temp 98  F (36.7  C) (Oral)   Resp 14   Wt 51.7 kg (114 lb)   SpO2 99%   BMI 20.85 kg/m    Body mass index is 20.85 kg/m .  Physical Exam   {Exam List (Optional):872992}    {Diagnostic Test Results (Optional):478520}    {AMBULATORY ATTESTATION (Optional):221384}

## 2022-02-18 NOTE — PROGRESS NOTES
SUBJECTIVE:   Indy Reyes is a 89 year old female who presents for Preventive Visit.    Are you in the first 12 months of your Medicare coverage?  No    HPI  Do you feel safe in your environment? Yes    Have you ever done Advance Care Planning? (For example, a Health Directive, POLST, or a discussion with a medical provider or your loved ones about your wishes): Yes, advance care planning is on file.      Rapidly worsening cognition over the past year. Sister with Alzheimer's dementia. Unable to care for self any longer. Refusing to bathe. Not taking medications reliably.       Fall risk - no falls in the past 12 months. No throw rugs in home.        Cognitive Screening Not appropriate due to known dementia    Do you have sleep apnea, excessive snoring or daytime drowsiness?: no    Reviewed and updated as needed this visit by clinical staff   Tobacco  Allergies  Meds  Problems  Med Hx  Surg Hx  Fam Hx  Soc   Hx        Reviewed and updated as needed this visit by Provider     Meds  Problems            Social History     Tobacco Use     Smoking status: Former Smoker     Types: Cigarettes     Quit date: 1960     Years since quittin.1     Smokeless tobacco: Never Used     Tobacco comment:    Substance Use Topics     Alcohol use: Yes     Comment: SOCIAL-2 drinks per month          Alcohol Use 2016   Prescreen: >3 drinks/day or >7 drinks/week? The patient does not drink >3 drinks per day nor >7 drinks per week.       Current providers sharing in care for this patient include:   Patient Care Team:  Batool Kulkarni MD as PCP - General (Family Practice)  Batool Kulkarni MD as Assigned PCP    The following health maintenance items are reviewed in Epic and correct as of today:  Health Maintenance Due   Topic Date Due     ZOSTER IMMUNIZATION (1 of 2) Never done     DTAP/TDAP/TD IMMUNIZATION (2 - Td or Tdap) 2013     INFLUENZA VACCINE (1) 2021     MEDICARE ANNUAL  WELLNESS VISIT  2022     Patient Active Problem List   Diagnosis     Hyperlipidemia LDL goal <130     Osteopenia     Advanced directives, counseling/discussion     Impaired fasting glucose     Family history of breast cancer     Family history of coronary artery disease     Thrombocytopenia (H)     Thyroid nodule     Postnasal drip     Rhinitis     Elevated TSH     Osteoarthrosis of knee     Cervical radiculitis     Lumbar radiculopathy, right     Memory changes     Subclinical hypothyroidism     Dyspepsia     Macular degeneration (senile) of retina     Past Surgical History:   Procedure Laterality Date     APPENDECTOMY      at age of 16     CATARACT IOL, RT/LT      both     FORAMINOTOMY LUMBAR POSTERIOR ONE LEVEL Right 2017    Procedure: FORAMINOTOMY LUMBAR POSTERIOR ONE LEVEL;  Right L4-L5 hemilaminectomy and foraminotomy and excision of synovial cyst. ;  Surgeon: Fady Woo MD;  Location: [facility] OR     TONSILLECTOMY         Social History     Tobacco Use     Smoking status: Former Smoker     Types: Cigarettes     Quit date: 1960     Years since quittin.1     Smokeless tobacco: Never Used     Tobacco comment:    Substance Use Topics     Alcohol use: Yes     Comment: SOCIAL-2 drinks per month      Family History   Problem Relation Age of Onset     Family History Negative Mother          in accident     Cancer Father         prostate     Heart Disease Brother         60s-4-one brother with heart attack in his 60's     Cancer Sister         breast-7-one  wtih breast ca,one with stroke, one sis  from diabetes     Breast Cancer Sister         diagnosed at age of 52- 5 sisters alive     Cancer - colorectal No family hx of              Mammogram Screening - Patient over age 75, has elected to discontinue screenings.  Pertinent mammograms are reviewed under the imaging tab.    Review of Systems  Constitutional, HEENT, cardiovascular, pulmonary, GI, , musculoskeletal,  "neuro, skin, endocrine and psych systems are negative, except as otherwise noted.    OBJECTIVE:   /58   Pulse 88   Temp 98  F (36.7  C) (Oral)   Resp 14   Wt 51.7 kg (114 lb)   SpO2 99%   BMI 20.85 kg/m   Estimated body mass index is 20.85 kg/m  as calculated from the following:    Height as of 12/13/21: 1.575 m (5' 2\").    Weight as of this encounter: 51.7 kg (114 lb).  Physical Exam  GENERAL APPEARANCE: healthy, alert and no distress  EYES: Eyes grossly normal to inspection, PERRL and conjunctivae and sclerae normal  HENT: ear canals and TM's normal, nose and mouth without ulcers or lesions, oropharynx clear and oral mucous membranes moist  NECK: no adenopathy, no asymmetry, masses, or scars and thyroid normal to palpation  RESP: lungs clear to auscultation - no rales, rhonchi or wheezes  CV: regular rate and rhythm, normal S1 S2, no S3 or S4, no murmur, click or rub, no peripheral edema and peripheral pulses strong  ABDOMEN: soft, nontender, no hepatosplenomegaly, no masses and bowel sounds normal  MS: no musculoskeletal defects are noted and gait is age appropriate without ataxia  SKIN: no suspicious lesions or rashes  NEURO: Normal strength and tone, sensory exam grossly normal, mentation intact and speech normal  PSYCH: mentation appears normal and affect normal/bright    Diagnostic Test Results:  Labs reviewed in Epic    ASSESSMENT / PLAN:     1. Encounter for annual wellness exam in Medicare patient    2. Memory changes - discussed appropriate for Neuro exam - unable to assess cogwheeling well. She does seem to get around well with no shuffling gait. No masked facies. Sister with Alzheimers. Symptoms have evolved fairly rapidly. No other neuro deficits that would lead me to believe this was a stroke, thus no imaging needed at this point. At this point, would benefit from home health aide to help with bathing as she is unable to do herself. Also needs RN to help with medication management to " "ensure she is actually getting her medications daily.   - Adult Neurology  Referral; Future  - Home Health referral    3. Elevated TSH  - levothyroxine (SYNTHROID/LEVOTHROID) 25 MCG tablet; Take 1 tablet (25 mcg) by mouth daily  Dispense: 90 tablet; Refill: 3      COUNSELING:  Reviewed preventive health counseling, as reflected in patient instructions    Estimated body mass index is 20.85 kg/m  as calculated from the following:    Height as of 12/13/21: 1.575 m (5' 2\").    Weight as of this encounter: 51.7 kg (114 lb).      She reports that she quit smoking about 62 years ago. Her smoking use included cigarettes. She has never used smokeless tobacco.      Appropriate preventive services were discussed with this patient, including applicable screening as appropriate for cardiovascular disease, diabetes, osteopenia/osteoporosis, and glaucoma.  As appropriate for age/gender, discussed screening for colorectal cancer, prostate cancer, breast cancer, and cervical cancer. Checklist reviewing preventive services available has been given to the patient.    Reviewed patients plan of care and provided an AVS. The Basic Care Plan (routine screening as documented in Health Maintenance) for Indy meets the Care Plan requirement. This Care Plan has been established and reviewed with the Patient.    Batool Kulkarni MD  [facility]    Identified Health Risks:  "

## 2022-02-21 ENCOUNTER — TELEPHONE (OUTPATIENT)
Dept: FAMILY MEDICINE | Facility: CLINIC | Age: 87
End: 2022-02-21
Payer: COMMERCIAL

## 2022-02-21 DIAGNOSIS — F03.90 DEMENTIA WITHOUT BEHAVIORAL DISTURBANCE, UNSPECIFIED DEMENTIA TYPE: Primary | ICD-10-CM

## 2022-02-21 NOTE — TELEPHONE ENCOUNTER
What type of home care? If it's help with bathing, laundry, etc - then CC referral should be placed. JH

## 2022-02-21 NOTE — TELEPHONE ENCOUNTER
Patient's daughter in law called about the pt. She is concerned because the patient has not been showering. Patient is also not allowing them to assist her with showering/bathing. They were hoping to start some sort of home care. Forwarding to PCP to see if an order can be placed. PAL can reach out to patient's care takers if an additional information or appointment is needed.       Beau Wright, [facility]  [phone]  February 21, 2022, 1:37 PM

## 2022-02-23 NOTE — TELEPHONE ENCOUNTER
"Called pt daughter-in-law requesting clarification on home care needs. Pt is no longer showering, when asked daughter-in-law says, \"because.\" Pt refuses family to assist with showering. Also states that, \"as a whole, the family is having a hard time with managing medications and care for both mom and her .\" Pt family requesting orders for home care to assist with daily needs and medication administration.     Sina RAM RN    "

## 2022-02-24 ENCOUNTER — NURSE TRIAGE (OUTPATIENT)
Dept: FAMILY MEDICINE | Facility: CLINIC | Age: 87
End: 2022-02-24
Payer: COMMERCIAL

## 2022-02-24 NOTE — TELEPHONE ENCOUNTER
"S: Unable to open patient to services due to capacity.    B: Referred to [facility] for intermittent skilled nursing care, physical therapy and/or speech therapy    A/R: Per Home Care representative, \"all of the companies we refer to are also at capacity\".     Recommend patient call own insurance to see what options there are.    Routing to Primary Care Provider and team to assist patient with this process.    Ro Anderson RN     "

## 2022-02-24 NOTE — TELEPHONE ENCOUNTER
Attempted to call pt regarding message below, LVMTCB. Also sent [email or web address] message.    Sina RAM RN

## 2022-02-24 NOTE — TELEPHONE ENCOUNTER
Called pt daughter-in-law regarding referral placed. No further questions or concerns at this time.     Sina RAM RN

## 2022-03-09 ENCOUNTER — TRANSFERRED RECORDS (OUTPATIENT)
Dept: HEALTH INFORMATION MANAGEMENT | Facility: CLINIC | Age: 87
End: 2022-03-09
Payer: COMMERCIAL

## 2022-03-19 NOTE — MR AVS SNAPSHOT
After Visit Summary   2017    Indy Reyes    MRN: [id number]           Patient Information     Date Of Birth          1931        Visit Information        Provider Department      2017 11:00 AM Rn, [facility]        Care Instructions    Preparing for Your Surgery      Name:  Indy Reyes   MRN:  [id number]   :  1931   Today's Date:  2017     Arriving for surgery:  Surgery date:  17  Surgery time:  4:10 pm  Arrival time:  2:10 pm  Please come to:       [facility] [address].   Please note the following visitor restrictions: No visitors under 5 are allowed to visit. Also, visitors who are unvaccinated for measles and those who are currently ill are also asked to refrain from visiting  [address] [id number]    -   parking is available in front of the hospital from 5:15 am to 8:00 pm    -  Stop at the Information Desk in the lobby. Ask for a wheelchair if needed.    -   Inform the information person that you are here for surgery. An escort to 3c will be provided. If you would not like an escort, please proceed to 3C on the 3rd floor. [phone].  What can I eat or drink?   -  You may have solid food or milk products until 8 hours prior to your surgery- 8:10 am  -  You may have water, apple juice or 7up/Sprite until 2 hours prior to your surgery - 2:10 pm    Which medicines can I take? No Aspirin, vitamins, or supplements for 7 days before surgery. No Naproxen/Aleve for 2 days before surgery. No Ibuprofen for 24 hours before surgery.    -  It is OKAY to take these medications:   Flonase if needed.    How do I prepare myself?  -  Take two showers: one the night before surgery; and one the morning of surgery.         Use 1/2 bottle of Scrubcare to wash from neck down for each shower.  You may use your own shampoo and conditioner.   -  Do NOT use lotion, powder,  Pt ambulated to CHARISSA [facility] from [facility] with steady gait.  On monitor, call light in reach. Chart up for ERP.   deodorant, or hair spray/gel on the day of your surgery.  -  Do NOT wear any makeup, fingernail polish or jewelry.    -Do not bring your own medications to the hospital.  -  Bring your ID and insurance card.    Questions or Concerns:  If you have questions or concerns, please call the  Preoperative Assessment Center, Monday-Friday 7AM-7PM:  [phone]    AFTER YOUR SURGERY  Breathing exercises   Breathing exercises help you recover faster. Take deep breaths and let the air out slowly. This will:     Help you wake up after surgery.    Help prevent complications like pneumonia.  Preventing complications will help you go home sooner.   We may give you a breathing device (incentive spirometer) to encourage you to breathe deeply.   Nausea and vomiting   You may feel sick to your stomach after surgery; if so, let your nurse know.    Pain control:  After surgery, you may have pain. Our goal is to help you manage your pain. Pain medicine will help you feel comfortable enough to do activities that will help you heal.  These activities may include breathing exercises, walking and physical therapy.   To help your health care team treat your pain we will ask: 1) If you have pain  2) where it is located 3) describe your pain in your words  Methods of pain control include medications given by mouth, vein or by nerve block for some surgeries.  We may give you a pain control pump that will:  1) Deliver the medicine through a tube placed in your vein  2) Control the amount of medicine you receive  3) Allow you to push a button to deliver a dose of pain medicine  Sequential Compression Device (SCD) or Pneumo Boots:  You may need to wear SCD S on your legs or feet. These are wraps connected to a machine that pumps in air and releases it. The repeated pumping helps prevent blood clots from forming.                   Follow-ups after your visit        Your next 10 appointments already scheduled     Apr 25, 2017 10:40 AM MELQUIADES   (Arrive  by 10:25 AM)   PAC Anesthesia Consult with  Pac Anesthesiologist   [facility] ([facility])    [address]  4th Floor  [facility] [address] [phone]            Apr 25, 2017 11:00 AM CDT   (Arrive by 10:45 AM)   PAC RN ASSESSMENT with Yecenia Pac Rn   [facility] Preoperative [facility] [place] ([facility])    [address]  4th Floor  [facility] [address] [phone]            Apr 25, 2017 11:30 AM CDT   LAB with YECENIA LAB   [facility] ([facility])    [address]  1st Floor  [facility] [address] [phone]           Patient must bring picture ID.  Patient should be prepared to give a urine specimen  Please do not eat 10-12 hours before your appointment if you are coming in fasting for labs on lipids, cholesterol, or glucose (sugar).  Pregnant women should follow their Care Team instructions. Water with medications is okay. Do not drink coffee or other fluids.   If you have concerns about taking  your medications, please ask at office or if scheduling via [employer], send a message by clicking on Secure Messaging, Message Your Care Team.            Apr 28, 2017   Procedure with Fady Woo MD   [facility], [place], Same Day Surgery (--)    [address] [place] [id number] [phone]              Who to contact     Please call your clinic at [phone] to:    Ask questions about your health    Make or cancel appointments    Discuss your medicines    Learn about your test results    Speak to your doctor   If you have compliments or concerns about an experience at your clinic, or if you wish to file a complaint, please contact [facility] Patient Relations at [phone] or email us at [email or web address]         Additional Information About Your Visit        [employer] Information     [employer] gives you secure access to your  electronic health record. If you see a primary care provider, you can also send messages to your care team and make appointments. If you have questions, please call your primary care clinic.  If you do not have a primary care provider, please call [phone] and they will assist you.      [employer] is an electronic gateway that provides easy, online access to your medical records. With [employer], you can request a clinic appointment, read your test results, renew a prescription or communicate with your care team.     To access your existing account, please contact your [facility] or call [phone] for assistance.        Care EveryWhere ID     This is your Care EveryWhere ID. This could be used by other organizations to access your [place] medical records  [id number]         Blood Pressure from Last 3 Encounters:   04/25/17 147/77   11/30/16 116/64   11/16/16 110/62    Weight from Last 3 Encounters:   04/25/17 53.3 kg (117 lb 6.4 oz)   11/30/16 51.7 kg (114 lb)   11/16/16 53.1 kg (117 lb)              Today, you had the following     No orders found for display         Today's Medication Changes          These changes are accurate as of: 4/25/17  9:48 AM.  If you have any questions, ask your nurse or doctor.               These medicines have changed or have updated prescriptions.        Dose/Directions    fluticasone 50 MCG/ACT spray   Commonly known as:  FLONASE   This may have changed:    - when to take this  - reasons to take this   Used for:  Postnasal drip        Dose:  2 spray   Spray 2 sprays into both nostrils daily   Quantity:  16 g   Refills:  6                Primary Care Provider Office Phone # Fax #    Batool Kulkarni [phone]       [facility] [address] [address]        Thank you!     Thank you for choosing [facility]  for your care. Our goal is always to provide you with excellent  care. Hearing back from our patients is one way we can continue to improve our services. Please take a few minutes to complete the written survey that you may receive in the mail after your visit with us. Thank you!             Your Updated Medication List - Protect others around you: Learn how to safely use, store and throw away your medicines at www.disposemymeds.org.          This list is accurate as of: 4/25/17  9:48 AM.  Always use your most recent med list.                   Brand Name Dispense Instructions for use    fluticasone 50 MCG/ACT spray    FLONASE    16 g    Spray 2 sprays into both nostrils daily       * OCUVITE ADULT 50+ Caps      Take 2 capsules by mouth every morning       * ICAPS AREDS FORMULA Tabs      Take 2 capsules by mouth every morning       * MULTIVITAMIN ADULTS 50+ Tabs      Take 2 capsules by mouth every morning       * Notice:  This list has 3 medication(s) that are the same as other medications prescribed for you. Read the directions carefully, and ask your doctor or other care provider to review them with you.

## 2022-03-23 ENCOUNTER — TELEPHONE (OUTPATIENT)
Dept: FAMILY MEDICINE | Facility: CLINIC | Age: 87
End: 2022-03-23
Payer: COMMERCIAL

## 2022-03-23 NOTE — TELEPHONE ENCOUNTER
Reason for Call:  Form, our goal is to have forms completed with 72 hours, however, some forms may require a visit or additional information.    Type of letter, form or note:  medical    Who is the form from?: [facility] (if other please explain)    Where did the form come from: Patient or family brought in       What clinic location was the form placed at?: [facility]     Where the form was placed: Dr. Kulkarni Box/Folder    What number is listed as a contact on the form?: [phone]       Additional comments: Fax back to [phone] with records requested.    Call taken on 3/23/2022 at 8:47 AM by Lulu El MA

## 2022-03-29 ENCOUNTER — TELEPHONE (OUTPATIENT)
Dept: FAMILY MEDICINE | Facility: CLINIC | Age: 87
End: 2022-03-29
Payer: COMMERCIAL

## 2022-03-29 DIAGNOSIS — H35.30 MACULAR DEGENERATION (SENILE) OF RETINA: Primary | ICD-10-CM

## 2022-03-29 NOTE — TELEPHONE ENCOUNTER
Cristin RN Director of Health Services [facility] living in [place]  [phone] calling for orders on Mary.     Admitting to services, they will be administering medications. Cristin says they received current medication list from us. But there are other medications she has been taking that they would like her to continue to get. However they would need an order to administer these.    They are requestin. OTC Système complete eye drops, 2 drops to both eyes in am and once daily prn.     2. Tpyt129 5% strength ointment--apply 1/4 inch to both eyes at HS.     3. Visiclear (or comparable eye vitamin like preservation or ocuvite) once daily, oral.    4. Osteobiflex 2 tabs by mouth daily.     5. Vit D3 1000 units 1 tab by mouth daily.     I have pended orders as best I am able. I have selected no print out. However I am not sure if this is better done under historical medications?     Routing to PCP to review. Triage or  to send completed medication list to facility. Cristin did say verbal order is okay and can call above number.     Thank you,  Shi Solis R.N.

## 2022-03-30 ENCOUNTER — TELEPHONE (OUTPATIENT)
Dept: FAMILY MEDICINE | Facility: CLINIC | Age: 87
End: 2022-03-30
Payer: COMMERCIAL

## 2022-03-30 RX ORDER — PROPYLENE GLYCOL 0.06 MG/ML
2 SOLUTION/ DROPS OPHTHALMIC DAILY
Start: 2022-03-30 | End: 2022-06-01

## 2022-03-30 RX ORDER — SODIUM CHLORIDE 5 %
OINTMENT (GRAM) OPHTHALMIC (EYE)
Start: 2022-03-30 | End: 2022-06-01

## 2022-03-30 RX ORDER — GLUCOSAMINE/D3/BOSWELLIA SERRA 1500MG-400
2 TABLET ORAL DAILY
Start: 2022-03-30

## 2022-03-30 NOTE — TELEPHONE ENCOUNTER
Reason for Call:  Form, our goal is to have forms completed with 72 hours, however, some forms may require a visit or additional information.    Type of letter, form or note:  medical    Who is the form from?: [facility] (if other please explain)    Where did the form come from: form was faxed in    What clinic location was the form placed at?: [facility]     Where the form was placed: Dr. Kulkarni Box/Folder    What number is listed as a contact on the form?: [phone]       Additional comments: fax back to [phone]    Call taken on 3/30/2022 at 10:22 AM by Lulu El MA

## 2022-03-30 NOTE — TELEPHONE ENCOUNTER
Reason for Call:  Other visit notes     Detailed comments: Robin Campuzano is calling for patient visit notes. Fax [phone]    Phone Number Patient can be reached at: Cell number on file:    Telephone Information:   Mobile [phone]    or Other phone number:  [phone]*    Best Time: any    Can we leave a detailed message on this number? YES    Call taken on 3/30/2022 at 8:47 AM by Marielle Jiang

## 2022-03-30 NOTE — TELEPHONE ENCOUNTER
Reason for Call:  Form, our goal is to have forms completed with 72 hours, however, some forms may require a visit or additional information.    Type of letter, form or note:  medical    Who is the form from?: Home care    Where did the form come from: form was faxed in    What clinic location was the form placed at?: [facility]     Where the form was placed: Dr. Kulkarni folder    What number is listed as a contact on the form?: [phone]       Additional comments: Please sign/date. Fax back to [phone]    Call taken on 3/30/2022 at 10:36 AM by Lulu El MA

## 2022-03-30 NOTE — TELEPHONE ENCOUNTER
Records printed and in Dr. Kulkarni folder along with orders for speech therapy and Occupational therapy.  Lulu El,

## 2022-03-31 ENCOUNTER — TELEPHONE (OUTPATIENT)
Dept: FAMILY MEDICINE | Facility: CLINIC | Age: 87
End: 2022-03-31
Payer: COMMERCIAL

## 2022-03-31 NOTE — TELEPHONE ENCOUNTER
Reason for Call:  Form, our goal is to have forms completed with 72 hours, however, some forms may require a visit or additional information.    Type of letter, form or note:  orders    Who is the form from?: Home care    Where did the form come from: form was faxed in    What clinic location was the form placed at?: [facility]     Where the form was placed: Dr Kulkarni  Box/Folder    What number is listed as a contact on the form?: [phone]       Additional comments: please sign.date and fax to [phone]    Call taken on 3/31/2022 at 7:51 AM by Julia Phillips

## 2022-04-01 ENCOUNTER — MYC MEDICAL ADVICE (OUTPATIENT)
Dept: FAMILY MEDICINE | Facility: CLINIC | Age: 87
End: 2022-04-01
Payer: COMMERCIAL

## 2022-04-01 ENCOUNTER — TELEPHONE (OUTPATIENT)
Dept: FAMILY MEDICINE | Facility: CLINIC | Age: 87
End: 2022-04-01
Payer: COMMERCIAL

## 2022-04-01 NOTE — TELEPHONE ENCOUNTER
Reason for Call:  Form, our goal is to have forms completed with 72 hours, however, some forms may require a visit or additional information.    Type of letter, form or note:  POLST    Who is the form from?: Patient son sent through [email or web address] (if other please explain)    Where did the form come from: Son sent through [email or web address]    What clinic location was the form placed at?: [facility]     Where the form was placed: Dr. Kulkarni Box/Folder    What number is listed as a contact on the form?:      Additional comments:     Call taken on 4/1/2022 at 2:09 PM by Lulu El MA    Son Nasir (not on consent to communicate and does not have [email or web address] proxy) had called in earlier today asking about how paperwork gets filled out as his mother Mary is now needing further services and that [facility] Perry needs this completed prior to getting these services. I explained to him depending on what it is sometimes it requires an appointment for discussion. I rec'd forms and ran this by another provider as Dr. Kulkarni is out of clinic. She states it depends on if patient is able to make the decisions on the POLST form. I called the [facility] Perry and spoke with Cristin RN who states she is not able to fill out form as she has significant cognitive decline and that her kids do all the decision making. She states that this is not holding back her care as the son told me it was, admit orders were already rec'd.    I spoke with Nasir to let him know status of this and that his mothers cares are not being delayed due to this paperwork. Also he said both and his brother GEOVANNI.    Dr. Kulkarni do you want a visit scheduled to complete the POLST? F2F or virtual?

## 2022-04-07 NOTE — TELEPHONE ENCOUNTER
No CTC with son as mentioned below, CTC on file for pt daughter in law Ro, called and LVMTCB.    Sina RAM RN

## 2022-04-07 NOTE — TELEPHONE ENCOUNTER
If there is no one at [facility] that can sign POLST, we can schedule a virtual visit to discuss - she should have POA with her at the time. ROSAURA

## 2022-04-08 NOTE — TELEPHONE ENCOUNTER
Pt daughter calling in regarding message below. Informed daughter pt sons are not on CTC form. Advised pt daughter to submit POA form via [employer] to have on file to speak with sons, verbalized understanding. Scheduled VV with PCP on 4/18/22. No further questions.    Sina RAM RN

## 2022-04-18 ENCOUNTER — VIRTUAL VISIT (OUTPATIENT)
Dept: FAMILY MEDICINE | Facility: CLINIC | Age: 87
End: 2022-04-18
Payer: COMMERCIAL

## 2022-04-18 DIAGNOSIS — H35.30 MACULAR DEGENERATION (SENILE) OF RETINA: Primary | ICD-10-CM

## 2022-04-18 DIAGNOSIS — R41.3 MEMORY CHANGES: ICD-10-CM

## 2022-04-18 PROCEDURE — 99213 OFFICE O/P EST LOW 20 MIN: CPT | Mod: 95 | Performed by: FAMILY MEDICINE

## 2022-04-18 ASSESSMENT — PATIENT HEALTH QUESTIONNAIRE - PHQ9
SUM OF ALL RESPONSES TO PHQ QUESTIONS 1-9: 20
SUM OF ALL RESPONSES TO PHQ QUESTIONS 1-9: 20
10. IF YOU CHECKED OFF ANY PROBLEMS, HOW DIFFICULT HAVE THESE PROBLEMS MADE IT FOR YOU TO DO YOUR WORK, TAKE CARE OF THINGS AT HOME, OR GET ALONG WITH OTHER PEOPLE: EXTREMELY DIFFICULT

## 2022-04-18 NOTE — PROGRESS NOTES
Mary is a 89 year old who is being evaluated via a billable video visit.      How would you like to obtain your AVS? MyChart  If the video visit is dropped, the invitation should be resent by: Text to cell phone: [phone]  Will anyone else be joining your video visit? No       Video Start Time: 11:15 AM    Assessment & Plan     1. Macular degeneration (senile) of retina -  Mary would benefit from have a home health evaluation due to her chronic conditions. She would also benefit from having an HHA to help with ADLs.   - Home Care Referral  - Primary Care - Care Coordination Referral; Future    2. Memory changes  - Home Care Referral  - Primary Care - Care Coordination Referral; Future     POLST completed today    Return in about 11 months (around 3/18/2023) for Yearly Preventive Exam.    Batool Kulkarni MD  [facility]    Gerardo Hernandez is a 89 year old who presents for the following health issues  accompanied by her daughter.    History of Present Illness       Mental Health Follow-up:                    Today's PHQ-9         PHQ-9 Total Score: 20  PHQ-9 Q9 Thoughts of better off dead/self-harm past 2 weeks :   Not at all    How difficult have these problems made it for you to do your work, take care of things at home, or get along with other people: Extremely difficult        Reason for visit:  To get support in home services  Symptom onset:  More than a month  Symptoms include:  Memory  Symptom intensity:  Severe  Symptom progression:  Worsening  Had these symptoms before:  Yes  What makes it worse:  Over stimulation & isolation  What makes it better:  N/a    She eats 2-3 servings of fruits and vegetables daily.She consumes 0 sweetened beverage(s) daily.She exercises with enough effort to increase her heart rate 9 or less minutes per day.  She exercises with enough effort to increase her heart rate 3 or less days per week. She is missing 2 dose(s) of medications per week.  She is  "not taking prescribed medications regularly due to remembering to take.       Family recently transitions Mary and her  to an [facility]. Working on getting home services to help with bathing, dressing, etc.     Mary's macular degeneration and dementia are progressing. Her 's health is failing, so she doesn't have help around the home. Family all working so unable to help regularly.       Review of Systems   Constitutional, HEENT, cardiovascular, pulmonary, gi and gu systems are negative, except as otherwise noted.      Objective    Vitals - Patient Reported  Weight (Patient Reported): 51.7 kg (114 lb)  Height (Patient Reported): 157.5 cm (5' 2\")  BMI (Based on Pt Reported Ht/Wt): 20.85        Physical Exam   GENERAL: Healthy, alert and no distress          Video-Visit Details    Type of service:  Video Visit    Video End Time: 11:40 AM    Originating Location (pt. Location): Home    Distant Location (provider location):  [facility]     Platform used for Video Visit: Rickey  "

## 2022-04-19 ENCOUNTER — TELEPHONE (OUTPATIENT)
Dept: FAMILY MEDICINE | Facility: CLINIC | Age: 87
End: 2022-04-19
Payer: COMMERCIAL

## 2022-04-19 ENCOUNTER — MEDICAL CORRESPONDENCE (OUTPATIENT)
Dept: HEALTH INFORMATION MANAGEMENT | Facility: CLINIC | Age: 87
End: 2022-04-19
Payer: COMMERCIAL

## 2022-04-19 ENCOUNTER — PATIENT OUTREACH (OUTPATIENT)
Dept: CARE COORDINATION | Facility: CLINIC | Age: 87
End: 2022-04-19
Payer: COMMERCIAL

## 2022-04-19 NOTE — LETTER
[facility] CARE COORDINATION  [facility]  April 20, 2022    Indy Reyes  [address] [address]  [facility]      Dear Indy,    I am a clinic community health worker who works with Batool Kulkarni MD at the [facility]. I wanted to introduce myself and provide you with my contact information for you to be able to call me with any questions or concerns. Below is a description of clinic care coordination and how I can further assist you.      The clinic care coordination team is made up of a registered nurse,  and community health worker who understand the health care system. The goal of clinic care coordination is to help you manage your health and improve access to the health care system in the most efficient manner. The team can assist you in meeting your health care goals by providing education, coordinating services, strengthening the communication among your providers and supporting you with any resource needs.    Please feel free to contact me at [phone] with any questions or concerns. We are focused on providing you with the highest-quality healthcare experience possible and that all starts with you.       Sincerely,     TASH Mejia. Public Health  Community Health Worker  [facility]  Clinic Care Coordination  [phone]

## 2022-04-19 NOTE — TELEPHONE ENCOUNTER
Reason for Call: Request for an order or referral:    Order or referral being requested: home care skilled nursing evaluation and treat for safety with medications. OT evaluation and treat for cognitive assessment and safety ADLs and visual strategies. Home health aid for showers. Once a week for 4 weeks and they will go from there. Leave detailed message if no answer. Name and title also.     Date needed: as soon as possible    Has the patient been seen by the PCP for this problem? NO    Additional comments: please call with orders    Phone number Patient can be reached at:  Other phone number:  [facility] [phone]    Best Time:  any    Can we leave a detailed message on this number?  YES    Call taken on 4/19/2022 at 10:00 AM by Sarai Richardson

## 2022-04-19 NOTE — PROGRESS NOTES
Clinic Care Coordination Contact  [facility]/Voicemail       Clinical Data: Care Coordinator Outreach  Outreach attempted x 1.  Left message on patient's voicemail with call back information and requested return call.     Chart review: Reason for Referral: Care Transition    Transition: Other - Use Comments    Clinical Staff have discussed the Care Coordination Referral with the patient and/or caregiver: Yes      Comments  Needs HHA resources, can insurance cover these? Please contact Ro - daughter in law  Notes  CHW, looks like home care and HHA was ordered. Covered under Medicare.      Plan: Care Coordinator will try to reach patient again in 1-2 business days.    JUAN JOSE Mejia.S. Public Health  Community Health Worker  [place] [facility]  Clinic Care Coordination  [phone]

## 2022-04-19 NOTE — TELEPHONE ENCOUNTER
Reason for Call:  Other     Detailed comments:BRENDA WANTS TO ADD ONE FOLLOW UP PHYSICAL THERAPY VISIT TO ASSESS ALL IS WELL    Phone Number Patient can be reached at: Other phone number:  [phone]    Best Time: ANYTIME    Can we leave a detailed message on this number? YES    Call taken on 4/19/2022 at 10:23 AM by Nevaeh Rodriguez

## 2022-04-20 NOTE — PROGRESS NOTES
Clinic Care Coordination Contact  [facility]/Voicemail  Writer received a VM from patient's daughter in-law Ro.       Clinical Data: Care Coordinator Outreach  Outreach attempted x 2.  Left message on patient's voicemail with call back information and requested return call.    Plan: Care Coordinator will send care coordination introduction letter with care coordinator contact information and explanation of care coordination services via [employer]. Care Coordinator will do no further outreaches at this time.    TASH Mejia. Public Health  Community Health Worker  [place] [facility]  Clinic Care Coordination  [phone]

## 2022-04-20 NOTE — TELEPHONE ENCOUNTER
Carola, PT, with [facility] requesting VO for:    OT eval and treat    Skilled nurse to skill and treat    Home health aide 1x per week for 4 weeks     PT x1 visit     Verbal okay given, will route to PCP.     Sina RAM RN

## 2022-04-21 ENCOUNTER — TELEPHONE (OUTPATIENT)
Dept: FAMILY MEDICINE | Facility: CLINIC | Age: 87
End: 2022-04-21
Payer: COMMERCIAL

## 2022-04-21 ENCOUNTER — PATIENT OUTREACH (OUTPATIENT)
Dept: NURSING | Facility: CLINIC | Age: 87
End: 2022-04-21
Payer: COMMERCIAL

## 2022-04-21 NOTE — LETTER
[facility] CARE COORDINATION  [facility]  April 21, 2022    Indy Reyes  [address] [address] [id number]  [facility]      Dear Indy,    I am a clinic community health worker who works with Batool Kulkarni MD at the [facility]. I wanted to thank you for spending the time to talk with me.  Below is a description of clinic care coordination and how I can further assist you.      The clinic care coordination team is made up of a registered nurse,  and community health worker who understand the health care system. The goal of clinic care coordination is to help you manage your health and improve access to the health care system in the most efficient manner. The team can assist you in meeting your health care goals by providing education, coordinating services, strengthening the communication among your providers and supporting you with any resource needs.    Please feel free to contact me at [phone] with any questions or concerns. We are focused on providing you with the highest-quality healthcare experience possible and that all starts with you.       Sincerely,       TASH Mejia. Public Health  Community Health Worker  [facility]  Clinic Care Coordination  [phone]

## 2022-04-21 NOTE — PROGRESS NOTES
Clinic Care Coordination Contact  Community Health Worker Initial Outreach  Ro left  for writer on 4/20 requesting a return call.     Writer reached out to patient's daughter in-law, Ro ([facility]), unable to do CHW initial assessment as Ro was going into work.      Chart review: reason for referral: Care Transition, Other - use comments.   Clinical staff discussed the Care Coordination Referral with the patient and/or caregiver: yes.        Patient accepts CC: No, Enrolled in home care. Patient will be sent Care Coordination introduction letter for future reference.     4-21, CHW:    Writer was able to connect with Ro. Ro discussed that right now the patient's family is assisting with ADL's (showering etc.).     Ro has been in contact with [facility] regarding OT, however was not sure if a Home Health Aid was going to be coming to the home. Upon chart review, a Home Health Aid should be coming 1x per week for 4 weeks.     Writer gave patient [facility] P. Number to call and set up a schedule.     Writer let the patient know that she can connect with the  at [facility] for or speak with CC RN to discuss community resources/future planning.     Ro stated that she will connect with [facility] and reach back out if she would like to discuss things further with Care Coordination. Ro had no other questions or concerns.       TASH Mejia. Public Health  Community Health Worker  [facility]  Clinic Care Coordination  [phone]

## 2022-04-21 NOTE — TELEPHONE ENCOUNTER
[facility] calling requesting VO for:     OT 1x week for 4 weeks for home safety and low vision strategies     Social work eval for community resources and future planning     Update - cognitive screen SLUMS test was given, tested 2/30 which indicates dementia. Inquiring if pt has referral placed for Neurology. RN reviewed chart, neurology referral placed and pt scheduled appt for 6/2/22.    Verbal okay given, will route to PCP.     Sina RAM RN

## 2022-04-27 ENCOUNTER — DOCUMENTATION ONLY (OUTPATIENT)
Dept: OTHER | Facility: CLINIC | Age: 87
End: 2022-04-27
Payer: COMMERCIAL

## 2022-05-02 ENCOUNTER — TELEPHONE (OUTPATIENT)
Dept: FAMILY MEDICINE | Facility: CLINIC | Age: 87
End: 2022-05-02
Payer: COMMERCIAL

## 2022-05-02 NOTE — TELEPHONE ENCOUNTER
Reason for Call:  Form, our goal is to have forms completed with 72 hours, however, some forms may require a visit or additional information.    Type of letter, form or note:  orders    Who is the form from?: Home care    Where did the form come from: form was faxed in    What clinic location was the form placed at?: [facility]     Where the form was placed: Dr Kulkarni Box/Folder    What number is listed as a contact on the form?: [phone]       Additional comments: please sign/date and fax to [phone]    Call taken on 5/2/2022 at 8:09 AM by Julia Phillips

## 2022-05-04 ENCOUNTER — MEDICAL CORRESPONDENCE (OUTPATIENT)
Dept: HEALTH INFORMATION MANAGEMENT | Facility: CLINIC | Age: 87
End: 2022-05-04
Payer: COMMERCIAL

## 2022-05-18 ENCOUNTER — TELEPHONE (OUTPATIENT)
Dept: FAMILY MEDICINE | Facility: CLINIC | Age: 87
End: 2022-05-18
Payer: COMMERCIAL

## 2022-05-18 ENCOUNTER — TELEPHONE (OUTPATIENT)
Dept: NEUROLOGY | Facility: CLINIC | Age: 87
End: 2022-05-18
Payer: COMMERCIAL

## 2022-05-18 NOTE — TELEPHONE ENCOUNTER
Patient's daughter-in-law called to see if 6/2/22 appointment could be moved up any sooner. The family is having some concerns with the patients current state.

## 2022-05-18 NOTE — TELEPHONE ENCOUNTER
Reason for Call:  Form, our goal is to have forms completed with 72 hours, however, some forms may require a visit or additional information.    Type of letter, form or note:  medical    Who is the form from?: [facility] (if other please explain)    Where did the form come from: form was faxed in    What clinic location was the form placed at?: [facility]     Where the form was placed: Dr. Kulkarni Box/Folder    What number is listed as a contact on the form?: [phone]       Additional comments: [phone]    Call taken on 5/18/2022 at 3:14 PM by Lulu El MA

## 2022-05-19 ENCOUNTER — MEDICAL CORRESPONDENCE (OUTPATIENT)
Dept: HEALTH INFORMATION MANAGEMENT | Facility: CLINIC | Age: 87
End: 2022-05-19
Payer: COMMERCIAL

## 2022-05-19 NOTE — TELEPHONE ENCOUNTER
Forms need to be signed today- please see if another provider can sign    PCP out of office. Forms in Dr Kulkarni folder at [facility]     Julia Phillips/

## 2022-05-24 ENCOUNTER — APPOINTMENT (OUTPATIENT)
Dept: MRI IMAGING | Facility: CLINIC | Age: 87
DRG: 054 | End: 2022-05-24
Attending: EMERGENCY MEDICINE
Payer: COMMERCIAL

## 2022-05-24 ENCOUNTER — HOSPITAL ENCOUNTER (INPATIENT)
Facility: CLINIC | Age: 87
LOS: 2 days | Discharge: HOME | DRG: 054 | End: 2022-05-26
Attending: EMERGENCY MEDICINE | Admitting: STUDENT IN AN ORGANIZED HEALTH CARE EDUCATION/TRAINING PROGRAM
Payer: COMMERCIAL

## 2022-05-24 ENCOUNTER — TELEPHONE (OUTPATIENT)
Dept: FAMILY MEDICINE | Facility: CLINIC | Age: 87
End: 2022-05-24
Payer: COMMERCIAL

## 2022-05-24 DIAGNOSIS — R53.1 RIGHT SIDED WEAKNESS: ICD-10-CM

## 2022-05-24 DIAGNOSIS — G93.89 BRAIN MASS: ICD-10-CM

## 2022-05-24 LAB
ALBUMIN UR-MCNC: NEGATIVE MG/DL
ANION GAP SERPL CALCULATED.3IONS-SCNC: 6 MMOL/L (ref 3–14)
APPEARANCE UR: CLEAR
ATRIAL RATE - MUSE: 75 BPM
BASOPHILS # BLD AUTO: 0.1 10E3/UL (ref 0–0.2)
BASOPHILS NFR BLD AUTO: 1 %
BILIRUB UR QL STRIP: NEGATIVE
BUN SERPL-MCNC: 22 MG/DL (ref 7–30)
CALCIUM SERPL-MCNC: 8.7 MG/DL (ref 8.5–10.1)
CHLORIDE BLD-SCNC: 106 MMOL/L (ref 94–109)
CO2 SERPL-SCNC: 28 MMOL/L (ref 20–32)
COLOR UR AUTO: NORMAL
CREAT SERPL-MCNC: 0.67 MG/DL (ref 0.52–1.04)
DIASTOLIC BLOOD PRESSURE - MUSE: NORMAL MMHG
EOSINOPHIL # BLD AUTO: 0.1 10E3/UL (ref 0–0.7)
EOSINOPHIL NFR BLD AUTO: 2 %
ERYTHROCYTE [DISTWIDTH] IN BLOOD BY AUTOMATED COUNT: 15.3 % (ref 10–15)
GFR SERPL CREATININE-BSD FRML MDRD: 82 ML/MIN/1.73M2
GLUCOSE BLD-MCNC: 95 MG/DL (ref 70–99)
GLUCOSE UR STRIP-MCNC: NEGATIVE MG/DL
HCT VFR BLD AUTO: 38.2 % (ref 35–47)
HGB BLD-MCNC: 12.1 G/DL (ref 11.7–15.7)
HGB UR QL STRIP: NEGATIVE
IMM GRANULOCYTES # BLD: 0 10E3/UL
IMM GRANULOCYTES NFR BLD: 0 %
INTERPRETATION ECG - MUSE: NORMAL
KETONES UR STRIP-MCNC: NEGATIVE MG/DL
LEUKOCYTE ESTERASE UR QL STRIP: NEGATIVE
LYMPHOCYTES # BLD AUTO: 1.3 10E3/UL (ref 0.8–5.3)
LYMPHOCYTES NFR BLD AUTO: 22 %
MCH RBC QN AUTO: 26.8 PG (ref 26.5–33)
MCHC RBC AUTO-ENTMCNC: 31.7 G/DL (ref 31.5–36.5)
MCV RBC AUTO: 85 FL (ref 78–100)
MONOCYTES # BLD AUTO: 0.6 10E3/UL (ref 0–1.3)
MONOCYTES NFR BLD AUTO: 9 %
NEUTROPHILS # BLD AUTO: 3.9 10E3/UL (ref 1.6–8.3)
NEUTROPHILS NFR BLD AUTO: 66 %
NITRATE UR QL: NEGATIVE
NRBC # BLD AUTO: 0 10E3/UL
NRBC BLD AUTO-RTO: 0 /100
P AXIS - MUSE: 82 DEGREES
PH UR STRIP: 6 [PH] (ref 5–7)
PLATELET # BLD AUTO: 170 10E3/UL (ref 150–450)
POTASSIUM BLD-SCNC: 4 MMOL/L (ref 3.4–5.3)
PR INTERVAL - MUSE: 158 MS
QRS DURATION - MUSE: 68 MS
QT - MUSE: 380 MS
QTC - MUSE: 424 MS
R AXIS - MUSE: 56 DEGREES
RBC # BLD AUTO: 4.52 10E6/UL (ref 3.8–5.2)
RBC URINE: 0 /HPF
SARS-COV-2 RNA RESP QL NAA+PROBE: NEGATIVE
SODIUM SERPL-SCNC: 140 MMOL/L (ref 133–144)
SP GR UR STRIP: 1.01 (ref 1–1.03)
SYSTOLIC BLOOD PRESSURE - MUSE: NORMAL MMHG
T AXIS - MUSE: 66 DEGREES
TROPONIN I SERPL HS-MCNC: 5 NG/L
UROBILINOGEN UR STRIP-MCNC: NORMAL MG/DL
VENTRICULAR RATE- MUSE: 75 BPM
WBC # BLD AUTO: 5.9 10E3/UL (ref 4–11)
WBC URINE: 1 /HPF

## 2022-05-24 PROCEDURE — A9585 GADOBUTROL INJECTION: HCPCS | Performed by: EMERGENCY MEDICINE

## 2022-05-24 PROCEDURE — 85014 HEMATOCRIT: CPT | Performed by: EMERGENCY MEDICINE

## 2022-05-24 PROCEDURE — 250N000011 HC RX IP 250 OP 636: Performed by: EMERGENCY MEDICINE

## 2022-05-24 PROCEDURE — C9803 HOPD COVID-19 SPEC COLLECT: HCPCS

## 2022-05-24 PROCEDURE — 99223 1ST HOSP IP/OBS HIGH 75: CPT | Performed by: PHYSICIAN ASSISTANT

## 2022-05-24 PROCEDURE — 96374 THER/PROPH/DIAG INJ IV PUSH: CPT

## 2022-05-24 PROCEDURE — 99285 EMERGENCY DEPT VISIT HI MDM: CPT | Mod: 25

## 2022-05-24 PROCEDURE — 36415 COLL VENOUS BLD VENIPUNCTURE: CPT | Performed by: EMERGENCY MEDICINE

## 2022-05-24 PROCEDURE — 80048 BASIC METABOLIC PNL TOTAL CA: CPT | Performed by: EMERGENCY MEDICINE

## 2022-05-24 PROCEDURE — 81001 URINALYSIS AUTO W/SCOPE: CPT | Performed by: EMERGENCY MEDICINE

## 2022-05-24 PROCEDURE — 255N000002 HC RX 255 OP 636: Performed by: EMERGENCY MEDICINE

## 2022-05-24 PROCEDURE — 93005 ELECTROCARDIOGRAM TRACING: CPT

## 2022-05-24 PROCEDURE — 120N000001 HC R&B MED SURG/OB

## 2022-05-24 PROCEDURE — 83036 HEMOGLOBIN GLYCOSYLATED A1C: CPT | Performed by: INTERNAL MEDICINE

## 2022-05-24 PROCEDURE — 70553 MRI BRAIN STEM W/O & W/DYE: CPT

## 2022-05-24 PROCEDURE — U0005 INFEC AGEN DETEC AMPLI PROBE: HCPCS | Performed by: EMERGENCY MEDICINE

## 2022-05-24 PROCEDURE — 99223 1ST HOSP IP/OBS HIGH 75: CPT | Mod: AI | Performed by: STUDENT IN AN ORGANIZED HEALTH CARE EDUCATION/TRAINING PROGRAM

## 2022-05-24 PROCEDURE — 84484 ASSAY OF TROPONIN QUANT: CPT | Performed by: EMERGENCY MEDICINE

## 2022-05-24 RX ORDER — GADOBUTROL 604.72 MG/ML
5 INJECTION INTRAVENOUS ONCE
Status: COMPLETED | OUTPATIENT
Start: 2022-05-24 | End: 2022-05-24

## 2022-05-24 RX ORDER — DEXAMETHASONE SODIUM PHOSPHATE 10 MG/ML
10 INJECTION, SOLUTION INTRAMUSCULAR; INTRAVENOUS ONCE
Status: COMPLETED | OUTPATIENT
Start: 2022-05-24 | End: 2022-05-24

## 2022-05-24 RX ADMIN — DEXAMETHASONE SODIUM PHOSPHATE 10 MG: 10 INJECTION, SOLUTION INTRAMUSCULAR; INTRAVENOUS at 22:32

## 2022-05-24 RX ADMIN — GADOBUTROL 5 ML: 604.72 INJECTION INTRAVENOUS at 20:02

## 2022-05-24 ASSESSMENT — ENCOUNTER SYMPTOMS
DYSURIA: 0
BLOOD IN STOOL: 0
HEADACHES: 0
WEAKNESS: 1
APPETITE CHANGE: 0
ABDOMINAL PAIN: 0
FREQUENCY: 0
NUMBNESS: 0

## 2022-05-24 ASSESSMENT — ACTIVITIES OF DAILY LIVING (ADL): ADLS_ACUITY_SCORE: 35

## 2022-05-24 NOTE — ED PROVIDER NOTES
History   Chief Complaint:  Stroke Symptoms       The history is provided by a relative.      Indy Reyes is a 89 year old female with history of dementia, hyperlipidemia, and hypothyroidism who presents with weakness.  About a month ago, she was also having difficulties lifting her right leg in order to get dressed. Her son feels this weakness has been fairly constant since then. Over the past week, the patient's family has noticed that her weakness has still not improved. Mary is right handed but has been using her left hand to support her right hand to brush her teeth and has also been needing two hands to drink out of a glass. Additionally, she has been dragging her right foot when she walks and has been stumbling more frequently. She does not use a walker or cane to ambulate. When they spoke with her PCP today, it was recommended she come to the ED for stroke evaluation. Previously the patient lived at home with her , but her  was recently hospitalized and he unfortunately passed away over the weekend. She is now living with one of her sons. Her son also mentions that for the past year she has been having difficulties with her speech but while in the ED, she feels her speech is at its baseline. She denies any changes in appetite, changes in vision, chest pain, abdominal pain, bloody stools, dysuria, urinary frequency, urinary urgency, extremity numbness, or headache.     Review of Systems   Constitutional: Negative for appetite change.   Eyes: Negative for visual disturbance.   Cardiovascular: Negative for chest pain.   Gastrointestinal: Negative for abdominal pain and blood in stool.   Genitourinary: Negative for dysuria, frequency and urgency.   Musculoskeletal: Positive for gait problem.   Neurological: Positive for weakness. Negative for numbness and headaches.   All other systems reviewed and are negative.      Allergies:  Tetracycline    Medications:  Levothyroxine   Vitamin  "D3  Osteo Bi-Flex  Multivitamin     Past Medical History:     Hyperlipidemia  Osteopenia  Thrombocytopenia  Thyroid nodule  Rhinitis  Right lumbar radiculopathy   Subclinical hypothyroidism   Dyspepsia  Macular degeneration   Dementia     Past Surgical History:    Appendectomy  Bilateral cataract removal   Right L4-L5 foraminotomy lumbar posterior   Tonsillectomy      Family History:    Father: prostate cancer  Brother: heart disease  Sister: breast cancer, stroke, diabetes      Social History:  The patient presents to the ED with her son. Her  passed away on 5/21.       Physical Exam     Patient Vitals for the past 24 hrs:   BP Temp Temp src Pulse Resp SpO2 Height Weight   05/25/22 0015 (!) 140/62 -- -- -- -- 97 % -- --   05/24/22 2130 (!) 160/78 -- -- -- -- 98 % -- --   05/24/22 1952 -- -- -- -- -- 98 % -- --   05/24/22 1951 (!) 152/125 -- -- 77 -- -- -- --   05/24/22 1743 (!) 155/70 98.2  F (36.8  C) Temporal 86 18 99 % 1.575 m (5' 2\") 51.7 kg (114 lb)       Physical Exam    Physical Exam   Constitutional:  Patient is oriented to self. They appear well-developed and well-nourished.   HENT:   Mouth/Throat:   Oropharynx is clear and moist.   Eyes:    Conjunctivae normal and EOM are normal. Pupils are equal, round, and reactive to light.   Neck:    Normal range of motion.   Cardiovascular: Normal rate, regular rhythm and normal heart sounds.  Exam reveals no gallop and no friction rub.  No murmur heard.  Pulmonary/Chest:  Effort normal and breath sounds normal. Patient has no wheezes. Patient has no rales.   Abdominal:   Soft. Bowel sounds are normal. Patient exhibits no mass. There is no tenderness. There is no rebound and no guarding.   Musculoskeletal:  Normal range of motion. Patient exhibits no edema.   Neurological:   Patient is alert and oriented to self. Patient is generally weak. No cranial nerve deficit, motor or sensory deficit. No facial droop. Appears to have moderate cognitive deficit. Some mild " dysarthria. GCS 15. Equally weak  strength. No leg drift, no pronator drift.  Skin:   Skin is warm and dry. No rash noted. No erythema.   Psychiatric:   Patient has a normal mood      Emergency Department Course   ECG  ECG taken at 1840, ECG read at 1843  Normal sinus rhythm. Normal ECG.    Rate 75 bpm. AK interval 158 ms. QRS duration 68 ms. QT/QTc 380/424 ms. P-R-T axes 82 56 66.     Imaging:  MR Brain w/o & w Contrast   Final Result   IMPRESSION:   1.  Large enhancing extra-axial mass over the superior left cerebral convexity measuring up to 5.8 cm as detailed above. There is an additional smaller but similar nodular mass more anteriorly along the left cervical convexity measuring up to 1.6 cm.    These most likely represent meningiomas, however, other dural based masses would also be in the differential. There is also apparent mild dural thickening over the anterior left cerebral convexity.   2.  Underlying T2 hyperintense signal in the adjacent left frontal lobe by the large extra-axial mass. This may represent edema from mass effect although possibility of higher grade neoplasm should be considered.   3.  Marked mass effect on the underlying left cerebral hemisphere and left lateral ventricle. There is a 1.2 cm rightward midline shift. No evidence of hydrocephalus.   4.  Mild diffuse parenchymal volume loss and white matter changes most likely due to chronic microvascular ischemic disease.              Report per radiology    Laboratory:  Labs Ordered and Resulted from Time of ED Arrival to Time of ED Departure   CBC WITH PLATELETS AND DIFFERENTIAL - Abnormal       Result Value    WBC Count 5.9      RBC Count 4.52      Hemoglobin 12.1      Hematocrit 38.2      MCV 85      MCH 26.8      MCHC 31.7      RDW 15.3 (*)     Platelet Count 170      % Neutrophils 66      % Lymphocytes 22      % Monocytes 9      % Eosinophils 2      % Basophils 1      % Immature Granulocytes 0      NRBCs per 100 WBC 0      Absolute  Neutrophils 3.9      Absolute Lymphocytes 1.3      Absolute Monocytes 0.6      Absolute Eosinophils 0.1      Absolute Basophils 0.1      Absolute Immature Granulocytes 0.0      Absolute NRBCs 0.0     BASIC METABOLIC PANEL - Normal    Sodium 140      Potassium 4.0      Chloride 106      Carbon Dioxide (CO2) 28      Anion Gap 6      Urea Nitrogen 22      Creatinine 0.67      Calcium 8.7      Glucose 95      GFR Estimate 82     TROPONIN I - Normal    Troponin I High Sensitivity 5     ROUTINE UA WITH MICROSCOPIC REFLEX TO CULTURE - Normal    Color Urine Straw      Appearance Urine Clear      Glucose Urine Negative      Bilirubin Urine Negative      Ketones Urine Negative      Specific Gravity Urine 1.009      Blood Urine Negative      pH Urine 6.0      Protein Albumin Urine Negative      Urobilinogen Urine Normal      Nitrite Urine Negative      Leukocyte Esterase Urine Negative      RBC Urine 0      WBC Urine 1     COVID-19 VIRUS (CORONAVIRUS) BY PCR - Normal    SARS CoV2 PCR Negative          Emergency Department Course:             Reviewed:  I reviewed nursing notes, vitals and past medical history    Assessments:  1805 I obtained history and examined the patient as noted above.   2205 I rechecked the patient and explained findings.     Consults:  2158 I spoke with Goldie Gallegos, neurosurgery PA-C, to discuss the patient's presentation, results, and treatment plan.   2228 I spoke with Dr. Nash, hospitalist, who agreed to accept the patient.     Interventions:  2232 Decadron 10 mg IV    Disposition:  The patient was admitted to the hospital under the care of Dr. Nash.     Impression & Plan     CMS Diagnoses: None    Medical Decision Making:     Indy Reyes is a 89 year old female presenting to the emergency department with her family for right sided weakness over the last month and some chronic word finding difficulties. She did not have any focal findings on my exam. However, given the duration of her  symptoms and the report of her symptoms, I did do an MRI. MRI unfortunately shows a large mass on the left side which is likely been causing her symptoms over the last month. I did review the imaging findings with her family members as well as her family member who is a doctor. I spoke with Goldie Gallegos, neurosurgery PA, and at this time, she is recommending we bring her into the hospital, give decadron 10 mg now and 4 mg every six hours. I will admit to Dr. Nash and neurosurgery will likely want additional imaging to determine what the best plan of action is. At this time, she is admitted to a neuro bed.       Diagnosis:    ICD-10-CM    1. Brain mass  G93.89    2. Right sided weakness  R53.1          Scribe Disclosure:  I, Nevaeh Hou, am serving as a scribe at 5:59 PM on 5/24/2022 to document services personally performed by Rachel Oliver MD based on my observations and the provider's statements to me.              Rachel Oliver MD  05/25/22 0138

## 2022-05-24 NOTE — TELEPHONE ENCOUNTER
Patient has been experiencing Hemiparesis as per her DIL observation. Patient has also been experiencing some aphasia with her words. Patient's  has recently passed away and the added stress may have contributed to her symptoms. PAL huddled with PCP and conveyed the family's request for an MRI order. Patient's PCP recommended to have the patient evaluated in the ED. Patient is reporting symptoms that may be congruent with a stroke. PAL conveyed these concerns to the family and recommended they seek help at [facility].     MARILIN Mehta  [phone]  May 24, 2022, 4:23 PM

## 2022-05-24 NOTE — ED TRIAGE NOTES
Son reports pt has been having right sided weakness for the past week. Notes that the patient will use her left hand to help her right arm do tasks and the right leg will occasionally drag. Pt ambulated to room with steady gait and 1 assist. Equal grasps. Speech slow to respond and some word finding difficulty which son reports is also new this week.      Triage Assessment     Row Name 05/24/22 9412       Triage Assessment (Adult)    Airway WDL WDL       Respiratory WDL    Respiratory WDL WDL       Skin Circulation/Temperature WDL    Skin Circulation/Temperature WDL WDL       Cardiac WDL    Cardiac WDL WDL       Peripheral/Neurovascular WDL    Peripheral Neurovascular WDL WDL       Cognitive/Neuro/Behavioral WDL    Cognitive/Neuro/Behavioral WDL X    Level of Consciousness alert    Arousal Level opens eyes spontaneously    Orientation disoriented to;place    Speech word-finding difficulty;slow    Mood/Behavior calm;cooperative       [place] Coma Scale    Best Eye Response 4-->(E4) spontaneous    Best Motor Response 6-->(M6) obeys commands    Best Verbal Response 5-->(V5) oriented    Nae Coma Scale Score 15

## 2022-05-25 DIAGNOSIS — Z11.59 ENCOUNTER FOR SCREENING FOR OTHER VIRAL DISEASES: Primary | ICD-10-CM

## 2022-05-25 DIAGNOSIS — D49.6 BRAIN TUMOR (H): Primary | ICD-10-CM

## 2022-05-25 LAB
GLUCOSE BLDC GLUCOMTR-MCNC: 136 MG/DL (ref 70–99)
GLUCOSE BLDC GLUCOMTR-MCNC: 146 MG/DL (ref 70–99)
GLUCOSE BLDC GLUCOMTR-MCNC: 236 MG/DL (ref 70–99)
GLUCOSE BLDC GLUCOMTR-MCNC: 275 MG/DL (ref 70–99)
HBA1C MFR BLD: 5.7 % (ref 0–5.6)

## 2022-05-25 PROCEDURE — 120N000001 HC R&B MED SURG/OB

## 2022-05-25 PROCEDURE — 99233 SBSQ HOSP IP/OBS HIGH 50: CPT | Performed by: INTERNAL MEDICINE

## 2022-05-25 PROCEDURE — 250N000012 HC RX MED GY IP 250 OP 636 PS 637: Performed by: INTERNAL MEDICINE

## 2022-05-25 PROCEDURE — 250N000011 HC RX IP 250 OP 636: Performed by: STUDENT IN AN ORGANIZED HEALTH CARE EDUCATION/TRAINING PROGRAM

## 2022-05-25 PROCEDURE — 250N000011 HC RX IP 250 OP 636: Performed by: NURSE PRACTITIONER

## 2022-05-25 PROCEDURE — 96376 TX/PRO/DX INJ SAME DRUG ADON: CPT

## 2022-05-25 PROCEDURE — 250N000013 HC RX MED GY IP 250 OP 250 PS 637: Performed by: STUDENT IN AN ORGANIZED HEALTH CARE EDUCATION/TRAINING PROGRAM

## 2022-05-25 RX ORDER — LEVOTHYROXINE SODIUM 25 UG/1
25 TABLET ORAL DAILY
Status: DISCONTINUED | OUTPATIENT
Start: 2022-05-25 | End: 2022-05-26 | Stop reason: HOSPADM

## 2022-05-25 RX ORDER — ACETAMINOPHEN 325 MG/1
650 TABLET ORAL EVERY 6 HOURS PRN
Status: DISCONTINUED | OUTPATIENT
Start: 2022-05-25 | End: 2022-05-26 | Stop reason: HOSPADM

## 2022-05-25 RX ORDER — DEXTROSE MONOHYDRATE 25 G/50ML
25-50 INJECTION, SOLUTION INTRAVENOUS
Status: DISCONTINUED | OUTPATIENT
Start: 2022-05-25 | End: 2022-05-26 | Stop reason: HOSPADM

## 2022-05-25 RX ORDER — ACETAMINOPHEN 650 MG/1
650 SUPPOSITORY RECTAL EVERY 6 HOURS PRN
Status: DISCONTINUED | OUTPATIENT
Start: 2022-05-25 | End: 2022-05-26 | Stop reason: HOSPADM

## 2022-05-25 RX ORDER — NICOTINE POLACRILEX 4 MG
15-30 LOZENGE BUCCAL
Status: DISCONTINUED | OUTPATIENT
Start: 2022-05-25 | End: 2022-05-26 | Stop reason: HOSPADM

## 2022-05-25 RX ORDER — LANOLIN ALCOHOL/MO/W.PET/CERES
3 CREAM (GRAM) TOPICAL
Status: DISCONTINUED | OUTPATIENT
Start: 2022-05-25 | End: 2022-05-26 | Stop reason: HOSPADM

## 2022-05-25 RX ORDER — SODIUM CHLORIDE 5 %
OINTMENT (GRAM) OPHTHALMIC (EYE) 2 TIMES DAILY
Status: DISCONTINUED | OUTPATIENT
Start: 2022-05-25 | End: 2022-05-25

## 2022-05-25 RX ORDER — LIDOCAINE 40 MG/G
CREAM TOPICAL
Status: DISCONTINUED | OUTPATIENT
Start: 2022-05-25 | End: 2022-05-26 | Stop reason: HOSPADM

## 2022-05-25 RX ORDER — DEXAMETHASONE SODIUM PHOSPHATE 4 MG/ML
4 INJECTION, SOLUTION INTRA-ARTICULAR; INTRALESIONAL; INTRAMUSCULAR; INTRAVENOUS; SOFT TISSUE 3 TIMES DAILY
Status: DISCONTINUED | OUTPATIENT
Start: 2022-05-25 | End: 2022-05-26 | Stop reason: HOSPADM

## 2022-05-25 RX ORDER — DEXAMETHASONE SODIUM PHOSPHATE 4 MG/ML
4 INJECTION, SOLUTION INTRA-ARTICULAR; INTRALESIONAL; INTRAMUSCULAR; INTRAVENOUS; SOFT TISSUE EVERY 6 HOURS
Status: DISCONTINUED | OUTPATIENT
Start: 2022-05-25 | End: 2022-05-25

## 2022-05-25 RX ORDER — SODIUM CHLORIDE 5 %
OINTMENT (GRAM) OPHTHALMIC (EYE) 2 TIMES DAILY
Status: DISCONTINUED | OUTPATIENT
Start: 2022-05-25 | End: 2022-05-26 | Stop reason: HOSPADM

## 2022-05-25 RX ADMIN — DEXAMETHASONE SODIUM PHOSPHATE 4 MG: 4 INJECTION, SOLUTION INTRAMUSCULAR; INTRAVENOUS at 22:19

## 2022-05-25 RX ADMIN — LEVOTHYROXINE SODIUM 25 MCG: 25 TABLET ORAL at 11:55

## 2022-05-25 RX ADMIN — Medication: at 20:47

## 2022-05-25 RX ADMIN — INSULIN ASPART 3 UNITS: 100 INJECTION, SOLUTION INTRAVENOUS; SUBCUTANEOUS at 19:06

## 2022-05-25 RX ADMIN — DEXAMETHASONE SODIUM PHOSPHATE 4 MG: 4 INJECTION, SOLUTION INTRAMUSCULAR; INTRAVENOUS at 04:39

## 2022-05-25 RX ADMIN — DEXAMETHASONE SODIUM PHOSPHATE 4 MG: 4 INJECTION, SOLUTION INTRAMUSCULAR; INTRAVENOUS at 11:56

## 2022-05-25 RX ADMIN — INSULIN ASPART 4 UNITS: 100 INJECTION, SOLUTION INTRAVENOUS; SUBCUTANEOUS at 15:13

## 2022-05-25 RX ADMIN — DEXAMETHASONE SODIUM PHOSPHATE 4 MG: 4 INJECTION, SOLUTION INTRAMUSCULAR; INTRAVENOUS at 16:45

## 2022-05-25 ASSESSMENT — ACTIVITIES OF DAILY LIVING (ADL)
ADLS_ACUITY_SCORE: 43
ADLS_ACUITY_SCORE: 43
ADLS_ACUITY_SCORE: 35
ADLS_ACUITY_SCORE: 43
ADLS_ACUITY_SCORE: 35
ADLS_ACUITY_SCORE: 35
ADLS_ACUITY_SCORE: 43
ADLS_ACUITY_SCORE: 35
ADLS_ACUITY_SCORE: 35
ADLS_ACUITY_SCORE: 43

## 2022-05-25 NOTE — PROGRESS NOTES
[facility]    Hospitalist Progress Note    Assessment & Plan     Date of Admission:  2022        Assessment & Plan     Indy Reyes is a 89 year old female with past medical history significant for dementia, hyperlipidemia, and hypothyroidism admitted on 2022 with weakness.      New intracranial mass   R sided Weakness, dysarthria  -The patient presents to the ED with R sided weakness. Unfortunately the patient's  recently passed away over the weekend and she has been living her son since that time. He has noted that she seems to be having issues with weakness in her right arm. He notes that she is normally right handed but has been using her left hand for most tasks (brushing teeth, etc), and her right leg will occasionally drag. He also noted some word finding difficulties and slowed, slurred speech.     - MRI of the brain was obtained in the ED and notable for the followin.  Large enhancing extra-axial mass over the superior left cerebral convexity measuring up to 5.8 cm as detailed above. There is an additional smaller but similar nodular mass more anteriorly along the left cervical convexity measuring up to 1.6 cm.   These most likely represent meningiomas, however, other dural based masses would also be in the differential. There is also apparent mild dural thickening over the anterior left cerebral convexity.  2.  Underlying T2 hyperintense signal in the adjacent left frontal lobe by the large extra-axial mass. This may represent edema from mass effect although possibility of higher grade neoplasm should be considered.  3.  Marked mass effect on the underlying left cerebral hemisphere and left lateral ventricle. There is a 1.2 cm rightward midline shift. No evidence of hydrocephalus.  4.  Mild diffuse parenchymal volume loss and white matter changes most likely due to chronic microvascular ischemic disease.     -presents with progressive word finding  difficulties since December, new R sided weakness for 2 weeks.     ekg without ischemic changes. Nl intervals  -Exam-Speech responses are slow and she has notable word finding difficulties.  Boarded in ED overnight  Stable to improved on neuro exam in eD overnight  On diet.   Blood sugar 236 this am  Slowed mentation, no spont speech,alert, very subtle weakness R sided today, significantly improved from admission. Speech a bit better.     Medically optimized for proposed surgery    Plan;               - Neurosurgery consulted in the ED.   - Decadron 10mg IV given in the ED.   - Continue Decadron 4mg Q6H  - Q4H neurochecks   -sbp <180  - follow BS  - await surgical recs  PT, OT, SW consult.     Hyperglycemia  BS 95 admission  BS this am 236. Started on decadron  No hx of dM per family    Plan-  Change diet to diabetic for now  ISS,   1 gram per 15 grams carb  Follow for need of lantus.   HbA1C       Hypothyroidism  - Continue PTA hypothyroidism     Dry eyes  - Continue PTA drops and ointments, ok to use home supply      Diet: Regular Diet   DVT Prophylaxis: Pneumatic Compression Devices  Samuel Catheter: Not present  Central Lines: None  Cardiac Monitoring: None  Code Status: Special Code - reviewed on POLST - Attempt CPR/defibrillation, No intubation.   ---> discussed with family, they had found the POLST form confusing. Discussed code status with sons including Alberto Reyes in details. Stated that DNR/DNI would be medically appropriate, family will discuss and get back to hospitalist service. Aware that dnr//dni would be temporarily rescinded in event of surgery           Disposition Plan     Expected Discharge: TBD  Anticipated discharge location:  Awaiting care coordination huddle          Drew Mitchell MD, MD  Text Page  (7am to 6pm)  Interval History   Admit yesterday, seen by neurosurgery, started on iv steroids  Stable neurologically overnight  No complaints.   R sided weakness much  improved  Progressive neuro changes since December, prior to that very verbal      -Data reviewed today: I reviewed all new labs and imaging results over the last 24 hours. I personally reviewed labs and imaging since admission.     Physical Exam   Temp: 98.3  F (36.8  C) Temp src: Oral BP: 136/69 Pulse: 98   Resp: 18 SpO2: 97 % O2 Device: None (Room air)    Vitals:    05/24/22 1743   Weight: 51.7 kg (114 lb)     Vital Signs with Ranges  Temp:  [98.2  F (36.8  C)-98.3  F (36.8  C)] 98.3  F (36.8  C)  Pulse:  [77-98] 98  Resp:  [18] 18  BP: (120-160)/() 136/69  SpO2:  [95 %-100 %] 97 %  No intake/output data recorded.    Constitutional: Up in chair, nad  Respiratory:CTAB  Cardiovascular: RRR no r/g/m  GI: soft, nt, nd  Skin/Integumen: no rash or edema  Neuro: CN 2-12 grossly intact, tongue midline, face symmetric, eomi, nl finger nose finger, very subtle right sided weakness  Oriented to person and season only. Knows dtr in law at bedside.     Medications       dexamethasone  4 mg Intravenous Q6H     levothyroxine  25 mcg Oral Daily     propylene glycol  2 drop Both Eyes Daily     sodium chloride   Both Eyes BID     sodium chloride (PF)  3 mL Intracatheter Q8H       Data   Recent Labs   Lab 05/25/22  0842 05/24/22  1835   WBC  --  5.9   HGB  --  12.1   MCV  --  85   PLT  --  170   NA  --  140   POTASSIUM  --  4.0   CHLORIDE  --  106   CO2  --  28   BUN  --  22   CR  --  0.67   ANIONGAP  --  6   IMAN  --  8.7   * 95       Imaging:   Recent Results (from the past 24 hour(s))   MR Brain w/o & w Contrast    Narrative    EXAM: MR BRAIN W/O and W CONTRAST  LOCATION: [facility]  DATE/TIME: 5/24/2022 8:22 PM    INDICATION: Right sided weakness 1 month  COMPARISON: None available at the time of this dictation.  CONTRAST: 5 mL Gadavist  TECHNIQUE: Routine multiplanar multisequence head MRI without and with intravenous contrast.    FINDINGS:  INTRACRANIAL CONTENTS: Large enhancing mass over  the superior left cerebral convexity which appears extra-axial. This mass measures approximately 5.8 x 4.6 x 5.3 cm. Marked mass effect on the left cerebral hemisphere and left lateral ventricle with 1.2   cm rightward midline shift. Additional smaller extra-axial dural based nodule more anteriorly in the left cerebral convexity measuring 1.6 x 1.1 x 1.1 cm. There is smooth thin dural enhancement over the left cerebral convexity anteriorly, laterally,   medially also involving the left aspect of the falx.    Underlying T2 hyperintense signal is seen in the adjacent left frontal lobe parenchyma. Mild diffuse parenchymal volume loss. Background patchy deep and subcortical white matter T2 hyperintensities which are nonspecific, but most likely related to   chronic microvascular ischemic disease. Ventricular size is not significantly enlarged and there is no evidence of hydrocephalus. Basal cisterns are patent.    SELLA: No abnormality accounting for technique.    OSSEOUS STRUCTURES/SOFT TISSUES: Normal marrow signal. The major intracranial vascular flow voids are maintained.     ORBITS: No abnormality accounting for technique.     SINUSES/MASTOIDS: Mild polypoid mucosal thickening in the inferior maxillary sinuses. No middle ear or mastoid effusion.       Impression    IMPRESSION:  1.  Large enhancing extra-axial mass over the superior left cerebral convexity measuring up to 5.8 cm as detailed above. There is an additional smaller but similar nodular mass more anteriorly along the left cervical convexity measuring up to 1.6 cm.   These most likely represent meningiomas, however, other dural based masses would also be in the differential. There is also apparent mild dural thickening over the anterior left cerebral convexity.  2.  Underlying T2 hyperintense signal in the adjacent left frontal lobe by the large extra-axial mass. This may represent edema from mass effect although possibility of higher grade neoplasm should  be considered.  3.  Marked mass effect on the underlying left cerebral hemisphere and left lateral ventricle. There is a 1.2 cm rightward midline shift. No evidence of hydrocephalus.  4.  Mild diffuse parenchymal volume loss and white matter changes most likely due to chronic microvascular ischemic disease.

## 2022-05-25 NOTE — CONSULTS
Consult Date: 05/24/2022    IMPRESSION:  A 89-year-old female presenting with right-sided weakness and expressive aphasia with cognitive decline over the past several weeks to months with imaging findings revealing a large extraaxial mass in the left cerebral convexity with a second smaller mass anterior left cerebral convexity, most likely representing meningiomas with brain compression and shift.    PLAN:  From the neurosurgical standpoint, we do not plan for any immediate operative neurosurgical intervention.  Would recommend admission to the hospital.  We will give her 10 mg of Decadron and continue on 4 mg IV every 6 hours.  Recommend blood pressure control at less than 150 systolic and every 4 hour neuro checks overnight.  A brief discussion occurred with the patient and her son regarding surgical intervention.  We would like to discuss further with the rest of the team and the family prior to determining plan of care.         TYPE OF VISIT:  The neurosurgical service was consulted see Ms. Reyes for intracranial lesion.    HISTORY OF PRESENT ILLNESS:  Ms. Reyes is a pleasant, 89-year-old, right-handed female with a past medical history of dementia, macular degeneration, thrombocytopenia, right lumbar radiculopathy, subclinical hypothyroidism, dyspepsia, osteopenia, hyperlipidemia, who presents to the emergency room with right-sided weakness.  History is obtained from the son at bedside.  He states that his mother has had a steady cognitive decline for approximately a year.  He believes her cognitive functional ability was at par a year ago in 06/2021.  He states that since that time, she has had a slow decline cognitively, but has significantly worsened since the January/February timeframe.  In January she could talk and get her needs met and have regular conversations, but over the past few months, they have noticed a steady decline in her speech.  Since March, they have really noticed a decrease, now to  a point that she only says a few words at a time.  He describes what sounds like expressive aphasia, in which she knows that she wants to say, but cannot get her words out.  They have also noted approximately 1 month of right-sided weakness.  They have noticed her ambulation has worsened over the past month. Specifically, the past 2 weeks, she has needed more help with activities of daily living such as getting dressed. Specifically, approximately 2 weeks ago, they were helping her get her clothing on.  She was able to lift up her left leg okay to get the pants on, but she had a difficult time lifting up her right leg.  Sunday, again, she could not put her right leg up to get into her jeans.  They noticed significantly worsening decline over the past 2 weeks.  Today, they called her primary care doctor's office with her symptoms, and they were told that she may be having a stroke and she should present to the Emergency Room.  Currently, patient denies headache, weakness, nausea, vomiting, or other complaints.      PAST MEDICAL HISTORY:  Hyperlipidemia, osteopenia, thrombocytopenia, thyroid nodule, rhinitis, right lumbar radiculopathy, subclinical hypothyroidism, dyspepsia, macular degeneration, and dementia.    PAST SURGICAL HISTORY:  Right L4-L5 foraminotomy, tonsillectomy, bilateral cataract removal, appendectomy.    FAMILY HISTORY:  Father with cancer, brother with heart disease, sister with breast cancer and stroke.    SOCIAL HISTORY:  She lived independently with her  in a home until January.  In January, they moved into an assisted living facility together.  Unfortunately, her  recently passed away this past Saturday.  She has been staying with family. She is right-handed.  She denies tobacco or cigarette use.  She has 3 sons who are involved in her care.  Her daughter-in-law is a healthcare provider.    MEDICATIONS:  Reviewed per the electronic medical record, not including any  anticoagulation.    ALLERGIES:  TETRACYCLINE.    PHYSICAL EXAMINATION:  VITAL SIGNS:  Temperature 98.2, blood pressure is 155/70, pulse is 86, respiratory rate is 18.  GENERAL:  She is awake, but pleasantly confused.  She is oriented to place and person, but not time.  She is also confused in regard to her right and left.  When asked to hold up 2 fingers on her right hand, she holds up 2 fingers on her left hand.  When asked which is her right hand, she points to her left.  She is able to lift both arms up in the air and has a slight right pronator drift.  She has a slight right hand  weakness at 4+/5, slight triceps weakness at 4+/5, biceps, slight quadriceps weakness on the right at 4+/5.  She does not do well with finger-to-nose testing, specifically on the right.  Her extraocular movements are intact.  Pupils are equal, round, and reactive.  She has decreased rapid alternating movements on the right.  She is not able to perform heel-to-shin testing well.  [place] coma scale is 15.    IMAGING:  Brain MRI:    FINDINGS:  INTRACRANIAL CONTENTS: Large enhancing mass over the superior left cerebral convexity which appears extra-axial. This mass measures approximately 5.8 x 4.6 x 5.3 cm. Marked mass effect on the left cerebral hemisphere and left lateral ventricle with 1.2   cm rightward midline shift. Additional smaller extra-axial dural based nodule more anteriorly in the left cerebral convexity measuring 1.6 x 1.1 x 1.1 cm. There is smooth thin dural enhancement over the left cerebral convexity anteriorly, laterally,   medially also involving the left aspect of the falx.     Underlying T2 hyperintense signal is seen in the adjacent left frontal lobe parenchyma. Mild diffuse parenchymal volume loss. Background patchy deep and subcortical white matter T2 hyperintensities which are nonspecific, but most likely related to   chronic microvascular ischemic disease. Ventricular size is not significantly enlarged and  there is no evidence of hydrocephalus. Basal cisterns are patent.     SELLA: No abnormality accounting for technique.     OSSEOUS STRUCTURES/SOFT TISSUES: Normal marrow signal. The major intracranial vascular flow voids are maintained.      ORBITS: No abnormality accounting for technique.      SINUSES/MASTOIDS: Mild polypoid mucosal thickening in the inferior maxillary sinuses. No middle ear or mastoid effusion.                                                                       IMPRESSION:  1.  Large enhancing extra-axial mass over the superior left cerebral convexity measuring up to 5.8 cm as detailed above. There is an additional smaller but similar nodular mass more anteriorly along the left cervical convexity measuring up to 1.6 cm.   These most likely represent meningiomas, however, other dural based masses would also be in the differential. There is also apparent mild dural thickening over the anterior left cerebral convexity.  2.  Underlying T2 hyperintense signal in the adjacent left frontal lobe by the large extra-axial mass. This may represent edema from mass effect although possibility of higher grade neoplasm should be considered.  3.  Marked mass effect on the underlying left cerebral hemisphere and left lateral ventricle. There is a 1.2 cm rightward midline shift. No evidence of hydrocephalus.  4.  Mild diffuse parenchymal volume loss and white matter changes most likely due to chronic microvascular ischemic disease.      LABORATORY DATA:  White count 5.9, platelets are 170.  Sodium is 140.  Troponin is normal.    TOTAL TIME SPENT WITH THE PATIENT:  70 minutes, including 4 minutes of counseling and coordination of care.    Discussed with Dr. Rose.    Dictated by CARMELO HORTON        D: 2022   T: 2022   MT: [id number]    Name:     KVNG JACOBSENEmmanuel  MRN:      [id number]        Account:      [id number]   :      1931           Consult Date: 2022     Document:  [id number]    cc:  Rasheed Rose MD

## 2022-05-25 NOTE — PROGRESS NOTES
[facility] [place]  Neurosurgery Daily Progress Note    Assessment & Plan   89-year-old female presenting with right-sided weakness and expressive aphasia with cognitive decline over the past several weeks to months with imaging findings revealing a large extraaxial mass in the left cerebral convexity with a second smaller mass anterior left cerebral convexity, most likely representing meningiomas with brain compression and shift. On exam, she has continued expressive aphasia and right sided weakness RLE>RUE. She denies headaches, n/v, dizziness. Met with daughter in law as well.    Plan:  - Dr. Rose spoke with Dr. Woo regarding plan of care. Dr. Rose plans to meet with patient/family today to discuss next steps.   - Continue Decadron   - Continue supportive and symptomatic treatment   - NSG will continue to follow.    ADDENDUM  - Will plan for left frontal craniotomy for tumor removal with Dr. Rose next week  - Continue Decadron 4mg TID until surgery      Hollie Miguel CNP  [facility] Neurosurgery  [address] [address]  Tel [phone]  Pager [phone]    Interval History   Stable     Physical Exam   Temp: 98.3  F (36.8  C) Temp src: Oral BP: 136/69 Pulse: 98   Resp: 18 SpO2: 97 % O2 Device: None (Room air)    Vitals:    05/24/22 1743   Weight: 114 lb (51.7 kg)     Vital Signs with Ranges  Temp:  [98.2  F (36.8  C)-98.3  F (36.8  C)] 98.3  F (36.8  C)  Pulse:  [77-98] 98  Resp:  [18] 18  BP: (120-160)/() 136/69  SpO2:  [95 %-100 %] 97 %  No intake/output data recorded.    Mental status:  Awake, alert, pleasantly confused, expressive aphasia   Cranial nerves:  II-XII intact.   Motor:  Slight RUE extremity weakness, RLE weakness 4+/5   Sensation:  Intact  Coordination:  Smooth finger to nose testing.   Negative pronator drift.      Medications        dexamethasone  4 mg Intravenous Q6H     levothyroxine  25 mcg  Oral Daily     propylene glycol  2 drop Both Eyes Daily     sodium chloride   Both Eyes BID     sodium chloride (PF)  3 mL Intracatheter Q8H     Hollie Miguel [facility] Neurosurgery   [address] [address]  Tel [phone]  Pager [phone]

## 2022-05-25 NOTE — H&P
[facility]    History and Physical - Hospitalist Service       Date of Admission:  2022    Assessment & Plan      Indy Reyes is a 89 year old female with past medical history significant for dementia, hyperlipidemia, and hypothyroidism admitted on 2022 with weakness.     New intracranial mass   R sided Weakness, dysarthria  The patient presents to the ED with R sided weakness. Unfortunately the patient's  recently passed away over the weekend and she has been living her son since that time. He has noted that she seems to be having issues with weakness in her right arm. He notes that she is normally right handed but has been using her left hand for most tasks (brushing teeth, etc), and her right leg will occasionally drag. He also noted some word finding difficulties and slowed, slurred speech.   * MRI of the brain was obtained in the ED and notable for the followin.  Large enhancing extra-axial mass over the superior left cerebral convexity measuring up to 5.8 cm as detailed above. There is an additional smaller but similar nodular mass more anteriorly along the left cervical convexity measuring up to 1.6 cm.   These most likely represent meningiomas, however, other dural based masses would also be in the differential. There is also apparent mild dural thickening over the anterior left cerebral convexity.  2.  Underlying T2 hyperintense signal in the adjacent left frontal lobe by the large extra-axial mass. This may represent edema from mass effect although possibility of higher grade neoplasm should be considered.  3.  Marked mass effect on the underlying left cerebral hemisphere and left lateral ventricle. There is a 1.2 cm rightward midline shift. No evidence of hydrocephalus.  4.  Mild diffuse parenchymal volume loss and white matter changes most likely due to chronic microvascular ischemic disease.   - Neurosurgery consulted in the ED.   - Decadron 10mg  IV given in the ED.   - Continue Decadron 4mg Q6H  - Q4H neurochecks     Hypothyroidism  - Continue PTA hypothyroidism     Dry eyes  - Continue PTA drops and ointments, ok to use home supply      Diet: Regular Diet   DVT Prophylaxis: Pneumatic Compression Devices  Samuel Catheter: Not present  Central Lines: None  Cardiac Monitoring: None  Code Status: Special Code - reviewed on POLST - Attempt CPR/defibrillation, No intubation.     Disposition Plan   Expected Discharge: TBD  Anticipated discharge location:  Awaiting care coordination huddle       The patient's care was discussed with the Patient and Patient's Family.    Patricia Nash  Hospitalist Service  [facility]  Securely message with the Vocera Web Console (learn more here)  Text page via [employer] Paging/Directory         ______________________________________________________________________    Chief Complaint   Right sided weakness     History is obtained from the patient    History of Present Illness   Indy Reyes is a 89 year old female who presents to the ED with her family for evaluation of weakness.     Pts family had noticed word finding difficulties, slow responses and increasing right sided weakness over the course of several weeks to months. Unfortunately the patient's  recently passed away over the weekend and she has been living her son since that time. Since then he has really noted the weakness in her right arm. He notes that she is normally right handed but has been using her left hand for most tasks (brushing teeth, etc), and her right leg will occasionally drag. They called her PCP today who recommended presentation to the ED for stroke evaluation.     The patient is currently comfortable and has no specific complaints.     Review of Systems    The 10 point Review of Systems is negative other than noted in the HPI or here.     Past Medical History    I have reviewed this patient's medical history and  updated it with pertinent information if needed.   Past Medical History:   Diagnosis Date     Back pain        Past Surgical History   I have reviewed this patient's surgical history and updated it with pertinent information if needed.  Past Surgical History:   Procedure Laterality Date     APPENDECTOMY      at age of 16     CATARACT IOL, RT/LT      both     FORAMINOTOMY LUMBAR POSTERIOR ONE LEVEL Right 2017    Procedure: FORAMINOTOMY LUMBAR POSTERIOR ONE LEVEL;  Right L4-L5 hemilaminectomy and foraminotomy and excision of synovial cyst. ;  Surgeon: Fady Woo MD;  Location: [facility] OR     TONSILLECTOMY         Social History   I have reviewed this patient's social history and updated it with pertinent information if needed.  Social History     Tobacco Use     Smoking status: Former Smoker     Types: Cigarettes     Quit date: 1960     Years since quittin.4     Smokeless tobacco: Never Used     Tobacco comment:    Vaping Use     Vaping Use: Never used   Substance Use Topics     Alcohol use: Yes     Comment: SOCIAL-2 drinks per month      Drug use: No       Family History   I have reviewed this patient's family history and updated it with pertinent information if needed.  Family History   Problem Relation Age of Onset     Family History Negative Mother          in accident     Cancer Father         prostate     Heart Disease Brother         60s-4-one brother with heart attack in his 60's     Cancer Sister         breast-7-one  wtih breast ca,one with stroke, one sis  from diabetes     Breast Cancer Sister         diagnosed at age of 52- 5 sisters alive     Cancer - colorectal No family hx of        Prior to Admission Medications   Prior to Admission Medications   Prescriptions Last Dose Informant Patient Reported? Taking?   Boswellia-Glucosamine-Vit D (OSTEO BI-FLEX ONE PER DAY) TABS   No No   Sig: Take 2 tablets by mouth daily   Multiple Vitamins-Minerals (EYE VITAMINS) CAPS    No No   Sig: Take 1 tablet by mouth daily Visiclear, Complete, or Preservision   Multiple Vitamins-Minerals (MULTIVITAMIN ADULTS 50+) TABS   Yes No   Sig: Take 2 capsules by mouth every morning   acetaminophen (TYLENOL) 325 MG tablet   No No   Sig: Take 2 tablets (650 mg) by mouth every 4 hours as needed for other (mild pain)   cholecalciferol (VITAMIN D3) 25 mcg (1000 units) capsule   No No   Sig: Take 1 capsule (25 mcg) by mouth daily   levothyroxine (SYNTHROID/LEVOTHROID) 25 MCG tablet   No No   Sig: Take 1 tablet (25 mcg) by mouth daily   propylene glycol (SYSTANE COMPLETE) 0.6 % SOLN ophthalmic solution   No No   Sig: Place 2 drops into both eyes daily in a.m., and once prn daily as needed.   sodium chloride (WAYLON 128) 5 % ophthalmic ointment   No No   Si/4 inch daily to both eyes in a.m, and HS      Facility-Administered Medications: None     Allergies   Allergies   Allergen Reactions     Tetracycline Rash       Physical Exam   Vital Signs: Temp: 98.2  F (36.8  C) Temp src: Temporal BP: (!) 160/78 Pulse: 77   Resp: 18 SpO2: 98 % O2 Device: None (Room air)    Weight: 114 lbs 0 oz    Constitutional: Awake, alert, cooperative, no apparent distress.  Eyes: Conjunctiva and pupils examined and normal.  HEENT: Moist mucous membranes, normal dentition.  Respiratory: Clear to auscultation bilaterally, no crackles or wheezing.  Cardiovascular: Regular rate and rhythm, normal S1 and S2, and no murmur noted.  GI: Soft, non-distended, non-tender, normal bowel sounds.  Skin: No rashes, no cyanosis, no edema.  Musculoskeletal: No joint swelling, erythema or tenderness.  Neurologic: Cranial nerves 2-12 intact, moving all extremities. Speech responses are slow and she has notable word finding difficulties. See neurosurgery note for full Neuro examination   Psychiatric: Alert, oriented to person,no obvious anxiety or depression.      Data   Data reviewed today: I reviewed all medications, new labs and imaging results over  the last 24 hours.     Recent Labs   Lab 05/24/22  1835   WBC 5.9   HGB 12.1   MCV 85         POTASSIUM 4.0   CHLORIDE 106   CO2 28   BUN 22   CR 0.67   ANIONGAP 6   IAMN 8.7   GLC 95     Recent Results (from the past 24 hour(s))   MR Brain w/o & w Contrast    Narrative    EXAM: MR BRAIN W/O and W CONTRAST  LOCATION: [facility]  DATE/TIME: 5/24/2022 8:22 PM    INDICATION: Right sided weakness 1 month  COMPARISON: None available at the time of this dictation.  CONTRAST: 5 mL Gadavist  TECHNIQUE: Routine multiplanar multisequence head MRI without and with intravenous contrast.    FINDINGS:  INTRACRANIAL CONTENTS: Large enhancing mass over the superior left cerebral convexity which appears extra-axial. This mass measures approximately 5.8 x 4.6 x 5.3 cm. Marked mass effect on the left cerebral hemisphere and left lateral ventricle with 1.2   cm rightward midline shift. Additional smaller extra-axial dural based nodule more anteriorly in the left cerebral convexity measuring 1.6 x 1.1 x 1.1 cm. There is smooth thin dural enhancement over the left cerebral convexity anteriorly, laterally,   medially also involving the left aspect of the falx.    Underlying T2 hyperintense signal is seen in the adjacent left frontal lobe parenchyma. Mild diffuse parenchymal volume loss. Background patchy deep and subcortical white matter T2 hyperintensities which are nonspecific, but most likely related to   chronic microvascular ischemic disease. Ventricular size is not significantly enlarged and there is no evidence of hydrocephalus. Basal cisterns are patent.    SELLA: No abnormality accounting for technique.    OSSEOUS STRUCTURES/SOFT TISSUES: Normal marrow signal. The major intracranial vascular flow voids are maintained.     ORBITS: No abnormality accounting for technique.     SINUSES/MASTOIDS: Mild polypoid mucosal thickening in the inferior maxillary sinuses. No middle ear or mastoid effusion.        Impression    IMPRESSION:  1.  Large enhancing extra-axial mass over the superior left cerebral convexity measuring up to 5.8 cm as detailed above. There is an additional smaller but similar nodular mass more anteriorly along the left cervical convexity measuring up to 1.6 cm.   These most likely represent meningiomas, however, other dural based masses would also be in the differential. There is also apparent mild dural thickening over the anterior left cerebral convexity.  2.  Underlying T2 hyperintense signal in the adjacent left frontal lobe by the large extra-axial mass. This may represent edema from mass effect although possibility of higher grade neoplasm should be considered.  3.  Marked mass effect on the underlying left cerebral hemisphere and left lateral ventricle. There is a 1.2 cm rightward midline shift. No evidence of hydrocephalus.  4.  Mild diffuse parenchymal volume loss and white matter changes most likely due to chronic microvascular ischemic disease.

## 2022-05-25 NOTE — PROGRESS NOTES
RECEIVING UNIT ED HANDOFF REVIEW    ED Nurse Handoff Report was reviewed by: Anny Santana RN on May 25, 2022 at 8:47 AM

## 2022-05-25 NOTE — ED NOTES
"..  [facility]  ED Nurse Handoff Report    ED Chief complaint: Stroke Symptoms      ED Diagnosis:   Final diagnoses:   None       Code Status: MD to discuss    Allergies:   Allergies   Allergen Reactions     Tetracycline Rash       Patient Story:    Son reports pt has been having right sided weakness for the past week. Notes that the patient will use her left hand to help her right arm do tasks and the right leg will occasionally drag. Pt ambulated to room with steady gait and 1 assist. Equal grasps. Speech slow to respond and some word finding difficulty which son reports is also new this week.       Focused Assessment:  Hx dementia, disoriented to time, 1AGW, Commode bedside, RA, VSS, Neuros Intact    Treatments and/or interventions provided: Labs, UA, MRI   Patient's response to treatments and/or interventions: WNL    To be done/followed up on inpatient unit:  N/A    Does this patient have any cognitive concerns?: Baseline dementia    Activity level - Baseline/Home:  SBA  Activity Level - Current:   Stand with assist x2 and Walker/Gait    Patient's Preferred language: English   Needed?: No    Isolation: None  Infection: Not Applicable  Patient tested for COVID 19 prior to admission: YES  Bariatric?: No    Vital Signs:   Vitals:    05/24/22 1743 05/24/22 1951 05/24/22 1952 05/24/22 2130   BP: (!) 155/70 (!) 152/125  (!) 160/78   Pulse: 86 77     Resp: 18      Temp: 98.2  F (36.8  C)      TempSrc: Temporal      SpO2: 99%  98% 98%   Weight: 51.7 kg (114 lb)      Height: 1.575 m (5' 2\")          Cardiac Rhythm:     Was the PSS-3 completed:  Yes  What interventions are required if any?               Family Comments: Son at bedside    For the majority of the shift this patient's behavior was Green.   Behavioral interventions performed were None    ED NURSE PHONE NUMBER: [phone]         "

## 2022-05-25 NOTE — PLAN OF CARE
Care plan note:      Recent Vitals:  Temp: 98.8  F (37.1  C) Temp src: Oral BP: 132/67 Pulse: 103   Resp: 16 SpO2: 96 % O2 Device: None (Room air)      Orientation/Neuro: Confused, Disoriented to, Place , Time, Situation  Pain: Pain is controlled without any medications.   Tele: NA.   IV medications: decadron.   Mobility: up ad pete   Skin: With in normal limits   GI: WDL  : WDL     Diet: Tolerating diet:   Well  Orders Placed This Encounter      Moderate Consistent Carb (60 g CHO per Meal) Diet      Safety/Concerns:  Fall Risk  Aggression Color: Green    Plan: No neuro changes, possible discharge tomorrow and wait for surgery next week.   Continue to monitor.      Anny Santana RN     Goal Outcome Evaluation:Improving.

## 2022-05-25 NOTE — PHARMACY-ADMISSION MEDICATION HISTORY
Pharmacy Medication History  Admission medication history interview status for the 5/24/2022  admission is complete. See EPIC admission navigator for prior to admission medications     Location of Interview: Patient room  Medication history sources: Patient's family/friend (son) and Surescripts    Significant changes made to the medication list:  None    In the past week, patient estimated taking medication this percent of the time: greater than 90%    Additional medication history information:   Patient has her own eye drops and eye ointment with her     Medication reconciliation completed by provider prior to medication history? No    Time spent in this activity: 5 min     Prior to Admission medications    Medication Sig Last Dose Taking? Auth Provider   Boswellia-Glucosamine-Vit D (OSTEO BI-FLEX ONE PER DAY) TABS Take 2 tablets by mouth daily 5/24/2022 at Unknown time Yes Batool Kulkarni MD   cholecalciferol (VITAMIN D3) 25 mcg (1000 units) capsule Take 1 capsule (25 mcg) by mouth daily 5/24/2022 at Unknown time Yes Batool Kulkarni MD   levothyroxine (SYNTHROID/LEVOTHROID) 25 MCG tablet Take 1 tablet (25 mcg) by mouth daily 5/24/2022 at Unknown time Yes Batool Kulkarni MD   Multiple Vitamins-Minerals (EYE VITAMINS) CAPS Take 1 tablet by mouth daily Visiclear, Complete, or Preservision 5/24/2022 at Unknown time Yes Batool Kulkarni MD   Multiple Vitamins-Minerals (MULTIVITAMIN ADULTS 50+) TABS Take 2 capsules by mouth every morning 5/24/2022 at Unknown time Yes Reported, Patient   propylene glycol (SYSTANE COMPLETE) 0.6 % SOLN ophthalmic solution Place 2 drops into both eyes daily in a.m., and once prn daily as needed.  Patient taking differently: Place 2 drops into both eyes 2 times daily Morning and mid-day 5/24/2022 at Unknown time Yes Batool Kulkarni MD   sodium chloride (WAYLON 128) 5 % ophthalmic ointment 1/4 inch daily to both eyes in a.m, and HS  Patient taking differently: Place  0.25 inches into both eyes At Bedtime 5/23/2022 at Unknown time Yes Batool Kulkarni MD       The information provided in this note is only as accurate as the sources available at the time of update(s)

## 2022-05-26 ENCOUNTER — DOCUMENTATION ONLY (OUTPATIENT)
Dept: OTHER | Facility: CLINIC | Age: 87
End: 2022-05-26
Payer: COMMERCIAL

## 2022-05-26 ENCOUNTER — APPOINTMENT (OUTPATIENT)
Dept: SPEECH THERAPY | Facility: CLINIC | Age: 87
DRG: 054 | End: 2022-05-26
Attending: HOSPITALIST
Payer: COMMERCIAL

## 2022-05-26 ENCOUNTER — APPOINTMENT (OUTPATIENT)
Dept: OCCUPATIONAL THERAPY | Facility: CLINIC | Age: 87
DRG: 054 | End: 2022-05-26
Attending: INTERNAL MEDICINE
Payer: COMMERCIAL

## 2022-05-26 ENCOUNTER — APPOINTMENT (OUTPATIENT)
Dept: PHYSICAL THERAPY | Facility: CLINIC | Age: 87
DRG: 054 | End: 2022-05-26
Attending: INTERNAL MEDICINE
Payer: COMMERCIAL

## 2022-05-26 VITALS
RESPIRATION RATE: 16 BRPM | SYSTOLIC BLOOD PRESSURE: 124 MMHG | WEIGHT: 114 LBS | BODY MASS INDEX: 20.98 KG/M2 | HEART RATE: 79 BPM | TEMPERATURE: 99.1 F | DIASTOLIC BLOOD PRESSURE: 60 MMHG | OXYGEN SATURATION: 96 % | HEIGHT: 62 IN

## 2022-05-26 LAB
GLUCOSE BLDC GLUCOMTR-MCNC: 130 MG/DL (ref 70–99)
GLUCOSE BLDC GLUCOMTR-MCNC: 139 MG/DL (ref 70–99)
GLUCOSE BLDC GLUCOMTR-MCNC: 150 MG/DL (ref 70–99)

## 2022-05-26 PROCEDURE — 92610 EVALUATE SWALLOWING FUNCTION: CPT | Mod: GN

## 2022-05-26 PROCEDURE — 97162 PT EVAL MOD COMPLEX 30 MIN: CPT | Mod: GP | Performed by: PHYSICAL THERAPIST

## 2022-05-26 PROCEDURE — 99239 HOSP IP/OBS DSCHRG MGMT >30: CPT | Performed by: HOSPITALIST

## 2022-05-26 PROCEDURE — 250N000011 HC RX IP 250 OP 636: Performed by: NURSE PRACTITIONER

## 2022-05-26 PROCEDURE — 250N000013 HC RX MED GY IP 250 OP 250 PS 637: Performed by: STUDENT IN AN ORGANIZED HEALTH CARE EDUCATION/TRAINING PROGRAM

## 2022-05-26 PROCEDURE — 97535 SELF CARE MNGMENT TRAINING: CPT | Mod: GO | Performed by: OCCUPATIONAL THERAPIST

## 2022-05-26 PROCEDURE — 97165 OT EVAL LOW COMPLEX 30 MIN: CPT | Mod: GO | Performed by: OCCUPATIONAL THERAPIST

## 2022-05-26 RX ORDER — DEXAMETHASONE 4 MG/1
4 TABLET ORAL
Qty: 15 TABLET | Refills: 0 | Status: ON HOLD | OUTPATIENT
Start: 2022-05-26 | End: 2022-06-07

## 2022-05-26 RX ADMIN — LEVOTHYROXINE SODIUM 25 MCG: 25 TABLET ORAL at 08:32

## 2022-05-26 RX ADMIN — INSULIN ASPART 1 UNITS: 100 INJECTION, SOLUTION INTRAVENOUS; SUBCUTANEOUS at 14:26

## 2022-05-26 RX ADMIN — INSULIN ASPART: 100 INJECTION, SOLUTION INTRAVENOUS; SUBCUTANEOUS at 09:25

## 2022-05-26 RX ADMIN — DEXAMETHASONE SODIUM PHOSPHATE 4 MG: 4 INJECTION, SOLUTION INTRAMUSCULAR; INTRAVENOUS at 08:32

## 2022-05-26 RX ADMIN — INSULIN ASPART 1 UNITS: 100 INJECTION, SOLUTION INTRAVENOUS; SUBCUTANEOUS at 18:03

## 2022-05-26 RX ADMIN — Medication: at 08:32

## 2022-05-26 ASSESSMENT — ACTIVITIES OF DAILY LIVING (ADL)
WALKING_OR_CLIMBING_STAIRS_DIFFICULTY: NO
ADLS_ACUITY_SCORE: 43
ADLS_ACUITY_SCORE: 31
FALL_HISTORY_WITHIN_LAST_SIX_MONTHS: NO
DOING_ERRANDS_INDEPENDENTLY_DIFFICULTY: YES
TOILETING_ISSUES: NO
DIFFICULTY_EATING/SWALLOWING: NO
ADLS_ACUITY_SCORE: 43
WEAR_GLASSES_OR_BLIND: NO
ADLS_ACUITY_SCORE: 43
ADLS_ACUITY_SCORE: 43
CONCENTRATING,_REMEMBERING_OR_MAKING_DECISIONS_DIFFICULTY: YES
ADLS_ACUITY_SCORE: 43
ADLS_ACUITY_SCORE: 43
DRESSING/BATHING_DIFFICULTY: NO
CHANGE_IN_FUNCTIONAL_STATUS_SINCE_ONSET_OF_CURRENT_ILLNESS/INJURY: NO
ADLS_ACUITY_SCORE: 43
ADLS_ACUITY_SCORE: 43

## 2022-05-26 NOTE — PLAN OF CARE
Pt. Alert and oriented x3, disoriented to time/place. Neuros- Slight right sided weakness and confusion. Vital signs stable on RA. SBA. Regular diet. Lung sounds clear. Bowel sounds normoactive. No BM on shift, voiding adequately. Denies pain or nausea.  Possible discharge for tomorrow. Plans for left frontal craniotomy-tumor removal pending next week .

## 2022-05-26 NOTE — DISCHARGE SUMMARY
Discharge Summary  Hospitalist    Date of Admission:  2022  Date of Discharge:  2022  Discharging Provider: Nguyen Lu MD, MD  Date of Service (when I saw the patient): 22    Discharge Diagnoses     New intracranial mass   R sided Weakness, dysarthria    History of Present Illness   Please refer H & P for details.      Hospital Course   Indy Reyes is a 89 year old female with past medical history significant for dementia, hyperlipidemia, and hypothyroidism admitted on 2022 with weakness.      New intracranial mass   R sided Weakness, dysarthria  -The patient presents to the ED with R sided weakness. Unfortunately the patient's  recently passed away over the weekend and she has been living with her son since that time. He has noted that she seems to be having issues with weakness in her right arm. He notes that she is normally right handed but has been using her left hand for most tasks (brushing teeth, etc), and her right leg will occasionally drag. He also noted some word finding difficulties and slowed, slurred speech.      - MRI of the brain was obtained in the ED and notable for the followin.  Large enhancing extra-axial mass over the superior left cerebral convexity measuring up to 5.8 cm as detailed above. There is an additional smaller but similar nodular mass more anteriorly along the left cervical convexity measuring up to 1.6 cm.   These most likely represent meningiomas, however, other dural based masses would also be in the differential. There is also apparent mild dural thickening over the anterior left cerebral convexity.  2.  Underlying T2 hyperintense signal in the adjacent left frontal lobe by the large extra-axial mass. This may represent edema from mass effect although possibility of higher grade neoplasm should be considered.  3.  Marked mass effect on the underlying left cerebral hemisphere and left lateral ventricle. There is a 1.2 cm rightward  midline shift. No evidence of hydrocephalus.  4.  Mild diffuse parenchymal volume loss and white matter changes most likely due to chronic microvascular ischemic disease.      -presents with progressive word finding difficulties since December, new R sided weakness for 2 weeks.   -Neurosurgery was consulted.  She was initiated on Decadron [10 Mg IV given in ED] followed by 4 mg 3 times daily.  She was seen by speech therapy at physical therapy, cleared for discharge home.  She will return on 5/31/2022 for scheduled neurosurgery [left frontal craniotomy for tumor removal with Dr. Rose].  She is medically optimized for proposed surgery.       Hypothyroidism  - Continue PTA hypothyroidism      Dry eyes  - Continue PTA drops and ointments, ok to use home supply        Discharged home in stable condition.  Will return next week for scheduled neurosurgery.       Nguyen Lu MD, MD      Pending Results   These results will be followed up by Hospitalist team.  Unresulted Labs Ordered in the Past 30 Days of this Admission     No orders found from 4/24/2022 to 5/25/2022.          Code Status   DNI       Primary Care Physician   Batool Kulkarni    Follow-ups Needed After Discharge   Follow-up Appointments     Follow-up and recommended labs and tests       Follow up with neurosurgery for surgery scheduled on 5/31/2022.  They   will call you.             Physical Exam   Temp: 99.1  F (37.3  C) Temp src: Oral BP: 124/60 Pulse: 79   Resp: 16 SpO2: 96 % O2 Device: None (Room air)    Vitals:    05/24/22 1743   Weight: 51.7 kg (114 lb)     Vital Signs with Ranges  Temp:  [98.2  F (36.8  C)-99.1  F (37.3  C)] 99.1  F (37.3  C)  Pulse:  [] 79  Resp:  [16] 16  BP: (112-134)/(60-70) 124/60  SpO2:  [95 %-97 %] 96 %  I/O last 3 completed shifts:  In: 360 [P.O.:360]  Out: -     Constitutional:  Resting comfortably in bed, quite drowsy  Respiratory:CTAB  Cardiovascular: RRR no r/g/m  GI: soft, nt, nd  Skin/Integumen: no rash  or edema  Neuro: Exam deferred as patient was sleeping            Discharge Disposition   Discharged to home  Condition at discharge: Stable    Consultations This Hospital Stay   NEUROSURGERY IP CONSULT  CARE MANAGEMENT / SOCIAL WORK IP CONSULT  PHYSICAL THERAPY ADULT IP CONSULT  OCCUPATIONAL THERAPY ADULT IP CONSULT  CARE MANAGEMENT / SOCIAL WORK IP CONSULT  SPIRITUAL HEALTH SERVICES IP CONSULT  SPEECH LANGUAGE PATH ADULT IP CONSULT    Time Spent on this Encounter   Nguyen REDDY MD, personally saw the patient today and spent greater than 30 minutes discharging this patient.    Discharge Orders      Reason for your hospital stay    Brain mass     Follow-up and recommended labs and tests     Follow up with neurosurgery for surgery scheduled on 5/31/2022.  They will call you.     Activity    Your activity upon discharge: activity as tolerated     When to contact your care team    Call your primary doctor if you have any of the following: Worsening headache, weakness, confusion, numbness, fever, shortness of breath     Diet    Follow this diet upon discharge: Orders Placed This Encounter      Moderate Consistent Carb (60 g CHO per Meal) Diet     Discharge Medications   Current Discharge Medication List      START taking these medications    Details   dexamethasone (DECADRON) 4 MG tablet Take 1 tablet (4 mg) by mouth 3 times daily (with meals)  Qty: 15 tablet, Refills: 0    Associated Diagnoses: Brain mass         CONTINUE these medications which have NOT CHANGED    Details   Boswellia-Glucosamine-Vit D (OSTEO BI-FLEX ONE PER DAY) TABS Take 2 tablets by mouth daily    Associated Diagnoses: Macular degeneration (senile) of retina      cholecalciferol (VITAMIN D3) 25 mcg (1000 units) capsule Take 1 capsule (25 mcg) by mouth daily    Associated Diagnoses: Macular degeneration (senile) of retina      levothyroxine (SYNTHROID/LEVOTHROID) 25 MCG tablet Take 1 tablet (25 mcg) by mouth daily  Qty: 90 tablet, Refills: 3     Associated Diagnoses: Elevated TSH      Multiple Vitamins-Minerals (EYE VITAMINS) CAPS Take 1 tablet by mouth daily Visiclear, Complete, or Preservision    Associated Diagnoses: Macular degeneration (senile) of retina      Multiple Vitamins-Minerals (MULTIVITAMIN ADULTS 50+) TABS Take 2 capsules by mouth every morning      propylene glycol (SYSTANE COMPLETE) 0.6 % SOLN ophthalmic solution Place 2 drops into both eyes daily in a.m., and once prn daily as needed.    Associated Diagnoses: Macular degeneration (senile) of retina      sodium chloride (WAYLON 128) 5 % ophthalmic ointment 1/4 inch daily to both eyes in a.m, and HS    Associated Diagnoses: Macular degeneration (senile) of retina           Allergies   Allergies   Allergen Reactions     Tetracycline Rash     Data   Most Recent 3 CBC's:  Recent Labs   Lab Test 05/24/22 1835 02/01/21  1049 11/06/20  1140   WBC 5.9 5.5 6.0   HGB 12.1 13.0 12.5   MCV 85 83 83    175 163      Most Recent 3 BMP's:  Recent Labs   Lab Test 05/26/22  1205 05/26/22  0804 05/25/22  2140 05/25/22  0842 05/24/22  1835 02/01/21  1049 11/06/20  1140   NA  --   --   --   --  140 138 138   POTASSIUM  --   --   --   --  4.0 4.9 4.4   CHLORIDE  --   --   --   --  106 106 105   CO2  --   --   --   --  28 27 30   BUN  --   --   --   --  22 16 19   CR  --   --   --   --  0.67 0.76 0.72   ANIONGAP  --   --   --   --  6 5 3   IMAN  --   --   --   --  8.7 9.5 9.6   * 139* 136*   < > 95 105* 100*    < > = values in this interval not displayed.     Most Recent 2 LFT's:  Recent Labs   Lab Test 02/01/21  1049 05/08/18  1049   AST 26 22   ALT 30 28   ALKPHOS 96 105   BILITOTAL 0.4 0.3     Most Recent INR's and Anticoagulation Dosing History:  Anticoagulation Dose History    There is no flowsheet data to display.       Most Recent 3 Troponin's:No lab results found.  Most Recent Cholesterol Panel:  Recent Labs   Lab Test 02/01/21  1049   CHOL 267*   *   HDL 54   TRIG 162*     Most Recent  6 Bacteria Isolates From Any Culture (See EPIC Reports for Culture Details):  Recent Labs   Lab Test 05/20/19  1450 06/09/17  1616   CULT 50,000 to 100,000 colonies/mL  mixed urogenital deirdre  Susceptibility testing not routinely done   <10,000 colonies/mL urogenital deirdre     Most Recent TSH, T4 and A1c Labs:  Recent Labs   Lab Test 05/24/22  1835 02/01/21  1049   TSH  --  4.93*   T4  --  1.11   A1C 5.7* 5.9*       Results for orders placed or performed during the hospital encounter of 05/24/22   MR Brain w/o & w Contrast    Narrative    EXAM: MR BRAIN W/O and W CONTRAST  LOCATION: [facility]  DATE/TIME: 5/24/2022 8:22 PM    INDICATION: Right sided weakness 1 month  COMPARISON: None available at the time of this dictation.  CONTRAST: 5 mL Gadavist  TECHNIQUE: Routine multiplanar multisequence head MRI without and with intravenous contrast.    FINDINGS:  INTRACRANIAL CONTENTS: Large enhancing mass over the superior left cerebral convexity which appears extra-axial. This mass measures approximately 5.8 x 4.6 x 5.3 cm. Marked mass effect on the left cerebral hemisphere and left lateral ventricle with 1.2   cm rightward midline shift. Additional smaller extra-axial dural based nodule more anteriorly in the left cerebral convexity measuring 1.6 x 1.1 x 1.1 cm. There is smooth thin dural enhancement over the left cerebral convexity anteriorly, laterally,   medially also involving the left aspect of the falx.    Underlying T2 hyperintense signal is seen in the adjacent left frontal lobe parenchyma. Mild diffuse parenchymal volume loss. Background patchy deep and subcortical white matter T2 hyperintensities which are nonspecific, but most likely related to   chronic microvascular ischemic disease. Ventricular size is not significantly enlarged and there is no evidence of hydrocephalus. Basal cisterns are patent.    SELLA: No abnormality accounting for technique.    OSSEOUS STRUCTURES/SOFT TISSUES:  Normal marrow signal. The major intracranial vascular flow voids are maintained.     ORBITS: No abnormality accounting for technique.     SINUSES/MASTOIDS: Mild polypoid mucosal thickening in the inferior maxillary sinuses. No middle ear or mastoid effusion.       Impression    IMPRESSION:  1.  Large enhancing extra-axial mass over the superior left cerebral convexity measuring up to 5.8 cm as detailed above. There is an additional smaller but similar nodular mass more anteriorly along the left cervical convexity measuring up to 1.6 cm.   These most likely represent meningiomas, however, other dural based masses would also be in the differential. There is also apparent mild dural thickening over the anterior left cerebral convexity.  2.  Underlying T2 hyperintense signal in the adjacent left frontal lobe by the large extra-axial mass. This may represent edema from mass effect although possibility of higher grade neoplasm should be considered.  3.  Marked mass effect on the underlying left cerebral hemisphere and left lateral ventricle. There is a 1.2 cm rightward midline shift. No evidence of hydrocephalus.  4.  Mild diffuse parenchymal volume loss and white matter changes most likely due to chronic microvascular ischemic disease.

## 2022-05-26 NOTE — PLAN OF CARE
Goal Outcome Evaluation:  Pt here with R sided weakness, expressive aphasia & confusion. Disoriented to place, time & situation-reoriented. Neuros ex confusion & slight R side weakness-improving.  Denies headaches/N/V/blurry vision. VSS.  MOD CHO diet. Takes pills whole. Up independent/ SBA. Continent of bowel and bladder. Denies pain. Pt scoring green on the Aggression Stop Light Tool. Plan for possible discharge tomorrow and next week plan for left frontal craniotomy-tumor removal with Dr. Rose.

## 2022-05-26 NOTE — PROGRESS NOTES
"   05/26/22 1251   General Information   Onset of Illness/Injury or Date of Surgery 05/24/22   Referring Physician Nguyen Lu MD   Pertinent History of Current Problem Per MD note, \"Indy Reyes is a 89 year old female with past medical history significant for dementia, hyperlipidemia, and hypothyroidism admitted on 5/24/2022 with weakness.\" + new intracranial mass per MRI 5/24/22. Tentative plan for d/c tomorrow and plan for surgery (tumor removal) next week.   General Observations Pt asleep and easily roused, pleasant and cooperative, c/o fatigue. Son present. He reported pt inconsistently coughing on liquids recently. Pt initially denied difficulty, however then endorsed inconsistent coughing when son indicated that it had occurred.   Past History of Dysphagia No h/o dysphagia per pt   Pain Assessment   Patient Currently in Pain No   Type of Evaluation   Type of Evaluation Swallow Evaluation   Oral Motor   Oral Musculature generally intact   Dentition (Oral Motor)   Dentition (Oral Motor) natural dentition;adequate dentition   Cough/Swallow/Gag Reflex (Oral Motor)   Volitional Throat Clear/Cough (Oral Motor) reduced strength   Volitional Swallow (Oral Motor)   (appeared WFL)   Vocal Quality/Secretion Management (Oral Motor)   Vocal Quality (Oral Motor) dysphonic   Secretion Management (Oral Motor) WNL   General Swallowing Observations   Current Diet/Method of Nutritional Intake (General Swallowing Observations, NIS) regular diet;thin liquids (level 0)   Swallowing Evaluation Clinical swallow evaluation   Clinical Swallow Evaluation   Feeding Assistance minimal assistance required   Clinical Swallow Evaluation Textures Trialed thin liquids;pureed;solid foods   Clinical Swallow Eval: Thin Liquid Texture Trial   Mode of Presentation, Thin Liquids cup;straw;self-fed   Oral Phase of Swallow   (?reduced control)   Pharyngeal Phase of Swallow ?impaired;coughing/choking   Successful Strategies Trialed " During Procedure   (small sips via straw)   Diagnostic Statement + cough on initial 2 trials of thin liquid via cup. When cued for small straw sip, no further cough, throat clear, wet vocal quality, or increased WOB.   Clinical Swallow Evaluation: Puree Solid Texture Trial   Mode of Presentation, Puree spoon;self-fed   Oral Phase, Puree WFL   Pharyngeal Phase, Puree suspect intact   Clinical Swallow Evaluation: Solid Food Texture Trial   Mode of Presentation self-fed   Oral Phase WFL   Pharyngeal Phase suspect intact   Swallowing Recommendations   Diet Consistency Recommendations regular diet;thin liquids (level 0)   Supervision Level for Intake close supervision needed   Mode of Delivery Recommendations bolus size, small;slow rate of intake   Swallowing Maneuver Recommendations alternate food and liquid intake   Recommended Feeding/Eating Techniques (Swallow Eval) maintain upright posture during/after eating for 30 minutes   Medication Administration Recommendations, Swallowing (SLP) whole with liquid or in puree   Instrumental Assessment Recommendations instrumental evaluation not recommended at this time  (consider as indicated following surgery)   General Therapy Interventions   Planned Therapy Interventions Dysphagia Treatment   Dysphagia treatment Instruction of safe swallow strategies;Compensatory strategies for swallowing   Clinical Impression   Criteria for Skilled Therapeutic Interventions Met (SLP Eval) Yes, treatment indicated   SLP Diagnosis functional oral swallow and possible pharyngeal dysphagia   Clinical Impression Comments Pt currently presents with functional oral swallow and possible pharyngeal dysphagia. Noted adequate acceptance/containment. Bolus formation/propulsion appeared adequate; ?reduced control for thin liquid. Pt with grossly adequate mastication with timely oral transit and complete clearance. Suspect grossly timely swallow with fair hyolaryngeal elevation. + cough following initial  2 trials of thin liquid via cup (?increased difficulty self-administering sips via cup vs straw). Pt then cued for small cup/straw sips. No other cough, throat clear, wet vocal quality, eye watering, or increased WOB observed.   SLP Discharge Planning   SLP Discharge Recommendation home with assist  (?higher level of care following surgery)   SLP Rationale for DC Rec Suspect current SLP needs will be met during this admission; pt may have other needs following surgery   SLP Brief overview of current status  Recommend continue regular diet with thin liquid (0) -- SMALL sips -- emphasize straw over cup, upright posture, small bites, slow rate, alternate liquid/solid PRN.    Total Evaluation Time   Total Evaluation Time (Minutes) 20   SLP Goals   Therapy Frequency (SLP Eval) 3 times/wk   SLP Predicted Duration/Target Date for Goal Attainment 06/02/22   SLP Goals Swallow   SLP: Safely tolerate diet without signs/symptoms of aspiration Regular diet;Thin liquids;With use of swallow precautions

## 2022-05-26 NOTE — PROGRESS NOTES
"SPIRITUAL HEALTH SERVICES Progress Note  73    I shared a reflective visit with patient Mary's son, John today, integrating elements of illness and personal narratives. Mary was asleep through the visit.     Illness Narrative - John was tearful throughout the visit. It has been a significant week for their family as pt Mary's spouse and John's father  last week after enrolling in hospice. John described how the family has thought Mary had dementia but learned now that she has a tumor.     Distress - John names he and his mother and family's grief during this time and the numerous details they are navigating (planning his mother's surgery, preparing his father's , etc).     Coping - John spent much time describing his family and the tremendous love and support around his mother and that the family provides for each other.     Meaning-Making - John engaged in significant life review about his mother and father and shared reflections around preparing for his father's service, which he hopes his mother can improve enough to participate in. He names their family's [employer] is deeply meaningful to them \"Eric is our rock\" and shares their family is close to the  at [facility] in [place].     I spent time providing emotional and spiritual support through reflective listening that affirmed emotions, experiences and meaning.     Plan - [facility] will continue to follow, will plan to visit at a time when Mary is awake and able to participate in visit.     Jessy Gonsales  Associate       "

## 2022-05-26 NOTE — PROGRESS NOTES
05/26/22 1400   Quick Adds   Type of Visit Initial Occupational Therapy Evaluation   Living Environment   People in Home   (living in an apartment w/spouse up until last weeked, as spouse passed away over weekend, living w/ son since that time.)   Current Living Arrangements house  (will stay w/ son until surgery)   Home Accessibility stairs to enter home;stairs within home   Number of Stairs, Main Entrance 2  (no railing)   Number of Stairs, Within Home, Primary greater than 10 stairs  (13 ro bedroom/bathroom)   Stair Railings, Within Home, Primary railing on right side (ascending)   Transportation Anticipated family or friend will provide   Living Environment Comments Pt.'s son reports comfort height toilet w/ vanity/sink support 1 side, tub/shower combo.--does have suctin cup grab bar available;pt. has a walk-in shower, grab bars in her apt.   Self-Care   Usual Activity Tolerance moderate   Current Activity Tolerance fair   Regular Exercise   (not recently)   Equipment Currently Used at Home none   Fall history within last six months no   Activity/Exercise/Self-Care Comment Per son, pt. is firecely indep., no AD at baseline;family assist recently w/ medication mgmt., bathing   General Information   Onset of Illness/Injury or Date of Surgery 05/24/22   Referring Physician Dr. Drew Mitchell   Patient/Family Therapy Goal Statement (OT) for pt. to discharge to son's home until sx.   Additional Occupational Profile Info/Pertinent History of Current Problem OT:Indy Reyes is a 89 year old female with past medical history significant for dementia, hyperlipidemia, and hypothyroidism admitted on 5/24/2022 with weakness. Per neurosurgery note: 89-year-old female presenting with right-sided weakness and expressive aphasia with cognitive decline over the past several weeks to months with imaging findings revealing a large extraaxial mass in the left cerebral convexity with a second smaller mass anterior left  cerebral convexity, most likely representing meningiomas with brain compression and shift. Plan for sx. next week per son.   Performance Patterns (Routines, Roles, Habits) pt/ indep./mod. indep. w/ I/ADL's--needing more assist recently   Existing Precautions/Restrictions fall   Limitations/Impairments hearing  (has HA's)   General Observations and Info Pt.alert, not orenited to month, place, stated correct year;son present and able to provide baseline/background info.;pt. having difficulty word-finding   Cognitive Status Examination   Orientation Status person   Affect/Mental Status (Cognitive) sad/depressed affect  (recent loss of spouse)   Follows Commands 75-90% accuracy;follows one-step commands   Safety Deficit at risk behavior observed;awareness of need for assistance;insight into deficits/self-awareness;judgment   Memory Deficit   (STM recall 1/3 after delay)   Cognitive Status Comments h/o dementia;family able to provide 24/7 support at home   Visual Perception   Impact of Vision Impairment on Function (Vision) h/o macular degeneration   Sensory   Sensory Comments no numbness/tingling reported   Pain Assessment   Patient Currently in Pain No   Posture   Posture forward head position;protracted shoulders   Range of Motion Comprehensive   Comment, General Range of Motion BUE WFL   Strength Comprehensive (MMT)   Comment, General Manual Muscle Testing (MMT) Assessment grossly 4/5 B shoulder/elbow strength;some difficulty folloing directions for MMT   Coordination   Coordination Comments able to use BUE in functional tasks, min. difficulty using R   Bed Mobility   Comment (Bed Mobility) SBA/handrail   Transfers   Transfer Comments sit-stand w/ SBA/vc's   Balance   Balance Comments impaired;overall, SBA-CGA for room mobility   Lower Body Dressing Assessment/Training   [place] Level (Lower Body Dressing) minimum assist (75% patient effort)   Grooming Assessment/Training   [place] Level (Grooming) contact  guard assist   Toileting   [place] Level (Toileting) minimum assist (75% patient effort)  (low toilet)   Clinical Impression   Criteria for Skilled Therapeutic Interventions Met (OT) Yes, treatment indicated   OT Diagnosis Decline in ADL performance   OT Problem List-Impairments impacting ADL balance;cognition;communication;mobility;strength   Assessment of Occupational Performance 3-5 Performance Deficits   Identified Performance Deficits dressing, bathing, grooming, toileting, fx. mobility + IADL's   Planned Therapy Interventions (OT) ADL retraining;cognition;strengthening;home program guidelines   Clinical Decision Making Complexity (OT) low complexity   Anticipated Equipment Needs Upon Discharge (OT) raised toilet seat;shower chair;other (see comments)  (grab bar)   Risk & Benefits of therapy have been explained evaluation/treatment results reviewed;care plan/treatment goals reviewed;risks/benefits reviewed;current/potential barriers reviewed;participants voiced agreement with care plan;participants included;patient   OT Discharge Planning   OT Discharge Recommendation (DC Rec) home with assist;home with home care occupational therapy   OT Rationale for DC Rec OT:Pt. currently below baseline and requires 24/7 support/assist--family able to provide such support, as pt. will be discharging to son's home until surgery;consider home OT for ADL retraining/safety eval./strengthening(or defer until after surgery). Family available 24/7 to assist w/ I/ADL's, mobility;[facility] for shower chair,grab bars.   OT Brief overview of current status SBA w/ transfers, SBA-CGA room mobility, SBA w/ higher toilet/commode -over-toilet, SBA standing at sink for grooming;demo. word-finding difficulties + impaired memory(has h/o dementia)   Total Evaluation Time (Minutes)   Total Evaluation Time (Minutes) 14   OT Goals   Therapy Frequency (OT) Daily   OT Predicted Duration/Target  Date for Goal Attainment 05/31/22   OT Goals Hygiene/Grooming;Upper Body Dressing;Lower Body Dressing;Toilet Transfer/Toileting   OT: Hygiene/Grooming independent;modified independent;while standing   OT: Upper Body Dressing Independent;including set-up/clothing retrieval   OT: Lower Body Dressing Independent;Modified independent;using adaptive equipment;including set-up/clothing retrieval   OT: Toilet Transfer/Toileting Independent;Modified independent;toilet transfer;cleaning and garment management;using adaptive equipment

## 2022-05-26 NOTE — PLAN OF CARE
Patient will discharge to home with son, plan to come back next Thursday for brain surgery. Will review discharge instructions with son and patient.

## 2022-05-26 NOTE — PROGRESS NOTES
Spoke with patient and son, answered all questions. We will have our surgery schedulers contact him and help answer any scheduling questions that arise.   Otherwise, agree with plan to discharge this afternoon.     Charlie Barber PA-C  Neurosurgery

## 2022-05-27 ENCOUNTER — PATIENT OUTREACH (OUTPATIENT)
Dept: CARE COORDINATION | Facility: CLINIC | Age: 87
End: 2022-05-27
Payer: COMMERCIAL

## 2022-05-27 ENCOUNTER — TELEPHONE (OUTPATIENT)
Dept: NEUROSURGERY | Facility: CLINIC | Age: 87
End: 2022-05-27
Payer: COMMERCIAL

## 2022-05-27 DIAGNOSIS — Z71.89 OTHER SPECIFIED COUNSELING: ICD-10-CM

## 2022-05-27 ASSESSMENT — PATIENT HEALTH QUESTIONNAIRE - PHQ9
SUM OF ALL RESPONSES TO PHQ QUESTIONS 1-9: 3
10. IF YOU CHECKED OFF ANY PROBLEMS, HOW DIFFICULT HAVE THESE PROBLEMS MADE IT FOR YOU TO DO YOUR WORK, TAKE CARE OF THINGS AT HOME, OR GET ALONG WITH OTHER PEOPLE: NOT DIFFICULT AT ALL
SUM OF ALL RESPONSES TO PHQ QUESTIONS 1-9: 3

## 2022-05-27 NOTE — PLAN OF CARE
Occupational Therapy Discharge Summary    Reason for therapy discharge:    Discharged to home.    Progress towards therapy goal(s). See goals on [facility] Plan [facility] record for goal details.  Goals not met.  Barriers to achieving goals:   discharge on same date as initial evaluation.    Therapy recommendation(s):  Pt. currently below baseline and requires 24/7 support/assist--family able to provide such support, as pt. will be discharging to son's home until surgery;consider home OT for ADL retraining/safety eval./strengthening(or defer until after surgery). Family available 24/7 to assist w/ I/ADL's, mobility;Rec. DME for safety in the bathroom and provded DME resource for shower chair,grab bars.

## 2022-05-27 NOTE — PROGRESS NOTES
05/26/22 1530   Quick Adds   Type of Visit Initial PT Evaluation       Present no   Language English   Living Environment   People in Home   (living in an apartment w/spouse up until last weeked, as spouse passed away over weekend, living w/ son since that time.)   Current Living Arrangements house   Home Accessibility stairs to enter home;stairs within home   Number of Stairs, Main Entrance 2   Number of Stairs, Within Home, Primary greater than 10 stairs   Stair Railings, Within Home, Primary railing on right side (ascending)   Transportation Anticipated family or friend will provide   Living Environment Comments Pt.'s son reports comfort height toilet w/ vanity/sink support 1 side, tub/shower combo.--does have suctin cup grab bar available;pt. has a walk-in shower, grab bars in her apt.   Self-Care   Usual Activity Tolerance moderate   Current Activity Tolerance fair   Regular Exercise   (not recently)   Equipment Currently Used at Home none   Fall history within last six months no   Activity/Exercise/Self-Care Comment Per son, pt. is firecely indep., no AD at baseline;family assist recently w/ medication mgmt., bathing   General Information   Onset of Illness/Injury or Date of Surgery 05/24/22   Referring Physician Drew Mitchell MD   Patient/Family Therapy Goals Statement (PT) Feel better, go home   Pertinent History of Current Problem (include personal factors and/or comorbidities that impact the POC) Pt is a 89 year old female with past medical history significant for dementia, hyperlipidemia, and hypothyroidism admitted on 5/24/2022 with weakness. Per neurosurgery note: 89-year-old female presenting with right-sided weakness and expressive aphasia with cognitive decline over the past several weeks to months with imaging findings revealing a large extraaxial mass in the left cerebral convexity with a second smaller mass anterior left cerebral convexity, most likely representing  "meningiomas with brain compression and shift. Plan for sx. next week per son.   Existing Precautions/Restrictions seizures;fall   Cognition   Cognitive Status Comments h/o dementia;family able to provide 24/7 support at home   Pain Assessment   Patient Currently in Pain No   Integumentary/Edema   Integumentary/Edema no deficits were identifed   Posture    Posture Forward head position;Protracted shoulders;Kyphosis   Range of Motion (ROM)   Range of Motion ROM is WFL   Strength (Manual Muscle Testing)   Strength (Manual Muscle Testing) Able to perform L SLR;Able to perform R SLR   Strength Comments Grossly deconditioned, grossly 4/5 B LE   Transfers   Comment, (Transfers) SST with CGA   Gait/Stairs (Locomotion)   Distance in Feet (Required for LE Total Joints) 400   Pattern (Gait) step-through   [place] Level (Stairs) contact guard   Handrail Location (Stairs) right side (ascending)   Number of Steps (Stairs) 4   Ascending Technique (Stairs) step-to-step   Descending Technique (Stairs) step-to-step   Comment, (Gait/Stairs) CGA with HHA, pt/son refused AD at this time - \"we'll cross that bridge when we have to after the brain surgery.\"   Balance   Balance Comments Needs 1-2 UE support   Sensory Examination   Sensory Perception Comments Not formally assessed   Coordination   Coordination Comments Not formally assessed   Clinical Impression   Criteria for Skilled Therapeutic Intervention Evaluation only   PT Diagnosis (PT) Impaired indep with fn mob   Influenced by the following impairments Strength, balance, cognition, actvity tolerance, cardiovasc endurance   Functional limitations due to impairments Bed mob, transfers, amb, stairs   Clinical Presentation (PT Evaluation Complexity) Evolving/Changing   Clinical Presentation Rationale Multiple affecting comorbidities, assessment incl strength, balance, fn mob   Clinical Decision Making (Complexity) moderate complexity   Risk & Benefits of therapy have been " explained evaluation/treatment results reviewed;care plan/treatment goals reviewed;risks/benefits reviewed;current/potential barriers reviewed;participants voiced agreement with care plan;participants included;patient;son   PT Discharge Planning   PT Discharge Recommendation (DC Rec) home with assist   PT Rationale for DC Rec Pt is significantly below baseline, scheduled to return to [facility] for brain surgery in week's time. Pt able to complete transfers, amb and climb/descend stairs with CGA. Supportive son wiling and able to provide 24/7 assistance. No further PT needs identified at this time, pt is d/c to home with son, will need PT re-assessment after brain surgery   PT Brief overview of current status CGA bed mob, transfers, amb, stairs

## 2022-05-27 NOTE — PROGRESS NOTES
Clinic Care Coordination Contact  [facility]: Post-Discharge Note  SITUATION                                                      Admission:    Admission Date: 05/24/22   Reason for Admission: Brain mass  Discharge:   Discharge Date: 05/26/22  Discharge Diagnosis: Brain mass    BACKGROUND                                                      Per hospital discharge summary and inpatient provider notes:    Indy Reyes is a 89 year old female who presents to the ED with her family for evaluation of weakness.      Pts family had noticed word finding difficulties, slow responses and increasing right sided weakness over the course of several weeks to months. Unfortunately the patient's  recently passed away over the weekend and she has been living her son since that time. Since then he has really noted the weakness in her right arm. He notes that she is normally right handed but has been using her left hand for most tasks (brushing teeth, etc), and her right leg will occasionally drag. They called her PCP today who recommended presentation to the ED for stroke evaluation.      The patient is currently comfortable and has no specific complaints.        ASSESSMENT      Enrollment  Primary Care Care Coordination Status: Declined    Discharge Assessment  How are you doing now that you are home?: She is doing well. She is eating well and speaking much better.  How are your symptoms? (Red Flag symptoms escalate to triage hotline per guidelines): Improved  Do you feel your condition is stable enough to be safe at home until your provider visit?: Yes  Does the patient have their discharge instructions? : Yes  Does the patient have questions regarding their discharge instructions? : No  Were you started on any new medications or were there changes to any of your previous medications? : Yes  Does the patient have all of their medications?: Yes  Do you have questions regarding any of your medications? :  No  Discharge follow-up appointment scheduled within 14 calendar days? : Yes  Discharge Follow Up Appointment Date: 05/31/22  Discharge Follow Up Appointment Scheduled with?: Specialty Care Provider                  PLAN                                                      Outpatient Plan: Follow up with neurosurgery for surgery scheduled on 5/31/2022. They  will call you.    Future Appointments   Date Time Provider Department Center   5/29/2022  2:15 PM [facility] [place]   6/22/2022 11:30 AM Mariel Chou NP [facility]   7/14/2022  2:00 PM Dylon Hodgson PA-C [facility]         For any urgent concerns, please contact our 24 hour nurse triage line: [phone] ([phone])         GALEN Rincon  [phone]  [facility]  [facility]

## 2022-05-27 NOTE — PLAN OF CARE
Physical Therapy Discharge Summary    Reason for therapy discharge:    Discharged to home.    Progress towards therapy goal(s). See goals on Care Plan in [facility] electronic health record for goal details.  Goals not met.  Barriers to achieving goals:   discharge from facility.    Therapy recommendation(s):    No further therapy is recommended. (Until after brain surgery in weeks' time.)

## 2022-05-27 NOTE — TELEPHONE ENCOUNTER
Pt having surgery 6/3/22 with Dr. Rose. pt has Macular Degeneration. patient son called in -    She recieves injections monthly into eye(s) last one was 5/12/22. Next one is 6/23/22. Son is wanting to know if there are any issues with this medication and the patient having surgery.     Placed call to eye clinic ([phone] Dr. Fernandes), they will call us back with their recommendations -    Eye clinic called back to state that there are no restrictions or risks with patient having surgery and receiving these injections. Placed call back to son to update him    Pt son states understanding of Dr. Fernandes's recommendation. He is still insistent on having Dr. Rose's blessing as well for the patient to receive the injections after surgery on 6/23/22.    Pt receives:   - Right Eye: Avastin Injection   - Left Eye: Eylea Injection    Message emailed to Dr. Rose for verification -    Dr. Rose updated of injections and states he is okay to proceed with surgery and the injections as scheduled.     Placed call to son to update him.

## 2022-05-31 ENCOUNTER — LAB (OUTPATIENT)
Dept: URGENT CARE | Facility: URGENT CARE | Age: 87
End: 2022-05-31
Payer: COMMERCIAL

## 2022-05-31 DIAGNOSIS — Z11.59 ENCOUNTER FOR SCREENING FOR OTHER VIRAL DISEASES: ICD-10-CM

## 2022-05-31 PROCEDURE — U0003 INFECTIOUS AGENT DETECTION BY NUCLEIC ACID (DNA OR RNA); SEVERE ACUTE RESPIRATORY SYNDROME CORONAVIRUS 2 (SARS-COV-2) (CORONAVIRUS DISEASE [COVID-19]), AMPLIFIED PROBE TECHNIQUE, MAKING USE OF HIGH THROUGHPUT TECHNOLOGIES AS DESCRIBED BY CMS-2020-01-R: HCPCS

## 2022-05-31 PROCEDURE — U0005 INFEC AGEN DETEC AMPLI PROBE: HCPCS

## 2022-06-01 ENCOUNTER — ANESTHESIA EVENT (OUTPATIENT)
Dept: SURGERY | Facility: CLINIC | Age: 87
DRG: 025 | End: 2022-06-01
Payer: COMMERCIAL

## 2022-06-01 ENCOUNTER — OFFICE VISIT (OUTPATIENT)
Dept: INTERNAL MEDICINE | Facility: CLINIC | Age: 87
End: 2022-06-01
Payer: COMMERCIAL

## 2022-06-01 VITALS
TEMPERATURE: 97.4 F | RESPIRATION RATE: 14 BRPM | OXYGEN SATURATION: 99 % | SYSTOLIC BLOOD PRESSURE: 111 MMHG | BODY MASS INDEX: 20.63 KG/M2 | HEIGHT: 62 IN | HEART RATE: 103 BPM | WEIGHT: 112.1 LBS | DIASTOLIC BLOOD PRESSURE: 60 MMHG

## 2022-06-01 DIAGNOSIS — R79.89 ELEVATED TSH: ICD-10-CM

## 2022-06-01 DIAGNOSIS — R53.1 RIGHT SIDED WEAKNESS: ICD-10-CM

## 2022-06-01 DIAGNOSIS — Z01.818 PREOP GENERAL PHYSICAL EXAM: Primary | ICD-10-CM

## 2022-06-01 DIAGNOSIS — G93.89 BRAIN MASS: ICD-10-CM

## 2022-06-01 DIAGNOSIS — E03.8 SUBCLINICAL HYPOTHYROIDISM: ICD-10-CM

## 2022-06-01 LAB
ANION GAP SERPL CALCULATED.3IONS-SCNC: 7 MMOL/L (ref 3–14)
BUN SERPL-MCNC: 30 MG/DL (ref 7–30)
CALCIUM SERPL-MCNC: 8.9 MG/DL (ref 8.5–10.1)
CHLORIDE BLD-SCNC: 103 MMOL/L (ref 94–109)
CO2 SERPL-SCNC: 30 MMOL/L (ref 20–32)
CREAT SERPL-MCNC: 0.62 MG/DL (ref 0.52–1.04)
GFR SERPL CREATININE-BSD FRML MDRD: 84 ML/MIN/1.73M2
GLUCOSE BLD-MCNC: 83 MG/DL (ref 70–99)
POTASSIUM BLD-SCNC: 3.9 MMOL/L (ref 3.4–5.3)
SARS-COV-2 RNA RESP QL NAA+PROBE: NEGATIVE
SODIUM SERPL-SCNC: 140 MMOL/L (ref 133–144)

## 2022-06-01 PROCEDURE — 36415 COLL VENOUS BLD VENIPUNCTURE: CPT | Performed by: FAMILY MEDICINE

## 2022-06-01 PROCEDURE — 80048 BASIC METABOLIC PNL TOTAL CA: CPT | Performed by: FAMILY MEDICINE

## 2022-06-01 PROCEDURE — 99214 OFFICE O/P EST MOD 30 MIN: CPT | Performed by: FAMILY MEDICINE

## 2022-06-01 RX ORDER — LEVOTHYROXINE SODIUM 25 UG/1
25 TABLET ORAL DAILY
Qty: 90 TABLET | Refills: 1 | Status: SHIPPED | OUTPATIENT
Start: 2022-06-01 | End: 2023-03-13

## 2022-06-01 NOTE — PROGRESS NOTES
[place] [address] [facility] [address]  Phone: [phone]  Primary Provider: Batool Kulkarni  Pre-op Performing Provider: GOSIA WEST     :[id number]}  PREOPERATIVE EVALUATION:  Today's date: 6/1/2022    Indy Reyes is a 89 year old female who presents for a preoperative evaluation.    Surgical Information:  Surgery/Procedure: Left frontal craniotomy for tumor removal  Surgery Location: [facility]  Surgeon: Dr. Rasheed Rose  Surgery Date: 6/2/22  Time of Surgery: 12:35 PM  Where patient plans to recover: Other: TBD  Fax number for surgical facility: Note does not need to be faxed, will be available electronically in Epic.    Type of Anesthesia Anticipated: General    Assessment & Plan     The proposed surgical procedure is considered INTERMEDIATE risk.    Problem List Items Addressed This Visit     Elevated TSH    Relevant Medications    levothyroxine (SYNTHROID/LEVOTHROID) 25 MCG tablet    Subclinical hypothyroidism    Relevant Medications    levothyroxine (SYNTHROID/LEVOTHROID) 25 MCG tablet    Brain mass    Right sided weakness      Other Visit Diagnoses     Preop general physical exam    -  Primary    Relevant Orders    Basic metabolic panel  (Ca, Cl, CO2, Creat, Gluc, K, Na, BUN)               Risks and Recommendations:  The patient has the following additional risks and recommendations for perioperative complications:   - No identified additional risk factors other than previously addressed      Medications -none on morning of surgery.      RECOMMENDATION:      APPROVAL GIVEN to proceed with proposed procedure, without further diagnostic evaluation.    :[id number]}  This was a 25-minute preop visit with chart review, interview and examination of patient, review labs, assess glucose which had been elevated, review EKG, prepare medical record.      Subjective     HPI related to upcoming procedure:     This 89-year-old  woman developed some right-sided weakness within the last month.  She came to the emergency room 5-24 and work-up disclosed a large mass in left cerebral hemisphere.  Neurosurgery consultation was obtained.  She was started on Decadron.    Patient has other wise been quite healthy.  She is on a small dose of L-thyroxine.  She has no history of heart disease, lung disease.  Her blood sugar was elevated while on Decadron and will be rechecked today.    She has a remote history of surgery but no history of anesthesia complications.      Preop Questions 5/27/2022   1. Have you ever had a heart attack or stroke? No   2. Have you ever had surgery on your heart or blood vessels, such as a stent placement, a coronary artery bypass, or surgery on an artery in your head, neck, heart, or legs? No   3. Do you have chest pain with activity? No   4. Do you have a history of  heart failure? No   5. Do you currently have a cold, bronchitis or symptoms of other infection? No   6. Do you have a cough, shortness of breath, or wheezing? No   7. Do you or anyone in your family have previous history of blood clots? No   8. Do you or does anyone in your family have a serious bleeding problem such as prolonged bleeding following surgeries or cuts? No   9. Have you ever had problems with anemia or been told to take iron pills? No   10. Have you had any abnormal blood loss such as black, tarry or bloody stools, or abnormal vaginal bleeding? No   11. Have you ever had a blood transfusion? No   12. Are you willing to have a blood transfusion if it is medically needed before, during, or after your surgery? Yes   13. Have you or any of your relatives ever had problems with anesthesia? No   14. Do you have sleep apnea, excessive snoring or daytime drowsiness? No   15. Do you have any artifical heart valves or other implanted medical devices like a pacemaker, defibrillator, or continuous glucose monitor? No   16. Do you have artificial joints? No    17. Are you allergic to latex? No       Health Care Directive:  Patient has a Health Care Directive on file      Preoperative Review of :   reviewed - no record of controlled substances prescribed.        Review of Systems    No fevers.  No cough or shortness of breath.  No chest pain.  No nausea or abdominal pain.  No edema.  Some improvement in speech and right side strength.      Patient Active Problem List    Diagnosis Date Noted     Brain mass 05/24/2022     Priority: Medium     Right sided weakness 05/24/2022     Priority: Medium     Dyspepsia 10/14/2021     Priority: Medium     Macular degeneration (senile) of retina 10/14/2021     Priority: Medium     Subclinical hypothyroidism 05/22/2018     Priority: Medium     Memory changes 11/09/2017     Priority: Medium     Consistent with age related changes. Trouble way finding in places she does not frequent. Contributors include stress of multiple roles at this time and also difficulty falling asleep at times       Lumbar radiculopathy, right 04/18/2017     Priority: Medium     Cervical radiculitis 11/16/2016     Priority: Medium     Osteoarthrosis of knee 05/19/2014     Priority: Medium     Elevated TSH 02/04/2014     Priority: Medium     Postnasal drip 05/23/2013     Priority: Medium     Rhinitis 05/23/2013     Priority: Medium     Thyroid nodule 07/25/2012     Priority: Medium     Advanced directives, counseling/discussion 11/09/2011     Priority: Medium     Advance Directive Problem List Overview:   Name Relationship Phone    Primary Health Care Agent            Alternative Health Care Agent          Patient states has Advance Directive and will bring in a copy to clinic. 11/9/2011          Impaired fasting glucose 11/09/2011     Priority: Medium     Family history of breast cancer 11/09/2011     Priority: Medium     Family history of coronary artery disease 11/09/2011     Priority: Medium     Osteopenia 09/02/2009     Priority: Medium      "Hyperlipidemia LDL goal <130 2008     Priority: Medium      Past Medical History:   Diagnosis Date     Back pain      Past Surgical History:   Procedure Laterality Date     APPENDECTOMY      at age of 16     BACK SURGERY  2017     CATARACT IOL, RT/LT  2009    both     FORAMINOTOMY LUMBAR POSTERIOR ONE LEVEL Right 2017    Procedure: FORAMINOTOMY LUMBAR POSTERIOR ONE LEVEL;  Right L4-L5 hemilaminectomy and foraminotomy and excision of synovial cyst. ;  Surgeon: Fady Woo MD;  Location: [facility] OR     TONSILLECTOMY  1954     Current Outpatient Medications   Medication Sig Dispense Refill     Boswellia-Glucosamine-Vit D (OSTEO BI-FLEX ONE PER DAY) TABS Take 2 tablets by mouth daily       cholecalciferol (VITAMIN D3) 25 mcg (1000 units) capsule Take 1 capsule (25 mcg) by mouth daily       dexamethasone (DECADRON) 4 MG tablet Take 1 tablet (4 mg) by mouth 3 times daily (with meals) 15 tablet 0     levothyroxine (SYNTHROID/LEVOTHROID) 25 MCG tablet Take 1 tablet (25 mcg) by mouth daily 90 tablet 3     Multiple Vitamins-Minerals (EYE VITAMINS) CAPS Take 1 tablet by mouth daily Visiclear, Complete, or Preservision         Allergies   Allergen Reactions     Tetracycline Rash        Social History     Tobacco Use     Smoking status: Former Smoker     Types: Cigarettes     Quit date: 1960     Years since quittin.4     Smokeless tobacco: Never Used     Tobacco comment:    Substance Use Topics     Alcohol use: Yes     Comment: SOCIAL-2 drinks per month        History   Drug Use No         Objective     /60 (BP Location: Right arm, Patient Position: Sitting, Cuff Size: Adult Regular)   Pulse 103   Temp 97.4  F (36.3  C) (Tympanic)   Resp 14   Ht 1.575 m (5' 2\")   Wt 50.8 kg (112 lb 1.6 oz)   SpO2 99%   BMI 20.50 kg/m      Physical Exam    Thin, alert woman.  HEENT pupils equal, face symmetrical  Neck no thyromegaly.  Lungs clear.  Cardiac RSR, rate normal, no " murmur.  Abdomen nondistended, nontender.  Extremities no edema.      Recent Labs   Lab Test 05/24/22  1835 02/01/21  1049   HGB 12.1 13.0    175    138   POTASSIUM 4.0 4.9   CR 0.67 0.76   A1C 5.7* 5.9*        Diagnostics:    Results for orders placed or performed in visit on 06/01/22   Basic metabolic panel  (Ca, Cl, CO2, Creat, Gluc, K, Na, BUN)     Status: Normal   Result Value Ref Range    Sodium 140 133 - 144 mmol/L    Potassium 3.9 3.4 - 5.3 mmol/L    Chloride 103 94 - 109 mmol/L    Carbon Dioxide (CO2) 30 20 - 32 mmol/L    Anion Gap 7 3 - 14 mmol/L    Urea Nitrogen 30 7 - 30 mg/dL    Creatinine 0.62 0.52 - 1.04 mg/dL    Calcium 8.9 8.5 - 10.1 mg/dL    Glucose 83 70 - 99 mg/dL    GFR Estimate 84 >60 mL/min/1.73m2         EKG  Reviewed from 5-  Tracing appears WNL.      Revised Cardiac Risk Index (RCRI):  The patient has the following serious cardiovascular risks for perioperative complications:   - No serious cardiac risks = 0 points     RCRI Interpretation: 1 point: Class II (low risk - 0.9% complication rate)           Signed Electronically by: Darrell Chan MD  Copy of this evaluation report is provided to requesting physician.      Answers for HPI/ROS submitted by the patient on 5/27/2022  If you checked off any problems, how difficult have these problems made it for you to do your work, take care of things at home, or get along with other people?: Not difficult at all  PHQ9 TOTAL SCORE: 3

## 2022-06-01 NOTE — PATIENT INSTRUCTIONS
Preparing for Your Surgery  Getting started  A nurse will call you to review your health history and instructions. They will give you an arrival time based on your scheduled surgery time. Please be ready to share:    Your doctor's clinic name and phone number    Your medical, surgical and anesthesia history    A list of allergies and sensitivities    A list of medicines, including herbal treatments and over-the-counter drugs    Whether the patient has a legal guardian (ask how to send us the papers in advance)  Please tell us if you're pregnant--or if there's any chance you might be pregnant. Some surgeries may injure a fetus (unborn baby), so they require a pregnancy test. Surgeries that are safe for a fetus don't always need a test, and you can choose whether to have one.   If you have a child who's having surgery, please ask for a copy of Preparing for Your Child's Surgery.    Preparing for surgery    Within 30 days of surgery: Have a pre-op exam (sometimes called an H&P, or History and Physical). This can be done at a clinic or pre-operative center.  ? If you're having a , you may not need this exam. Talk to your care team.    At your pre-op exam, talk to your care team about all medicines you take. If you need to stop any medicines before surgery, ask when to start taking them again.  ? We do this for your safety. Many medicines can make you bleed too much during surgery. Some change how well surgery (anesthesia) drugs work.    Call your insurance company to let them know you're having surgery. (If you don't have insurance, call [phone].)    Call your clinic if there's any change in your health. This includes signs of a cold or flu (sore throat, runny nose, cough, rash, fever). It also includes a scrape or scratch near the surgery site.    If you have questions on the day of surgery, call your hospital or surgery center.  COVID testing  You may need to be tested for COVID-19 before having  surgery. If so, we will give you instructions.  Eating and drinking guidelines  For your safety: Unless your surgeon tells you otherwise, follow the guidelines below.    Eat and drink as usual until 8 hours before surgery. After that, no food or milk.    Drink clear liquids until 2 hours before surgery. These are liquids you can see through, like water, Gatorade and Propel Water. You may also have black coffee and tea (no cream or milk).    Nothing by mouth within 2 hours of surgery. This includes gum, candy and breath mints.    If you drink alcohol: Stop drinking it the night before surgery.    If your care team tells you to take medicine on the morning of surgery, it's okay to take it with a sip of water.  Preventing infection    Shower or bathe the night before and morning of your surgery. Follow the instructions your clinic gave you. (If no instructions, use regular soap.)    Don't shave or clip hair near your surgery site. We'll remove the hair if needed.    Don't smoke or vape the morning of surgery. You may chew nicotine gum up to 2 hours before surgery. A nicotine patch is okay.  ? Note: Some surgeries require you to completely quit smoking and nicotine. Check with your surgeon.    Your care team will make every effort to keep you safe from infection. We will:  ? Clean our hands often with soap and water (or an alcohol-based hand rub).  ? Clean the skin at your surgery site with a special soap that kills germs.  ? Give you a special gown to keep you warm. (Cold raises the risk of infection.)  ? Wear special hair covers, masks, gowns and gloves during surgery.  ? Give antibiotic medicine, if prescribed. Not all surgeries need antibiotics.  What to bring on the day of surgery    Photo ID and insurance card    Copy of your health care directive, if you have one    Glasses and hearing aides (bring cases)  ? You can't wear contacts during surgery    Inhaler and eye drops, if you use them (tell us about these when  you arrive)    CPAP machine or breathing device, if you use them    A few personal items, if spending the night    If you have . . .  ? A pacemaker, ICD (cardiac defibrillator) or other implant: Bring the ID card.  ? An implanted stimulator: Bring the remote control.  ? A legal guardian: Bring a copy of the certified (court-stamped) guardianship papers.  Please remove any jewelry, including body piercings. Leave jewelry and other valuables at home.  If you're going home the day of surgery    You must have a responsible adult drive you home. They should stay with you overnight as well.    If you don't have someone to stay with you, and you aren't safe to go home alone, we may keep you overnight. Insurance often won't pay for this.  After surgery  If it's hard to control your pain or you need more pain medicine, please call your surgeon's office.  Questions?   If you have any questions for your care team, list them here: _________________________________________________________________________________________________________________________________________________________________________ ____________________________________ ____________________________________ ____________________________________  For informational purposes only. Not to replace the advice of your health care provider. Copyright   2003, 2019 [facility]. All rights reserved. Clinically reviewed by Gabbi Dewitt MD. [employer] [id number] - REV 07/21.

## 2022-06-02 ENCOUNTER — MEDICAL CORRESPONDENCE (OUTPATIENT)
Dept: HEALTH INFORMATION MANAGEMENT | Facility: CLINIC | Age: 87
End: 2022-06-02

## 2022-06-02 ENCOUNTER — HOSPITAL ENCOUNTER (INPATIENT)
Facility: CLINIC | Age: 87
LOS: 5 days | Discharge: NURSING, REHAB OR LONG-TERM CARE | DRG: 025 | End: 2022-06-07
Attending: NEUROLOGICAL SURGERY | Admitting: NEUROLOGICAL SURGERY
Payer: COMMERCIAL

## 2022-06-02 ENCOUNTER — ANESTHESIA (OUTPATIENT)
Dept: SURGERY | Facility: CLINIC | Age: 87
DRG: 025 | End: 2022-06-02
Payer: COMMERCIAL

## 2022-06-02 ENCOUNTER — TRANSFERRED RECORDS (OUTPATIENT)
Dept: FAMILY MEDICINE | Facility: CLINIC | Age: 87
End: 2022-06-02

## 2022-06-02 DIAGNOSIS — Z98.890 S/P CRANIOTOMY: Primary | ICD-10-CM

## 2022-06-02 DIAGNOSIS — G93.89 BRAIN MASS: ICD-10-CM

## 2022-06-02 LAB
ABO/RH(D): NORMAL
ANTIBODY SCREEN: NEGATIVE
GLUCOSE BLDC GLUCOMTR-MCNC: 161 MG/DL (ref 70–99)
GLUCOSE BLDC GLUCOMTR-MCNC: 211 MG/DL (ref 70–99)
GLUCOSE BLDC GLUCOMTR-MCNC: 95 MG/DL (ref 70–99)
SPECIMEN EXPIRATION DATE: NORMAL

## 2022-06-02 PROCEDURE — 258N000003 HC RX IP 258 OP 636: Performed by: PHYSICIAN ASSISTANT

## 2022-06-02 PROCEDURE — 61510 CRNEC TREPH EXC BRN TUM STTL: CPT | Performed by: NEUROLOGICAL SURGERY

## 2022-06-02 PROCEDURE — 250N000011 HC RX IP 250 OP 636: Performed by: NURSE ANESTHETIST, CERTIFIED REGISTERED

## 2022-06-02 PROCEDURE — 69990 MICROSURGERY ADD-ON: CPT | Mod: 59 | Performed by: NEUROLOGICAL SURGERY

## 2022-06-02 PROCEDURE — 258N000003 HC RX IP 258 OP 636: Performed by: ANESTHESIOLOGY

## 2022-06-02 PROCEDURE — 200N000001 HC R&B ICU

## 2022-06-02 PROCEDURE — 710N000009 HC RECOVERY PHASE 1, LEVEL 1, PER MIN: Performed by: NEUROLOGICAL SURGERY

## 2022-06-02 PROCEDURE — 250N000013 HC RX MED GY IP 250 OP 250 PS 637: Performed by: PHYSICIAN ASSISTANT

## 2022-06-02 PROCEDURE — 250N000009 HC RX 250: Performed by: NURSE ANESTHETIST, CERTIFIED REGISTERED

## 2022-06-02 PROCEDURE — 250N000025 HC SEVOFLURANE, PER MIN: Performed by: NEUROLOGICAL SURGERY

## 2022-06-02 PROCEDURE — 258N000003 HC RX IP 258 OP 636: Performed by: NURSE ANESTHETIST, CERTIFIED REGISTERED

## 2022-06-02 PROCEDURE — 88331 PATH CONSLTJ SURG 1 BLK 1SPC: CPT | Mod: TC | Performed by: NEUROLOGICAL SURGERY

## 2022-06-02 PROCEDURE — 00D70ZZ EXTRACTION OF CEREBRAL HEMISPHERE, OPEN APPROACH: ICD-10-PCS | Performed by: NEUROLOGICAL SURGERY

## 2022-06-02 PROCEDURE — 370N000017 HC ANESTHESIA TECHNICAL FEE, PER MIN: Performed by: NEUROLOGICAL SURGERY

## 2022-06-02 PROCEDURE — 99222 1ST HOSP IP/OBS MODERATE 55: CPT | Performed by: INTERNAL MEDICINE

## 2022-06-02 PROCEDURE — 272N000001 HC OR GENERAL SUPPLY STERILE: Performed by: NEUROLOGICAL SURGERY

## 2022-06-02 PROCEDURE — 250N000011 HC RX IP 250 OP 636: Performed by: PHYSICIAN ASSISTANT

## 2022-06-02 PROCEDURE — 250N000009 HC RX 250: Performed by: NEUROLOGICAL SURGERY

## 2022-06-02 PROCEDURE — C1762 CONN TISS, HUMAN(INC FASCIA): HCPCS | Performed by: NEUROLOGICAL SURGERY

## 2022-06-02 PROCEDURE — 61781 SCAN PROC CRANIAL INTRA: CPT | Performed by: NEUROLOGICAL SURGERY

## 2022-06-02 PROCEDURE — 250N000011 HC RX IP 250 OP 636: Performed by: ANESTHESIOLOGY

## 2022-06-02 PROCEDURE — C1713 ANCHOR/SCREW BN/BN,TIS/BN: HCPCS | Performed by: NEUROLOGICAL SURGERY

## 2022-06-02 PROCEDURE — 00B70ZZ EXCISION OF CEREBRAL HEMISPHERE, OPEN APPROACH: ICD-10-PCS | Performed by: NEUROLOGICAL SURGERY

## 2022-06-02 PROCEDURE — C9113 INJ PANTOPRAZOLE SODIUM, VIA: HCPCS | Performed by: PHYSICIAN ASSISTANT

## 2022-06-02 PROCEDURE — 360N000078 HC SURGERY LEVEL 5, PER MIN: Performed by: NEUROLOGICAL SURGERY

## 2022-06-02 PROCEDURE — 999N000141 HC STATISTIC PRE-PROCEDURE NURSING ASSESSMENT: Performed by: NEUROLOGICAL SURGERY

## 2022-06-02 PROCEDURE — 86850 RBC ANTIBODY SCREEN: CPT | Performed by: PHYSICIAN ASSISTANT

## 2022-06-02 PROCEDURE — 8E09XBZ COMPUTER ASSISTED PROCEDURE OF HEAD AND NECK REGION: ICD-10-PCS | Performed by: NEUROLOGICAL SURGERY

## 2022-06-02 PROCEDURE — 36415 COLL VENOUS BLD VENIPUNCTURE: CPT | Performed by: PHYSICIAN ASSISTANT

## 2022-06-02 DEVICE — IMP PLATE SYN BURR HOLE COVER 17MM 04.503.023: Type: IMPLANTABLE DEVICE | Site: CRANIAL | Status: FUNCTIONAL

## 2022-06-02 DEVICE — GRAFT DURAGEN DURAL MATRIX 4X5CM ID-4501: Type: IMPLANTABLE DEVICE | Site: CRANIAL | Status: FUNCTIONAL

## 2022-06-02 DEVICE — IMP SCR SYN MATRIX LOW PRO 1.5X04MM SELF DRILL 04.503.104.01: Type: IMPLANTABLE DEVICE | Site: CRANIAL | Status: FUNCTIONAL

## 2022-06-02 RX ORDER — DEXAMETHASONE SODIUM PHOSPHATE 4 MG/ML
INJECTION, SOLUTION INTRA-ARTICULAR; INTRALESIONAL; INTRAMUSCULAR; INTRAVENOUS; SOFT TISSUE PRN
Status: DISCONTINUED | OUTPATIENT
Start: 2022-06-02 | End: 2022-06-02

## 2022-06-02 RX ORDER — ONDANSETRON 2 MG/ML
4 INJECTION INTRAMUSCULAR; INTRAVENOUS EVERY 6 HOURS PRN
Status: DISCONTINUED | OUTPATIENT
Start: 2022-06-02 | End: 2022-06-07 | Stop reason: HOSPADM

## 2022-06-02 RX ORDER — HYDRALAZINE HYDROCHLORIDE 20 MG/ML
10-20 INJECTION INTRAMUSCULAR; INTRAVENOUS EVERY 30 MIN PRN
Status: DISCONTINUED | OUTPATIENT
Start: 2022-06-02 | End: 2022-06-02

## 2022-06-02 RX ORDER — ESMOLOL HYDROCHLORIDE 10 MG/ML
INJECTION INTRAVENOUS PRN
Status: DISCONTINUED | OUTPATIENT
Start: 2022-06-02 | End: 2022-06-02

## 2022-06-02 RX ORDER — OXYCODONE HYDROCHLORIDE 5 MG/1
5 TABLET ORAL EVERY 4 HOURS PRN
Status: DISCONTINUED | OUTPATIENT
Start: 2022-06-02 | End: 2022-06-07 | Stop reason: HOSPADM

## 2022-06-02 RX ORDER — SODIUM CHLORIDE, SODIUM LACTATE, POTASSIUM CHLORIDE, CALCIUM CHLORIDE 600; 310; 30; 20 MG/100ML; MG/100ML; MG/100ML; MG/100ML
INJECTION, SOLUTION INTRAVENOUS CONTINUOUS PRN
Status: DISCONTINUED | OUTPATIENT
Start: 2022-06-02 | End: 2022-06-02

## 2022-06-02 RX ORDER — BACITRACIN ZINC 500 [USP'U]/G
OINTMENT TOPICAL PRN
Status: DISCONTINUED | OUTPATIENT
Start: 2022-06-02 | End: 2022-06-02 | Stop reason: HOSPADM

## 2022-06-02 RX ORDER — LABETALOL HYDROCHLORIDE 5 MG/ML
10 INJECTION, SOLUTION INTRAVENOUS EVERY 4 HOURS PRN
Status: DISCONTINUED | OUTPATIENT
Start: 2022-06-02 | End: 2022-06-07 | Stop reason: HOSPADM

## 2022-06-02 RX ORDER — SODIUM CHLORIDE, SODIUM LACTATE, POTASSIUM CHLORIDE, CALCIUM CHLORIDE 600; 310; 30; 20 MG/100ML; MG/100ML; MG/100ML; MG/100ML
INJECTION, SOLUTION INTRAVENOUS CONTINUOUS
Status: DISCONTINUED | OUTPATIENT
Start: 2022-06-02 | End: 2022-06-02 | Stop reason: HOSPADM

## 2022-06-02 RX ORDER — SODIUM CHLORIDE 5 %
1 OINTMENT (GRAM) OPHTHALMIC (EYE) AT BEDTIME
COMMUNITY

## 2022-06-02 RX ORDER — LABETALOL HYDROCHLORIDE 5 MG/ML
10 INJECTION, SOLUTION INTRAVENOUS ONCE
Status: COMPLETED | OUTPATIENT
Start: 2022-06-02 | End: 2022-06-02

## 2022-06-02 RX ORDER — ACETAMINOPHEN 325 MG/1
975 TABLET ORAL EVERY 8 HOURS
Status: DISPENSED | OUTPATIENT
Start: 2022-06-02 | End: 2022-06-05

## 2022-06-02 RX ORDER — HYDROMORPHONE HCL IN WATER/PF 6 MG/30 ML
.2-.5 PATIENT CONTROLLED ANALGESIA SYRINGE INTRAVENOUS
Status: DISCONTINUED | OUTPATIENT
Start: 2022-06-02 | End: 2022-06-02

## 2022-06-02 RX ORDER — FENTANYL CITRATE 0.05 MG/ML
50 INJECTION, SOLUTION INTRAMUSCULAR; INTRAVENOUS EVERY 5 MIN PRN
Status: DISCONTINUED | OUTPATIENT
Start: 2022-06-02 | End: 2022-06-02

## 2022-06-02 RX ORDER — CEFAZOLIN SODIUM/WATER 2 G/20 ML
2 SYRINGE (ML) INTRAVENOUS SEE ADMIN INSTRUCTIONS
Status: DISCONTINUED | OUTPATIENT
Start: 2022-06-02 | End: 2022-06-02 | Stop reason: HOSPADM

## 2022-06-02 RX ORDER — NALOXONE HYDROCHLORIDE 0.4 MG/ML
0.2 INJECTION, SOLUTION INTRAMUSCULAR; INTRAVENOUS; SUBCUTANEOUS
Status: DISCONTINUED | OUTPATIENT
Start: 2022-06-02 | End: 2022-06-07 | Stop reason: HOSPADM

## 2022-06-02 RX ORDER — LABETALOL HYDROCHLORIDE 5 MG/ML
INJECTION, SOLUTION INTRAVENOUS PRN
Status: DISCONTINUED | OUTPATIENT
Start: 2022-06-02 | End: 2022-06-02

## 2022-06-02 RX ORDER — DEXAMETHASONE 4 MG/1
4 TABLET ORAL
Status: DISCONTINUED | OUTPATIENT
Start: 2022-06-02 | End: 2022-06-04

## 2022-06-02 RX ORDER — ONDANSETRON 4 MG/1
4 TABLET, ORALLY DISINTEGRATING ORAL EVERY 6 HOURS PRN
Status: DISCONTINUED | OUTPATIENT
Start: 2022-06-02 | End: 2022-06-07 | Stop reason: HOSPADM

## 2022-06-02 RX ORDER — FENTANYL CITRATE 50 UG/ML
INJECTION, SOLUTION INTRAMUSCULAR; INTRAVENOUS PRN
Status: DISCONTINUED | OUTPATIENT
Start: 2022-06-02 | End: 2022-06-02

## 2022-06-02 RX ORDER — OXYCODONE HYDROCHLORIDE 5 MG/1
5 TABLET ORAL EVERY 4 HOURS PRN
Status: DISCONTINUED | OUTPATIENT
Start: 2022-06-02 | End: 2022-06-02 | Stop reason: HOSPADM

## 2022-06-02 RX ORDER — CALCIUM CARBONATE 500 MG/1
1000 TABLET, CHEWABLE ORAL 4 TIMES DAILY PRN
Status: DISCONTINUED | OUTPATIENT
Start: 2022-06-02 | End: 2022-06-07 | Stop reason: HOSPADM

## 2022-06-02 RX ORDER — HYDROXYZINE HYDROCHLORIDE 10 MG/1
10 TABLET, FILM COATED ORAL EVERY 6 HOURS PRN
Status: DISCONTINUED | OUTPATIENT
Start: 2022-06-02 | End: 2022-06-07 | Stop reason: HOSPADM

## 2022-06-02 RX ORDER — LIDOCAINE 40 MG/G
CREAM TOPICAL
Status: DISCONTINUED | OUTPATIENT
Start: 2022-06-02 | End: 2022-06-07 | Stop reason: HOSPADM

## 2022-06-02 RX ORDER — PROPOFOL 10 MG/ML
INJECTION, EMULSION INTRAVENOUS PRN
Status: DISCONTINUED | OUTPATIENT
Start: 2022-06-02 | End: 2022-06-02

## 2022-06-02 RX ORDER — NALOXONE HYDROCHLORIDE 0.4 MG/ML
0.4 INJECTION, SOLUTION INTRAMUSCULAR; INTRAVENOUS; SUBCUTANEOUS
Status: DISCONTINUED | OUTPATIENT
Start: 2022-06-02 | End: 2022-06-07 | Stop reason: HOSPADM

## 2022-06-02 RX ORDER — HYDROMORPHONE HCL IN WATER/PF 6 MG/30 ML
0.4 PATIENT CONTROLLED ANALGESIA SYRINGE INTRAVENOUS EVERY 5 MIN PRN
Status: DISCONTINUED | OUTPATIENT
Start: 2022-06-02 | End: 2022-06-02 | Stop reason: HOSPADM

## 2022-06-02 RX ORDER — ONDANSETRON 2 MG/ML
4 INJECTION INTRAMUSCULAR; INTRAVENOUS EVERY 30 MIN PRN
Status: DISCONTINUED | OUTPATIENT
Start: 2022-06-02 | End: 2022-06-02 | Stop reason: HOSPADM

## 2022-06-02 RX ORDER — ONDANSETRON 2 MG/ML
INJECTION INTRAMUSCULAR; INTRAVENOUS PRN
Status: DISCONTINUED | OUTPATIENT
Start: 2022-06-02 | End: 2022-06-02

## 2022-06-02 RX ORDER — MAGNESIUM HYDROXIDE 1200 MG/15ML
LIQUID ORAL PRN
Status: DISCONTINUED | OUTPATIENT
Start: 2022-06-02 | End: 2022-06-02 | Stop reason: HOSPADM

## 2022-06-02 RX ORDER — LABETALOL HYDROCHLORIDE 5 MG/ML
10-40 INJECTION, SOLUTION INTRAVENOUS EVERY 10 MIN PRN
Status: DISCONTINUED | OUTPATIENT
Start: 2022-06-02 | End: 2022-06-02

## 2022-06-02 RX ORDER — SODIUM CHLORIDE 9 MG/ML
INJECTION, SOLUTION INTRAVENOUS CONTINUOUS
Status: DISCONTINUED | OUTPATIENT
Start: 2022-06-02 | End: 2022-06-07 | Stop reason: HOSPADM

## 2022-06-02 RX ORDER — CEFAZOLIN SODIUM 1 G/3ML
1 INJECTION, POWDER, FOR SOLUTION INTRAMUSCULAR; INTRAVENOUS EVERY 8 HOURS
Status: COMPLETED | OUTPATIENT
Start: 2022-06-02 | End: 2022-06-03

## 2022-06-02 RX ORDER — BISACODYL 10 MG
10 SUPPOSITORY, RECTAL RECTAL DAILY PRN
Status: DISCONTINUED | OUTPATIENT
Start: 2022-06-02 | End: 2022-06-07 | Stop reason: HOSPADM

## 2022-06-02 RX ORDER — PROPYLENE GLYCOL 0.06 MG/ML
1 SOLUTION/ DROPS OPHTHALMIC 2 TIMES DAILY
COMMUNITY

## 2022-06-02 RX ORDER — ONDANSETRON 4 MG/1
4 TABLET, ORALLY DISINTEGRATING ORAL EVERY 30 MIN PRN
Status: DISCONTINUED | OUTPATIENT
Start: 2022-06-02 | End: 2022-06-02 | Stop reason: HOSPADM

## 2022-06-02 RX ORDER — CEFAZOLIN SODIUM/WATER 2 G/20 ML
2 SYRINGE (ML) INTRAVENOUS
Status: COMPLETED | OUTPATIENT
Start: 2022-06-02 | End: 2022-06-02

## 2022-06-02 RX ORDER — HYDROMORPHONE HCL IN WATER/PF 6 MG/30 ML
.2-.5 PATIENT CONTROLLED ANALGESIA SYRINGE INTRAVENOUS
Status: DISCONTINUED | OUTPATIENT
Start: 2022-06-02 | End: 2022-06-07 | Stop reason: HOSPADM

## 2022-06-02 RX ORDER — PROCHLORPERAZINE MALEATE 5 MG
5 TABLET ORAL EVERY 6 HOURS PRN
Status: DISCONTINUED | OUTPATIENT
Start: 2022-06-02 | End: 2022-06-07 | Stop reason: HOSPADM

## 2022-06-02 RX ORDER — LEVOTHYROXINE SODIUM 25 UG/1
25 TABLET ORAL DAILY
Status: DISCONTINUED | OUTPATIENT
Start: 2022-06-03 | End: 2022-06-07 | Stop reason: HOSPADM

## 2022-06-02 RX ORDER — FENTANYL CITRATE 0.05 MG/ML
25 INJECTION, SOLUTION INTRAMUSCULAR; INTRAVENOUS EVERY 5 MIN PRN
Status: DISCONTINUED | OUTPATIENT
Start: 2022-06-02 | End: 2022-06-02 | Stop reason: HOSPADM

## 2022-06-02 RX ORDER — HYDRALAZINE HYDROCHLORIDE 20 MG/ML
10 INJECTION INTRAMUSCULAR; INTRAVENOUS EVERY 4 HOURS PRN
Status: DISCONTINUED | OUTPATIENT
Start: 2022-06-02 | End: 2022-06-07 | Stop reason: HOSPADM

## 2022-06-02 RX ORDER — LIDOCAINE HYDROCHLORIDE 20 MG/ML
INJECTION, SOLUTION INFILTRATION; PERINEURAL PRN
Status: DISCONTINUED | OUTPATIENT
Start: 2022-06-02 | End: 2022-06-02

## 2022-06-02 RX ORDER — AMOXICILLIN 250 MG
1 CAPSULE ORAL 2 TIMES DAILY
Status: DISCONTINUED | OUTPATIENT
Start: 2022-06-02 | End: 2022-06-07 | Stop reason: HOSPADM

## 2022-06-02 RX ORDER — ACETAMINOPHEN 325 MG/1
650 TABLET ORAL EVERY 4 HOURS PRN
Status: DISCONTINUED | OUTPATIENT
Start: 2022-06-05 | End: 2022-06-07 | Stop reason: HOSPADM

## 2022-06-02 RX ORDER — POLYETHYLENE GLYCOL 3350 17 G/17G
17 POWDER, FOR SOLUTION ORAL DAILY
Status: DISCONTINUED | OUTPATIENT
Start: 2022-06-03 | End: 2022-06-07 | Stop reason: HOSPADM

## 2022-06-02 RX ORDER — LABETALOL HYDROCHLORIDE 5 MG/ML
20 INJECTION, SOLUTION INTRAVENOUS ONCE
Status: COMPLETED | OUTPATIENT
Start: 2022-06-02 | End: 2022-06-02

## 2022-06-02 RX ADMIN — PROPOFOL 80 MG: 10 INJECTION, EMULSION INTRAVENOUS at 13:22

## 2022-06-02 RX ADMIN — FENTANYL CITRATE 25 MCG: 50 INJECTION, SOLUTION INTRAMUSCULAR; INTRAVENOUS at 16:52

## 2022-06-02 RX ADMIN — ROCURONIUM BROMIDE 50 MG: 50 INJECTION, SOLUTION INTRAVENOUS at 13:08

## 2022-06-02 RX ADMIN — LIDOCAINE HYDROCHLORIDE 80 MG: 20 INJECTION, SOLUTION INFILTRATION; PERINEURAL at 13:08

## 2022-06-02 RX ADMIN — SUGAMMADEX 200 MG: 100 INJECTION, SOLUTION INTRAVENOUS at 16:23

## 2022-06-02 RX ADMIN — ESMOLOL HYDROCHLORIDE 10 MG: 10 INJECTION, SOLUTION INTRAVENOUS at 16:35

## 2022-06-02 RX ADMIN — ROCURONIUM BROMIDE 20 MG: 50 INJECTION, SOLUTION INTRAVENOUS at 14:47

## 2022-06-02 RX ADMIN — PROPOFOL 30 MG: 10 INJECTION, EMULSION INTRAVENOUS at 14:07

## 2022-06-02 RX ADMIN — SODIUM CHLORIDE, POTASSIUM CHLORIDE, SODIUM LACTATE AND CALCIUM CHLORIDE: 600; 310; 30; 20 INJECTION, SOLUTION INTRAVENOUS at 13:12

## 2022-06-02 RX ADMIN — ROCURONIUM BROMIDE 30 MG: 50 INJECTION, SOLUTION INTRAVENOUS at 13:54

## 2022-06-02 RX ADMIN — FENTANYL CITRATE 100 MCG: 50 INJECTION, SOLUTION INTRAMUSCULAR; INTRAVENOUS at 13:08

## 2022-06-02 RX ADMIN — PROPOFOL 20 MG: 10 INJECTION, EMULSION INTRAVENOUS at 15:46

## 2022-06-02 RX ADMIN — LABETALOL HYDROCHLORIDE 5 MG: 5 INJECTION INTRAVENOUS at 16:37

## 2022-06-02 RX ADMIN — PROPOFOL 20 MG: 10 INJECTION, EMULSION INTRAVENOUS at 15:49

## 2022-06-02 RX ADMIN — PHENYLEPHRINE HYDROCHLORIDE 25.33 MCG/MIN: 10 INJECTION INTRAVENOUS at 13:44

## 2022-06-02 RX ADMIN — LABETALOL HYDROCHLORIDE 20 MG: 5 INJECTION INTRAVENOUS at 17:15

## 2022-06-02 RX ADMIN — LEVETIRACETAM 750 MG: 100 INJECTION, SOLUTION INTRAVENOUS at 19:13

## 2022-06-02 RX ADMIN — LABETALOL HYDROCHLORIDE 10 MG: 5 INJECTION INTRAVENOUS at 17:09

## 2022-06-02 RX ADMIN — CEFAZOLIN 1 G: 1 INJECTION, POWDER, FOR SOLUTION INTRAMUSCULAR; INTRAVENOUS at 21:58

## 2022-06-02 RX ADMIN — SENNOSIDES AND DOCUSATE SODIUM 1 TABLET: 50; 8.6 TABLET ORAL at 21:58

## 2022-06-02 RX ADMIN — PROPOFOL 120 MG: 10 INJECTION, EMULSION INTRAVENOUS at 13:08

## 2022-06-02 RX ADMIN — PHENYLEPHRINE HYDROCHLORIDE 100 MCG: 10 INJECTION INTRAVENOUS at 13:43

## 2022-06-02 RX ADMIN — FENTANYL CITRATE 25 MCG: 50 INJECTION, SOLUTION INTRAMUSCULAR; INTRAVENOUS at 17:19

## 2022-06-02 RX ADMIN — PROPOFOL 30 MG: 10 INJECTION, EMULSION INTRAVENOUS at 14:09

## 2022-06-02 RX ADMIN — DEXAMETHASONE SODIUM PHOSPHATE 4 MG: 4 INJECTION, SOLUTION INTRA-ARTICULAR; INTRALESIONAL; INTRAMUSCULAR; INTRAVENOUS; SOFT TISSUE at 13:34

## 2022-06-02 RX ADMIN — ESMOLOL HYDROCHLORIDE 10 MG: 10 INJECTION, SOLUTION INTRAVENOUS at 16:29

## 2022-06-02 RX ADMIN — FENTANYL CITRATE 25 MCG: 50 INJECTION, SOLUTION INTRAMUSCULAR; INTRAVENOUS at 16:57

## 2022-06-02 RX ADMIN — ESMOLOL HYDROCHLORIDE 10 MG: 10 INJECTION, SOLUTION INTRAVENOUS at 16:26

## 2022-06-02 RX ADMIN — PROPOFOL 20 MG: 10 INJECTION, EMULSION INTRAVENOUS at 13:54

## 2022-06-02 RX ADMIN — Medication 2 G: at 13:03

## 2022-06-02 RX ADMIN — SODIUM CHLORIDE, POTASSIUM CHLORIDE, SODIUM LACTATE AND CALCIUM CHLORIDE: 600; 310; 30; 20 INJECTION, SOLUTION INTRAVENOUS at 17:40

## 2022-06-02 RX ADMIN — SODIUM CHLORIDE, POTASSIUM CHLORIDE, SODIUM LACTATE AND CALCIUM CHLORIDE: 600; 310; 30; 20 INJECTION, SOLUTION INTRAVENOUS at 12:59

## 2022-06-02 RX ADMIN — LABETALOL HYDROCHLORIDE 5 MG: 5 INJECTION INTRAVENOUS at 16:46

## 2022-06-02 RX ADMIN — SODIUM CHLORIDE: 9 INJECTION, SOLUTION INTRAVENOUS at 18:26

## 2022-06-02 RX ADMIN — ONDANSETRON 4 MG: 2 INJECTION INTRAMUSCULAR; INTRAVENOUS at 16:12

## 2022-06-02 RX ADMIN — DEXAMETHASONE 4 MG: 4 TABLET ORAL at 20:54

## 2022-06-02 RX ADMIN — ACETAMINOPHEN 975 MG: 325 TABLET ORAL at 20:53

## 2022-06-02 RX ADMIN — PANTOPRAZOLE SODIUM 40 MG: 40 INJECTION, POWDER, FOR SOLUTION INTRAVENOUS at 18:33

## 2022-06-02 ASSESSMENT — ACTIVITIES OF DAILY LIVING (ADL)
ADLS_ACUITY_SCORE: 35
ADLS_ACUITY_SCORE: 32
ADLS_ACUITY_SCORE: 35
ADLS_ACUITY_SCORE: 32
ADLS_ACUITY_SCORE: 35
ADLS_ACUITY_SCORE: 35
ADLS_ACUITY_SCORE: 32

## 2022-06-02 ASSESSMENT — LIFESTYLE VARIABLES: TOBACCO_USE: 0

## 2022-06-02 NOTE — ANESTHESIA PROCEDURE NOTES
Arterial Line Procedure Note    Pre-Procedure   Staff -        Anesthesiologist:  Destiny Lamar       Performed By: anesthesiologist       Location: pre-op       Pre-Anesthestic Checklist: patient identified, IV checked, risks and benefits discussed, informed consent, monitors and equipment checked, pre-op evaluation and at physician/surgeon's request  Timeout:       Correct Patient: Yes        Correct Procedure: Yes        Correct Site: Yes        Correct Position: Yes   Line Placement:   This line was placed Pre Induction starting at 6/2/2022 12:04 PM and ending at 6/2/2022 12:24 PM  Procedure   Procedure: arterial line and new line       Laterality: left       Insertion Site: radial.  Sterile Prep        Standard elements of sterile barrier followed       Skin prep: Chloraprep  Insertion/Injection        Technique: ultrasound guided and Seldinger Technique        1. Ultrasound was used to evaluate the access site.       2. Artery evaluated via ultrasound for patency/adequacy.       3. Using real-time ultrasound the needle/catheter was observed entering the artery/vein.       4. Permanent image was captured and entered into the patient's record.       5. The visualized structures were anatomically normal.       6. There were no apparent abnormal pathologic findings.       Catheter Type/Size: 20 G, 1.75 in/4.5 cm quick cath (integral wire)  Narrative         Secured by: other       Tegaderm dressing used.       Complications: None apparent,        Arterial waveform: Yes        IBP within 10% of NIBP: Yes   Comments:  Pt tolerated well.

## 2022-06-02 NOTE — OR NURSING
Dr Rose to bedside at 1730. Dr informed that patient not moving left arm, not following commands with left arm or hand. Dr Rose used sternal rub and asked patient to move left hand, patient wiggled fingers slightly at that time and did hold left arm in air for 5 seconds. Dr Rose clarified that SBP up to 160 was ok for the patient and he was made aware that his current order states to treat for above 140.

## 2022-06-02 NOTE — ANESTHESIA POSTPROCEDURE EVALUATION
Patient: Indy Reyes    Procedure: Procedure(s):  left frontal craniotomy for tumor removal       Anesthesia Type:  General    Note:  Disposition: Inpatient   Postop Pain Control: Uneventful            Sign Out: Well controlled pain   PONV: No   Neuro/Psych: Uneventful            Sign Out: Acceptable/Baseline neuro status   Airway/Respiratory: Uneventful            Sign Out: Acceptable/Baseline resp. status   CV/Hemodynamics: Uneventful            Sign Out: Acceptable CV status; No obvious hypovolemia; No obvious fluid overload   Other NRE:    DID A NON-ROUTINE EVENT OCCUR?            Last vitals:  Vitals Value Taken Time   /66 06/02/22 1730   Temp 36.5  C (97.7  F) 06/02/22 1644   Pulse 61 06/02/22 1739   Resp 12 06/02/22 1739   SpO2 98 % 06/02/22 1739   Vitals shown include unvalidated device data.    Electronically Signed By: Destiny Lamar  June 2, 2022  5:41 PM

## 2022-06-02 NOTE — OP NOTE
Name of procedure: Left frontal craniotomy for removal of extra-axial intracranial tumor; computer-assisted stereotactic volumetric resection of tumor; use the operating microscope    Preoperative diagnosis: Large vividly enhancing left frontal lobe mass with significant neurologic deficit and surrounding cerebral edema.    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    Surgeon: Rasheed Rose MD    First Assistant: None    Material for the laboratory for examination: Multiple fragments of tumor for both frozen and permanent section    EBL: 45 cc    Description of the procedure: Patient was brought to the operating room where she is placed under suitable general and tracheal anesthesia and subsequently positioned in the supine position with her head slightly elevated and secured in the [place] head bauer.  3-dimensional model of the cranium and tumor were prepared on the left howsimple workstation and registered to surface anatomy with good specificity and accurate reproduction.  Frontal region was then shaved and sterilely prepped and draped in the usual fashion and a slightly curvilinear S-shaped type incision was made centered over the center of presentation of this left frontal tumor.  Incision was carried down sharply through subcutaneous tissues and hemostasis was achieved with [place] clips and cerebellar retractors.  Surface representation tumor was traced onto the skull surface using the Stealth neuronavigational system.  2 small entry holes were then placed near midline and subsequently a free bone flap was elevated and removed using a Midas Chadwick B1 instrument without difficulty.  There was no clear involvement of the cranial flap.  A U-shaped dural flap was then elevated and reflected towards midline.  A dark purplish color tumor adherent to the dura was immediately evident.   Multiple fragments were sent for frozen tissue diagnosis and the operating microscope was brought into use and used throughout the remainder the procedure for visualization, illumination and microsurgical technique.  The sono PET aspirator was then used to debulk the center of the tumor guided by neuronavigational technology and without particular difficulty.  Capsule was then identified and slowly turned inward and dissected free from the deep white matter.  This was rather adherent and significant blood flow appeared to arise from the most deep portion of the tumor with bridging arteries and veins to the capsule of the tumor.  These were coagulated with bipolar cautery and sharply divided.  At the conclusion of the procedure a gross total resection of the tumor had been completed.  Meticulous hemostasis was easily achieved with bipolar cautery and the wound was sahara irrigated with sterile saline which remain clear.  The dural flap was resected back to midline and replaced with a DuraGen membrane to cover the dural opening.  Bone flap was then returned and secured in place using 3 round titanium plates secured with 4 mm titanium screws placed in predrilled openings.  Wound was then closed with interrupted inverted 2-0 Vicryl suture through the galea and skin was closed with surgical staples.  Sterile dressing was applied and patient was subsequently taken on the [place] head bauer, awakened, extubated and transported to recovery room in stable condition.

## 2022-06-02 NOTE — ANESTHESIA CARE TRANSFER NOTE
Patient: Indy Reyes    Procedure: Procedure(s):  left frontal craniotomy for tumor removal       Diagnosis: Brain tumor (H) [D49.6]  Diagnosis Additional Information: No value filed.    Anesthesia Type:   General     Note:    Oropharynx: oropharynx clear of all foreign objects and spontaneously breathing  Level of Consciousness: awake  Oxygen Supplementation: face mask  Level of Supplemental Oxygen (L/min / FiO2): 6  Independent Airway: airway patency satisfactory and stable  Dentition: dentition unchanged  Vital Signs Stable: post-procedure vital signs reviewed and stable  Report to RN Given: handoff report given  Patient transferred to: PACU    Handoff Report: Identifed the Patient, Identified the Reponsible Provider, Reviewed the pertinent medical history, Discussed the surgical course, Reviewed Intra-OP anesthesia mangement and issues during anesthesia, Set expectations for post-procedure period and Allowed opportunity for questions and acknowledgement of understanding      Vitals:  Vitals Value Taken Time   /92 06/02/22 1644   Temp     Pulse 84 06/02/22 1649   Resp 15 06/02/22 1649   SpO2 100 % 06/02/22 1649   Vitals shown include unvalidated device data.    Electronically Signed By: RAMANA Campos CRNA  June 2, 2022  4:49 PM

## 2022-06-02 NOTE — ANESTHESIA PREPROCEDURE EVALUATION
Anesthesia Pre-Procedure Evaluation    Patient: Indy Reyes   MRN: [id number] : 1931        Procedure : Procedure(s):  left frontal craniotomy for tumor removal          Past Medical History:   Diagnosis Date     Back pain      Thyroid disease       Past Surgical History:   Procedure Laterality Date     APPENDECTOMY      at age of 16     BACK SURGERY  2017     CATARACT IOL, RT/LT  2009    both     FORAMINOTOMY LUMBAR POSTERIOR ONE LEVEL Right 2017    Procedure: FORAMINOTOMY LUMBAR POSTERIOR ONE LEVEL;  Right L4-L5 hemilaminectomy and foraminotomy and excision of synovial cyst. ;  Surgeon: Fady Woo MD;  Location: [facility] OR     TONSILLECTOMY  1954      Allergies   Allergen Reactions     Tetracycline Rash      Social History     Tobacco Use     Smoking status: Former Smoker     Types: Cigarettes     Quit date: 1960     Years since quittin.4     Smokeless tobacco: Never Used     Tobacco comment:    Substance Use Topics     Alcohol use: Yes     Comment: SOCIAL-2 drinks per month       Wt Readings from Last 1 Encounters:   22 50.8 kg (112 lb 1.6 oz)        Anesthesia Evaluation   Pt has had prior anesthetic.     No history of anesthetic complications       ROS/MED HX  ENT/Pulmonary:    (-) tobacco use, asthma and sleep apnea   Neurologic: Comment: Brain mass      Cardiovascular:       METS/Exercise Tolerance:     Hematologic:       Musculoskeletal:       GI/Hepatic:    (-) GERD   Renal/Genitourinary:       Endo:     (+) thyroid problem,  (-) Type II DM   Psychiatric/Substance Use:       Infectious Disease:       Malignancy: Comment: EXAM: MR BRAIN W/O and W CONTRAST  LOCATION: [facility]  DATE/TIME: 2022 8:22 PM     INDICATION: Right sided weakness 1 month  COMPARISON: None available at the time of this dictation.  CONTRAST: 5 mL Gadavist  TECHNIQUE: Routine multiplanar multisequence head MRI without and with intravenous  contrast.     FINDINGS:  INTRACRANIAL CONTENTS: Large enhancing mass over the superior left cerebral convexity which appears extra-axial. This mass measures approximately 5.8 x 4.6 x 5.3 cm. Marked mass effect on the left cerebral hemisphere and left lateral ventricle with 1.2   cm rightward midline shift. Additional smaller extra-axial dural based nodule more anteriorly in the left cerebral convexity measuring 1.6 x 1.1 x 1.1 cm. There is smooth thin dural enhancement over the left cerebral convexity anteriorly, laterally,   medially also involving the left aspect of the falx.     Underlying T2 hyperintense signal is seen in the adjacent left frontal lobe parenchyma. Mild diffuse parenchymal volume loss. Background patchy deep and subcortical white matter T2 hyperintensities which are nonspecific, but most likely related to   chronic microvascular ischemic disease. Ventricular size is not significantly enlarged and there is no evidence of hydrocephalus. Basal cisterns are patent.     SELLA: No abnormality accounting for technique.     OSSEOUS STRUCTURES/SOFT TISSUES: Normal marrow signal. The major intracranial vascular flow voids are maintained.      ORBITS: No abnormality accounting for technique.      SINUSES/MASTOIDS: Mild polypoid mucosal thickening in the inferior maxillary sinuses. No middle ear or mastoid effusion.                                                                       IMPRESSION:  1.  Large enhancing extra-axial mass over the superior left cerebral convexity measuring up to 5.8 cm as detailed above. There is an additional smaller but similar nodular mass more anteriorly along the left cervical convexity measuring up to 1.6 cm.   These most likely represent meningiomas, however, other dural based masses would also be in the differential. There is also apparent mild dural thickening over the anterior left cerebral convexity.  2.  Underlying T2 hyperintense signal in the adjacent left frontal  lobe by the large extra-axial mass. This may represent edema from mass effect although possibility of higher grade neoplasm should be considered.  3.  Marked mass effect on the underlying left cerebral hemisphere and left lateral ventricle. There is a 1.2 cm rightward midline shift. No evidence of hydrocephalus.  4.  Mild diffuse parenchymal volume loss and white matter changes most likely due to chronic microvascular ischemic disease.            Other:            Physical Exam    Airway        Mallampati: I   TM distance: > 3 FB   Neck ROM: full   Mouth opening: > 3 cm    Respiratory Devices and Support         Dental  no notable dental history         Cardiovascular   cardiovascular exam normal          Pulmonary   pulmonary exam normal                OUTSIDE LABS:  CBC:   Lab Results   Component Value Date    WBC 5.9 05/24/2022    WBC 5.5 02/01/2021    HGB 12.1 05/24/2022    HGB 13.0 02/01/2021    HCT 38.2 05/24/2022    HCT 40.5 02/01/2021     05/24/2022     02/01/2021     BMP:   Lab Results   Component Value Date     06/01/2022     05/24/2022    POTASSIUM 3.9 06/01/2022    POTASSIUM 4.0 05/24/2022    CHLORIDE 103 06/01/2022    CHLORIDE 106 05/24/2022    CO2 30 06/01/2022    CO2 28 05/24/2022    BUN 30 06/01/2022    BUN 22 05/24/2022    CR 0.62 06/01/2022    CR 0.67 05/24/2022    GLC 95 06/02/2022    GLC 83 06/01/2022     COAGS: No results found for: PTT, INR, FIBR  POC:   Lab Results   Component Value Date     (H) 04/29/2017     HEPATIC:   Lab Results   Component Value Date    ALBUMIN 4.0 02/01/2021    PROTTOTAL 7.4 02/01/2021    ALT 30 02/01/2021    AST 26 02/01/2021    ALKPHOS 96 02/01/2021    BILITOTAL 0.4 02/01/2021     OTHER:   Lab Results   Component Value Date    A1C 5.7 (H) 05/24/2022    IMAN 8.9 06/01/2022    TSH 4.93 (H) 02/01/2021    T4 1.11 02/01/2021    SED 10 07/27/2016       Anesthesia Plan    ASA Status:  3      Anesthesia Type: General.     - Airway: ETT       Maintenance: Balanced.   Techniques and Equipment:     - Airway: Video-Laryngoscope     - Lines/Monitors: 2nd IV, Arterial Line     Consents    Anesthesia Plan(s) and associated risks, benefits, and realistic alternatives discussed. Questions answered and patient/representative(s) expressed understanding.    - Discussed:     - Discussed with:  Patient         Postoperative Care    Pain management: IV analgesics, Oral pain medications.   PONV prophylaxis: Ondansetron (or other 5HT-3)     Comments:                Destiny Lamar

## 2022-06-02 NOTE — ANESTHESIA PROCEDURE NOTES
Airway       Patient location during procedure: OR       Procedure Start/Stop Times: 6/2/2022 1:11 PM  Staff -        Anesthesiologist:  Destiny Lamar       CRNA: Yadi Erickson APRN CRNA       Performed By: CRNA  Consent for Airway        Urgency: elective  Indications and Patient Condition       Indications for airway management: freddie-procedural         Mask difficulty assessment: 1 - vent by mask    Final Airway Details       Final airway type: endotracheal airway       Successful airway: ETT - single  Endotracheal Airway Details        ETT size (mm): 7.0       Cuffed: yes       Successful intubation technique: video laryngoscopy       VL Blade Size: Lopez 3       Grade View of Cords: 1       Adjucts: stylet       Position: Right       Secured at (cm): 20       Bite block used: None    Post intubation assessment        Placement verified by: capnometry, equal breath sounds and chest rise        Number of attempts at approach: 1       Number of other approaches attempted: 0       Secured with: pink tape       Ease of procedure: easy       Dentition: Intact and Unchanged    Medication(s) Administered   Medication Administration Time: 6/2/2022 1:11 PM

## 2022-06-03 ENCOUNTER — APPOINTMENT (OUTPATIENT)
Dept: CT IMAGING | Facility: CLINIC | Age: 87
DRG: 025 | End: 2022-06-03
Attending: PHYSICIAN ASSISTANT
Payer: COMMERCIAL

## 2022-06-03 LAB
ERYTHROCYTE [DISTWIDTH] IN BLOOD BY AUTOMATED COUNT: 15.9 % (ref 10–15)
GLUCOSE BLDC GLUCOMTR-MCNC: 132 MG/DL (ref 70–99)
GLUCOSE BLDC GLUCOMTR-MCNC: 142 MG/DL (ref 70–99)
GLUCOSE BLDC GLUCOMTR-MCNC: 165 MG/DL (ref 70–99)
GLUCOSE BLDC GLUCOMTR-MCNC: 173 MG/DL (ref 70–99)
GLUCOSE BLDC GLUCOMTR-MCNC: 208 MG/DL (ref 70–99)
GLUCOSE BLDC GLUCOMTR-MCNC: 247 MG/DL (ref 70–99)
GLUCOSE BLDC GLUCOMTR-MCNC: 260 MG/DL (ref 70–99)
HCT VFR BLD AUTO: 33 % (ref 35–47)
HGB BLD-MCNC: 10.6 G/DL (ref 11.7–15.7)
MCH RBC QN AUTO: 27 PG (ref 26.5–33)
MCHC RBC AUTO-ENTMCNC: 32.1 G/DL (ref 31.5–36.5)
MCV RBC AUTO: 84 FL (ref 78–100)
PLATELET # BLD AUTO: 161 10E3/UL (ref 150–450)
RBC # BLD AUTO: 3.93 10E6/UL (ref 3.8–5.2)
WBC # BLD AUTO: 16.9 10E3/UL (ref 4–11)

## 2022-06-03 PROCEDURE — 120N000013 HC R&B IMCU

## 2022-06-03 PROCEDURE — 250N000013 HC RX MED GY IP 250 OP 250 PS 637: Performed by: PHYSICIAN ASSISTANT

## 2022-06-03 PROCEDURE — 258N000003 HC RX IP 258 OP 636: Performed by: PHYSICIAN ASSISTANT

## 2022-06-03 PROCEDURE — 250N000012 HC RX MED GY IP 250 OP 636 PS 637: Performed by: INTERNAL MEDICINE

## 2022-06-03 PROCEDURE — 250N000011 HC RX IP 250 OP 636: Performed by: PHYSICIAN ASSISTANT

## 2022-06-03 PROCEDURE — 99232 SBSQ HOSP IP/OBS MODERATE 35: CPT | Performed by: HOSPITALIST

## 2022-06-03 PROCEDURE — 36415 COLL VENOUS BLD VENIPUNCTURE: CPT | Performed by: HOSPITALIST

## 2022-06-03 PROCEDURE — 85027 COMPLETE CBC AUTOMATED: CPT | Performed by: HOSPITALIST

## 2022-06-03 PROCEDURE — 250N000013 HC RX MED GY IP 250 OP 250 PS 637: Performed by: HOSPITALIST

## 2022-06-03 PROCEDURE — C9113 INJ PANTOPRAZOLE SODIUM, VIA: HCPCS | Performed by: PHYSICIAN ASSISTANT

## 2022-06-03 PROCEDURE — 70450 CT HEAD/BRAIN W/O DYE: CPT

## 2022-06-03 RX ORDER — DEXTROSE MONOHYDRATE 25 G/50ML
25-50 INJECTION, SOLUTION INTRAVENOUS
Status: DISCONTINUED | OUTPATIENT
Start: 2022-06-03 | End: 2022-06-07 | Stop reason: HOSPADM

## 2022-06-03 RX ORDER — NICOTINE POLACRILEX 4 MG
15-30 LOZENGE BUCCAL
Status: DISCONTINUED | OUTPATIENT
Start: 2022-06-03 | End: 2022-06-07 | Stop reason: HOSPADM

## 2022-06-03 RX ADMIN — DEXAMETHASONE 4 MG: 4 TABLET ORAL at 08:30

## 2022-06-03 RX ADMIN — CEFAZOLIN 1 G: 1 INJECTION, POWDER, FOR SOLUTION INTRAMUSCULAR; INTRAVENOUS at 05:51

## 2022-06-03 RX ADMIN — INSULIN ASPART 1 UNITS: 100 INJECTION, SOLUTION INTRAVENOUS; SUBCUTANEOUS at 06:10

## 2022-06-03 RX ADMIN — POLYETHYLENE GLYCOL 3350 17 G: 17 POWDER, FOR SOLUTION ORAL at 10:25

## 2022-06-03 RX ADMIN — ACETAMINOPHEN 975 MG: 325 TABLET ORAL at 12:41

## 2022-06-03 RX ADMIN — ACETAMINOPHEN 975 MG: 325 TABLET ORAL at 20:11

## 2022-06-03 RX ADMIN — INSULIN ASPART 3 UNITS: 100 INJECTION, SOLUTION INTRAVENOUS; SUBCUTANEOUS at 15:27

## 2022-06-03 RX ADMIN — SENNOSIDES AND DOCUSATE SODIUM 1 TABLET: 50; 8.6 TABLET ORAL at 08:30

## 2022-06-03 RX ADMIN — SENNOSIDES AND DOCUSATE SODIUM 1 TABLET: 50; 8.6 TABLET ORAL at 20:11

## 2022-06-03 RX ADMIN — DEXAMETHASONE 4 MG: 4 TABLET ORAL at 17:48

## 2022-06-03 RX ADMIN — SODIUM CHLORIDE: 9 INJECTION, SOLUTION INTRAVENOUS at 10:23

## 2022-06-03 RX ADMIN — LEVETIRACETAM 750 MG: 100 INJECTION, SOLUTION INTRAVENOUS at 06:46

## 2022-06-03 RX ADMIN — SODIUM CHLORIDE: 9 INJECTION, SOLUTION INTRAVENOUS at 19:23

## 2022-06-03 RX ADMIN — INSULIN ASPART 1 UNITS: 100 INJECTION, SOLUTION INTRAVENOUS; SUBCUTANEOUS at 03:01

## 2022-06-03 RX ADMIN — DEXAMETHASONE 4 MG: 4 TABLET ORAL at 12:01

## 2022-06-03 RX ADMIN — POLYETHYLENE GLYCOL 400 AND PROPYLENE GLYCOL 1 DROP: 4; 3 SOLUTION/ DROPS OPHTHALMIC at 20:12

## 2022-06-03 RX ADMIN — ACETAMINOPHEN 975 MG: 325 TABLET ORAL at 05:50

## 2022-06-03 RX ADMIN — INSULIN ASPART 1 UNITS: 100 INJECTION, SOLUTION INTRAVENOUS; SUBCUTANEOUS at 12:51

## 2022-06-03 RX ADMIN — INSULIN ASPART 3 UNITS: 100 INJECTION, SOLUTION INTRAVENOUS; SUBCUTANEOUS at 20:18

## 2022-06-03 RX ADMIN — LEVETIRACETAM 750 MG: 100 INJECTION, SOLUTION INTRAVENOUS at 17:48

## 2022-06-03 RX ADMIN — CEFAZOLIN 1 G: 1 INJECTION, POWDER, FOR SOLUTION INTRAMUSCULAR; INTRAVENOUS at 12:41

## 2022-06-03 RX ADMIN — PANTOPRAZOLE SODIUM 40 MG: 40 INJECTION, POWDER, FOR SOLUTION INTRAVENOUS at 08:30

## 2022-06-03 RX ADMIN — LEVOTHYROXINE SODIUM 25 MCG: 25 TABLET ORAL at 07:31

## 2022-06-03 ASSESSMENT — ACTIVITIES OF DAILY LIVING (ADL)
ADLS_ACUITY_SCORE: 39
ADLS_ACUITY_SCORE: 35
ADLS_ACUITY_SCORE: 39
ADLS_ACUITY_SCORE: 35
ADLS_ACUITY_SCORE: 39
ADLS_ACUITY_SCORE: 35
ADLS_ACUITY_SCORE: 39
ADLS_ACUITY_SCORE: 35
ADLS_ACUITY_SCORE: 39
ADLS_ACUITY_SCORE: 39

## 2022-06-03 NOTE — PLAN OF CARE
"Patient drowsy and very weak at beginning of shift. Patient woke up towards end of shift and is much more alert. Patient has equal strength in all extremities. Slight right facial droop unchanged. Patient complained of 10/10 headache at 0550. Tylenol given and pain is now subsiding.       Problem: Risk for Delirium  Goal: Optimal Coping  Outcome: Ongoing, Progressing  Goal: Improved Behavioral Control  Outcome: Ongoing, Progressing  Goal: Improved Attention and Thought Clarity  Outcome: Ongoing, Progressing  Goal: Improved Sleep  Outcome: Ongoing, Progressing     Problem: Plan of Care - These are the overarching goals to be used throughout the patient stay.    Goal: Plan of Care Review/Shift Note  Description: The Plan of Care Review/Shift note should be completed every shift.  The Outcome Evaluation is a brief statement about your assessment that the patient is improving, declining, or no change.  This information will be displayed automatically on your shift note.  Outcome: Ongoing, Progressing  Goal: Patient-Specific Goal (Individualized)  Description: You can add care plan individualizations to a care plan. Examples of Individualization might be:  \"Parent requests to be called daily at 9am for status\", \"I have a hard time hearing out of my right ear\", or \"Do not touch me to wake me up as it startles me\".  Outcome: Ongoing, Progressing  Goal: Absence of Hospital-Acquired Illness or Injury  Outcome: Ongoing, Progressing  Intervention: Identify and Manage Fall Risk  Recent Flowsheet Documentation  Taken 6/3/2022 0400 by Ferny Lara, RN  Safety Promotion/Fall Prevention:   activity supervised   clutter free environment maintained   fall prevention program maintained   nonskid shoes/slippers when out of bed   safety round/check completed   bed alarm on   lighting adjusted   increase visualization of patient   treat reversible contributory factors   treat underlying cause  Taken 6/3/2022 0000 by Jane, " ELENA Isaacs  Safety Promotion/Fall Prevention:   activity supervised   clutter free environment maintained   fall prevention program maintained   nonskid shoes/slippers when out of bed   safety round/check completed   bed alarm on   lighting adjusted   increase visualization of patient   treat reversible contributory factors   treat underlying cause  Intervention: Prevent Skin Injury  Recent Flowsheet Documentation  Taken 6/3/2022 0400 by Ferny Lara RN  Body Position:   turned   left  Taken 6/3/2022 0000 by Ferny Lara RN  Body Position:   turned   right  Intervention: Prevent and Manage VTE (Venous Thromboembolism) Risk  Recent Flowsheet Documentation  Taken 6/3/2022 0400 by Ferny Lara RN  VTE Prevention/Management: SCDs (sequential compression devices) on  Activity Management: bedrest  Taken 6/3/2022 0000 by Ferny Lara RN  VTE Prevention/Management: SCDs (sequential compression devices) on  Activity Management: bedrest  Goal: Optimal Comfort and Wellbeing  Outcome: Ongoing, Progressing  Intervention: Monitor Pain and Promote Comfort  Recent Flowsheet Documentation  Taken 6/3/2022 0550 by Ferny Lara RN  Pain Management Interventions: medication (see MAR)  Goal: Readiness for Transition of Care  Outcome: Ongoing, Progressing

## 2022-06-03 NOTE — CONSULTS
"CLINICAL NUTRITION SERVICES - BRIEF NOTE    Chart reviewed. Received positive admission nutrition risk screen on 6/2/22 at 1140 -   Have you recently lost weight without trying? Yes (unsure amount)  Have you been eating poorly because of a decreased appetite? No    PMH significant for hypothyroidism, brain mass, osteopenia and steroid-induced hyperglycemia. Admitted 2/2 scheduled surgery (L frontal craniotomy for tumor removal) on 6/2.     Weight hx reviewed. Weight has been stable over the past 1+ year, no recent weight loss noted.   Height: 5' 1\"  Weight: 53.3 kg (117 lb 8.1 oz)  BMI: 22.20 kg/m^2 (normal)  Wt Readings from Last 10 Encounters:   06/03/22 53.3 kg (117 lb 8.1 oz)   06/01/22 50.8 kg (112 lb 1.6 oz)   05/24/22 51.7 kg (114 lb)   02/18/22 51.7 kg (114 lb)   12/13/21 53.1 kg (117 lb)   11/04/21 51.7 kg (114 lb)   10/14/21 52.2 kg (115 lb)   02/01/21 53.5 kg (118 lb)   11/06/20 54.9 kg (121 lb)   10/29/19 56.2 kg (124 lb)     Diet: Clear Liquid with orders to advance as tolerated   Intake: No PO intake thus far. Surgery just completed last night.     Recommendations:  - Advance diet as tolerated to Regular   - Consult if nutrition concerns arise over admission     We will chart review at LOS #7 to reassess nutrition risk factors per protocol. If needs arise before this time please page or consult.     Andreina Saini RD, LD  ICU RD Pager: [phone]    "

## 2022-06-03 NOTE — PROGRESS NOTES
SHIFT SUMMARY [id number]  NEURO: AOx3, d/o to time @ times (yeaR). FC, slow to respond. Slight R facial drop. PERRLA. drowsy but wakes up to voice and gentle touch. Q1H Neuro checks.   RESP: room air. No issues.  CARDIAC:  BP goal under 160. Soft BP when sleeping. Fluids increased.   GI/: billings, UOP good. Tolerating ice chips, meds in applesauce.   SKIN: head incision, covered with gauze/tape. Old drainage marked.  PLAN: sons updated at bedside. Continue hourly neuro checks until 24 hrs post op.      Tanesha Barclay RN

## 2022-06-03 NOTE — PROGRESS NOTES
[facility]      Medicine Consultation Progress Note - Hospitalist Service  Primary Service: Neurosurgery    Date of Admission:  6/2/2022    Assessment & Plan           Indy Reyes is a 89 year old female admitted on 6/2/2022. She is currently hospitalized at [facility] intensive care unit following resection of a large left frontal lobe mass with surrounding cerebral edema and associated right-sided weakness     Hypothyroidism:  -Okay to continue levothyroxine 25 mcg daily when appropriate for oral intake.  This has already been ordered.  No need to switch to IV levothyroxine during hospitalization.     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  Currently on dexamethasone 4 mg 3 times daily with meals  -Await final pathology; might require oncology consultation pending findings.  Alternatively, may require discussion regarding risks, benefits, and goals of care with possible gastroenterology evaluation given some concern for mesothelial origin spindle cell tumor.  -Physical therapy, Occupational Therapy; pending progress, may require care management consultation for placement.  I am told she lives alone and has some assistance from her son, though she is not able to describe level of his involvement currently  -Postoperative pain management, anticoagulation per surgical team  - added SLP consult (previously worked with patient)     Prediabetes, steroid-induced hyperglycemia: On Decadron, patient has had intermittently elevated blood glucose levels.  Hemoglobin A1c 5/24/2022 of 5.7.  -Medium dose sliding scale insulin every 4 hours; patient is still too sleepy to  advance diet.  This can be converted to 3 times daily before meals and at bedtime dosing with bolus carb coverage when she is able to further advance her diet.  - 6/3 sliding scale insulin in place already, will continue and monitor     On Protonix 40 mg IV for GI prophylaxis        Diet: Advance Diet as Tolerated: Clear Liquid Diet    DVT Prophylaxis: Defer to primary service  Samuel Catheter: PRESENT, indication: Strict 1-2 Hour I&O  Central Lines: None  Cardiac Monitoring: None  Code Status:   defer to primary service    Disposition Plan   Expected Discharge: defer to primary  Anticipated discharge location: other (comment) (Staying with son after last hospital stay)    Delays:            The patient's care was discussed with the Bedside Nurse, Patient and Patient's Family.    Frank Manuel MD  Hospitalist Service  [facility]  Securely message with the Vocera Web Console (learn more here)  Text page via [employer] Paging/Directory         Clinically Significant Risk Factors Present on Admission                 ______________________________________________________________________    Interval History   Patient seen and examined late morning with her granddaughter at bedside.  Denies new pain, shortness of breath, fevers or chills.    Data reviewed today: I reviewed all medications, new labs and imaging results over the last 24 hours. I personally reviewed no images or EKG's today.    Physical Exam   Vital Signs: Temp: 98.3  F (36.8  C) Temp src: Oral BP: 125/50 Pulse: 78   Resp: 18 SpO2: 100 % O2 Device: None (Room air) Oxygen Delivery: 6 LPM  Weight: 117 lbs 8.08 oz    Gen: NAD, pleasant  HEENT: MMM  Resp: no crackles,  no wheezes, no increased work of resp  CV: S1S2 heard, reg rhythm, reg rate  Abdo: soft, nontender, nondistended, bowel sounds present  Ext: calves nontender, well perfused  Neuro: awake and alert conversing appropriately, moving all ext      Data   Recent Labs   Lab  06/03/22  1522 06/03/22  1250 06/03/22  0719 06/02/22  1051 06/01/22  0859   NA  --   --   --   --  140   POTASSIUM  --   --   --   --  3.9   CHLORIDE  --   --   --   --  103   CO2  --   --   --   --  30   BUN  --   --   --   --  30   CR  --   --   --   --  0.62   ANIONGAP  --   --   --   --  7   IMAN  --   --   --   --  8.9   * 173* 132*   < > 83    < > = values in this interval not displayed.     Recent Results (from the past 24 hour(s))   CT Head w/o Contrast    Narrative    EXAM: CT HEAD W/O CONTRAST  LOCATION: [facility] [place]  DATE/TIME: 6/3/2022 5:32 AM    INDICATION: Craniotomy, post-op.  COMPARISON: 05/24/2022.  TECHNIQUE: Routine CT Head without IV contrast. Multiplanar reformats. Dose reduction techniques were used.    FINDINGS:  INTRACRANIAL CONTENTS: Since the prior study, there are new postoperative changes of anterior left frontal craniotomy and resection of previously demonstrated underlying extra-axial mass. Surgical defect within the anterior left frontal lobe demonstrates   a small amount of layering hemorrhage. Moderate pneumocephalus primarily over the frontal lobes and extending along the interhemispheric fissure anteriorly. Small amount of subdural fluid on the left. There is persistent mass effect in the left frontal   region and on the left lateral ventricle with persistent 5 mm of left to right shift of midline structures compared with 13 mm on the preoperative study. Underlying volume loss and presumed chronic small vessel ischemic changes.     VISUALIZED ORBITS/SINUSES/MASTOIDS: No intraorbital abnormality. No paranasal sinus mucosal disease. No middle ear or mastoid effusion.    BONES/SOFT TISSUES: Left frontal craniotomy.      Impression    IMPRESSION:  1.  Interval postoperative changes relating to resection of previously demonstrated left frontal extra-axial mass. There is a surgical defect extending into the left frontal lobe which contains a small amount of  postoperative hemorrhage.  2.  Persistent but decreased mass effect in the left frontal region.  3.  Moderate pneumocephalus primarily over the anterior frontal lobes and extending along the interhemispheric fissure.

## 2022-06-03 NOTE — PROGRESS NOTES
"SPIRITUAL HEALTH SERVICES Progress Note  [facility]  ICU    Visited with PT (Mary) per [facility] request for  services. Provided emotional support, affirmation of goals of care, and prayer.    PT states that she is feeling \"great\" and is \"amazed\" at how quickly she is recovering from her surgery. Mary indicated that she looks to God for support and believes that things are in the hands of God.    Mary accepted offer of prayer for her recovery from the [employer]. She stated that recovery is \"exactly where I'm headed\" with the help of her large and supportive family.    PT indicated that she was feeling well but tired and in need of rest. Mary invites future visits from  for conversation and prayer. Follow-up with PT while she remains in hospital.    [facility]  [employer]ate      "

## 2022-06-03 NOTE — CARE PLAN
SHIFT SUMMARY [id number]  NEURO: intact, AOx4. Much improvement from yesterday. Moving all extremities equally. PERRLA. Q2h neuro checks. See neuro flowsheet.   RESP: RA.   CARDIAC: NSR, PAC. BP stable, goal systolic under 160. Well controlled, Arterial line removed.  GI/: UOP good, billings. regular diet, good appetite. On sliding scale.   LAB: no labs this AM, message sent to MD  PLAN: transfer out of ICU today, q2 neuro checks. Family updated at bedside.     Tanesha Barclay RN

## 2022-06-03 NOTE — CONSULTS
[facility]  Consult Note - Hospitalist Service  Date of Admission:  6/2/2022  Consult Requested by: Goldie Blanco, neurosurgery PA  Reason for Consult: Medical management    Assessment & Plan   Indy Reyes is a 89 year old female admitted on 6/2/2022. She is currently hospitalized at [facility] intensive care unit following resection of a large left frontal lobe mass with surrounding cerebral edema and associated right-sided weakness    Hypothyroidism:  -Okay to continue levothyroxine 25 mcg daily when appropriate for oral intake.  This has already been ordered.  No need to switch to IV levothyroxine during hospitalization.    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  Currently on dexamethasone 4 mg 3 times daily with meals  -Await final pathology; might require oncology consultation pending findings.  Alternatively, may require discussion regarding risks, benefits, and goals of care with possible gastroenterology evaluation given some concern for mesothelial origin spindle cell tumor.  -Physical therapy, Occupational Therapy; pending progress, may require care management consultation for placement.  I am told she lives alone and has some assistance from her son, though she is not able to describe level of his involvement currently  -Postoperative pain management, anticoagulation per surgical team    Prediabetes, steroid-induced hyperglycemia: On Decadron, patient has had intermittently elevated blood glucose levels.  Hemoglobin A1c 5/24/2022 of 5.7.  -Medium dose sliding scale insulin every 4 hours; patient is still too sleepy to advance diet.  This can be  converted to 3 times daily before meals and at bedtime dosing with bolus carb coverage when she is able to further advance her diet.    On Protonix 40 mg IV for GI prophylaxis       The patient's care was discussed with the Patient and ICU nurse.    Eric Stout MD  [facility]  Securely message with the Vocera Web Console (learn more here)  Text page via [employer]       Hospitalist Service    Clinically Significant Risk Factors Present on Admission                      ______________________________________________________________________    Chief Complaint   Right-sided weakness prior to surgery; currently without complaint other than dry mouth    History is obtained from the patient, chart review.  Currently patient's ability to provide a history is limited by her sedation postoperatively.  As an example of this, she tells me that she cannot remember if she has been eating well in the past days to weeks, is unable to describe what her living situation is at home or if she receives help at home; nurse tells me she resides with her son    History of Present Illness   Indy Reyes is a 89 year old female who is currently hospitalized at [facility] ICU following left frontal craniotomy for a large left frontal lobe mass resulting in cerebral edema, midline shift, and right-sided weakness.    Patient is 89, and quite healthy at baseline.  Her only medication is low-dose levothyroxine by her report with recent Decadron.  I see some vitamins listed on her home medication list as well as well as some ophthalmic lubricating drops.  She lives at home with her son, who apparently provide some assistance.    Patient was recently hospitalized 5/24/2022 through 5/26/2022 for right-sided weakness and dysarthria.  Her symptoms of word finding difficulties had been noted since December, though right-sided weakness was new since mid May.  She was diagnosed with a large  "left-sided extra-axial frontal mass resulting in vasogenic edema and midline shift, treated with Decadron and followed by physical therapy, speech pathology.  She was discharged home with assistance with plan for frontal craniotomy for tumor extraction 5/31/2022 which she has now undergone.    Postoperatively, patient with some borderline lower blood pressures.  She still has an arterial line in place.  Suspect these borderline blood pressures are secondary to sedation with anesthesia as patient is still quite sleepy.  She is able to follow commands, and with more interaction, is more awake.    Patient's only complaint is of dry mouth.  She answers \"I do not remember\" to multiple questions regarding her past medical history, though is able to tell me that her only medication is levothyroxine.  Has been on Decadron recently as well, of course.  She is able to tell me that \"of course\" she would request CPR if her heart were to stop beating.  This is consistent with last this test CODE STATUS.  I did not further assess her desire for intubation, as patient's level of alertness might not be appropriate for this discussion as she desired no prearrest or postarrest intubation despite requesting CPR.  I am uncertain if this was further discussed with surgical team preoperatively, and when patient is more alert, this could be more fully addressed with her.    Review of Systems   The 10 point Review of Systems is negative other than noted in the HPI or here.  Patient with no pain, no nausea or vomiting.  Her only complaint is dry mouth    Past Medical History    I have reviewed this patient's medical history and updated it with pertinent information if needed.   Past Medical History:   Diagnosis Date     Back pain      Thyroid disease        Past Surgical History   I have reviewed this patient's surgical history and updated it with pertinent information if needed.  Past Surgical History:   Procedure Laterality Date     " APPENDECTOMY      at age of 16     BACK SURGERY  2017     CATARACT IOL, RT/LT  2009    both     FORAMINOTOMY LUMBAR POSTERIOR ONE LEVEL Right 2017    Procedure: FORAMINOTOMY LUMBAR POSTERIOR ONE LEVEL;  Right L4-L5 hemilaminectomy and foraminotomy and excision of synovial cyst. ;  Surgeon: Fady Woo MD;  Location: [facility] OR     TONSILLECTOMY  1954       Social History   I have reviewed this patient's social history and updated it with pertinent information if needed.  Social History     Tobacco Use     Smoking status: Former Smoker     Types: Cigarettes     Quit date: 1960     Years since quittin.4     Smokeless tobacco: Never Used     Tobacco comment:    Vaping Use     Vaping Use: Never used   Substance Use Topics     Alcohol use: Yes     Comment: SOCIAL-2 drinks per month      Drug use: No       Family History   I have reviewed this patient's family history and updated it with pertinent information if needed.  Family History   Problem Relation Age of Onset     Family History Negative Mother          in accident     Cancer Father         prostate     Heart Disease Brother         60s-4-one brother with heart attack in his 60's     Cancer Sister         breast-7-one  wtih breast ca,one with stroke, one sis  from diabetes     Breast Cancer Sister         diagnosed at age of 52- 5 sisters alive     Cancer - colorectal No family hx of        Medications   I have reviewed this patient's current medications  Current Facility-Administered Medications   Medication     [START ON 2022] acetaminophen (TYLENOL) tablet 650 mg     acetaminophen (TYLENOL) tablet 975 mg     bisacodyl (DULCOLAX) Suppository 10 mg     calcium carbonate (TUMS) chewable tablet 1,000 mg     ceFAZolin (ANCEF) 1 g vial to attach to  ml bag for ADULT or 50 ml bag for PEDS     dexamethasone (DECADRON) tablet 4 mg     glucose gel 15-30 g    Or     dextrose 50 % injection 25-50 mL    Or      glucagon injection 1 mg     hydrALAZINE (APRESOLINE) injection 10 mg     HYDROmorphone (DILAUDID) injection 0.2-0.5 mg     hydrOXYzine (ATARAX) tablet 10 mg     insulin aspart (NovoLOG) injection (RAPID ACTING)     labetalol (NORMODYNE/TRANDATE) injection 10 mg     levETIRAcetam (KEPPRA) 750 mg in sodium chloride 0.9 % 100 mL intermittent infusion     levothyroxine (SYNTHROID/LEVOTHROID) tablet 25 mcg     lidocaine (LMX4) cream     lidocaine 1 % 0.1-1 mL     magnesium hydroxide (MILK OF MAGNESIA) suspension 30 mL     naloxone (NARCAN) injection 0.2 mg    Or     naloxone (NARCAN) injection 0.4 mg    Or     naloxone (NARCAN) injection 0.2 mg    Or     naloxone (NARCAN) injection 0.4 mg     ondansetron (ZOFRAN ODT) ODT tab 4 mg    Or     ondansetron (ZOFRAN) injection 4 mg     oxyCODONE IR (ROXICODONE) half-tab 2.5 mg    Or     oxyCODONE (ROXICODONE) tablet 5 mg     pantoprazole (PROTONIX) IV push injection 40 mg     polyethylene glycol (MIRALAX) Packet 17 g     prochlorperazine (COMPAZINE) injection 5 mg    Or     prochlorperazine (COMPAZINE) tablet 5 mg     senna-docusate (SENOKOT-S/PERICOLACE) 8.6-50 MG per tablet 1 tablet     sodium chloride (PF) 0.9% PF flush 3 mL     sodium chloride (PF) 0.9% PF flush 3 mL     sodium chloride 0.9% infusion       Allergies   Allergies   Allergen Reactions     Tetracycline Rash       Physical Exam   Vital Signs: Temp: 98.4  F (36.9  C) Temp src: Oral BP: 98/57 Pulse: 74   Resp: 16 SpO2: 99 % O2 Device: None (Room air) Oxygen Delivery: 6 LPM  Weight: 115 lbs 0 oz    General Appearance: Frail appearing 89-year-old female laying in bed.  She is sleepy, though in no acute distress  Eyes: No scleral icterus or injection.  Has difficulty opening her eyes secondary to sleepiness and recent sedating medications, though is able to do so when requested  HEENT: Recent left frontal craniotomy surgical site.  Some wasting of muscles of mastication on right  Respiratory: Breath sounds are clear  bilaterally to auscultation with good effort when prompted.  No wheezes or crackles.  Cardiovascular: Regular rate and rhythm with heart rate in the 70s.  Does have some associated respiratory variability on auscultation.  GI: Thin, soft, nontender to palpation.  No palpable mass.  Lymph/Hematologic: No lower extremity edema  Genitourinary: Samuel catheter in place currently  Skin: Surgical changes on left frontal craniotomy site.  No jaundice  Musculoskeletal: Thin with diffuse muscular wasting and subcutaneous fat loss.  Appears somewhat advanced for age.  Neurologic: Patient is very sleepy.  When awoken, she is alert, conversant.  She demonstrates equal strength in bilateral upper extremities with , wrist and elbow flexion and extension.  Bilateral weakness in lower extremities at hip flexors at a weak 5/5.  This again, however, is equal  Psychiatric: Normal versus blunted affect.  Sleepy.  Very pleasant    Data   Results for orders placed or performed during the hospital encounter of 06/02/22 (from the past 24 hour(s))   Glucose by meter   Result Value Ref Range    GLUCOSE BY METER POCT 95 70 - 99 mg/dL   ABO/Rh type and screen    Narrative    The following orders were created for panel order ABO/Rh type and screen.  Procedure                               Abnormality         Status                     ---------                               -----------         ------                     Adult Type and Screen[[id number]]                            Final result                 Please view results for these tests on the individual orders.   Adult Type and Screen   Result Value Ref Range    ABO/RH(D) O POS     Antibody Screen Negative Negative    SPECIMEN EXPIRATION DATE [id number]    Surgical Pathology Exam   Result Value Ref Range    Case Report       Surgical Pathology Report                         Case: [id number]                                  Authorizing Provider:  Rasheed Rose   Collected:           06/02/2022 02:26 PM                                 MD                                                                           Ordering Location:     [facility]          Received:            06/02/2022 02:33 PM                                 [facility] Main OR                                                            Pathologist:           John Jacob MD                                                           Intraop:               John Jacob MD                                                           Specimen:    Brain, LEFT FRONTAL INTRACRANIAL MASS                                                      Intraoperative Consultation       A(1). Brain, LEFT FRONTAL INTRACRANIAL MASS:  AFS1:  Spindle cell neoplasm, favor meningothelial origin     Seen with Dr. Alissa Jacob MD on 6/2/2022 at 2:54 PM          Glucose by meter   Result Value Ref Range    GLUCOSE BY METER POCT 211 (H) 70 - 99 mg/dL   Glucose by meter   Result Value Ref Range    GLUCOSE BY METER POCT 161 (H) 70 - 99 mg/dL

## 2022-06-03 NOTE — PROGRESS NOTES
[facility]    Neurosurgery  Daily Note    Assessment & Plan   Procedure(s):  left frontal craniotomy for tumor removal   1 Day Post-Op  POD1 s/p left frontal craniotomy for tumor removal with Dr. Rose due to BRAIN COMPRESSION. Patient had increased headache this AM after transferring to and from head CT. She is feeling much better on rounds today. Admits to slight headache. Denies vision changes, nausea, vomiting, new weakness. Incision has dressing in place.     Narrative & Impression   EXAM: CT HEAD W/O CONTRAST  LOCATION: [facility]  DATE/TIME: 6/3/2022 5:32 AM     INDICATION: Craniotomy, post-op.  COMPARISON: 05/24/2022.  TECHNIQUE: Routine CT Head without IV contrast. Multiplanar reformats. Dose reduction techniques were used.     FINDINGS:  INTRACRANIAL CONTENTS: Since the prior study, there are new postoperative changes of anterior left frontal craniotomy and resection of previously demonstrated underlying extra-axial mass. Surgical defect within the anterior left frontal lobe demonstrates   a small amount of layering hemorrhage. Moderate pneumocephalus primarily over the frontal lobes and extending along the interhemispheric fissure anteriorly. Small amount of subdural fluid on the left. There is persistent mass effect in the left frontal   region and on the left lateral ventricle with persistent 5 mm of left to right shift of midline structures compared with 13 mm on the preoperative study. Underlying volume loss and presumed chronic small vessel ischemic changes.      VISUALIZED ORBITS/SINUSES/MASTOIDS: No intraorbital abnormality. No paranasal sinus mucosal disease. No middle ear or mastoid effusion.     BONES/SOFT TISSUES: Left frontal craniotomy.                                                                      IMPRESSION:  1.  Interval postoperative changes relating to resection of previously demonstrated left frontal extra-axial mass. There is  a surgical defect extending into the left frontal lobe which contains a small amount of postoperative hemorrhage.  2.  Persistent but decreased mass effect in the left frontal region.  3.  Moderate pneumocephalus primarily over the anterior frontal lobes and extending along the interhemispheric fissure.          Plan:  -OK for transfer to floor this afternoon as long as doing well   -Advance activity as tolerated  -Continue supportive and symptomatic treatment  -Start or continue physical therapy  -Pain control measures  -Decadron taper to start tomorrow over 2 weeks time to OFF.   -Advance diet as tolerated  -Routine wound care. Dressing in place until tomorrow  -Plans reviewed with Dr. Rose who was in agreement with plans   -Page or call with questions    Paola Falcon PA-C  [facility]  [address]  [address] [place] [id number]    Tel [phone]  Pager [phone]    Active Problems:    S/P craniotomy      Paola Falcon PA-C    Interval History   Stable     Physical Exam   Temp: 98.3  F (36.8  C) Temp src: Oral BP: 125/50 Pulse: 78   Resp: 18 SpO2: 100 % O2 Device: None (Room air) Oxygen Delivery: 6 LPM  Vitals:    06/02/22 1202 06/03/22 0645   Weight: 115 lb (52.2 kg) 117 lb 8.1 oz (53.3 kg)     Vital Signs with Ranges  Temp:  [97.7  F (36.5  C)-98.4  F (36.9  C)] 98.3  F (36.8  C)  Pulse:  [61-88] 78  Resp:  [10-49] 18  BP: ()/(40-80) 125/50  MAP:  [54 mmHg-112 mmHg] 69 mmHg  Arterial Line BP: ()/(34-70) 115/44  SpO2:  [95 %-100 %] 100 %  I/O last 3 completed shifts:  In: 3173.33 [I.V.:3173.33]  Out: 2495 [Urine:2495]    Awake, alert, appropriate. Oriented to person, place, month, disoriented to year (said 2021)  II-XII grossly intact  Slight right sided weakness  LUE nad LLE full strength   Negative drift  Negative clonus   Dysmetria with left finger to nose testing    Medications     sodium chloride 125 mL/hr at 06/03/22 1022         acetaminophen  975 mg Oral Q8H     ceFAZolin  1 g Intravenous Q8H     dexamethasone  4 mg Oral TID w/meals     insulin aspart  1-6 Units Subcutaneous Q4H     levETIRAcetam  750 mg Intravenous Q12H     levothyroxine  25 mcg Oral Daily     pantoprazole (PROTONIX) IV  40 mg Intravenous Daily     polyethylene glycol  17 g Oral Daily     senna-docusate  1 tablet Oral BID     sodium chloride (PF)  3 mL Intracatheter Q8H       Plans discussed with Dr. Rose who was in agreement with plans    Paola MARQUIS [facility]  [address] [address]    Tel [phone]  Pager [phone]

## 2022-06-04 ENCOUNTER — APPOINTMENT (OUTPATIENT)
Dept: SPEECH THERAPY | Facility: CLINIC | Age: 87
DRG: 025 | End: 2022-06-04
Attending: HOSPITALIST
Payer: COMMERCIAL

## 2022-06-04 ENCOUNTER — APPOINTMENT (OUTPATIENT)
Dept: PHYSICAL THERAPY | Facility: CLINIC | Age: 87
DRG: 025 | End: 2022-06-04
Attending: PHYSICIAN ASSISTANT
Payer: COMMERCIAL

## 2022-06-04 ENCOUNTER — APPOINTMENT (OUTPATIENT)
Dept: OCCUPATIONAL THERAPY | Facility: CLINIC | Age: 87
DRG: 025 | End: 2022-06-04
Attending: PHYSICIAN ASSISTANT
Payer: COMMERCIAL

## 2022-06-04 LAB
GLUCOSE BLDC GLUCOMTR-MCNC: 106 MG/DL (ref 70–99)
GLUCOSE BLDC GLUCOMTR-MCNC: 113 MG/DL (ref 70–99)
GLUCOSE BLDC GLUCOMTR-MCNC: 143 MG/DL (ref 70–99)
GLUCOSE BLDC GLUCOMTR-MCNC: 161 MG/DL (ref 70–99)
GLUCOSE BLDC GLUCOMTR-MCNC: 169 MG/DL (ref 70–99)
GLUCOSE BLDC GLUCOMTR-MCNC: 177 MG/DL (ref 70–99)
GLUCOSE BLDC GLUCOMTR-MCNC: 191 MG/DL (ref 70–99)
GLUCOSE BLDC GLUCOMTR-MCNC: 196 MG/DL (ref 70–99)

## 2022-06-04 PROCEDURE — 97530 THERAPEUTIC ACTIVITIES: CPT | Mod: GO

## 2022-06-04 PROCEDURE — 97530 THERAPEUTIC ACTIVITIES: CPT | Mod: GP

## 2022-06-04 PROCEDURE — 250N000013 HC RX MED GY IP 250 OP 250 PS 637: Performed by: HOSPITALIST

## 2022-06-04 PROCEDURE — 92610 EVALUATE SWALLOWING FUNCTION: CPT | Mod: GN | Performed by: SPEECH-LANGUAGE PATHOLOGIST

## 2022-06-04 PROCEDURE — 250N000011 HC RX IP 250 OP 636: Performed by: PHYSICIAN ASSISTANT

## 2022-06-04 PROCEDURE — 258N000003 HC RX IP 258 OP 636: Performed by: PHYSICIAN ASSISTANT

## 2022-06-04 PROCEDURE — 120N000013 HC R&B IMCU

## 2022-06-04 PROCEDURE — C9113 INJ PANTOPRAZOLE SODIUM, VIA: HCPCS | Performed by: PHYSICIAN ASSISTANT

## 2022-06-04 PROCEDURE — 97116 GAIT TRAINING THERAPY: CPT | Mod: GP

## 2022-06-04 PROCEDURE — 92526 ORAL FUNCTION THERAPY: CPT | Mod: GN | Performed by: SPEECH-LANGUAGE PATHOLOGIST

## 2022-06-04 PROCEDURE — 97161 PT EVAL LOW COMPLEX 20 MIN: CPT | Mod: GP

## 2022-06-04 PROCEDURE — 97165 OT EVAL LOW COMPLEX 30 MIN: CPT | Mod: GO

## 2022-06-04 PROCEDURE — 97535 SELF CARE MNGMENT TRAINING: CPT | Mod: GO

## 2022-06-04 PROCEDURE — 250N000013 HC RX MED GY IP 250 OP 250 PS 637: Performed by: PHYSICIAN ASSISTANT

## 2022-06-04 RX ORDER — DEXAMETHASONE 2 MG/1
2 TABLET ORAL DAILY
Status: DISCONTINUED | OUTPATIENT
Start: 2022-06-12 | End: 2022-06-07 | Stop reason: HOSPADM

## 2022-06-04 RX ORDER — DEXAMETHASONE 4 MG/1
4 TABLET ORAL 2 TIMES DAILY
Status: DISCONTINUED | OUTPATIENT
Start: 2022-06-04 | End: 2022-06-07 | Stop reason: HOSPADM

## 2022-06-04 RX ORDER — DEXAMETHASONE 4 MG/1
4 TABLET ORAL DAILY
Status: DISCONTINUED | OUTPATIENT
Start: 2022-06-08 | End: 2022-06-07 | Stop reason: HOSPADM

## 2022-06-04 RX ORDER — DEXAMETHASONE 1 MG
1 TABLET ORAL DAILY
Status: DISCONTINUED | OUTPATIENT
Start: 2022-06-16 | End: 2022-06-07 | Stop reason: HOSPADM

## 2022-06-04 RX ADMIN — ACETAMINOPHEN 975 MG: 325 TABLET ORAL at 13:11

## 2022-06-04 RX ADMIN — PANTOPRAZOLE SODIUM 40 MG: 40 INJECTION, POWDER, FOR SOLUTION INTRAVENOUS at 08:47

## 2022-06-04 RX ADMIN — ACETAMINOPHEN 975 MG: 325 TABLET ORAL at 03:57

## 2022-06-04 RX ADMIN — LEVETIRACETAM 750 MG: 100 INJECTION, SOLUTION INTRAVENOUS at 06:37

## 2022-06-04 RX ADMIN — DEXAMETHASONE 4 MG: 4 TABLET ORAL at 21:03

## 2022-06-04 RX ADMIN — SENNOSIDES AND DOCUSATE SODIUM 1 TABLET: 50; 8.6 TABLET ORAL at 21:03

## 2022-06-04 RX ADMIN — SENNOSIDES AND DOCUSATE SODIUM 1 TABLET: 50; 8.6 TABLET ORAL at 08:47

## 2022-06-04 RX ADMIN — ACETAMINOPHEN 975 MG: 325 TABLET ORAL at 21:03

## 2022-06-04 RX ADMIN — INSULIN ASPART 1 UNITS: 100 INJECTION, SOLUTION INTRAVENOUS; SUBCUTANEOUS at 00:19

## 2022-06-04 RX ADMIN — DEXAMETHASONE 4 MG: 4 TABLET ORAL at 13:11

## 2022-06-04 RX ADMIN — LEVOTHYROXINE SODIUM 25 MCG: 25 TABLET ORAL at 06:40

## 2022-06-04 RX ADMIN — INSULIN ASPART 1 UNITS: 100 INJECTION, SOLUTION INTRAVENOUS; SUBCUTANEOUS at 03:54

## 2022-06-04 RX ADMIN — SODIUM CHLORIDE: 9 INJECTION, SOLUTION INTRAVENOUS at 03:50

## 2022-06-04 RX ADMIN — DEXAMETHASONE 4 MG: 4 TABLET ORAL at 08:47

## 2022-06-04 RX ADMIN — POLYETHYLENE GLYCOL 400 AND PROPYLENE GLYCOL 1 DROP: 4; 3 SOLUTION/ DROPS OPHTHALMIC at 21:04

## 2022-06-04 RX ADMIN — LEVETIRACETAM 750 MG: 100 INJECTION, SOLUTION INTRAVENOUS at 17:29

## 2022-06-04 ASSESSMENT — ACTIVITIES OF DAILY LIVING (ADL)
ADLS_ACUITY_SCORE: 36
ADLS_ACUITY_SCORE: 36
ADLS_ACUITY_SCORE: 39

## 2022-06-04 NOTE — PLAN OF CARE
Pt here post op left frontal lobe mass resection. A&Ox4. Neuros intact. VSS on RA. Regular diet, thin liquids. Takes pills whole with water. Scheduled tylenol given, pt reporting mild headache pain. Incision dressing WDL, small amount of old drainage present. Pt scoring green on the Aggression Stop Light Tool. Discharge plan pending.

## 2022-06-04 NOTE — PLAN OF CARE
Speech Language Therapy Discharge Summary    Reason for therapy discharge:    All goals and outcomes met, no further needs identified.    Progress towards therapy goal(s). See goals on Care Plan in [facility] electronic health record for goal details.  Goals met    Therapy recommendation(s):    No further therapy is recommended. Patient tolerating a regular diet and thin liquids.

## 2022-06-04 NOTE — PLAN OF CARE
"Reason for Admission: POD 2 crani tumor removal    Cognitive/Mentation: A/Ox 3-4, a little forgetful of exact month  Neuros/CMS: Intact ex for some complaints of \"vision worsening\". Slightly blurry--neurosurgery aware.   VS: stable on RA, keep SBP <160.   Tele: N/A.  GI: BS audible, passing flatus. Continent.  : WDL. Continent. Needs one more PVR. Samuel removed 1100  Pulmonary: LS clear.  Pain: no complaints.     Drains/Lines: none  Skin: WDL ex for incision. Dressing marked and unchanged  Activity: Assist x 1/GB/W.  Diet: Regular with thin liquids. Takes pills whole with water.     Therapies recs: home with outpatient PT vs TCU  Discharge: pending, likely tomorrow once safe disposition    Aggression Stoplight Tool: green    End of shift summary: grandkids at bedside                        "

## 2022-06-04 NOTE — PROVIDER NOTIFICATION
"Notified Paola Falcon that pt complaining of \"vision feeling worse today\". Pt notes she has bad vision at baseline, wears glasses, and feels vision has been worsening last few days.    Call back received: continue to monitor, if vision gets worse notify team  "

## 2022-06-04 NOTE — PROGRESS NOTES
06/04/22 1400   Quick Adds   Type of Visit Initial PT Evaluation   Living Environment   People in Home child(becca), adult;grandchild(becca)  (living in apt until mid May when spouse passed away, has been staying with son since. Unclear if pt still has that apt to return to if desired.)   Current Living Arrangements house   Home Accessibility stairs to enter home;stairs within home   Number of Stairs, Main Entrance 2   Stair Railings, Main Entrance none   Number of Stairs, Within Home, Primary greater than 10 stairs  (20 Per grandson to bedroom/bathroom)   Stair Railings, Within Home, Primary railing on right side (ascending)   Transportation Anticipated family or friend will provide   Living Environment Comments Pt teary and emotional upon subjective eval regarding home set up. Per chart son's home has a comfort height toilet with vanity/sink support on 1 side and a tub/shower combo with suction cup grab bar available. Pt has walk in shower and grab bars in her apt per chart   Self-Care   Usual Activity Tolerance good   Current Activity Tolerance fair   Regular Exercise Other (see comments)  (unclear, initially states not however later states she was going to the [employer] and exercising.)   Equipment Currently Used at Home none   Fall history within last six months no   Activity/Exercise/Self-Care Comment IND wtih all aDLs and MRADLs without AD. Family assist recently with meds per chart.   General Information   Onset of Illness/Injury or Date of Surgery 06/02/22   Referring Physician Paola Falcon PA-C   Patient/Family Therapy Goals Statement (PT) To go home.   Pertinent History of Current Problem (include personal factors and/or comorbidities that impact the POC) Pt is a 89 YOF POD 2 L frontal crainiotomy for tumor removal. PMH includes R weakness and hypothyroidism   Existing Precautions/Restrictions seizures;fall   General Observations Pt intermittently emotional regarding recent loss of spouse, unable to obtain  clear home/discharge situation due to this.   Pain Assessment   Patient Currently in Pain No   Posture    Posture Comments mild rounded shoulders and foward head   Range of Motion (ROM)   ROM Comment WFL B LE   Strength (Manual Muscle Testing)   Strength Comments decreased B LE, not formally assessed.   Bed Mobility   Comment, (Bed Mobility) Mod I supine > sit with HOB elevated   Transfers   Comment, (Transfers) SBA for sit <> stand   Gait/Stairs (Locomotion)   [place] Level (Gait) contact guard   Distance in Feet (Required for LE Total Joints) 200   Pattern (Gait) step-through   Deviations/Abnormal Patterns (Gait) bilateral deviations;base of support, narrow;amilcar decreased;gait speed decreased;stride length decreased   [place] Level (Stairs) contact guard   Handrail Location (Stairs) both sides   Number of Steps (Stairs) 4   Ascending Technique (Stairs) step-over-step   Comment, (Gait/Stairs) moderate UE support on hand rails of stairs   Balance   Balance Comments good static standing balance, fair dynamic standing balance - intermittent LOB during gait with recovery with step   Sensory Examination   Sensory Perception Comments denies numbness or tingling   Clinical Impression   Criteria for Skilled Therapeutic Intervention Yes, treatment indicated   PT Diagnosis (PT) Impaired gait   Influenced by the following impairments weakness, balance, activity tolerance   Functional limitations due to impairments impaired functional mobility requiring assist of 1 and device   Clinical Presentation (PT Evaluation Complexity) Evolving/Changing   Clinical Presentation Rationale Pt presentation and clinical judgement   Clinical Decision Making (Complexity) low complexity   Planned Therapy Interventions (PT) balance training;bed mobility training;gait training;neuromuscular re-education;strengthening;stair training;transfer training   Anticipated Equipment Needs at Discharge (PT) walker, rolling   Risk & Benefits of  therapy have been explained evaluation/treatment results reviewed;care plan/treatment goals reviewed;risks/benefits reviewed;current/potential barriers reviewed;participants voiced agreement with care plan;participants included;patient   Clinical Impression Comments Pt is below baseline level of IND in mobility requiring CGA for gait or distant supervision and FWW.   PT Discharge Planning   PT Discharge Recommendation (DC Rec) home with assist;home with home care physical therapy;Transitional Care Facility;Leaving home requires significant assistance;Leaving home requires significant taxing effort   PT Rationale for DC Rec Pt is significantly below baseline level of IND without limitations or device. She is at an increased risk of falling due to impaired balance and weakness as well as visual impairments (blurry/double vision). She will require 24/7 assist (CGA for mobility) and home health PT upon discharge. Grandchildren present are concerned about stair negotiation at their home. If this is not available she will benefit from TCU stay to maximize functional independence prior to discharge to an independent living situation.   PT Brief overview of current status Pt ambulates with CGA x200' and distant supervision x50' with FWW. Stairs with SBA up/down 4 with B rail, bed mobility with min A.   Plan of Care Review   Plan of Care Reviewed With patient;grandchild(becca)   Total Evaluation Time   Total Evaluation Time (Minutes) 5   Physical Therapy Goals   PT Frequency Daily   PT Predicted Duration/Target Date for Goal Attainment 06/07/22   PT Goals Bed Mobility;Transfers;Gait;Stairs   PT: Bed Mobility Independent;Supine to/from sit   PT: Transfers Independent;Bed to/from chair;Sit to/from stand   PT: Gait Modified independent;Rolling walker;Greater than 200 feet   PT: Stairs Supervision/stand-by assist;Greater than 10 stairs;Rail on left

## 2022-06-04 NOTE — PROGRESS NOTES
[facility]    Neurosurgery  Daily Note    Assessment & Plan   Procedure(s):  left frontal craniotomy for tumor removal   2 Days Post-Op  POD2 s/p left frontal craniotomy for tumor removal with Dr. Rose. Patient notes she is feeling well today. She notes her speech has significantly improved since surgery. Denies new or worsening headache, vision changes, weakness, nausea, vomiting. Incision is c/d/i.     Narrative & Impression   EXAM: CT HEAD W/O CONTRAST  LOCATION: [facility]  DATE/TIME: 6/3/2022 5:32 AM     INDICATION: Craniotomy, post-op.  COMPARISON: 05/24/2022.  TECHNIQUE: Routine CT Head without IV contrast. Multiplanar reformats. Dose reduction techniques were used.     FINDINGS:  INTRACRANIAL CONTENTS: Since the prior study, there are new postoperative changes of anterior left frontal craniotomy and resection of previously demonstrated underlying extra-axial mass. Surgical defect within the anterior left frontal lobe demonstrates   a small amount of layering hemorrhage. Moderate pneumocephalus primarily over the frontal lobes and extending along the interhemispheric fissure anteriorly. Small amount of subdural fluid on the left. There is persistent mass effect in the left frontal   region and on the left lateral ventricle with persistent 5 mm of left to right shift of midline structures compared with 13 mm on the preoperative study. Underlying volume loss and presumed chronic small vessel ischemic changes.      VISUALIZED ORBITS/SINUSES/MASTOIDS: No intraorbital abnormality. No paranasal sinus mucosal disease. No middle ear or mastoid effusion.     BONES/SOFT TISSUES: Left frontal craniotomy.                                                                      IMPRESSION:  1.  Interval postoperative changes relating to resection of previously demonstrated left frontal extra-axial mass. There is a surgical defect extending into the left frontal lobe which  contains a small amount of postoperative hemorrhage.  2.  Persistent but decreased mass effect in the left frontal region.  3.  Moderate pneumocephalus primarily over the anterior frontal lobes and extending along the interhemispheric fissure.           Plan:  -Advance activity as tolerated  -Continue supportive and symptomatic treatment  -Start or continue physical therapy  -Pain control measures  -Decadron taper to start today over 2 weeks time to OFF.   -Advance diet as tolerated  -Routine wound care. Remove dressing today   -PT, ST, OT consults placed, appreciate involvement   -Plans reviewed with Dr. Zamora who was in agreement with plans   -Page or call with questions    Paola Falcon PA-C  [facility] Neurosurgery  [facility]  [address] [place] [address]  [address] [place] [id number]    Tel [phone]  Pager [phone]    Active Problems:    S/P craniotomy      Paola Falcon PA-C    Interval History   Stable     Physical Exam   Temp: 97.6  F (36.4  C) Temp src: Oral BP: (!) 144/69 Pulse: 70   Resp: 16 SpO2: 99 % O2 Device: None (Room air)    Vitals:    06/02/22 1202 06/03/22 0645   Weight: 115 lb (52.2 kg) 117 lb 8.1 oz (53.3 kg)     Vital Signs with Ranges  Temp:  [97.6  F (36.4  C)-98.2  F (36.8  C)] 97.6  F (36.4  C)  Pulse:  [70-92] 70  Resp:  [16-23] 16  BP: (106-144)/(51-69) 144/69  SpO2:  [97 %-100 %] 99 %  I/O last 3 completed shifts:  In: 1710 [P.O.:360; I.V.:1350]  Out: 3860 [Urine:3860]    Awake, alert, intact  Peeled off dressing slightly to view incision- c/d/i with staples in place   II-XII grossly intact  JACOBSEN on command   Negative clonus     Medications     sodium chloride 125 mL/hr at 06/04/22 0350        acetaminophen  975 mg Oral Q8H     dexamethasone  4 mg Oral TID w/meals     insulin aspart  1-6 Units Subcutaneous 4x Daily AC & HS     levETIRAcetam  750 mg Intravenous Q12H     levothyroxine  25 mcg Oral Daily     pantoprazole (PROTONIX) IV  40 mg Intravenous Daily      polyethylene glycol  17 g Oral Daily     polyethylene glycol-propylene glycol PF  1 drop Both Eyes BID     senna-docusate  1 tablet Oral BID     sodium chloride (PF)  3 mL Intracatheter Q8H       Plans discussed with Dr. Zamora who was in agreement with plans    Paola Falcon PA-C  [facility] Neurosurgery  [address] [place] [address]    Tel [phone]  Pager [phone]

## 2022-06-04 NOTE — PLAN OF CARE
Reason for Admission: POD 1 from Crani tumor removal    Cognitive/Mentation: A/Ox 4  Neuros/CMS: Intact ex flatter affect and headache  VS: stable on RA, SBP <160.   Tele: none.  GI: BS audible, passing flatus. Continent.  : billings  Pulmonary: LS clear.  Pain: headache, icepacks and tylenol.     Drains/Lines: NS at 125mL/hr  Skin: WDL ex for incision, dried drainage, marked  Activity: pt has not gotten up.  Diet: Regular with thin liquids. Takes pills crushed in pudding.     Therapies recs: pending  Discharge: pending    Aggression Stoplight Tool: green

## 2022-06-04 NOTE — PROGRESS NOTES
06/04/22 0906   General Information   Onset of Illness/Injury or Date of Surgery 06/02/22   Referring Physician Dr. Manuel   Patient/Family Therapy Goal Statement (SLP) Patient did not state.   Pertinent History of Current Problem Indy Reyes is a 89 year old female admitted on 6/2/2022. 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   Past History of Dysphagia Seen on 5/26/22 for a bedside swallow evaluation a regular diet and thin liquids was recommend.   Type of Evaluation   Type of Evaluation Swallow Evaluation   Oral Motor   Oral Musculature generally intact   Structural Abnormalities none present   Mucosal Quality dry   Dentition (Oral Motor)   Dentition (Oral Motor) natural dentition;adequate dentition   Facial Symmetry (Oral Motor)   Facial Symmetry (Oral Motor) WNL   Lip Function (Oral Motor)   Lip Range of Motion (Oral Motor) WNL   Tongue Function (Oral Motor)   Tongue Coordination/Speed (Oral Motor) slows down progressively   Tongue ROM (Oral Motor) WNL   Jaw Function (Oral Motor)   Jaw Function (Oral Motor) WNL   Cough/Swallow/Gag Reflex (Oral Motor)   Soft Palate/Velum (Oral Motor) WNL   Volitional Throat Clear/Cough (Oral Motor) impaired;reduced strength   Volitional Swallow (Oral Motor) mildly delayed   Vocal Quality/Secretion Management (Oral Motor)   Vocal Quality (Oral Motor) hoarse   General Swallowing Observations   Current Diet/Method of Nutritional Intake (General Swallowing Observations, NIS) regular diet;thin liquids (level 0)   Swallowing Evaluation Clinical swallow evaluation   Respiratory Support (General Swallowing Observations) none   Clinical Swallow Evaluation   Feeding Assistance no assistance needed   Clinical Swallow Eval: Thin Liquid Texture Trial   Mode of Presentation, Thin Liquids cup    Oral Phase of Swallow premature pharyngeal entry   Pharyngeal Phase of Swallow impaired;repeated swallows;throat clearing   Diagnostic Statement Premature entry witih one throat clear.   Volume of Liquid or Food Presented 8 oz of water   Clinical Swallow Evaluation: Puree Solid Texture Trial   Mode of Presentation, Puree spoon;self-fed   Oral Phase, Puree WFL   Pharyngeal Phase, Puree intact   Volume of Puree Presented 4 oz of pudding   Clinical Swallow Evaluation: Solid Food Texture Trial   Mode of Presentation spoon;self-fed   Oral Phase delayed AP movement;residue in oral cavity   Pharyngeal Phase impaired;reduction in laryngeal movement;repeated swallows   Volume Presented Breakfast omelet with everything, pineapple, cereal   Oral Residue mid posterior tongue   Diagnostic Statement Increased time for mastication with minimal oral residue cleared with lingual sweeping.   Esophageal Phase of Swallow   Patient reports or presents with symptoms of esophageal dysphagia Yes   Esophageal comments Mild burping intermittently.   Swallowing Recommendations   Diet Consistency Recommendations regular diet;thin liquids (level 0)   Supervision Level for Intake distant supervision needed   Mode of Delivery Recommendations bolus size, small;food moistened;no straws;slow rate of intake   Postural Recommendations none   Swallowing Maneuver Recommendations alternate food and liquid intake;extra swallow   Monitoring/Assistance Required (Eating/Swallowing) check mouth frequently for oral residue/pocketing;stop eating activities when fatigue is present;monitor for cough or change in vocal quality with intake   Recommended Feeding/Eating Techniques (Swallow Eval) maintain upright sitting position for eating;maintain upright posture during/after eating for 30 minutes   Medication Administration Recommendations, Swallowing (SLP) Whole in water or puree.   General Therapy Interventions   Planned Therapy Interventions Dysphagia  Treatment   Dysphagia treatment Instruction of safe swallow strategies   Clinical Impression   Criteria for Skilled Therapeutic Interventions Met (SLP Eval) Yes, treatment indicated   SLP Diagnosis Minimal to mild dysphagia   Risks & Benefits of therapy have been explained evaluation/treatment results reviewed;care plan/treatment goals reviewed;risks/benefits reviewed;current/potential barriers reviewed;participants voiced agreement with care plan;participants included;patient   Clinical Impression Comments Patient presents with minimal to mild oral and pharyngeal dysphagia at bedside characterized by premature entry of thin liquids via the cup with throat clearing x1. Mastication was mildly prolonged for solid with minimal to mild oral residue after the swallow that cleared with a liquids rinse. No overt Sx of aspiration across consistencies.   Recommend: 1. Ciontinue on a regular diet and thin liquids. 2. Upright, no straws, small bites/sips, alternate liquids/solids.   SLP Discharge Planning   SLP Discharge Recommendation Transitional Care Facility   SLP Rationale for DC Rec Patient near/at bedside swallow function. Will defer discharge plan to PT/OT needs.   SLP Brief overview of current status  Patient with minimal to mild oral and pharyngeal dysphagia.    Total Evaluation Time   Total Evaluation Time (Minutes) 20

## 2022-06-04 NOTE — PROGRESS NOTES
Brief hospitalist consultation progress note    Chart reviewed.  Discussed with RN.  No major concerns for hospitalist service.  Patient's PCA hypothyroidism medication has already been resumed.  Sliding scale insulin regimen already in place for steroid-induced hyperglycemia.  CODE STATUS and sleeping medication should be addressed by primary team given her brain mass being recently surgically removed.  I see that therapies have been ordered per primary team.  No further input or recommendations per hospitalist service.  We will sign off.  Please reconsult if new concerns.

## 2022-06-04 NOTE — PROGRESS NOTES
06/04/22 1528   Quick Adds   Type of Visit Initial Occupational Therapy Evaluation   Living Environment   People in Home child(becca), adult;grandchild(becca)  (living in apt until mid May when spouse passed away, has been staying with son and his family since.)   Current Living Arrangements house   Home Accessibility stairs to enter home;stairs within home   Living Environment Comments Has been living at son's home for 3 weeks and having 24 hr supervision. Prior to that was in independent senior apartment. Grandchildren report family is working to move patient to a new apartment post rehab   Self-Care   Usual Activity Tolerance good   Current Activity Tolerance moderate   Regular Exercise Yes   Equipment Currently Used at Home   (Son's home has standard heigt toilet with vanity nex to it. Tub/shower with no grab bar or chair)   Fall history within last six months no   Activity/Exercise/Self-Care Comment At baseline is independent in self-cares without gait aid. Recently, at son's home, has been having assist with bathing.   Instrumental Activities of Daily Living (IADL)   IADL Comments No longer drives. At son's home has been folding laundry and helping with dishes. Family does med management. A month ago was living alone in apartment and doing cooking, cleaning, laundry,   General Information   Onset of Illness/Injury or Date of Surgery 06/02/22   Referring Physician Paola Falcon PA-C   Patient/Family Therapy Goal Statement (OT) Improve independence   Additional Occupational Profile Info/Pertinent History of Current Problem POD2 crani with left frontal lobe mass resection. PMH of hypothyroidism   Existing Precautions/Restrictions fall  (HOB 30 degrees)   Cognitive Status Examination   Orientation Status person;place;time   Affect/Mental Status (Cognitive) WFL   Follows Commands follows two-step commands;75-90% accuracy   Cognitive Status Comments Continue to further assess   Visual Perception   Visual  Impairment/Limitations distance vision impaired;near/reading vision impaired;blurry vision   Impact of Vision Impairment on Function (Vision) Pt reports baseline blurry vision for near and far distances and light sensitivity. Reports recently blurry vision is worse. Has yellow tinted glasses in her hospital room from home. Intact peripheral vision on L and reduced by 15 degrees on R. WNL near point of convergence. Slowed saccades in all visual fields. Nystagmus with horizontal tracking in all visual quadrants   Sensory   Sensory Comments Intact and symmetrical BUE light touch. Pt denies BUE/BLE numbness or tingling   Pain Assessment   Patient Currently in Pain No   Coordination   Upper Extremity Coordination No deficits were identified   Coordination Comments Symmetrical finger to nose touching with eyes closed and digit to thumb opposition   Transfers   Transfers sit-stand transfer;toilet transfer   Sit-Stand Transfer   Sit-Stand [place] (Transfers) contact guard   Toilet Transfer   Type (Toilet Transfer) stand-sit;sit-stand   [place] Level (Toilet Transfer) contact guard   Activities of Daily Living   BADL Assessment/Intervention lower body dressing;grooming;toileting;bathing   Bathing Assessment/Intervention   [place] Level (Bathing) moderate assist (50% patient effort)  (per clinical judgment)   Lower Body Dressing Assessment/Training   [place] Level (Lower Body Dressing) minimum assist (75% patient effort)   Grooming Assessment/Training   [place] Level (Grooming) minimum assist (75% patient effort)   Toileting   [place] Level (Toileting) minimum assist (75% patient effort)   Comment, (Toileting) per clinical judgement   Clinical Impression   Criteria for Skilled Therapeutic Interventions Met (OT) Yes, treatment indicated   OT Diagnosis Decline function   OT Problem List-Impairments impacting ADL balance;cognition;mobility;vision   Assessment of Occupational Performance 5 or more  Performance Deficits   Identified Performance Deficits dressing, bathing, grooming, toileting, fx. mobility + IADL's   Planned Therapy Interventions (OT) ADL retraining;cognition;strengthening;home program guidelines;IADL retraining;transfer training;progressive activity/exercise   Clinical Decision Making Complexity (OT) low complexity   Anticipated Equipment Needs Upon Discharge (OT) raised toilet seat;shower chair;other (see comments)  (Grab bars)   Risk & Benefits of therapy have been explained evaluation/treatment results reviewed;care plan/treatment goals reviewed;risks/benefits reviewed;current/potential barriers reviewed;participants voiced agreement with care plan;participants included;patient  (Granddaughter and grandson)   OT Discharge Planning   OT Discharge Recommendation (DC Rec) Transitional Care Facility   OT Rationale for DC Rec Pt overall requiring SBA-Nicci to complete self-cares and functional mobility due to impairments noted above. Pt functioning below baseline and will benefit from continued skilled OT while IP and in TCU to maximize safety and independence. Pt has been staying with family for 3 weeks however family reports desire for pt to discharge to rehab prior to alone in apartment   Total Evaluation Time (Minutes)   Total Evaluation Time (Minutes) 20

## 2022-06-05 ENCOUNTER — APPOINTMENT (OUTPATIENT)
Dept: OCCUPATIONAL THERAPY | Facility: CLINIC | Age: 87
DRG: 025 | End: 2022-06-05
Attending: NEUROLOGICAL SURGERY
Payer: COMMERCIAL

## 2022-06-05 ENCOUNTER — APPOINTMENT (OUTPATIENT)
Dept: PHYSICAL THERAPY | Facility: CLINIC | Age: 87
DRG: 025 | End: 2022-06-05
Attending: NEUROLOGICAL SURGERY
Payer: COMMERCIAL

## 2022-06-05 LAB
GLUCOSE BLDC GLUCOMTR-MCNC: 114 MG/DL (ref 70–99)
GLUCOSE BLDC GLUCOMTR-MCNC: 120 MG/DL (ref 70–99)
GLUCOSE BLDC GLUCOMTR-MCNC: 143 MG/DL (ref 70–99)
GLUCOSE BLDC GLUCOMTR-MCNC: 155 MG/DL (ref 70–99)

## 2022-06-05 PROCEDURE — C9113 INJ PANTOPRAZOLE SODIUM, VIA: HCPCS | Performed by: PHYSICIAN ASSISTANT

## 2022-06-05 PROCEDURE — 97530 THERAPEUTIC ACTIVITIES: CPT | Mod: GO

## 2022-06-05 PROCEDURE — 97116 GAIT TRAINING THERAPY: CPT | Mod: GP

## 2022-06-05 PROCEDURE — 250N000011 HC RX IP 250 OP 636: Performed by: PHYSICIAN ASSISTANT

## 2022-06-05 PROCEDURE — 250N000013 HC RX MED GY IP 250 OP 250 PS 637: Performed by: HOSPITALIST

## 2022-06-05 PROCEDURE — 250N000013 HC RX MED GY IP 250 OP 250 PS 637: Performed by: PHYSICIAN ASSISTANT

## 2022-06-05 PROCEDURE — 97530 THERAPEUTIC ACTIVITIES: CPT | Mod: GP

## 2022-06-05 PROCEDURE — 97535 SELF CARE MNGMENT TRAINING: CPT | Mod: GO

## 2022-06-05 PROCEDURE — 999N000040 HC STATISTIC CONSULT NO CHARGE VASC ACCESS

## 2022-06-05 PROCEDURE — 999N000128 HC STATISTIC PERIPHERAL IV START W/O US GUIDANCE

## 2022-06-05 PROCEDURE — 97112 NEUROMUSCULAR REEDUCATION: CPT | Mod: GO

## 2022-06-05 PROCEDURE — 258N000003 HC RX IP 258 OP 636: Performed by: PHYSICIAN ASSISTANT

## 2022-06-05 PROCEDURE — 120N000013 HC R&B IMCU

## 2022-06-05 RX ADMIN — LEVETIRACETAM 750 MG: 100 INJECTION, SOLUTION INTRAVENOUS at 06:01

## 2022-06-05 RX ADMIN — LEVOTHYROXINE SODIUM 25 MCG: 25 TABLET ORAL at 06:06

## 2022-06-05 RX ADMIN — POLYETHYLENE GLYCOL 3350 17 G: 17 POWDER, FOR SOLUTION ORAL at 08:55

## 2022-06-05 RX ADMIN — SENNOSIDES AND DOCUSATE SODIUM 1 TABLET: 50; 8.6 TABLET ORAL at 08:54

## 2022-06-05 RX ADMIN — POLYETHYLENE GLYCOL 400 AND PROPYLENE GLYCOL 1 DROP: 4; 3 SOLUTION/ DROPS OPHTHALMIC at 20:05

## 2022-06-05 RX ADMIN — DEXAMETHASONE 4 MG: 4 TABLET ORAL at 20:05

## 2022-06-05 RX ADMIN — SENNOSIDES AND DOCUSATE SODIUM 1 TABLET: 50; 8.6 TABLET ORAL at 20:05

## 2022-06-05 RX ADMIN — PANTOPRAZOLE SODIUM 40 MG: 40 INJECTION, POWDER, FOR SOLUTION INTRAVENOUS at 08:54

## 2022-06-05 RX ADMIN — LEVETIRACETAM 750 MG: 100 INJECTION, SOLUTION INTRAVENOUS at 18:37

## 2022-06-05 RX ADMIN — DEXAMETHASONE 4 MG: 4 TABLET ORAL at 08:53

## 2022-06-05 RX ADMIN — POLYETHYLENE GLYCOL 400 AND PROPYLENE GLYCOL 1 DROP: 4; 3 SOLUTION/ DROPS OPHTHALMIC at 08:54

## 2022-06-05 RX ADMIN — ACETAMINOPHEN 975 MG: 325 TABLET ORAL at 05:58

## 2022-06-05 ASSESSMENT — ACTIVITIES OF DAILY LIVING (ADL)
ADLS_ACUITY_SCORE: 36
ADLS_ACUITY_SCORE: 32
ADLS_ACUITY_SCORE: 32
ADLS_ACUITY_SCORE: 35
ADLS_ACUITY_SCORE: 33
ADLS_ACUITY_SCORE: 33
ADLS_ACUITY_SCORE: 32
ADLS_ACUITY_SCORE: 35
ADLS_ACUITY_SCORE: 32

## 2022-06-05 NOTE — PROGRESS NOTES
[facility]    Neurosurgery  Daily Note    Assessment & Plan   Procedure(s):  left frontal craniotomy for tumor removal   3 Days Post-Op  POD3 s/p left frontal craniotomy for tumor removal with Dr. Rose. Patient denies headache, worsening vision, weakness. Incision c/d/i.      Narrative & Impression   EXAM: CT HEAD W/O CONTRAST  LOCATION: [facility]  DATE/TIME: 6/3/2022 5:32 AM     INDICATION: Craniotomy, post-op.  COMPARISON: 05/24/2022.  TECHNIQUE: Routine CT Head without IV contrast. Multiplanar reformats. Dose reduction techniques were used.     FINDINGS:  INTRACRANIAL CONTENTS: Since the prior study, there are new postoperative changes of anterior left frontal craniotomy and resection of previously demonstrated underlying extra-axial mass. Surgical defect within the anterior left frontal lobe demonstrates   a small amount of layering hemorrhage. Moderate pneumocephalus primarily over the frontal lobes and extending along the interhemispheric fissure anteriorly. Small amount of subdural fluid on the left. There is persistent mass effect in the left frontal   region and on the left lateral ventricle with persistent 5 mm of left to right shift of midline structures compared with 13 mm on the preoperative study. Underlying volume loss and presumed chronic small vessel ischemic changes.      VISUALIZED ORBITS/SINUSES/MASTOIDS: No intraorbital abnormality. No paranasal sinus mucosal disease. No middle ear or mastoid effusion.     BONES/SOFT TISSUES: Left frontal craniotomy.                                                                      IMPRESSION:  1.  Interval postoperative changes relating to resection of previously demonstrated left frontal extra-axial mass. There is a surgical defect extending into the left frontal lobe which contains a small amount of postoperative hemorrhage.  2.  Persistent but decreased mass effect in the left frontal region.  3.   Moderate pneumocephalus primarily over the anterior frontal lobes and extending along the interhemispheric fissure.           Plan:  -Advance activity as tolerated  -Continue supportive and symptomatic treatment  -Start or continue physical therapy  -Pain control measures  -Decadron taper in place  -Advance diet as tolerated  -Routine wound care. Incision open to air  -PT, ST, OT consults placed, appreciate involvement   -TCU pending placement   -Plans reviewed with Dr. Zamora who was in agreement with plans   -Page or call with questions    Paola Falcon PA-C  [facility] Neurosurgery  [address]  [address] [place] [id number]    Tel [phone]  Pager [phone]    Active Problems:    S/P craniotomy      Paola Falcon PA-C    Interval History   Doing well.  Continues to improve.  Pain is well-controlled.  No fevers.      Physical Exam   Temp: 97.6  F (36.4  C) Temp src: Oral BP: 129/61 Pulse: 71   Resp: 16 SpO2: 100 % O2 Device: None (Room air)    Vitals:    06/02/22 1202 06/03/22 0645   Weight: 115 lb (52.2 kg) 117 lb 8.1 oz (53.3 kg)     Vital Signs with Ranges  Temp:  [97.3  F (36.3  C)-97.8  F (36.6  C)] 97.6  F (36.4  C)  Pulse:  [71-83] 71  Resp:  [16-18] 16  BP: (126-159)/(58-80) 129/61  SpO2:  [98 %-100 %] 100 %  I/O last 3 completed shifts:  In: -   Out: 1700 [Urine:1700]    Awake, alert, eating breakfast  Incision c/d/i   JACOBSEN symmetrically   Patient notes speech significantly improved compared to pre op    Medications     sodium chloride Stopped (06/04/22 1503)        acetaminophen  975 mg Oral Q8H     dexamethasone  4 mg Oral BID    Followed by     [START ON 6/8/2022] dexamethasone  4 mg Oral Daily    Followed by     [START ON 6/12/2022] dexamethasone  2 mg Oral Daily    Followed by     [START ON 6/16/2022] dexamethasone  1 mg Oral Daily     insulin aspart  1-6 Units Subcutaneous 4x Daily AC & HS     levETIRAcetam  750 mg Intravenous Q12H      levothyroxine  25 mcg Oral Daily     pantoprazole (PROTONIX) IV  40 mg Intravenous Daily     polyethylene glycol  17 g Oral Daily     polyethylene glycol-propylene glycol PF  1 drop Both Eyes BID     senna-docusate  1 tablet Oral BID     sodium chloride (PF)  3 mL Intracatheter Q8H       Plans discussed with Dr. Zamora who was in agreement with plans    Paola MARQUIS [facility] Neurosurgery  [address] [place] [address]    Tel [phone]  Pager [phone]

## 2022-06-05 NOTE — PLAN OF CARE
Goal Outcome Evaluation:        No change, AOR X 4, has been up moving on the unit with therapy. Tylenol scheduled for pain- patient refused, deneis any pain or HAs;. Drsg to head incision removed per surgery. Incision intact with stable.   Regular diet, voiding appropriately.  Needs to decide what code status she wants, but when asked, she became teary eye and stated would like to discuss with family.   Stable VS & neuros.

## 2022-06-05 NOTE — PLAN OF CARE
Pt here post op left frontal lobe mass resection. A&Ox4. Neuros intact. VSS on RA. Regular diet, thin liquids. Takes pills whole with water. Scheduled tylenol given, pt reporting mild headache pain. Incision dressing WDL, marked and unchanged. Pt scoring green on the Aggression Stop Light Tool. Discharge plan pending, likely home with outpatient PT or TCU.

## 2022-06-05 NOTE — CONSULTS
Care Management Initial Consult    General Information  Assessment completed with: Patient, Family, pt's two sons  Type of CM/SW Visit: Initial Assessment    Primary Care Provider verified and updated as needed:     Readmission within the last 30 days:        Reason for Consult: discharge planning  Advance Care Planning:  ACD on chart.Sons Alberto   And Wiley are  Co-HCA for pt.       Communication Assessment  Patient's communication style: spoken language (English or Bilingual)    Hearing Difficulty or Deaf: yes   Wear Glasses or Blind: no    Cognitive  Cognitive/Neuro/Behavioral: WDL  Level of Consciousness: alert  Arousal Level: opens eyes spontaneously  Orientation: oriented x 4  Mood/Behavior: calm, cooperative  Best Language: 0 - No aphasia  Speech: slow, logical    Living Environment:   People in home: alone  Fady  Current living Arrangements: apartment      Able to return to prior arrangements:  Pt recently moved into an independent senior apartment at [facility] in December with her .   Pt's  passed away on 5/22/22 and she is living there alone now. Pt has a meal option available to her there but does not have any assistance in the apartment.     Family/Social Support:  Care provided by: child(becca)  Provides care for:    Marital Status:              Description of Support System: Supportive, Involved         Current Resources:   Patient receiving home care services:no       Community Resources:    Equipment currently used at home:  (Son's home has standard heigt toilet with vanity nex to it. Tub/shower with no grab bar or chair)  Supplies currently used at home:      Employment/Financial:  Employment Status:          Financial Concerns:   BC/BS Medicare Advantage       Lifestyle & Psychosocial Needs:  Social Determinants of Health     Tobacco Use: Medium Risk     Smoking Tobacco Use: Former Smoker     Smokeless Tobacco Use: Never Used   Alcohol Use: Not on file   Financial  Resource Strain: Low Risk      Difficulty of Paying Living Expenses: Not hard at all   Food Insecurity: No Food Insecurity     Worried About Running Out of Food in the Last Year: Never true     Ran Out of Food in the Last Year: Never true   Transportation Needs: No Transportation Needs     Lack of Transportation (Medical): No     Lack of Transportation (Non-Medical): No   Physical Activity: Not on file   Stress: Not on file   Social Connections: Not on file   Intimate Partner Violence: Not At Risk     Fear of Current or Ex-Partner: No     Emotionally Abused: No     Physically Abused: No     Sexually Abused: No   Depression: Not at risk     PHQ-2 Score: 0   Housing Stability: Not on file       Functional Status:  Prior to admission patient needed assistance: Pt had been living with her  until just recently. It is undetermined at this time if pt is able to return to independent living without additional supports. Pt is recommended to TCU at this time and in need now of 24x7 supervision and assistance.  Pt understands the recommendation for short term rehab and is agreeable.     Mental Health Status:        Chemical Dependency Status:            Values/Beliefs:  Spiritual, Cultural Beliefs, Amish Practices, Values that affect care:    Description of Beliefs that Will Affect Care: Kevin   Pt very recently            Additional Information:    MARGARETTE met with pt and sons and sent TCU referrals out per request.  Referral and discharge process reviewed.  MARGARETTE also reviewed possible options post TCU for supports.    TIGRE Riley  [facility]  Care Transitions  [phone]

## 2022-06-06 ENCOUNTER — APPOINTMENT (OUTPATIENT)
Dept: OCCUPATIONAL THERAPY | Facility: CLINIC | Age: 87
DRG: 025 | End: 2022-06-06
Attending: NEUROLOGICAL SURGERY
Payer: COMMERCIAL

## 2022-06-06 LAB
GLUCOSE BLDC GLUCOMTR-MCNC: 103 MG/DL (ref 70–99)
GLUCOSE BLDC GLUCOMTR-MCNC: 126 MG/DL (ref 70–99)
GLUCOSE BLDC GLUCOMTR-MCNC: 177 MG/DL (ref 70–99)

## 2022-06-06 PROCEDURE — 250N000013 HC RX MED GY IP 250 OP 250 PS 637: Performed by: PHYSICIAN ASSISTANT

## 2022-06-06 PROCEDURE — 97535 SELF CARE MNGMENT TRAINING: CPT | Mod: GO | Performed by: OCCUPATIONAL THERAPIST

## 2022-06-06 PROCEDURE — 250N000013 HC RX MED GY IP 250 OP 250 PS 637: Performed by: NEUROLOGICAL SURGERY

## 2022-06-06 PROCEDURE — 120N000013 HC R&B IMCU

## 2022-06-06 PROCEDURE — 258N000003 HC RX IP 258 OP 636: Performed by: PHYSICIAN ASSISTANT

## 2022-06-06 PROCEDURE — 250N000013 HC RX MED GY IP 250 OP 250 PS 637: Performed by: HOSPITALIST

## 2022-06-06 PROCEDURE — 250N000011 HC RX IP 250 OP 636: Performed by: PHYSICIAN ASSISTANT

## 2022-06-06 PROCEDURE — 97530 THERAPEUTIC ACTIVITIES: CPT | Mod: GO | Performed by: OCCUPATIONAL THERAPIST

## 2022-06-06 RX ORDER — PANTOPRAZOLE SODIUM 40 MG/1
40 TABLET, DELAYED RELEASE ORAL
Status: DISCONTINUED | OUTPATIENT
Start: 2022-06-06 | End: 2022-06-07 | Stop reason: HOSPADM

## 2022-06-06 RX ADMIN — SENNOSIDES AND DOCUSATE SODIUM 1 TABLET: 50; 8.6 TABLET ORAL at 09:38

## 2022-06-06 RX ADMIN — POLYETHYLENE GLYCOL 400 AND PROPYLENE GLYCOL 1 DROP: 4; 3 SOLUTION/ DROPS OPHTHALMIC at 20:28

## 2022-06-06 RX ADMIN — LEVETIRACETAM 750 MG: 100 INJECTION, SOLUTION INTRAVENOUS at 06:27

## 2022-06-06 RX ADMIN — POLYETHYLENE GLYCOL 3350 17 G: 17 POWDER, FOR SOLUTION ORAL at 09:39

## 2022-06-06 RX ADMIN — LEVOTHYROXINE SODIUM 25 MCG: 25 TABLET ORAL at 06:31

## 2022-06-06 RX ADMIN — LEVETIRACETAM 750 MG: 100 INJECTION, SOLUTION INTRAVENOUS at 18:47

## 2022-06-06 RX ADMIN — PANTOPRAZOLE SODIUM 40 MG: 40 TABLET, DELAYED RELEASE ORAL at 09:38

## 2022-06-06 RX ADMIN — SENNOSIDES AND DOCUSATE SODIUM 1 TABLET: 50; 8.6 TABLET ORAL at 20:28

## 2022-06-06 RX ADMIN — DEXAMETHASONE 4 MG: 4 TABLET ORAL at 09:38

## 2022-06-06 RX ADMIN — POLYETHYLENE GLYCOL 400 AND PROPYLENE GLYCOL 1 DROP: 4; 3 SOLUTION/ DROPS OPHTHALMIC at 09:38

## 2022-06-06 RX ADMIN — DEXAMETHASONE 4 MG: 4 TABLET ORAL at 20:28

## 2022-06-06 ASSESSMENT — ACTIVITIES OF DAILY LIVING (ADL)
ADLS_ACUITY_SCORE: 33

## 2022-06-06 NOTE — PLAN OF CARE
Pt here post op left frontal lobe mass resection. A&Ox4. Neuros intact. VSS on RA. Regular diet, thin liquids. Takes pills whole. Denies pain Incision YOLANDA and. Pt scoring green on the Aggression Stop Light Tool. Discharge plan pending, home with home care vs TCU placement.

## 2022-06-06 NOTE — PROGRESS NOTES
"[facility]    Neurosurgery Progress Note    Date of Service (when I saw the patient): 06/06/2022     Assessment & Plan     Procedure(s):  left frontal craniotomy for tumor removal   -4 Days Post-Op  Doing well. Reports mild headache and some blurry vision, unchanged. No paresthesias or overt weakness. Incision CDI.    Plan:  -Advance activity as tolerated  -Continue supportive and symptomatic treatment  -Start or continue physical therapy  -Pain control measures  -Decadron taper in place  -Advance diet as tolerated  -Routine wound care. Incision open to air  -PT, ST, OT consults placed, appreciate involvement   -TCU pending placement      Mariel Chou CNP  [facility] Neurosurgery  [facility]  [address]  [address]  [place] [address]    Tel [phone]  Pager [phone]      Interval History   Stable    Physical Exam   Temp: 97.7  F (36.5  C) Temp src: Oral BP: 132/69 Pulse: 67   Resp: 16 SpO2: 100 % O2 Device: None (Room air)    Vitals:    06/02/22 1202 06/03/22 0645   Weight: 52.2 kg (115 lb) 53.3 kg (117 lb 8.1 oz)     Vital Signs with Ranges  Temp:  [97.6  F (36.4  C)-98  F (36.7  C)] 97.7  F (36.5  C)  Pulse:  [67-76] 67  Resp:  [16-18] 16  BP: (129-143)/(61-72) 132/69  SpO2:  [97 %-100 %] 100 %  No intake/output data recorded.     , Blood pressure 132/69, pulse 67, temperature 97.7  F (36.5  C), temperature source Oral, resp. rate 16, height 1.549 m (5' 1\"), weight 53.3 kg (117 lb 8.1 oz), SpO2 100 %, not currently breastfeeding.  117 lbs 8.08 oz  HEENT:  Normocephalic.  PERRLA.  EOM s intact.    Heart:  No peripheral edema  Lungs:  No SOB  Skin:  Warm and dry, good capillary refill. Incision with staples CDI.  Extremities:  Good radial and dorsalis pedis pulses bilaterally, no edema, cyanosis or clubbing.    NEUROLOGICAL EXAMINATION:   Mental status:  Alert and following commands.  Motor:  JACOBSEN equally    Medications     sodium chloride " Stopped (06/04/22 1503)       dexamethasone  4 mg Oral BID    Followed by     [START ON 6/8/2022] dexamethasone  4 mg Oral Daily    Followed by     [START ON 6/12/2022] dexamethasone  2 mg Oral Daily    Followed by     [START ON 6/16/2022] dexamethasone  1 mg Oral Daily     insulin aspart  1-6 Units Subcutaneous 4x Daily AC & HS     levETIRAcetam  750 mg Intravenous Q12H     levothyroxine  25 mcg Oral Daily     pantoprazole  40 mg Oral QAM AC     polyethylene glycol  17 g Oral Daily     polyethylene glycol-propylene glycol PF  1 drop Both Eyes BID     senna-docusate  1 tablet Oral BID     sodium chloride (PF)  3 mL Intracatheter Q8H       Data     CBC RESULTS:   Recent Labs   Lab Test 06/03/22  1631   WBC 16.9*   RBC 3.93   HGB 10.6*   HCT 33.0*   MCV 84   MCH 27.0   MCHC 32.1   RDW 15.9*        Basic Metabolic Panel:  Lab Results   Component Value Date     06/01/2022     02/01/2021      Lab Results   Component Value Date    POTASSIUM 3.9 06/01/2022    POTASSIUM 4.9 02/01/2021     Lab Results   Component Value Date    CHLORIDE 103 06/01/2022    CHLORIDE 106 02/01/2021     Lab Results   Component Value Date    IMAN 8.9 06/01/2022    IMAN 9.5 02/01/2021     Lab Results   Component Value Date    CO2 30 06/01/2022    CO2 27 02/01/2021     Lab Results   Component Value Date    BUN 30 06/01/2022    BUN 16 02/01/2021     Lab Results   Component Value Date    CR 0.62 06/01/2022    CR 0.76 02/01/2021     Lab Results   Component Value Date     06/06/2022    GLC 83 06/01/2022     02/01/2021     INR:  No results found for: INR

## 2022-06-06 NOTE — PROGRESS NOTES
Care Management Follow Up    Length of Stay (days): 4    Expected Discharge Date: 06/06/2022     Concerns to be Addressed:       Patient plan of care discussed at interdisciplinary rounds: Yes    Anticipated Discharge Disposition:  TCU   Anticipated Discharge Services:    Anticipated Discharge DME:      Patient/family educated on Medicare website which has current facility and service quality ratings:    Education Provided on the Discharge Plan:    Patient/Family in Agreement with the Plan:      Referrals Placed by CM/SW:    Private pay costs discussed:     Additional Information:  [facility] admissions coordinator Lory requesting to discuss two items.   1. What is the anticipated discharge from TCU.  Writer spoke with son Karlos who said the family goal is for patient to return to her skilled nursing apartment after TCU and they will increase services as needed.   2.  Lory also wanted to know that if the pathology returns malignant that patient will not have any cancer treatment while in TCU.  Writer explained this to Karlos and he accepts this.    Writer updated Lory and she can offer patient admission tomorrow if we obtain [facility] authorization.  Son Karlos said he has not heard any information from the physicians as to when discharge will be.    Writer has updated Care Coordinator RN regarding above.       AVIS Fuller

## 2022-06-06 NOTE — PROGRESS NOTES
A/O x 4, vss, a-febrile, denies pain, up  With A1 GB and wallker, ambulated in the hallway, tolerated well, tolerating regular diet, s/p left frontal craniotomy, pod # 3, dressing off, staples intact, neuros are intact, voiding adequately in the bathroom, discharge to TCU vs home pending.

## 2022-06-06 NOTE — PROGRESS NOTES
Pt here POD #4 from crani with Dr. Rose. A&O X4- forgetful at times. Pt is also [place]. Neuros intact but does have some blurry vision at baseline. VSS. Regular diet, thin liquids. RN placed Pt on room service with assistance per family request. Family would also like tray set up along with cutting up food. Pt is still able to feed herself. Takes pills whole with water. Up with Ax1 GB/W. Denies pain. Incision on scalp has staples that are open to air. Pt scoring green on the Aggression Stop Light Tool. Plan for discharge to TCU tomorrow, family will transport.

## 2022-06-07 ENCOUNTER — APPOINTMENT (OUTPATIENT)
Dept: PHYSICAL THERAPY | Facility: CLINIC | Age: 87
DRG: 025 | End: 2022-06-07
Attending: NEUROLOGICAL SURGERY
Payer: COMMERCIAL

## 2022-06-07 VITALS
WEIGHT: 117.5 LBS | HEIGHT: 61 IN | OXYGEN SATURATION: 100 % | TEMPERATURE: 97.8 F | SYSTOLIC BLOOD PRESSURE: 118 MMHG | RESPIRATION RATE: 16 BRPM | HEART RATE: 74 BPM | DIASTOLIC BLOOD PRESSURE: 58 MMHG | BODY MASS INDEX: 22.19 KG/M2

## 2022-06-07 LAB
GLUCOSE BLDC GLUCOMTR-MCNC: 120 MG/DL (ref 70–99)
GLUCOSE BLDC GLUCOMTR-MCNC: 120 MG/DL (ref 70–99)
GLUCOSE BLDC GLUCOMTR-MCNC: 142 MG/DL (ref 70–99)
PATH REPORT.COMMENTS IMP SPEC: ABNORMAL
PATH REPORT.COMMENTS IMP SPEC: YES
PATH REPORT.FINAL DX SPEC: ABNORMAL
PATH REPORT.GROSS SPEC: ABNORMAL
PATH REPORT.INTRAOP OBS SPEC DOC: ABNORMAL
PATH REPORT.MICROSCOPIC SPEC OTHER STN: ABNORMAL
PATH REPORT.RELEVANT HX SPEC: ABNORMAL
PHOTO IMAGE: ABNORMAL

## 2022-06-07 PROCEDURE — 88307 TISSUE EXAM BY PATHOLOGIST: CPT | Mod: 26 | Performed by: PATHOLOGY

## 2022-06-07 PROCEDURE — 250N000013 HC RX MED GY IP 250 OP 250 PS 637: Performed by: PHYSICIAN ASSISTANT

## 2022-06-07 PROCEDURE — 250N000011 HC RX IP 250 OP 636: Performed by: PHYSICIAN ASSISTANT

## 2022-06-07 PROCEDURE — 258N000003 HC RX IP 258 OP 636: Performed by: PHYSICIAN ASSISTANT

## 2022-06-07 PROCEDURE — 97116 GAIT TRAINING THERAPY: CPT | Mod: GP

## 2022-06-07 PROCEDURE — 88331 PATH CONSLTJ SURG 1 BLK 1SPC: CPT | Mod: 26 | Performed by: PATHOLOGY

## 2022-06-07 PROCEDURE — 250N000013 HC RX MED GY IP 250 OP 250 PS 637: Performed by: NEUROLOGICAL SURGERY

## 2022-06-07 PROCEDURE — 250N000013 HC RX MED GY IP 250 OP 250 PS 637: Performed by: HOSPITALIST

## 2022-06-07 RX ORDER — DEXAMETHASONE 2 MG/1
2 TABLET ORAL DAILY
Qty: 4 TABLET | Refills: 0 | Status: SHIPPED | OUTPATIENT
Start: 2022-06-12 | End: 2022-06-22

## 2022-06-07 RX ORDER — LEVETIRACETAM 750 MG/1
750 TABLET ORAL 2 TIMES DAILY
Status: DISCONTINUED | OUTPATIENT
Start: 2022-06-07 | End: 2022-06-07 | Stop reason: HOSPADM

## 2022-06-07 RX ORDER — DEXAMETHASONE 1 MG
1 TABLET ORAL DAILY
Qty: 2 TABLET | Refills: 0 | Status: SHIPPED | OUTPATIENT
Start: 2022-06-16 | End: 2022-06-22

## 2022-06-07 RX ORDER — OXYCODONE HYDROCHLORIDE 5 MG/1
2.5-5 TABLET ORAL EVERY 4 HOURS PRN
Qty: 20 TABLET | Refills: 0 | Status: SHIPPED | OUTPATIENT
Start: 2022-06-07 | End: 2022-06-22

## 2022-06-07 RX ORDER — DEXAMETHASONE 4 MG/1
4 TABLET ORAL DAILY
Qty: 4 TABLET | Refills: 0 | Status: SHIPPED | OUTPATIENT
Start: 2022-06-08 | End: 2022-06-22

## 2022-06-07 RX ORDER — LEVETIRACETAM 750 MG/1
750 TABLET ORAL 2 TIMES DAILY
Qty: 60 TABLET | Refills: 1 | Status: SHIPPED | OUTPATIENT
Start: 2022-06-07 | End: 2023-02-01

## 2022-06-07 RX ORDER — AMOXICILLIN 250 MG
1 CAPSULE ORAL DAILY PRN
Qty: 30 TABLET | Refills: 1 | Status: SHIPPED | OUTPATIENT
Start: 2022-06-07 | End: 2023-02-01

## 2022-06-07 RX ORDER — OXYCODONE HYDROCHLORIDE 5 MG/1
2.5-5 TABLET ORAL EVERY 4 HOURS PRN
Qty: 20 TABLET | Refills: 0 | Status: SHIPPED | OUTPATIENT
Start: 2022-06-07 | End: 2022-06-07

## 2022-06-07 RX ADMIN — LEVOTHYROXINE SODIUM 25 MCG: 25 TABLET ORAL at 06:26

## 2022-06-07 RX ADMIN — POLYETHYLENE GLYCOL 400 AND PROPYLENE GLYCOL 1 DROP: 4; 3 SOLUTION/ DROPS OPHTHALMIC at 09:45

## 2022-06-07 RX ADMIN — LEVETIRACETAM 750 MG: 100 INJECTION, SOLUTION INTRAVENOUS at 06:25

## 2022-06-07 RX ADMIN — POLYETHYLENE GLYCOL 3350 17 G: 17 POWDER, FOR SOLUTION ORAL at 09:44

## 2022-06-07 RX ADMIN — SENNOSIDES AND DOCUSATE SODIUM 1 TABLET: 50; 8.6 TABLET ORAL at 09:45

## 2022-06-07 RX ADMIN — DEXAMETHASONE 4 MG: 4 TABLET ORAL at 09:45

## 2022-06-07 RX ADMIN — PANTOPRAZOLE SODIUM 40 MG: 40 TABLET, DELAYED RELEASE ORAL at 06:26

## 2022-06-07 ASSESSMENT — ACTIVITIES OF DAILY LIVING (ADL)
ADLS_ACUITY_SCORE: 33

## 2022-06-07 NOTE — PROGRESS NOTES
Pt here with POD 5 from crani. A&O x4, forgetful. Neuros intact. Blurry vision at baseline, [place]. VSS on RA. Head incision, YOLANDA with staples. Regular diet, thin liquids. Takes pills whole. Up with Ax1 GB/W. Denies pain. Pt scoring green on the Aggression Stop Light Tool. Discharge to TCU today.

## 2022-06-07 NOTE — PROGRESS NOTES
Care Management Follow Up    Length of Stay (days): 5    Expected Discharge Date: 06/07/2022     Concerns to be Addressed:       Patient plan of care discussed at interdisciplinary rounds: Yes    Anticipated Discharge Disposition: Home     Anticipated Discharge Services:    Anticipated Discharge DME:      Patient/family educated on Medicare website which has current facility and service quality ratings:    Education Provided on the Discharge Plan:    Patient/Family in Agreement with the Plan:      Referrals Placed by CM/SW:    Private pay costs discussed: Not applicable    Additional Information:  Writer received voicemail from Lory at [facility] asking if Auth is received and if she would be able to admit today. Writer called back and left a VM for Lory in admissions stating that we are still working on obtaining [facility] Medicare auth but once it is received patient should be ready to discharge.     Call received back from Lory at [facility] stating that they can take patient anytime today but would like to know sooner rather then later.     Addendum 1045: Auth received, writer spoke with Lory at [facility] and she asked that patient arrive before 1600. Writer spoke with bedside nurse who believed that son wanted to transport. Writer called and left VM for Clarence Lopez stating insurance auth is received and [facility] can accept patient today and wanted to discuss transportation options. Writer asked for a call back. PAS faxed to TCU.     Addendum: Writer spoke with patient and son at bedside. Son confirmed he will transport patient. Writer expressed that they need to go to the TCU upon discharge. Son expressed he will transport around 1138-8516. Writer spoke with admissions and informed Lory of ride time, she is aware and agreeable.       Vilma Smith, THEA, LGSW   Social Work   [facility]

## 2022-06-07 NOTE — PROGRESS NOTES
[facility]    Neurosurgery  Daily Post-Op Note    Assessment & Plan   Procedure(s):  left frontal craniotomy for tumor removal   5 Days Post-Op  No immediate surgical complications identified.  Tolerating physical therapy and rehabilitation well.  States she feels weak, but improving.     Plan:  -Advance activity as tolerated  -Continue supportive and symptomatic treatment  -Pain control measures  -home today. Orders placed.     Charlie Barber PA-C    Interval History   Stable.  Doing well.  Improving slowly.  Pain is reasonably controlled.  No fevers.     Physical Exam   Temp: 97.8  F (36.6  C) Temp src: Oral BP: 129/68 Pulse: 70   Resp: 16 SpO2: 100 % O2 Device: None (Room air)    Vitals:    06/02/22 1202 06/03/22 0645   Weight: 52.2 kg (115 lb) 53.3 kg (117 lb 8.1 oz)     Vital Signs with Ranges  Temp:  [97.4  F (36.3  C)-98.1  F (36.7  C)] 97.8  F (36.6  C)  Pulse:  [67-84] 70  Resp:  [16] 16  BP: (117-136)/(57-68) 129/68  SpO2:  [97 %-100 %] 100 %  I/O last 3 completed shifts:  In: 240 [P.O.:240]  Out: -     Alert and oriented.  Moves all extremities equally.  Reflexes symmetrical.     Incision: CDI      Medications     sodium chloride Stopped (06/04/22 1503)        dexamethasone  4 mg Oral BID    Followed by     [START ON 6/8/2022] dexamethasone  4 mg Oral Daily    Followed by     [START ON 6/12/2022] dexamethasone  2 mg Oral Daily    Followed by     [START ON 6/16/2022] dexamethasone  1 mg Oral Daily     insulin aspart  1-6 Units Subcutaneous 4x Daily AC & HS     levETIRAcetam  750 mg Oral BID     levothyroxine  25 mcg Oral Daily     pantoprazole  40 mg Oral QAM AC     polyethylene glycol  17 g Oral Daily     polyethylene glycol-propylene glycol PF  1 drop Both Eyes BID     senna-docusate  1 tablet Oral BID     sodium chloride (PF)  3 mL Intracatheter Q8H           Charlie Barber PA-C  [facility] Neurosurgery  [address]  [address]  [place] [address]    Tel [phone]  Pager [phone]

## 2022-06-07 NOTE — PLAN OF CARE
Occupational Therapy Discharge Summary    Reason for therapy discharge:    Discharged to transitional care facility.    Progress towards therapy goal(s). See goals on Care Plan in [facility] electronic health record for goal details.  Goals partially met.  Barriers to achieving goals:   discharge from facility.    Therapy recommendation(s):    Continued therapy is recommended.  Rationale/Recommendations:  to increase indep with functional mobility and self cares.

## 2022-06-07 NOTE — PLAN OF CARE
Physical Therapy Discharge Summary    Reason for therapy discharge:    Discharged to transitional care facility.    Progress towards therapy goal(s). See goals on Care Plan in [facility] electronic health record for goal details.  Goals partially met.  Barriers to achieving goals:   discharge from facility.    Therapy recommendation(s):    Continued therapy is recommended.  Rationale/Recommendations:  Recommend to progress functional IND in TCU setting. Pt is CGA-Nicci for gait w/o device; unstable with dynamic balance challenges.

## 2022-06-08 ENCOUNTER — LAB REQUISITION (OUTPATIENT)
Dept: LAB | Facility: CLINIC | Age: 87
End: 2022-06-08
Payer: COMMERCIAL

## 2022-06-08 ENCOUNTER — TELEPHONE (OUTPATIENT)
Dept: NEUROSURGERY | Facility: CLINIC | Age: 87
End: 2022-06-08
Payer: COMMERCIAL

## 2022-06-08 DIAGNOSIS — J12.81 PNEUMONIA DUE TO SARS-ASSOCIATED CORONAVIRUS: ICD-10-CM

## 2022-06-08 DIAGNOSIS — Z71.89 OTHER SPECIFIED COUNSELING: ICD-10-CM

## 2022-06-08 PROCEDURE — U0005 INFEC AGEN DETEC AMPLI PROBE: HCPCS | Performed by: NURSE PRACTITIONER

## 2022-06-08 NOTE — TELEPHONE ENCOUNTER
Patient's daughter in law Madelin would like a call back regarding pathology results. She is asking if they are in yet. Please call her back at [phone]. Thank you~

## 2022-06-09 ENCOUNTER — PATIENT OUTREACH (OUTPATIENT)
Dept: NURSING | Facility: CLINIC | Age: 87
End: 2022-06-09
Attending: FAMILY MEDICINE
Payer: COMMERCIAL

## 2022-06-09 DIAGNOSIS — Z71.89 OTHER SPECIFIED COUNSELING: ICD-10-CM

## 2022-06-09 LAB — SARS-COV-2 RNA RESP QL NAA+PROBE: POSITIVE

## 2022-06-09 NOTE — LETTER
[facility]   To:             Please give to facility    From:   Taylor Chino RN  Care Coordinator   [facility]   P: [phone] [email or web address]   Patient Name:  Indy Reyes   YOB: 1931     Admit date: 6/7/22      *Information Needed:  Please contact me when the patient will discharge (or if they will move to long term care)- include the discharge date, disposition, and main diagnosis   - If the patient is discharged with home care services, please provide the name of the agency    Also- Please inform me if a care conference is being held.   Phone, Fax or Email with information                   Thank you

## 2022-06-09 NOTE — PROGRESS NOTES
Clinic Care Coordination Contact  Care Coordination Transition Communication         Clinical Data: Patient was hospitalized at [facility] from 6/2/22 to 6/7/22 with diagnosis of S/P Craniotomy brain mass.     Transition to Facility:              Facility Name: [facility]               Contact name and phone number/fax: [phone]    Plan: RN/SW Care Coordinator will await notification from facility staff informing RN/SW Care Coordinator of patient's discharge plans/needs. RN/SW Care Coordinator will review chart and outreach to facility staff every 4 weeks and as needed.     Taylor Chino RN Care Coordinator  [facility]  Email: [email or web address]  Phone: [phone]

## 2022-06-22 ENCOUNTER — OFFICE VISIT (OUTPATIENT)
Dept: NEUROSURGERY | Facility: CLINIC | Age: 87
End: 2022-06-22
Payer: COMMERCIAL

## 2022-06-22 VITALS — SYSTOLIC BLOOD PRESSURE: 117 MMHG | HEART RATE: 83 BPM | DIASTOLIC BLOOD PRESSURE: 69 MMHG | OXYGEN SATURATION: 97 %

## 2022-06-22 DIAGNOSIS — Z98.890 S/P CRANIOTOMY: Primary | ICD-10-CM

## 2022-06-22 PROCEDURE — 99024 POSTOP FOLLOW-UP VISIT: CPT | Performed by: NURSE PRACTITIONER

## 2022-06-22 ASSESSMENT — PAIN SCALES - GENERAL: PAINLEVEL: NO PAIN (0)

## 2022-06-22 NOTE — PATIENT INSTRUCTIONS
- Follow up 4 weeks as scheduled.  - Avoid immersing your wound in water.  - Call the clinic at [phone] for increasing redness, swelling or pus draining from the incision, increased pain or any other questions and concerns.  - Go to ER with any seizure activity, mental status change (increasing confusion), difficulty with speech or increasing or acute weakness

## 2022-06-22 NOTE — NURSING NOTE
"Indy Reyes is a 89 year old female who presents for:  Chief Complaint   Patient presents with     Surgical Followup     2 wk incision ck   DOS 6/2/22        Initial Vitals:  /69   Pulse 83   SpO2 97%  Estimated body mass index is 22.2 kg/m  as calculated from the following:    Height as of 6/2/22: 5' 1\" (1.549 m).    Weight as of 6/3/22: 117 lb 8.1 oz (53.3 kg).. There is no height or weight on file to calculate BSA. BP completed using cuff size: regular  No Pain (0)      Azam Sampson MA    "

## 2022-06-22 NOTE — PROGRESS NOTES
[facility] Neurosurgery  Neurosurgery Followup:    HPI: 19 days s/p left frontal craniotomy for tumor removal. Doing well. Denies headache, dizziness, nausea/vomiting, and vision changes. No weakness. Incision intact with staples. No edema, erythema, or drainage.    Medical, surgical, family, and social history unchanged since prior exam.    Exam:  Constitutional:  Alert, well nourished, NAD.  HEENT: Normocephalic, atraumatic.   Pulm:  Without shortness of breath or audible adventitious respiratory sounds.  CV:  No pitting edema of BLE.  Brisk capillary refill, CMS intact.    Vital Signs:  /69   Pulse 83   SpO2 97%     Neurological:  Awake  Alert  Oriented x 3  Speech clear  Cranial nerves II - XII intact  PERRL  EOMI  Face symmetric  Tongue midline  Motor exam  JACOBSEN equally  Sensation intact    Gait: Able to stand from a seated position. Normal non-antalgic, non-myelopathic gait.  Able to heel/toe walk without loss of balance    Incisions Healing nicely. Staples removed without incident.    Imaging: Nothing new to review.    A/P: s/p craniotomy. Continue current cares. Follow up as scheduled. She verbalized understanding and agreement.    Patient Instructions   - Follow up 4 weeks as scheduled.  - Avoid immersing your wound in water.  - Call the clinic at [phone] for increasing redness, swelling or pus draining from the incision, increased pain or any other questions and concerns.  - Go to ER with any seizure activity, mental status change (increasing confusion), difficulty with speech or increasing or acute weakness     Mariel Chou, DAVID  [facility] Neurosurgery  [address] [place] [id number]  Tel [phone]  Fax [phone]

## 2022-06-22 NOTE — LETTER
6/22/2022         RE: Indy Reyes  [address]  [place] [address]        Dear Colleague,    Thank you for referring your patient, Indy Reyes, to the [facility] NEUROLOGY CLINICS [facility]. Please see a copy of my visit note below.    [facility] Neurosurgery  Neurosurgery Followup:    HPI: 19 days s/p left frontal craniotomy for tumor removal. Doing well. Denies headache, dizziness, nausea/vomiting, and vision changes. No weakness. Incision intact with staples. No edema, erythema, or drainage.    Medical, surgical, family, and social history unchanged since prior exam.    Exam:  Constitutional:  Alert, well nourished, NAD.  HEENT: Normocephalic, atraumatic.   Pulm:  Without shortness of breath or audible adventitious respiratory sounds.  CV:  No pitting edema of BLE.  Brisk capillary refill, CMS intact.    Vital Signs:  /69   Pulse 83   SpO2 97%     Neurological:  Awake  Alert  Oriented x 3  Speech clear  Cranial nerves II - XII intact  PERRL  EOMI  Face symmetric  Tongue midline  Motor exam  JACOBSEN equally  Sensation intact    Gait: Able to stand from a seated position. Normal non-antalgic, non-myelopathic gait.  Able to heel/toe walk without loss of balance    Incisions Healing nicely. Staples removed without incident.    Imaging: Nothing new to review.    A/P: s/p craniotomy. Continue current cares. Follow up as scheduled. She verbalized understanding and agreement.    Patient Instructions   - Follow up 4 weeks as scheduled.  - Avoid immersing your wound in water.  - Call the clinic at [phone] for increasing redness, swelling or pus draining from the incision, increased pain or any other questions and concerns.  - Go to ER with any seizure activity, mental status change (increasing confusion), difficulty with speech or increasing or acute weakness     Mariel Chou, DAVID  [facility] Neurosurgery  [address] [place] [address]  [id number]  Tel [phone]  Fax [phone]          Again, thank you for allowing me to participate in the care of your patient.        Sincerely,        Mariel Chou NP

## 2022-06-23 NOTE — DISCHARGE SUMMARY
[facility]    Discharge Summary  Neurosurgery    Date of Admission:  6/2/2022  Date of Discharge:  6/7/2022  2:28 PM  Discharging Provider: Charlie Barber PA-C  Date of Service (when I saw the patient): 6/7/22    Discharge Diagnoses   Active Problems:    S/P craniotomy      History of Present Illness   Indy Reyes is an 89 year old female who presented for elective craniotomy for tumor resection with Dr. Rose.    Hospital Course   Indy Reyes was admitted on 6/2/2022.  The following problems were addressed during her hospitalization:    Active Problems:    S/P craniotomy    Assessment: She did well post operatively, and was mobilized with PT/OT.    Plan: Discharged to rehab in good condition, routine post op follow up expected.         I have discussed the following assessment and plan with Dr. Rose, who is in agreement with initial plan and will follow up with further consultation recommendations.      Charlie Barber PA-C  [facility] Neurosurgery  [place] [address]    Tel [phone]  Pager [phone]        Significant Results and Procedures       Pending Results   These results will be followed up by   Unresulted Labs Ordered in the Past 30 Days of this Admission     No orders found from 5/3/2022 to 6/3/2022.          Code Status   Full Code    Primary Care Physician   Batool Kulkarni    Physical Exam                      Vitals:    06/02/22 1202 06/03/22 0645   Weight: 52.2 kg (115 lb) 53.3 kg (117 lb 8.1 oz)     Vital Signs with Ranges     No intake/output data recorded.    Constitutional: Awake, alert, cooperative, no apparent distress, and appears stated age.  Eyes: Lids and lashes normal, pupils equal, round and reactive to light, extra ocular muscles intact  ENT: Normocephalic, without obvious abnormality, atraumatic  Respiratory: No increased work of breathing  Skin: No bruising or  bleeding, normal skin color, texture, turgor, no redness, warmth, or swelling.  Incision with staples intact, minimal drainage, open to air.  Musculoskeletal: There is no redness, warmth, or swelling of the joints.  Full range of motion noted.  Motor strength is 5 out of 5 all extremities bilaterally.  Tone is normal.  Neurologic: Awake, alert, oriented to name, place and time.  Cranial nerves II-XII are grossly intact.  Motor is 5 out of 5 bilaterally.     Neuropsychiatric: Calm, normal eye contact, alert, normal affect, oriented to self, place, time and situation, memory for past and recent events intact and thought process normal.       Time Spent on this Encounter   I, Charlie Barber PA-C, personally saw the patient today and spent less than or equal to 30 minutes discharging this patient.    Discharge Disposition   Discharged to rehabilitation facility  Condition at discharge: Good    Consultations This Hospital Stay   HOSPITALIST IP CONSULT  SPEECH LANGUAGE PATH ADULT IP CONSULT  PHYSICAL THERAPY ADULT IP CONSULT  OCCUPATIONAL THERAPY ADULT IP CONSULT  CARE MANAGEMENT / SOCIAL WORK IP CONSULT  VASCULAR ACCESS ADULT IP CONSULT    Discharge Orders      Reason for your hospital stay    Brain tumor resection     Activity    Your activity upon discharge: activity as tolerated, no lifting more than 10 lbs.     Follow-up and recommended labs and tests     2 week follow-up for incision check/staple removal     Wound care and dressings    Instructions to care for your wound at home: may get incision wet in shower but do not soak or scrub.     Discharge Instructions    Discharge instructions:  No lifting of more than 10 pounds until follow up visit.  Ok to shampoo hair, shower or bathe, but, do not scrub or submerge incision under water until evaluated post operatively in clinic.    Ok to walk as tolerated, avoid bedrest.    No contact sports until after follow up visit  No high impact activities such as;  running/jogging, snowmobile or 4 barnes riding or any other recreational vehicles    Call my office at [phone] for increasing redness, swelling or pus draining from the incision, increased pain or any other questions and concerns.    Go to ER with any seizure activity, mental status change (increasing confusion), difficulty with speech or increasing or acute weakness     Diet    Follow this diet upon discharge: Orders Placed This Encounter      Room Service      Regular Diet Adult     Discharge Medications   Discharge Medication List as of 6/7/2022  2:13 PM      START taking these medications    Details   levETIRAcetam (KEPPRA) 750 MG tablet Take 1 tablet (750 mg) by mouth 2 times daily, Disp-60 tablet, R-1, E-Prescribe      senna-docusate (SENOKOT-S/PERICOLACE) 8.6-50 MG tablet Take 1 tablet by mouth daily as needed for constipation, Disp-30 tablet, R-1, E-Prescribe         CONTINUE these medications which have CHANGED    Details   !! dexamethasone (DECADRON) 1 MG tablet Take 1 tablet (1 mg) by mouth daily, Disp-2 tablet, R-0, E-Prescribe      !! dexamethasone (DECADRON) 2 MG tablet Take 1 tablet (2 mg) by mouth daily, Disp-4 tablet, R-0, E-Prescribe      !! dexamethasone (DECADRON) 4 MG tablet Take 1 tablet (4 mg) by mouth daily, Disp-4 tablet, R-0, E-Prescribe      oxyCODONE (ROXICODONE) 5 MG tablet Take 0.5-1 tablets (2.5-5 mg) by mouth every 4 hours as needed for severe pain ((pain rating 7-10)), Disp-20 tablet, R-0, Local Print       !! - Potential duplicate medications found. Please discuss with provider.      CONTINUE these medications which have NOT CHANGED    Details   Boswellia-Glucosamine-Vit D (OSTEO BI-FLEX ONE PER DAY) TABS Take 2 tablets by mouth daily, No Print Out      cholecalciferol (VITAMIN D3) 25 mcg (1000 units) capsule Take 1 capsule (25 mcg) by mouth daily, No Print Out      levothyroxine (SYNTHROID/LEVOTHROID) 25 MCG tablet Take 1 tablet (25 mcg) by mouth daily, Disp-90 tablet, R-1,  E-Prescribe      Multiple Vitamins-Minerals (EYE VITAMINS) CAPS Take 1 tablet by mouth daily Visiclear, Complete, or Preservision, No Print Out      propylene glycol (SYSTANE COMPLETE) 0.6 % SOLN ophthalmic solution Place 1 drop into both eyes 2 times daily, Historical      sodium chloride (WAYLON 128) 5 % ophthalmic ointment 1 Application At BedtimeHistorical           Allergies   Allergies   Allergen Reactions     Tetracycline Rash

## 2022-06-27 ENCOUNTER — TRANSFERRED RECORDS (OUTPATIENT)
Dept: INTERNAL MEDICINE | Facility: CLINIC | Age: 87
End: 2022-06-27

## 2022-06-29 ENCOUNTER — PATIENT OUTREACH (OUTPATIENT)
Dept: CARE COORDINATION | Facility: CLINIC | Age: 87
End: 2022-06-29

## 2022-06-29 NOTE — LETTER
[facility] CARE COORDINATION  [address]  [facility] [address]    June 30, 2022    Indy Reyes  [address]   [place] [address]      Dear Indy,    I am a clinic care coordinator who works with Batool Kulkarni MD with the [facility]. I wanted to introduce myself and provide you with my contact information for you to be able to call me with any questions or concerns. Below is a description of clinic care coordination and how I can further assist you.       The clinic care coordination team is made up of a registered nurse, , financial resource worker and community health worker who understand the health care system. The goal of clinic care coordination is to help you manage your health and improve access to the health care system. Our team works alongside your provider to assist you in determining your health and social needs. We can help you obtain health care and community resources, providing you with necessary information and education. We can work with you through any barriers and develop a care plan that helps coordinate and strengthen the communication between you and your care team.    Please feel free to contact me with any questions or concerns regarding care coordination and what we can offer.      We are focused on providing you with the highest-quality healthcare experience possible.    Sincerely,     Taylor Chino RN Care Coordinator  [facility]  Email: [email or web address]  Phone: [phone]

## 2022-06-29 NOTE — PROGRESS NOTES
Clinic Care Coordination Contact  [facility]/Voicemail       Clinical Data: Care Coordinator Outreach  Outreach attempted x 1.  Left message on patient's daughter in law's voicemail with call back information and requested return call.  Plan: RN Care Coordinator will try to reach patient again in 1-2 business days.    Roxanna Luke RN Care Coordination   [facility]  Email: [email or web address]  Phone: [phone]

## 2022-06-30 ENCOUNTER — TELEPHONE (OUTPATIENT)
Dept: NEUROSURGERY | Facility: CLINIC | Age: 87
End: 2022-06-30

## 2022-06-30 NOTE — TELEPHONE ENCOUNTER
Spoke with JEANNE Starr- she will connect with patient's son to determine when best to reschedule appointment due to provider not being in the clinic.

## 2022-06-30 NOTE — PROGRESS NOTES
Clinic Care Coordination Contact  [facility]/Voicemail       Clinical Data: Care Coordinator Outreach  Outreach attempted x 2.  Left message on patient's voicemail with call back information and requested return call.  Plan: Care Coordinator will send care coordination introduction letter with care coordinator contact information and explanation of care coordination services via [employer]. Care Coordinator will do no further outreaches at this time.    Taylor Chino RN Care Coordinator  [facility]  Email: [email or web address]  Phone: [phone]

## 2022-07-01 ENCOUNTER — TELEPHONE (OUTPATIENT)
Dept: FAMILY MEDICINE | Facility: CLINIC | Age: 87
End: 2022-07-01

## 2022-07-01 NOTE — TELEPHONE ENCOUNTER
Reason for Call: Request for an order or referral:    Order or referral being requested: OT to continue with 5 visits over 8 weeks to address ADLS, vision, cogignition upper extremity strength and safety recommendations.  Home health aide for one visit a week for 8 weeks assist with bathing, skilled nursing for evaluation of medication management and physical therapy to evaluate transfers and mobility       Date needed: as soon as possible    Has the patient been seen by the PCP for this problem? YES    Additional comments: none    Phone number Patient can be reached at:  Other phone number:  [phone]    Best Time:  anytime    Can we leave a detailed message on this number?  YES    Call taken on 7/1/2022 at 9:05 AM by Kia Clifton

## 2022-07-01 NOTE — TELEPHONE ENCOUNTER
Called CAMILA Bland, regarding VO request for orders listed below. Left detailed VM on confidential line giving verbal okay and requested Edwina call back confirming received VM.     Sina RAM RN

## 2022-07-01 NOTE — TELEPHONE ENCOUNTER
Was told by PAL that Edwina contacted them requesting additional orders, LVMTCB.     Sina RAM RN

## 2022-07-05 ENCOUNTER — TELEPHONE (OUTPATIENT)
Dept: FAMILY MEDICINE | Facility: CLINIC | Age: 87
End: 2022-07-05

## 2022-07-05 NOTE — TELEPHONE ENCOUNTER
Reason for Call:  Form, our goal is to have forms completed with 72 hours, however, some forms may require a visit or additional information.    Type of letter, form or note:  medical-POLST    Who is the form from?: [facility]    Where did the form come from: form was faxed in    What clinic location was the form placed at?: [facility]     Where the form was placed: Dr. Kulkarni Box/Folder    What number is listed as a contact on the form?: [phone]       Additional comments: fax back to [phone]    Call taken on 7/5/2022 at 12:31 PM by Lulu El MA

## 2022-07-07 ENCOUNTER — TELEPHONE (OUTPATIENT)
Dept: FAMILY MEDICINE | Facility: CLINIC | Age: 87
End: 2022-07-07

## 2022-07-07 NOTE — TELEPHONE ENCOUNTER
Mariia PT with [facility] home [facility] calling for orders    Requesting PT order 1 X week for 4 weeks       VO given for orders as requested -   Did advise pt has not had F2F since discharge/over 30 days.   Pt should schedule when able.   Mariia will discuss with pt     Lorri Gaming RN

## 2022-07-11 ENCOUNTER — TELEPHONE (OUTPATIENT)
Dept: FAMILY MEDICINE | Facility: CLINIC | Age: 87
End: 2022-07-11

## 2022-07-11 NOTE — TELEPHONE ENCOUNTER
Reason for Call:  Form, our goal is to have forms completed with 72 hours, however, some forms may require a visit or additional information.    Type of letter, form or note:  medical    Who is the form from?: Home care    Where did the form come from: form was faxed in    What clinic location was the form placed at?: [facility]     Where the form was placed: Dr. Kulkarni Box/Folder    What number is listed as a contact on the form?: [phone]       Additional comments: fax back to [phone]    Call taken on 7/11/2022 at 5:19 PM by Lulu El MA

## 2022-07-15 ENCOUNTER — DOCUMENTATION ONLY (OUTPATIENT)
Dept: OTHER | Facility: CLINIC | Age: 87
End: 2022-07-15

## 2022-07-20 ENCOUNTER — OFFICE VISIT (OUTPATIENT)
Dept: NEUROSURGERY | Facility: CLINIC | Age: 87
End: 2022-07-20
Payer: COMMERCIAL

## 2022-07-20 VITALS — HEART RATE: 96 BPM | OXYGEN SATURATION: 97 % | DIASTOLIC BLOOD PRESSURE: 83 MMHG | SYSTOLIC BLOOD PRESSURE: 143 MMHG

## 2022-07-20 DIAGNOSIS — Z98.890 S/P CRANIOTOMY: Primary | ICD-10-CM

## 2022-07-20 DIAGNOSIS — D32.9 MENINGIOMA (H): ICD-10-CM

## 2022-07-20 PROCEDURE — 99024 POSTOP FOLLOW-UP VISIT: CPT | Performed by: PHYSICIAN ASSISTANT

## 2022-07-20 NOTE — LETTER
7/20/2022         RE: Indy Reyes  [address]  [place] [address]        Dear Colleague,    Thank you for referring your patient, Indy Reyes, to the [facility] NEUROLOGY CLINICS [facility]. Please see a copy of my visit note below.    Neurosurgery 6-week follow-up    Ms. 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  Prior to surgery she was having difficulty with speech and weakness on her right side, she has made a full recovery is speaking fluently walking well and very pleased with her recovery as is her family.  She denies any issues, has no problems with her incision, and is able to care for herself lives independently in the setting of an assisted living facility.    Exam     Alert oriented no acute distress  Negative pronator drift  Incision is healing well  Gait is normal    Assessment     WHO Grade 1 meningioma  Status post craniotomy    Plan    Gradually increase activity as tolerated.   Follow up in clinic in 6 weeks with repeat Brain MRI w/wo contrast to establish post op baseline for resection. No further imaging planned after that unless clinically warranted due to symptoms.     Discussed with Dr. Rose       Again, thank you for allowing me to participate in the care of your patient.        Sincerely,        Dylon Hodgson PA-C

## 2022-07-20 NOTE — PROGRESS NOTES
Neurosurgery 6-week follow-up    Ms. 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  Prior to surgery she was having difficulty with speech and weakness on her right side, she has made a full recovery is speaking fluently walking well and very pleased with her recovery as is her family.  She denies any issues, has no problems with her incision, and is able to care for herself lives independently in the setting of an assisted living facility.    Exam     Alert oriented no acute distress  Negative pronator drift  Incision is healing well  Gait is normal    Assessment     WHO Grade 1 meningioma  Status post craniotomy    Plan    Gradually increase activity as tolerated.   Follow up in clinic in 6 weeks with repeat Brain MRI w/wo contrast to establish post op baseline for resection. No further imaging planned after that unless clinically warranted due to symptoms.     Discussed with Dr. Rose

## 2022-07-20 NOTE — NURSING NOTE
"Indy Reyes is a 89 year old female who presents for:  Chief Complaint   Patient presents with     Surgical Followup     6 wk follow-up           Initial Vitals:  BP (!) 143/83   Pulse 96   SpO2 97%  Estimated body mass index is 22.2 kg/m  as calculated from the following:    Height as of 6/2/22: 5' 1\" (1.549 m).    Weight as of 6/3/22: 117 lb 8.1 oz (53.3 kg).. There is no height or weight on file to calculate BSA. BP completed using cuff size: nicolle Sampson    "

## 2022-07-21 ENCOUNTER — TELEPHONE (OUTPATIENT)
Dept: NEUROSURGERY | Facility: CLINIC | Age: 87
End: 2022-07-21

## 2022-07-21 NOTE — TELEPHONE ENCOUNTER
Writer called and spoke to patient's son John to advise him that his mother will need a 6 week follow up with an MRI prior to the office visit with Dylon Hodgson on a Monday or Tuesday when Dr. Rose is also in clinic. Patient's son advised he will call and schedule the MRI and then call us back to schedule the clinic visit in a couple days.

## 2022-07-21 NOTE — TELEPHONE ENCOUNTER
----- Message from Dylon Hodgson PA-C sent at 7/21/2022  2:26 PM CDT -----  Regarding: RE: follow up  I had mentioned 6 months to the patient, but Dr. Rose wants one at 6 weeks and then if it looks good no more imaging is needed. Thanks   ----- Message -----  From: Sophia Davalos  Sent: 7/21/2022  12:12 PM CDT  To: Dylon Hodgson PA-C, #  Subject: RE: follow up                                    Hi Dylon,  Patient's son John is asking if you meant 6 months for the next visit and MRI? I looked at your appointment visit notes and it says 6 weeks but he said he heard 6 months and then a year.     Let us know when you have a moment~    Thank you~    Sophia    ----- Message -----  From: Dylon Hodgson PA-C  Sent: 7/21/2022  10:09 AM CDT  To: Neurosurgery Surgery Scheduler -   Subject: follow up                                        Please arrange and follow up in  al least 6 weeks with a brain MRI prior, w/wo contrast. This should be on my schedule when Dr. Rose is in clinic as well.

## 2022-07-22 ENCOUNTER — TELEPHONE (OUTPATIENT)
Dept: FAMILY MEDICINE | Facility: CLINIC | Age: 87
End: 2022-07-22

## 2022-07-22 NOTE — TELEPHONE ENCOUNTER
ELENA Delgado, calling from [facility] requesting VO for:     Pt is reporting difficulty sleeping at night. Is having difficulty falling asleep and to stay asleep. Pt family purchased Melatonin 10 MG OTC and would like provider to review and advise.     Will route to PCP.     Sina RAM RN

## 2022-07-22 NOTE — TELEPHONE ENCOUNTER
Huddled with covering provider as PCP is out of clinic. States pt can start at Melatonin 3 MG and see if that helps, if sx continue can call back to clinic for provider to review and advise. Relayed message to ELENA Delgado, verbalized understanding and agreeable to plan. Sandy will relay message to pt and family.     Sina RAM RN

## 2022-07-27 ENCOUNTER — TELEPHONE (OUTPATIENT)
Dept: FAMILY MEDICINE | Facility: CLINIC | Age: 87
End: 2022-07-27

## 2022-07-27 NOTE — TELEPHONE ENCOUNTER
Reason for Call:  Form, our goal is to have forms completed with 72 hours, however, some forms may require a visit or additional information.    Type of letter, form or note:  medical    Who is the form from?: Home care    Where did the form come from: form was faxed in    What clinic location was the form placed at?: [facility]     Where the form was placed: Dr. Kulkarni Box/Folder    What number is listed as a contact on the form?: [phone]       Additional comments: fax back to [phone]    Call taken on 7/27/2022 at 10:37 AM by Lulu El MA

## 2022-08-08 ENCOUNTER — TRANSFERRED RECORDS (OUTPATIENT)
Dept: NURSING | Facility: CLINIC | Age: 87
End: 2022-08-08

## 2022-08-12 ENCOUNTER — TELEPHONE (OUTPATIENT)
Dept: FAMILY MEDICINE | Facility: CLINIC | Age: 87
End: 2022-08-12

## 2022-08-12 NOTE — TELEPHONE ENCOUNTER
Reason for Call:  Form, our goal is to have forms completed with 72 hours, however, some forms may require a visit or additional information.    Type of letter, form or note:  medical    Who is the form from?: Home care    Where did the form come from: form was faxed in    What clinic location was the form placed at?: [facility]     Where the form was placed: Dr. Kulkarni Box/Folder    What number is listed as a contact on the form?: [phone]       Additional comments: fax back to [phone]    Call taken on 8/12/2022 at 12:56 PM by Lulu El MA

## 2022-08-25 ENCOUNTER — MEDICAL CORRESPONDENCE (OUTPATIENT)
Dept: HEALTH INFORMATION MANAGEMENT | Facility: CLINIC | Age: 87
End: 2022-08-25

## 2022-08-31 ENCOUNTER — HOSPITAL ENCOUNTER (OUTPATIENT)
Dept: MRI IMAGING | Facility: CLINIC | Age: 87
Discharge: HOME | End: 2022-08-31
Attending: PHYSICIAN ASSISTANT | Admitting: PHYSICIAN ASSISTANT
Payer: COMMERCIAL

## 2022-08-31 DIAGNOSIS — Z98.890 S/P CRANIOTOMY: ICD-10-CM

## 2022-08-31 DIAGNOSIS — D32.9 MENINGIOMA (H): ICD-10-CM

## 2022-08-31 PROCEDURE — 255N000002 HC RX 255 OP 636: Performed by: PHYSICIAN ASSISTANT

## 2022-08-31 PROCEDURE — A9585 GADOBUTROL INJECTION: HCPCS | Performed by: PHYSICIAN ASSISTANT

## 2022-08-31 PROCEDURE — 70553 MRI BRAIN STEM W/O & W/DYE: CPT

## 2022-08-31 RX ORDER — GADOBUTROL 604.72 MG/ML
5 INJECTION INTRAVENOUS ONCE
Status: COMPLETED | OUTPATIENT
Start: 2022-08-31 | End: 2022-08-31

## 2022-08-31 RX ADMIN — GADOBUTROL 5 ML: 604.72 INJECTION INTRAVENOUS at 16:55

## 2022-09-19 ENCOUNTER — OFFICE VISIT (OUTPATIENT)
Dept: NEUROSURGERY | Facility: CLINIC | Age: 87
End: 2022-09-19
Payer: COMMERCIAL

## 2022-09-19 VITALS
HEIGHT: 61 IN | BODY MASS INDEX: 22.09 KG/M2 | OXYGEN SATURATION: 99 % | DIASTOLIC BLOOD PRESSURE: 71 MMHG | WEIGHT: 117 LBS | SYSTOLIC BLOOD PRESSURE: 121 MMHG | HEART RATE: 81 BPM

## 2022-09-19 DIAGNOSIS — Z98.890 S/P CRANIOTOMY: Primary | ICD-10-CM

## 2022-09-19 PROCEDURE — 99203 OFFICE O/P NEW LOW 30 MIN: CPT | Performed by: PHYSICIAN ASSISTANT

## 2022-09-19 ASSESSMENT — PAIN SCALES - GENERAL: PAINLEVEL: NO PAIN (0)

## 2022-09-19 NOTE — LETTER
9/19/2022         RE: Indy Reyes  [address]  [facility] [address]        Dear Colleague,    Thank you for referring your patient, Indy Reyes, to the [facility]UROLOG[facility]. Please see a copy of my visit note below.    Neurosurgery follow-up    Ms. Reyes is a 89-year-old female who presents to clinic for follow-up 3 months after left-sided craniotomy for resection of WHO grade 1 meningioma.  Patient states she is back to her normal self, and feeling well and not having any concerning symptoms like she was prior to surgery, where she had aphasia and and cognitive decline over several months.  She had a repeat brain MRI and is here for further review.    Exam    B/P: 121/71, T: Data Unavailable, P: 81, R: Data Unavailable     Alert and oriented no acute distress  Moving all extremities appropriately  Gait is normal    Imaging    Brain MRI demonstrated    1.  Interval left frontal craniotomy for resection of the large enhancing extra-axial mass. Residual dural thickening and enhancement beneath the craniotomy extending medially to the falx is favored to be postoperative though small amount of residual   tumor is possible. Localized encephalomalacia within the left superior frontal gyrus. Interval resolution of mass effect.  2.  Unchanged enhancing extra-axial nodule along the left anterior frontal lobe measuring 0.8 x 2.1 x 1.8 cm.  3.  Moderate generalized volume loss and mild to moderate chronic microvascular ischemic change.          Assessment    Status post left craniotomy for resection of WHO grade 1 meningioma      Plan    No further follow-up is needed with regards to the resected meningioma, order to the smaller probable meningioma along the left anterior frontal lobe.  Dr. Rose reviewed the images.  The patient's questions were answered, and images were shown to the patient and her son as well.  The patient is very pleased with her surgical outcome  and can resume activity as tolerated with no restrictions.              Again, thank you for allowing me to participate in the care of your patient.        Sincerely,        Dylon Hodgson PA-C

## 2022-09-19 NOTE — PROGRESS NOTES
Neurosurgery follow-up    Ms. Reyes is a 89-year-old female who presents to clinic for follow-up 3 months after left-sided craniotomy for resection of WHO grade 1 meningioma.  Patient states she is back to her normal self, and feeling well and not having any concerning symptoms like she was prior to surgery, where she had aphasia and and cognitive decline over several months.  She had a repeat brain MRI and is here for further review.    Exam    B/P: 121/71, T: Data Unavailable, P: 81, R: Data Unavailable     Alert and oriented no acute distress  Moving all extremities appropriately  Gait is normal    Imaging    Brain MRI demonstrated    1.  Interval left frontal craniotomy for resection of the large enhancing extra-axial mass. Residual dural thickening and enhancement beneath the craniotomy extending medially to the falx is favored to be postoperative though small amount of residual   tumor is possible. Localized encephalomalacia within the left superior frontal gyrus. Interval resolution of mass effect.  2.  Unchanged enhancing extra-axial nodule along the left anterior frontal lobe measuring 0.8 x 2.1 x 1.8 cm.  3.  Moderate generalized volume loss and mild to moderate chronic microvascular ischemic change.          Assessment    Status post left craniotomy for resection of WHO grade 1 meningioma      Plan    No further follow-up is needed with regards to the resected meningioma, order to the smaller probable meningioma along the left anterior frontal lobe.  Dr. Rose reviewed the images.  The patient's questions were answered, and images were shown to the patient and her son as well.  The patient is very pleased with her surgical outcome and can resume activity as tolerated with no restrictions.

## 2022-09-19 NOTE — NURSING NOTE
"Indy Reyes is a 89 year old female who presents for:  Chief Complaint   Patient presents with     RECHECK     Follow up MRI Brain        Initial Vitals:  /71   Pulse 81   Ht 5' 1\" (1.549 m)   Wt 117 lb (53.1 kg)   SpO2 99%   BMI 22.11 kg/m   Estimated body mass index is 22.11 kg/m  as calculated from the following:    Height as of this encounter: 5' 1\" (1.549 m).    Weight as of this encounter: 117 lb (53.1 kg).. Body surface area is 1.51 meters squared. BP completed using cuff size: small regular  No Pain (0)        Juli Rivera  "

## 2022-09-28 ENCOUNTER — TELEPHONE (OUTPATIENT)
Dept: FAMILY MEDICINE | Facility: CLINIC | Age: 87
End: 2022-09-28

## 2022-09-28 NOTE — TELEPHONE ENCOUNTER
Reason for Call:  Form, our goal is to have forms completed with 72 hours, however, some forms may require a visit or additional information.    Type of letter, form or note:  Home Health Certification    Who is the form from?: Home care    Where did the form come from: form was faxed in    What clinic location was the form placed at?: [facility]     Where the form was placed: Dr. Kulkarni Box/Folder    What number is listed as a contact on the form?: [phone]       Additional comments: faxed to [phone]    Call taken on 9/28/2022 at 11:05 AM by Lulu El MA

## 2022-10-04 DIAGNOSIS — Z53.9 DIAGNOSIS NOT YET DEFINED: Primary | ICD-10-CM

## 2022-10-04 PROCEDURE — G0180 MD CERTIFICATION HHA PATIENT: HCPCS | Performed by: FAMILY MEDICINE

## 2022-10-17 ENCOUNTER — VIRTUAL VISIT (OUTPATIENT)
Dept: PSYCHOLOGY | Facility: CLINIC | Age: 87
End: 2022-10-17
Payer: COMMERCIAL

## 2022-10-17 DIAGNOSIS — F43.23 ADJUSTMENT DISORDER WITH MIXED ANXIETY AND DEPRESSED MOOD: Primary | ICD-10-CM

## 2022-10-17 PROCEDURE — 90834 PSYTX W PT 45 MINUTES: CPT | Mod: 95

## 2022-10-17 ASSESSMENT — ANXIETY QUESTIONNAIRES
8. IF YOU CHECKED OFF ANY PROBLEMS, HOW DIFFICULT HAVE THESE MADE IT FOR YOU TO DO YOUR WORK, TAKE CARE OF THINGS AT HOME, OR GET ALONG WITH OTHER PEOPLE?: NOT DIFFICULT AT ALL
3. WORRYING TOO MUCH ABOUT DIFFERENT THINGS: SEVERAL DAYS
7. FEELING AFRAID AS IF SOMETHING AWFUL MIGHT HAPPEN: SEVERAL DAYS
GAD7 TOTAL SCORE: 8
GAD7 TOTAL SCORE: 8
5. BEING SO RESTLESS THAT IT IS HARD TO SIT STILL: MORE THAN HALF THE DAYS
GAD7 TOTAL SCORE: 8
4. TROUBLE RELAXING: NOT AT ALL
7. FEELING AFRAID AS IF SOMETHING AWFUL MIGHT HAPPEN: SEVERAL DAYS
IF YOU CHECKED OFF ANY PROBLEMS ON THIS QUESTIONNAIRE, HOW DIFFICULT HAVE THESE PROBLEMS MADE IT FOR YOU TO DO YOUR WORK, TAKE CARE OF THINGS AT HOME, OR GET ALONG WITH OTHER PEOPLE: NOT DIFFICULT AT ALL
2. NOT BEING ABLE TO STOP OR CONTROL WORRYING: SEVERAL DAYS
6. BECOMING EASILY ANNOYED OR IRRITABLE: NOT AT ALL
1. FEELING NERVOUS, ANXIOUS, OR ON EDGE: NEARLY EVERY DAY

## 2022-10-17 ASSESSMENT — PATIENT HEALTH QUESTIONNAIRE - PHQ9
SUM OF ALL RESPONSES TO PHQ QUESTIONS 1-9: 12
10. IF YOU CHECKED OFF ANY PROBLEMS, HOW DIFFICULT HAVE THESE PROBLEMS MADE IT FOR YOU TO DO YOUR WORK, TAKE CARE OF THINGS AT HOME, OR GET ALONG WITH OTHER PEOPLE: EXTREMELY DIFFICULT
SUM OF ALL RESPONSES TO PHQ QUESTIONS 1-9: 12
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
## 2022-10-17 NOTE — PROGRESS NOTES
[facility]   Mental Health & Addiction Services     Progress Note - Initial Visit    Patient  Name:  Indy Reyes Date: 10/17/22         Service Type: Individual     Visit Start Time: 10:00am  Visit End Time: 10:50am    Visit #: 1    Attendees: Client and adult son    Service Modality:  Video Visit:      Provider verified identity through the following two step process.  Patient provided:  Patient address    Telemedicine Visit: The patient's condition can be safely assessed and treated via synchronous audio and visual telemedicine encounter.      Reason for Telemedicine Visit: Patient has requested telehealth visit, Patient convenience (e.g. access to timely appointments / distance to available provider) and Services only offered telehealth    Originating Site (Patient Location): Patient's home    Distant Site (Provider Location): Provider Remote Setting- Home Office    Consent:  The patient/guardian has verbally consented to: the potential risks and benefits of telemedicine (video visit) versus in person care; bill my insurance or make self-payment for services provided; and responsibility for payment of non-covered services.     Patient would like the video invitation sent by:  My Chart    Mode of Communication:  Video Conference via [employer]    Distant Location (Provider):  Off-site    As the provider I attest to compliance with applicable laws and regulations related to telemedicine.       DATA:   Interactive Complexity: No   Crisis: No     Presenting Concerns/  Current Stressors:   Grief,  of nearly 70 years  in May 2022.  Medical conditions, recovery process.      ASSESSMENT:  Mental Status Assessment:  Appearance:   Appropriate   Eye Contact:   Good   Psychomotor Behavior: Normal  Restless   Attitude:   Cooperative  Interested Pleasant  Orientation:   All  Speech   Rate / Production: Normal/ Responsive Emotional Talkative   Volume:  Normal   Mood:    Anxious  Sad   Grieving  Affect:    Appropriate  Bright  Subdued  Tearful  Thought Content:  Clear  Rumination   Thought Form:  Coherent  Logical   Insight:    Fair       Safety Issues and Plan for Safety and Risk Management:     [place] Suicide Severity Rating Scale (Lifetime/Recent)  [place] Suicide Severity Rating (Lifetime/Recent) 10/23/2022   1. Wish to be Dead (Lifetime) 1   Wish to be Dead Description (Lifetime) Since death of  in May 2022.  Desire to be with him, not to end her life.   1. Wish to be Dead (Past 1 Month) 1   Wish to be Dead Description (Past 1 Month) Since death of  in May 2022.  Desire to be with him, not to end her life.   2. Non-Specific Active Suicidal Thoughts (Lifetime) 0   Calculated C-SSRS Risk Score (Lifetime/Recent) Low Risk     Patient denies current fears or concerns for personal safety.  Patient reports the following current or recent suicidal ideation or behaviors: passive thoughts of wishing to be with her late .  She indicates no plan or intent..  Patient denies current or recent homicidal ideation or behaviors.  Patient denies current or recent self injurious behavior or ideation.  Patient denies other safety concerns.  Recommended that patient call 911 or go to the local ED should there be a change in any of these risk factors.  Patient reports there are no firearms in the house.     Diagnostic Criteria:  Adjustment Disorder  A. The development of emotional or behavioral symptoms in response to an identifiable stressor(s) occurring within 3 months of the onset of the stressor(s)  B. These symptoms or behaviors are clinically significant, as evidenced by one or both of the following:       - Marked distress that is out of proportion to the severity/intensity of the stressor (with consideration for external context & culture)       - Significant impairment in social, occupational, or other important areas of functioning  C. The stress-related disturbance does not meet  criteria for another disorder & is not not an exacerbation of another mental disorder  D. The symptoms do not represent normal bereavement  E. 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  Vision complicated by macular degeneration, very low vision.  Grieving the death of her  in May 2022,  nearly 70 years.   WHODAS 2.0 (12 item): No flowsheet data found.  Intervention:   Psychodynamic- Patient processed internal experiences  and shared grief  Collateral Reports Completed:  Not Applicable      PLAN: (Homework, other):  1. Provider will continue Diagnostic Assessment.       3.  Suicide Risk and Safety Concerns were assessed for Indy Reyes.    Patient meets the following risk assessment and triage: Patient denied any current/recent/lifetime history of suicidal ideation and/or behaviors.  No safety plan indicated at this time.  Passive thoughts of wishing to join late , no plan or intent.      Adriana Mccann, [facility]  October 23, 2022

## 2022-10-23 ASSESSMENT — COLUMBIA-SUICIDE SEVERITY RATING SCALE - C-SSRS
1. IN THE PAST MONTH, HAVE YOU WISHED YOU WERE DEAD OR WISHED YOU COULD GO TO SLEEP AND NOT WAKE UP?: YES
2. HAVE YOU ACTUALLY HAD ANY THOUGHTS OF KILLING YOURSELF?: NO
1. HAVE YOU WISHED YOU WERE DEAD OR WISHED YOU COULD GO TO SLEEP AND NOT WAKE UP?: YES

## 2022-11-03 ENCOUNTER — VIRTUAL VISIT (OUTPATIENT)
Dept: PSYCHOLOGY | Facility: CLINIC | Age: 87
End: 2022-11-03
Payer: COMMERCIAL

## 2022-11-03 DIAGNOSIS — F43.23 ADJUSTMENT DISORDER WITH MIXED ANXIETY AND DEPRESSED MOOD: Primary | ICD-10-CM

## 2022-11-03 PROCEDURE — 90791 PSYCH DIAGNOSTIC EVALUATION: CPT | Mod: 95

## 2022-11-03 ASSESSMENT — PATIENT HEALTH QUESTIONNAIRE - PHQ9
SUM OF ALL RESPONSES TO PHQ QUESTIONS 1-9: 12
10. IF YOU CHECKED OFF ANY PROBLEMS, HOW DIFFICULT HAVE THESE PROBLEMS MADE IT FOR YOU TO DO YOUR WORK, TAKE CARE OF THINGS AT HOME, OR GET ALONG WITH OTHER PEOPLE: SOMEWHAT DIFFICULT
SUM OF ALL RESPONSES TO PHQ QUESTIONS 1-9: 12

## 2022-11-03 NOTE — PROGRESS NOTES
[facility]  Provider Name:  Adriana Mccann     Credentials:  MSW, TEJSW    PATIENT'S NAME: Indy Reyes  PREFERRED NAME: Mary  PRONOUNS: she/her/hers     MRN: [id number]  : 1931  ADDRESS: [facility] [address]  [place] [address]  ACCT. NUMBER:  [id number]  DATE OF SERVICE: 22  START TIME: 2:30pm  END TIME: 3:30pm  PREFERRED PHONE: [phone]  May we leave a program related message: Yes  SERVICE MODALITY:  Video Visit:      Provider verified identity through the following two step process.  Patient provided:  Patient is known previously to provider    Telemedicine Visit: The patient's condition can be safely assessed and treated via synchronous audio and visual telemedicine encounter.      Reason for Telemedicine Visit: Patient has requested telehealth visit, Patient unable to travel and Patient convenience (e.g. access to timely appointments / distance to available provider)    Originating Site (Patient Location): Patient's home    Distant Site (Provider Location): Provider Remote Setting- Home Office    Consent:  The patient/guardian has verbally consented to: the potential risks and benefits of telemedicine (video visit) versus in person care; bill my insurance or make self-payment for services provided; and responsibility for payment of non-covered services.     Patient would like the video invitation sent by:  Send to e-mail at: [email or web address]    Mode of Communication:  Video Conference via [employer]    Distant Location (Provider):  Off-site    As the provider I attest to compliance with applicable laws and regulations related to telemedicine.    UNIVERSAL ADULT Mental Health DIAGNOSTIC ASSESSMENT    Identifying Information:  Patient is a 89 year old, ,  individual.    Patient was referred for an assessment by family.  Patient attended the session with adult son.    Chief Complaint:   The reason for seeking services at this time is:  "\"GRIEF\",   of a heart condition and sepsis; client had brain surgery in 2022 after an arduous health journey after a consistent decline in memory, speech, dx w/alzheimers. 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  Significant recovery since surgery.  The problem(s) began 22.     Patient has not attempted to resolve these concerns in the past.    Social/Family History:  Patient reported they grew up in, moved to [facility] as a toddler, born in [place], [place].  They were raised by biological parents  . Number 11 or 12 children. Parents were always together.  Patient reported that their childhood was loving caring by parents, often impatient, \"not the bright star they hoped I would be\", I was labeled the \"black sheep\".  Patient described their current relationships with family of origin as good while they were alive.     The patient describes their cultural background as , Scientologist as a child, Zoroastrianism.  Cultural influences and impact on patient's life structure, values, norms, and healthcare: NONE.  Contextual influences on patient's health include:  in May 2022, recovering from brain surgery following the discovery of a brain tumor which impacted speech, cognitive functioning. Living alone is difficult, very lonely and sad.  Family is very supportive.    These factors will be addressed in the Preliminary Treatment plan. Patient identified their preferred language to be English. Patient reported they does not need the assistance of an  or other support involved in therapy.     Patient reported had no significant delays in developmental tasks.   Patient's highest education level was high school graduate.  Patient identified the following learning problems: reading.  Modifications will not be used to assist communication in therapy.  Patient reports they are  able to understand written materials.    Patient reported the following relationship history " one, eloped.  Patient's current relationship status is  for 9.6.52.   Patient identified their sexual orientation as heterosexual.  Patient reported having 4 child(becca). Patient identified adult child as part of their support system.  Patient identified the quality of these relationships as stable and meaningful,  .      Patient's current living/housing situation involves staying in own home/apartment.  The immediate members of family and household include Alberto Reyes, 61,SON  and they report that housing is stable.    Patient is currently retired.  [employer] Mc[employer] (2 years); Alexandra Barboza (open & closed the [place] location).  Patient reports their finances are obtained through  . Patient   identify finances as a current stressor.      Patient reported that they no been involved with the legal system.  Patient does not report being under probation/ parole/ jurisdiction. They are not under any current court jurisdiction.     Patient's Strengths and Limitations:  Patient identified the following strengths or resources that will help them succeed in treatment: Alevism / Hindu, commitment to health and well being, exercise routine, radha / spirituality, family support, intelligence, motivation, strong social skills and work ethic. Things that may interfere with the patient's success in treatment include: few friends, influenced by age as many friends have  at this time.     Assessments:  The following assessments were completed by patient for this visit:  PHQ2:   PHQ-2 (  [employer]) 2022 2022 11/3/2022 2022 2022 2021 10/14/2021   Q1: Little interest or pleasure in doing things 1 1 1 3 1 0 0   Q2: Feeling down, depressed or hopeless 3 3 2 3 1 0 0   PHQ-2 Score 4 4 3 6 2 0 0   PHQ-2 Total Score (12-17 Years)- Positive if 3 or more points; Administer PHQ-A if positive - - - - - 0 0   Q1: Little interest or pleasure in doing things Several days Several days Several days  Nearly every day Several days - Not at all   Q2: Feeling down, depressed or hopeless Nearly every day Nearly every day More than half the days Nearly every day Several days - Not at all   PHQ-2 Score 4 4 3 6 2 - 0     PHQ9:   PHQ-9 SCORE 5/30/2014 4/18/2022 5/27/2022 10/17/2022 11/3/2022 11/9/2022 11/16/2022   PHQ-9 Total Score 1 - - - - - -   PHQ-9 Total Score MyChart - 20 (Severe depression) 3 (Minimal depression) 12 (Moderate depression) 12 (Moderate depression) 11 (Moderate depression) 16 (Moderately severe depression)   PHQ-9 Total Score - 20 3 12 12 11 16     GAD2:   KAYLAN-2 11/3/2022 11/3/2022 11/16/2022   Feeling nervous, anxious, or on edge 1 1 0   Not being able to stop or control worrying 0 0 0   KAYLAN-2 Total Score 1 1 0     GAD7:   KAYLAN-7 SCORE 5/30/2014 10/17/2022   Total Score 1 -   Total Score - 8 (mild anxiety)   Total Score - 8     CAGE-AID: No flowsheet data found.  PROMIS 10-Global Health (only subscores and total score):   PROMIS-10 Scores Only 5/11/2017 7/24/2017 11/3/2022 11/3/2022 11/16/2022   Global Mental Health Score - 18 15 15 11   Global Physical Health Score - 19 18 18 18   PROMIS TOTAL - SUBSCORES - 37 33 33 29   Global Physical Health Score : Raw Score 18 (SUM : G03 - G06 - G07 - G08) 19 (SUM : G03 - G06 - G07 - G08) - - -   Global Mental Health Score : Raw Score 20 (SUM : G02 - G04 - G05 - G10) 18 (SUM : G02 - G04 - G05 - G10) - - -   Total (Physical + Mental Health Score) 38 37 - - -     Llano Suicide Severity Rating Scale (Lifetime/Recent)  Llano Suicide Severity Rating (Lifetime/Recent) 10/23/2022   1. Wish to be Dead (Lifetime) 1   Wish to be Dead Description (Lifetime) Since death of  in May 2022.  Desire to be with him, not to end her life.   1. Wish to be Dead (Past 1 Month) 1   Wish to be Dead Description (Past 1 Month) Since death of  in May 2022.  Desire to be with him, not to end her life.   2. Non-Specific Active Suicidal Thoughts (Lifetime) 0   Calculated  C-SSRS Risk Score (Lifetime/Recent) Low Risk       Personal and Family Medical History:  Patient does not report a family history of mental health concerns.  Patient reports family history includes Breast Cancer in her sister; Cancer in her father and sister; Family History Negative in her mother; Heart Disease in her brother.     Patient does not report Mental Health Diagnosis and/or Treatment.  Patient Patient reported the following previous diagnoses which include(s): none.  Patient reported symptoms began in May 2022.  Patient has not received mental health services in the past.  Psychiatric Hospitalizations: none.  Patient denies a history of civil commitment.  Currently, patient is not receiving other mental health services.  These include none.         Patient has had a physical exam to rule out medical causes for current symptoms.  Date of last physical exam was within the past year. Client was encouraged to follow up with PCP if symptoms were to develop. The patient has a [facility] Provider, who is named Batool Kulkarni..  Patient reports the following current medical concerns: recovery following tumor and brain surgery to remove.  Patient denies any issues with pain..   There are not significant appetite / nutritional concerns / weight changes.   Patient does not report a history of head injury / trauma / cognitive impairment.      Patient reports current meds as:   No outpatient medications have been marked as taking for the 11/3/22 encounter (Virtual Visit) with Adriana Mccann LGSW.       Medication Adherence:  Patient reports  .  taking prescribed medications as prescribed.    Patient Allergies:    Allergies   Allergen Reactions     Tetracycline Rash       Medical History:    Past Medical History:   Diagnosis Date     Back pain      Thyroid disease          Current Mental Status Exam:   Appearance:  Appropriate    Eye Contact:  Good   Psychomotor:  Normal  Restless       Gait  / station:  Client seated during session.  Attitude / Demeanor: Cooperative  Interested Friendly Attentive  Speech      Rate / Production: Normal/ Responsive Emotional Talkative      Volume:  Normal  volume      Language:  good and no obvious problem  Mood:   Depressed  Sad  Grieving  Affect:   Appropriate  Labile  Tearful   Thought Content: Clear  Rumination   Thought Process: Coherent  Goal Directed       Associations: No loosening of associations  Insight:   Fair   Judgment:  Intact   Orientation:  All  Attention/concentration: Good      Substance Use:  Patient did not report a family history of substance use concerns; see medical history section for details.  Patient has not received chemical dependency treatment in the past.  Patient has not ever been to detox.      Patient is not currently receiving any chemical dependency treatment.           Substance History of use Age of first use Date of last use     Pattern and duration of use (include amounts and frequency)   Alcohol -  Social, infrequent until spouse's death     Mid-July started drinking. double shot of vicente, 6 of 7 nights. Tired of being alone & sad.   Cannabis         REPORTS SUBSTANCE USE: N/A     Amphetamines         REPORTS SUBSTANCE USE: N/A   Cocaine/crack            REPORTS SUBSTANCE USE: N/A   Hallucinogens           REPORTS SUBSTANCE USE: N/A   Inhalants           REPORTS SUBSTANCE USE: N/A   Heroin           REPORTS SUBSTANCE USE: N/A   Other Opiates       REPORTS SUBSTANCE USE: N/A   Benzodiazepine         REPORTS SUBSTANCE USE: N/A   Barbiturates       REPORTS SUBSTANCE USE: N/A   Over the counter meds       REPORTS SUBSTANCE USE: N/A   Caffeine        2 cups caffiene decaf coffee   Nicotine        REPORTS SUBSTANCE USE: N/A   Other substances not listed above:  Identify:        REPORTS SUBSTANCE USE: N/A     Patient reported the following problems as a result of their substance use: none.    Substance Use: No symptoms    Based on the  "negative CAGE score and clinical interview there  are not indications of drug or alcohol abuse.      Significant Losses / Trauma / Abuse / Neglect Issues:   Patient did not serve in the .  There are indications or report of significant loss, trauma, abuse or neglect issues related to:  Loss of parents and siblings and most recently her spouse.  Concerns for possible neglect are not present.     Safety Assessment:   Patient denies current homicidal ideation and behaviors.  Patient denies current self-injurious ideation and behaviors.    Patient denied risk behaviors associated with substance use.  Patient denies any high risk behaviors associated with mental health symptoms.  Patient reports the following current concerns for their personal safety: None.  Patient reports there   firearms in the house.       There are no firearms in the home..    History of Safety Concerns:  Patient denied a history of homicidal ideation.     Patient denied a history of personal safety concerns.    Patient denied a history of assaultive behaviors.    Patient denied a history of sexual assault behaviors.     Patient denied a history of risk behaviors associated with substance use.  Patient denies any history of high risk behaviors associated with mental health symptoms.  Patient reports the following protective factors:      Risk Plan:  See Recommendations for Safety and Risk Management Plan. \"I want to be with my \"    Review of Symptoms per patient report:  Depression: Change in sleep, Lack of interest, Ruminations, Feeling sad, down, or depressed and Frequent crying  Allison:  No Symptoms  Psychosis: No Symptoms  Anxiety: Excessive worry, Sleep disturbance and Ruminations  Panic:  No symptoms  Post Traumatic Stress Disorder:  No Symptoms   Eating Disorder: No Symptoms  ADD / ADHD:  No symptoms  Conduct Disorder: No symptoms  Autism Spectrum Disorder: No symptoms  Obsessive Compulsive Disorder: No Symptoms    Patient " reports the following compulsive behaviors and treatment history: none per client report..      Diagnostic Criteria:   Adjustment Disorder  A. The development of emotional or behavioral symptoms in response to an identifiable stressor(s) occurring within 3 months of the onset of the stressor(s)  B. These symptoms or behaviors are clinically significant, as evidenced by one or both of the following:       - Marked distress that is out of proportion to the severity/intensity of the stressor (with consideration for external context & culture)       - Significant impairment in social, occupational, or other important areas of functioning  C. The stress-related disturbance does not meet criteria for another disorder & is not not an exacerbation of another mental disorder  D. The symptoms do not represent normal bereavement  E. Once the stressor or its consequences have terminated, the symptoms do not persist for more than an additional 6 months       * Adjustment Disorder with Mixed Anxiety and Depressed Mood: The predominant manifestation is a combination of depression and anxiety      Functional Status:  Patient reports the following functional impairments:  management of the household and or completion of tasks, self-care and social interactions.     Nonprogrammatic care:  Patient is requesting basic services to address current mental health concerns.    Clinical Summary:  1. Reason for assessment: grief.  2. Psychosocial, Cultural and Contextual Factors: Client grieving sudden death of spouse of 70 years in May 2022.  She experienced a health decline in late 2021 including memory loss, speech difficulties, Alzheimer dx; cause was a brain tumor for which she had surgery in June 2022.  Recovering well, yet, in new [facility] apartment and experiencing grief and loneliness.  Family is very supportive.  3. 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  4. Other Diagnoses that is relevant to services:   n/a.  5. Provisional Diagnosis:  Adjustment Disorders  309.28 (F43.23) With mixed anxiety and depressed mood as evidenced by sadness, tearfulness, crying, sleep disturbance.  6. Prognosis: Expect Improvement and Relieve Acute Symptoms.  7. Likely consequences of symptoms if not treated: continued mood disruption, need higher level of care, deterioration in functioning.  8. Client strengths include:  caring, educated, empathetic, goal-focused, insightful, intelligent, motivated, open to learning, responsible parent, support of family, friends and providers and wants to learn .     Recommendations:     1. Plan for Safety and Risk Management:   Safety and Risk: Recommended that patient call 911 or go to the local ED should there be a change in any of these risk factors..          Report to child / adult protection services was NA.     2. Patient's identified no mental health concerns with a cultural influence will be addressed by provider.     3. Initial Treatment will focus on:    Grief / Loss - grieving death of  in May 2022,  for 70 years..     4. Resources/Service Plan:    services are not indicated.   Modifications to assist communication are not indicated.   Additional disability accommodations are not indicated.      5. Collaboration:   Collaboration / coordination of treatment will be initiated with the following  support professionals: primary care physician.      6.  Referrals:   The following referral(s) will be initiated: none indicated in assessment. Next Scheduled Appointment: n/a.     A Release of Information has been obtained for the following: n/a.     Emergency Contact Alberto Reyes was obtained.     7. NANCI:    NANCI:  Discussed the general effects of drugs and alcohol on health and well-being. Provider gave patient printed information about the effects of chemical use on their health and well  being. Recommendations:  Recommended client discontinue use of alcohol which she started after initial session.     8. Records:   These were reviewed at time of assessment.   Information in this assessment was obtained from the medical record and  provided by patient and family who is a good historian.    Patient will have open access to their mental health medical record.        Provider Name/ Credentials:  THEA Balderrama, [facility]  November 28, 2022

## 2022-11-09 ENCOUNTER — VIRTUAL VISIT (OUTPATIENT)
Dept: PSYCHOLOGY | Facility: CLINIC | Age: 87
End: 2022-11-09
Payer: COMMERCIAL

## 2022-11-09 DIAGNOSIS — F43.23 ADJUSTMENT DISORDER WITH MIXED ANXIETY AND DEPRESSED MOOD: Primary | ICD-10-CM

## 2022-11-09 PROCEDURE — 90834 PSYTX W PT 45 MINUTES: CPT | Mod: 95

## 2022-11-09 ASSESSMENT — PATIENT HEALTH QUESTIONNAIRE - PHQ9
SUM OF ALL RESPONSES TO PHQ QUESTIONS 1-9: 11
SUM OF ALL RESPONSES TO PHQ QUESTIONS 1-9: 11
10. IF YOU CHECKED OFF ANY PROBLEMS, HOW DIFFICULT HAVE THESE PROBLEMS MADE IT FOR YOU TO DO YOUR WORK, TAKE CARE OF THINGS AT HOME, OR GET ALONG WITH OTHER PEOPLE: NOT DIFFICULT AT ALL

## 2022-11-09 NOTE — PROGRESS NOTES
[facility]                                     Progress Note    Patient Name: Indy Reyes  Date: 11/9/22         Service Type: Individual      Session Start Time: 3:30pm  Session End Time: 4:20pm     Session Length: 50 minutes    Session #: 3    Attendees: Client    Service Modality:  Video Visit:      Provider verified identity through the following two step process.  Patient provided:  Patient is known previously to provider    Telemedicine Visit: The patient's condition can be safely assessed and treated via synchronous audio and visual telemedicine encounter.      Reason for Telemedicine Visit: Patient has requested telehealth visit, Patient convenience (e.g. access to timely appointments / distance to available provider) and Services only offered telehealth    Originating Site (Patient Location): Patient's home    Distant Site (Provider Location): Provider Remote Setting- Home Office    Consent:  The patient/guardian has verbally consented to: the potential risks and benefits of telemedicine (video visit) versus in person care; bill my insurance or make self-payment for services provided; and responsibility for payment of non-covered services.     Patient would like the video invitation sent by:  My Chart    Mode of Communication:  Video Conference via Amwell    Distant Location (Provider):  Off-site    As the provider I attest to compliance with applicable laws and regulations related to telemedicine.    DATA  Interactive Complexity: No  Crisis: No        Progress Since Last Session (Related to Symptoms / Goals / Homework):   Symptoms: No change Mary reports significant depression (sadness, crying, sleep disturbance) since our last session.  Grief is primary  of depression symptoms, recent loss of .  Passive SI, no plan or intent, desire to join late .    Homework: Achieved / completed to satisfaction      Episode of Care Goals: Satisfactory progress -  CONTEMPLATION (Considering change and yet undecided); Intervened by assessing the negative and positive thinking (ambivalence) about behavior change     Current / Ongoing Stressors and Concerns:   Intense grief at loss of spouse in May 2022.  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  Client experiencing significant grief, losses and change.     Therapist met with client to create treatment plan in session.  Plan focused on grief, understanding the process of grieving and mourning, moving through grief.  Client reports feeling lonely and missing her  terribly. Therapist normed feelings of grief, education regarding no timeline to grief, comparison to medical condition.  Benefit of feeling our feelings not stuffing them. Crying does help, releases emotions, feel better although client embarrassed when crying outside of home.  Finding solace and comfort in the song Que Sera Sera  - I have no control over the future.            Treatment Objective(s) Addressed in This Session:   Increase interest, engagement, and pleasure in doing things  Decrease frequency and intensity of feeling down, depressed, hopeless  Improve quantity and quality of night time sleep / decrease daytime naps  Decrease thoughts that you'd be better off dead or of suicide / self-harm       Intervention:   Motivational Interviewing    MI Intervention: Expressed Empathy/Understanding, Supported Autonomy, Collaboration, Evocation, Open-ended questions, Reflections: simple and complex, Change talk (evoked) and Reframe     Change Talk Expressed by the Patient: Desire to change Ability to change Reasons to change Taking steps    Provider Response to Change Talk: E - Evoked more info from patient about behavior change, A - Affirmed patient's thoughts, decisions, or attempts at behavior change and R - Reflected patient's change talk      Assessments  completed prior to visit:  The following assessments were completed by patient for this visit:  PHQ2:   PHQ-2 ( 1999 [employer]) 11/9/2022 11/3/2022 4/18/2022 2/18/2022 11/4/2021 10/14/2021 2/1/2021   Q1: Little interest or pleasure in doing things 1 1 3 1 0 0 0   Q2: Feeling down, depressed or hopeless 3 2 3 1 0 0 0   PHQ-2 Score 4 3 6 2 0 0 0   PHQ-2 Total Score (12-17 Years)- Positive if 3 or more points; Administer PHQ-A if positive - - - - 0 0 0   Q1: Little interest or pleasure in doing things Several days Several days Nearly every day Several days - Not at all -   Q2: Feeling down, depressed or hopeless Nearly every day More than half the days Nearly every day Several days - Not at all -   PHQ-2 Score 4 3 6 2 - 0 -     PHQ9:   PHQ-9 SCORE 5/30/2014 4/18/2022 5/27/2022 10/17/2022 11/3/2022 11/9/2022   PHQ-9 Total Score 1 - - - - -   PHQ-9 Total Score MyChart - 20 (Severe depression) 3 (Minimal depression) 12 (Moderate depression) 12 (Moderate depression) 11 (Moderate depression)   PHQ-9 Total Score - 20 3 12 12 11     GAD2:   KAYLAN-2 11/3/2022 11/3/2022   Feeling nervous, anxious, or on edge 1 1   Not being able to stop or control worrying 0 0   KAYLAN-2 Total Score 1 1     GAD7:   KAYLAN-7 SCORE 5/30/2014 10/17/2022   Total Score 1 -   Total Score - 8 (mild anxiety)   Total Score - 8     PROMIS 10-Global Health (only subscores and total score):   PROMIS-10 Scores Only 5/11/2017 7/24/2017 11/3/2022 11/3/2022   Global Mental Health Score - 18 15 15   Global Physical Health Score - 19 18 18   PROMIS TOTAL - SUBSCORES - 37 33 33   Global Physical Health Score : Raw Score 18 (SUM : G03 - G06 - G07 - G08) 19 (SUM : G03 - G06 - G07 - G08) - -   Global Mental Health Score : Raw Score 20 (SUM : G02 - G04 - G05 - G10) 18 (SUM : G02 - G04 - G05 - G10) - -   Total (Physical + Mental Health Score) 38 37 - -         ASSESSMENT: Current Emotional / Mental Status (status of significant symptoms):   Risk status (Self / Other harm or  suicidal ideation)   Patient denies current fears or concerns for personal safety.   Patient reports the following current or recent suicidal ideation or behaviors: passive thoughts of joining her late .  No plan or intent.. Family is grounding and supportive.   Patient denies current or recent homicidal ideation or behaviors.   Patient denies current or recent self injurious behavior or ideation.   Patient denies other safety concerns.   Patient reports there has been no change in risk factors since their last session.     Patient reports there has been no change in protective factors since their last session.     Recommended that patient call 911 or go to the local ED should there be a change in any of these risk factors.     Appearance:   Appropriate    Eye Contact:   Good    Psychomotor Behavior: Normal    Attitude:   Cooperative  Interested Pleasant Attentive Rodrigue   Orientation:   All   Speech    Rate / Production: Normal/ Responsive Emotional    Volume:  Normal    Mood:    Depressed  Sad  Grieving   Affect:    Appropriate  Labile  Tearful   Thought Content:  Clear  Rumination    Thought Form:  Coherent  Logical    Insight:    Fair      Medication Review:   No current psychiatric medications prescribed     Medication Compliance:   Yes     Changes in Health Issues:   None reported     Chemical Use Review:   Substance Use: Chemical use reviewed, no active concerns identified      Tobacco Use: No current tobacco use.      Diagnosis:  1. Adjustment disorder with mixed anxiety and depressed mood        Collateral Reports Completed:   Not Applicable    PLAN: (Patient Tasks / Therapist Tasks / Other)  1.  Treatment plan created in session.  2.  Notice small joys in each day.  This can help highlight there is still something good in each day.  We can choose what we pay attention to.        Adriana Mccann, [facility] November 9, 2022                                                          ______________________________________________________________________    Individual Treatment Plan    Patient's Name: Indy Reyes  YOB: 1931    Date of Creation: 22  Date Treatment Plan Last Reviewed/Revised: 22    DSM5 Diagnoses: Adjustment Disorders  309.28 (F43.23) With mixed anxiety and depressed mood  Psychosocial / Contextual Factors: Mray experienced significant health decline in past year (cognitive decline, loss of speech and functioning) which was a brain tumor.  Spouse  suddenly in May 2022.  Brain surgery in 2022.  PROMIS (reviewed every 90 days): 33 on 11/3/22    Referral / Collaboration:  Referral to another professional/service is not indicated at this time..    Anticipated number of session for this episode of care: 9-12 sessions  Anticipation frequency of session: Every other week  Anticipated Duration of each session: 38-52 minutes  Treatment plan will be reviewed in 90 days or when goals have been changed.       MeasurableTreatment Goal(s) related to diagnosis / functional impairment(s)  Goal 1: Patient will increase understanding     I will know I've met my goal when my heart stops aching and when I can go to bed and wake up without crying, get out of this apartment.      Objective #A (Patient Action)    Patient will increase understanding of steps in the grief process.  Status: New - Date: 22     Intervention(s)  Therapist will provide education regarding grief, support groups, strategies to increase emotional experiencing to move through grief process, finding meaning after loss    Objective #B  Patient will Increase interest, engagement, and pleasure in doing things  Decrease frequency and intensity of feeling down, depressed, hopeless  Improve quantity and quality of night time sleep / decrease daytime naps.  Status: New - Date: 22     Intervention(s)  Therapist will assign homework to practice skills and discuss efficacy in  session  provide educational materials on grief, mourning, depression, CBT framework, mindfulness.    Patient has reviewed and agreed to the above plan.      Adriana Mccann, [facility]  November 9, 2022

## 2022-11-16 ENCOUNTER — VIRTUAL VISIT (OUTPATIENT)
Dept: PSYCHOLOGY | Facility: CLINIC | Age: 87
End: 2022-11-16
Payer: COMMERCIAL

## 2022-11-16 DIAGNOSIS — F43.23 ADJUSTMENT DISORDER WITH MIXED ANXIETY AND DEPRESSED MOOD: Primary | ICD-10-CM

## 2022-11-16 PROCEDURE — 90834 PSYTX W PT 45 MINUTES: CPT | Mod: 95

## 2022-11-16 ASSESSMENT — PATIENT HEALTH QUESTIONNAIRE - PHQ9
10. IF YOU CHECKED OFF ANY PROBLEMS, HOW DIFFICULT HAVE THESE PROBLEMS MADE IT FOR YOU TO DO YOUR WORK, TAKE CARE OF THINGS AT HOME, OR GET ALONG WITH OTHER PEOPLE: NOT DIFFICULT AT ALL
SUM OF ALL RESPONSES TO PHQ QUESTIONS 1-9: 16
SUM OF ALL RESPONSES TO PHQ QUESTIONS 1-9: 16

## 2022-12-08 ENCOUNTER — VIRTUAL VISIT (OUTPATIENT)
Dept: PSYCHOLOGY | Facility: CLINIC | Age: 87
End: 2022-12-08
Payer: COMMERCIAL

## 2022-12-08 DIAGNOSIS — F43.23 ADJUSTMENT DISORDER WITH MIXED ANXIETY AND DEPRESSED MOOD: Primary | ICD-10-CM

## 2022-12-08 PROCEDURE — 90834 PSYTX W PT 45 MINUTES: CPT | Mod: 95

## 2022-12-08 NOTE — PROGRESS NOTES
[facility]                                     Progress Note    Patient Name: Indy Reyes  Date: 12/8/22         Service Type: Individual      Session Start Time: 4:32pm  Session End Time: 5:21pm     Session Length: 49 minutes    Session #: 5    Attendees: Client    Service Modality:  Video Visit:      Provider verified identity through the following two step process.  Patient provided:  Patient is known previously to provider    Telemedicine Visit: The patient's condition can be safely assessed and treated via synchronous audio and visual telemedicine encounter.      Reason for Telemedicine Visit: Patient has requested telehealth visit and Patient convenience (e.g. access to timely appointments / distance to available provider)    Originating Site (Patient Location): Patient's home    Distant Site (Provider Location): [facility]    Consent:  The patient/guardian has verbally consented to: the potential risks and benefits of telemedicine (video visit) versus in person care; bill my insurance or make self-payment for services provided; and responsibility for payment of non-covered services.     Patient would like the video invitation sent by:  My Chart    Mode of Communication:  Video Conference via Amwell    Distant Location (Provider):  On-site    As the provider I attest to compliance with applicable laws and regulations related to telemedicine.    DATA  Interactive Complexity: No  Crisis: No        Progress Since Last Session (Related to Symptoms / Goals / Homework):   Symptoms: No change Mary reports significant depression (sadness, crying, sleep disturbance) since our last session.  Grief is primary  of depression symptoms, recent loss of .      Homework: Achieved / completed to satisfaction      Episode of Care Goals: Satisfactory progress - CONTEMPLATION (Considering change and yet undecided); Intervened by  assessing the negative and positive thinking (ambivalence) about behavior change     Current / Ongoing Stressors and Concerns:   Intense grief at loss of spouse in May 2022.  Beginning in late  client experienced health decline including loss of speech, cognitive decline; brain tumor was removed in 2022.  Client experiencing significant grief, losses and change.     Therapist met with client to review treatment interventions; provided updates to stressors since last session. Friend in building, time together and reminiscing, friend shared her grief. Client continues to grieve the loss of her .  She attends a grief group in assisted living [facility] weekly, 3-4x.  She walks daily to gain strength, distract herself.  Normed thoughts of , emotions attached to them, difference in level of grief versus other losses.  Therapist provided active, empathic and reflective listening, validation and processing support to client in session.  Client was engaged in therapeutic content and open to feedback.       Treatment Objective(s) Addressed in This Session:   Increase interest, engagement, and pleasure in doing things  Decrease frequency and intensity of feeling down, depressed, hopeless       Intervention:   Motivational Interviewing    MI Intervention: Expressed Empathy/Understanding, Supported Autonomy, Collaboration, Evocation, Open-ended questions, Reflections: simple and complex, Change talk (evoked) and Reframe     Change Talk Expressed by the Patient: Desire to change Ability to change Reasons to change Taking steps    Provider Response to Change Talk: E - Evoked more info from patient about behavior change, A - Affirmed patient's thoughts, decisions, or attempts at behavior change and R - Reflected patient's change talk      Assessments completed prior to visit:  The following assessments were completed by patient for this visit:  PHQ2:   PHQ-2 (  [employer]) 2022 2022 11/3/2022  4/18/2022 2/18/2022 11/4/2021 10/14/2021   Q1: Little interest or pleasure in doing things 1 1 1 3 1 0 0   Q2: Feeling down, depressed or hopeless 3 3 2 3 1 0 0   PHQ-2 Score 4 4 3 6 2 0 0   PHQ-2 Total Score (12-17 Years)- Positive if 3 or more points; Administer PHQ-A if positive - - - - - 0 0   Q1: Little interest or pleasure in doing things Several days Several days Several days Nearly every day Several days - Not at all   Q2: Feeling down, depressed or hopeless Nearly every day Nearly every day More than half the days Nearly every day Several days - Not at all   PHQ-2 Score 4 4 3 6 2 - 0     PHQ9:   PHQ-9 SCORE 5/30/2014 4/18/2022 5/27/2022 10/17/2022 11/3/2022 11/9/2022 11/16/2022   PHQ-9 Total Score 1 - - - - - -   PHQ-9 Total Score MyChart - 20 (Severe depression) 3 (Minimal depression) 12 (Moderate depression) 12 (Moderate depression) 11 (Moderate depression) 16 (Moderately severe depression)   PHQ-9 Total Score - 20 3 12 12 11 16     GAD2:   KAYLAN-2 11/3/2022 11/3/2022 11/16/2022   Feeling nervous, anxious, or on edge 1 1 0   Not being able to stop or control worrying 0 0 0   KAYLAN-2 Total Score 1 1 0     GAD7:   KAYLAN-7 SCORE 5/30/2014 10/17/2022   Total Score 1 -   Total Score - 8 (mild anxiety)   Total Score - 8     PROMIS 10-Global Health (only subscores and total score):   PROMIS-10 Scores Only 5/11/2017 7/24/2017 11/3/2022 11/3/2022 11/16/2022   Global Mental Health Score - 18 15 15 11   Global Physical Health Score - 19 18 18 18   PROMIS TOTAL - SUBSCORES - 37 33 33 29   Global Physical Health Score : Raw Score 18 (SUM : G03 - G06 - G07 - G08) 19 (SUM : G03 - G06 - G07 - G08) - - -   Global Mental Health Score : Raw Score 20 (SUM : G02 - G04 - G05 - G10) 18 (SUM : G02 - G04 - G05 - G10) - - -   Total (Physical + Mental Health Score) 38 37 - - -         ASSESSMENT: Current Emotional / Mental Status (status of significant symptoms):   Risk status (Self / Other harm or suicidal ideation)   Patient denies  current fears or concerns for personal safety.   Patient reports the following current or recent suicidal ideation or behaviors: passive thoughts of joining her late .  No plan or intent.. Family is grounding and supportive.   Patient denies current or recent homicidal ideation or behaviors.   Patient denies current or recent self injurious behavior or ideation.   Patient denies other safety concerns.   Patient reports there has been no change in risk factors since their last session.     Patient reports there has been no change in protective factors since their last session.     Recommended that patient call 911 or go to the local ED should there be a change in any of these risk factors.     Appearance:   Appropriate    Eye Contact:   Good    Psychomotor Behavior: Normal    Attitude:   Cooperative  Interested Pleasant Attentive Rodrigue   Orientation:   All   Speech    Rate / Production: Normal/ Responsive Emotional    Volume:  Normal    Mood:    Depressed  Sad  Grieving   Affect:    Appropriate  Labile  Tearful   Thought Content:  Clear  Rumination    Thought Form:  Coherent  Logical    Insight:    Fair      Medication Review:   No current psychiatric medications prescribed     Medication Compliance:   Yes     Changes in Health Issues:   None reported     Chemical Use Review:   Substance Use: Chemical use reviewed, no active concerns identified      Tobacco Use: No current tobacco use.      Diagnosis:  1. Adjustment disorder with mixed anxiety and depressed mood        Collateral Reports Completed:   Not Applicable    PLAN: (Patient Tasks / Therapist Tasks / Other)  1.  Strength is showing your emotions of grief.  This is role modeling grieving for your children and grandchildren.        Adriana Mccann, [facility] December 8, 2022                                                         ______________________________________________________________________    Individual Treatment Plan    Patient's Name:  Indy Reyes  YOB: 1931    Date of Creation: 22  Date Treatment Plan Last Reviewed/Revised: 22    DSM5 Diagnoses: Adjustment Disorders  309.28 (F43.23) With mixed anxiety and depressed mood  Psychosocial / Contextual Factors: Mary experienced significant health decline in past year (cognitive decline, loss of speech and functioning) which was a brain tumor.  Spouse  suddenly in May 2022.  Brain surgery in 2022.  PROMIS (reviewed every 90 days): 33 on 11/3/22    Referral / Collaboration:  Referral to another professional/service is not indicated at this time..    Anticipated number of session for this episode of care: 9-12 sessions  Anticipation frequency of session: Every other week  Anticipated Duration of each session: 38-52 minutes  Treatment plan will be reviewed in 90 days or when goals have been changed.       MeasurableTreatment Goal(s) related to diagnosis / functional impairment(s)  Goal 1: Patient will increase understanding     I will know I've met my goal when my heart stops aching and when I can go to bed and wake up without crying, get out of this apartment.      Objective #A (Patient Action)    Patient will increase understanding of steps in the grief process.  Status: New - Date: 22     Intervention(s)  Therapist will provide education regarding grief, support groups, strategies to increase emotional experiencing to move through grief process, finding meaning after loss    Objective #B  Patient will Increase interest, engagement, and pleasure in doing things  Decrease frequency and intensity of feeling down, depressed, hopeless  Improve quantity and quality of night time sleep / decrease daytime naps.  Status: New - Date: 22     Intervention(s)  Therapist will assign homework to practice skills and discuss efficacy in session  provide educational materials on grief, mourning, depression, CBT framework, mindfulness.    Patient has reviewed and agreed to  the above plan.      Adriana Mccann, [facility]  November 9, 2022

## 2022-12-12 ENCOUNTER — TRANSFERRED RECORDS (OUTPATIENT)
Dept: HEALTH INFORMATION MANAGEMENT | Facility: CLINIC | Age: 87
End: 2022-12-12

## 2022-12-14 ENCOUNTER — VIRTUAL VISIT (OUTPATIENT)
Dept: PSYCHOLOGY | Facility: CLINIC | Age: 87
End: 2022-12-14
Payer: COMMERCIAL

## 2022-12-14 DIAGNOSIS — F43.23 ADJUSTMENT DISORDER WITH MIXED ANXIETY AND DEPRESSED MOOD: Primary | ICD-10-CM

## 2022-12-14 PROCEDURE — 90834 PSYTX W PT 45 MINUTES: CPT | Mod: 95

## 2022-12-14 NOTE — PROGRESS NOTES
[facility]                                     Progress Note    Patient Name: Indy Reyes  Date: 12/14/22         Service Type: Individual      Session Start Time: 10:01am  Session End Time: 10:51am     Session Length: 50 minutes    Session #: 6    Attendees: Client    Service Modality:  Video Visit:      Provider verified identity through the following two step process.  Patient provided:  Patient is known previously to provider    Telemedicine Visit: The patient's condition can be safely assessed and treated via synchronous audio and visual telemedicine encounter.      Reason for Telemedicine Visit: Patient has requested telehealth visit, Patient convenience (e.g. access to timely appointments / distance to available provider) and Services only offered telehealth    Originating Site (Patient Location): Patient's home    Distant Site (Provider Location): Provider Remote Setting- Home Office    Consent:  The patient/guardian has verbally consented to: the potential risks and benefits of telemedicine (video visit) versus in person care; bill my insurance or make self-payment for services provided; and responsibility for payment of non-covered services.     Patient would like the video invitation sent by:  Send to e-mail at: [email or web address]    Mode of Communication:  Video Conference via Amwell    Distant Location (Provider):  Off-site    As the provider I attest to compliance with applicable laws and regulations related to telemedicine.    DATA  Interactive Complexity: No  Crisis: No        Progress Since Last Session (Related to Symptoms / Goals / Homework):   Symptoms: No change Mary reports significant depression (sadness, crying, sleep disturbance) since our last session.  Grief is primary  of depression symptoms, recent loss of .      Homework: Achieved / completed to satisfaction      Episode of Care Goals: Satisfactory progress - CONTEMPLATION (Considering change  "and yet undecided); Intervened by assessing the negative and positive thinking (ambivalence) about behavior change     Current / Ongoing Stressors and Concerns:   Intense grief at loss of spouse in May 2022.  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  Client experiencing significant grief, losses and change.     Therapist met with client to review treatment interventions; provided updates to stressors since last session. [place] and late 's birthday arriving is very emotionally taxing.  Holiday songs are hard, especially \"I'll be home for [place]\". Holiday celebration in [facility] is restricted due to COVID which is disappointing.  Discussed experiencing full array of emotions when grieving.  Normed and validated all emotions. Stifling feelings - better to bear the shame and not the pain (burp analogy to grief).  Resident whose wife is in memory care, talk to and stimulate her and connection versus silence (analogy for connection with loved ones).  \"In spirit he's in my heart\", yet, doing things alone is difficult.  Traveling to [place] with family, taking photo albums to reminisce and remember.  Therapist provided active, empathic and reflective listening, validation and processing support to client in session.  Client was engaged in therapeutic content and open to feedback.         Treatment Objective(s) Addressed in This Session:   Increase interest, engagement, and pleasure in doing things  Decrease frequency and intensity of feeling down, depressed, hopeless       Intervention:   Motivational Interviewing    MI Intervention: Expressed Empathy/Understanding, Supported Autonomy, Collaboration, Evocation, Open-ended questions, Reflections: simple and complex, Change talk (evoked) and Reframe     Change Talk Expressed by the Patient: Desire to change Ability to change Reasons to change Taking " steps    Provider Response to Change Talk: E - Evoked more info from patient about behavior change, A - Affirmed patient's thoughts, decisions, or attempts at behavior change and R - Reflected patient's change talk      Assessments completed prior to visit:  The following assessments were completed by patient for this visit:  PHQ2:   PHQ-2 ( 1999 [employer]) 11/16/2022 11/9/2022 11/3/2022 4/18/2022 2/18/2022 11/4/2021 10/14/2021   Q1: Little interest or pleasure in doing things 1 1 1 3 1 0 0   Q2: Feeling down, depressed or hopeless 3 3 2 3 1 0 0   PHQ-2 Score 4 4 3 6 2 0 0   PHQ-2 Total Score (12-17 Years)- Positive if 3 or more points; Administer PHQ-A if positive - - - - - 0 0   Q1: Little interest or pleasure in doing things Several days Several days Several days Nearly every day Several days - Not at all   Q2: Feeling down, depressed or hopeless Nearly every day Nearly every day More than half the days Nearly every day Several days - Not at all   PHQ-2 Score 4 4 3 6 2 - 0     PHQ9:   PHQ-9 SCORE 5/30/2014 4/18/2022 5/27/2022 10/17/2022 11/3/2022 11/9/2022 11/16/2022   PHQ-9 Total Score 1 - - - - - -   PHQ-9 Total Score MyChart - 20 (Severe depression) 3 (Minimal depression) 12 (Moderate depression) 12 (Moderate depression) 11 (Moderate depression) 16 (Moderately severe depression)   PHQ-9 Total Score - 20 3 12 12 11 16     GAD2:   KAYLAN-2 11/3/2022 11/3/2022 11/16/2022   Feeling nervous, anxious, or on edge 1 1 0   Not being able to stop or control worrying 0 0 0   KAYLAN-2 Total Score 1 1 0     GAD7:   KAYLAN-7 SCORE 5/30/2014 10/17/2022   Total Score 1 -   Total Score - 8 (mild anxiety)   Total Score - 8     PROMIS 10-Global Health (only subscores and total score):   PROMIS-10 Scores Only 5/11/2017 7/24/2017 11/3/2022 11/3/2022 11/16/2022   Global Mental Health Score - 18 15 15 11   Global Physical Health Score - 19 18 18 18   PROMIS TOTAL - SUBSCORES - 37 33 33 29   Global Physical Health Score : Raw Score 18 (SUM : G03  - G06 - G07 - G08) 19 (SUM : G03 - G06 - G07 - G08) - - -   Global Mental Health Score : Raw Score 20 (SUM : G02 - G04 - G05 - G10) 18 (SUM : G02 - G04 - G05 - G10) - - -   Total (Physical + Mental Health Score) 38 37 - - -         ASSESSMENT: Current Emotional / Mental Status (status of significant symptoms):   Risk status (Self / Other harm or suicidal ideation)   Patient denies current fears or concerns for personal safety.   Patient denies current or recent suicidal ideation or behaviors. Family is grounding and supportive if thoughts return.   Patient denies current or recent homicidal ideation or behaviors.   Patient denies current or recent self injurious behavior or ideation.   Patient denies other safety concerns.   Patient reports there has been no change in risk factors since their last session.     Patient reports there has been no change in protective factors since their last session.     Recommended that patient call 911 or go to the local ED should there be a change in any of these risk factors.     Appearance:   Appropriate    Eye Contact:   Good    Psychomotor Behavior: Normal    Attitude:   Cooperative  Interested Pleasant Attentive Rodrigue   Orientation:   All   Speech    Rate / Production: Normal/ Responsive Emotional    Volume:  Normal    Mood:    Depressed  Sad  Grieving   Affect:    Appropriate  Labile  Tearful   Thought Content:  Clear  Rumination    Thought Form:  Coherent  Logical    Insight:    Fair      Medication Review:   No current psychiatric medications prescribed     Medication Compliance:   Yes     Changes in Health Issues:   None reported     Chemical Use Review:   Substance Use: Chemical use reviewed, no active concerns identified      Tobacco Use: No current tobacco use.      Diagnosis:  1. Adjustment disorder with mixed anxiety and depressed mood        Collateral Reports Completed:   Not Applicable    PLAN: (Patient Tasks / Therapist Tasks / Other)  1.  Allow yourself to feel  your feelings - remember the burp analogy, better to feel the shame than the pain.        Adriana NesbittGenesisSoham, [facility] 2022                                                         ______________________________________________________________________    Individual Treatment Plan    Patient's Name: Indy Reyes  YOB: 1931    Date of Creation: 22  Date Treatment Plan Last Reviewed/Revised: 22    DSM5 Diagnoses: Adjustment Disorders  309.28 (F43.23) With mixed anxiety and depressed mood  Psychosocial / Contextual Factors: Mary experienced significant health decline in past year (cognitive decline, loss of speech and functioning) which was a brain tumor.  Spouse  suddenly in May 2022.  Brain surgery in 2022.  PROMIS (reviewed every 90 days): 33 on 11/3/22    Referral / Collaboration:  Referral to another professional/service is not indicated at this time..    Anticipated number of session for this episode of care: 9-12 sessions  Anticipation frequency of session: Every other week  Anticipated Duration of each session: 38-52 minutes  Treatment plan will be reviewed in 90 days or when goals have been changed.       MeasurableTreatment Goal(s) related to diagnosis / functional impairment(s)  Goal 1: Patient will increase understanding     I will know I've met my goal when my heart stops aching and when I can go to bed and wake up without crying, get out of this apartment.      Objective #A (Patient Action)    Patient will increase understanding of steps in the grief process.  Status: New - Date: 22     Intervention(s)  Therapist will provide education regarding grief, support groups, strategies to increase emotional experiencing to move through grief process, finding meaning after loss    Objective #B  Patient will Increase interest, engagement, and pleasure in doing things  Decrease frequency and intensity of feeling down, depressed, hopeless  Improve quantity and  quality of night time sleep / decrease daytime naps.  Status: New - Date: 11/9/22     Intervention(s)  Therapist will assign homework to practice skills and discuss efficacy in session  provide educational materials on grief, mourning, depression, CBT framework, mindfulness.    Patient has reviewed and agreed to the above plan.      Adriana Mccann, ADEN  November 9, 2022

## 2022-12-21 ENCOUNTER — VIRTUAL VISIT (OUTPATIENT)
Dept: PSYCHOLOGY | Facility: CLINIC | Age: 87
End: 2022-12-21
Payer: COMMERCIAL

## 2022-12-21 DIAGNOSIS — F43.23 ADJUSTMENT DISORDER WITH MIXED ANXIETY AND DEPRESSED MOOD: Primary | ICD-10-CM

## 2022-12-21 PROCEDURE — 90834 PSYTX W PT 45 MINUTES: CPT | Mod: 95

## 2022-12-21 NOTE — PROGRESS NOTES
[facility]                                     Progress Note    Patient Name: Indy Reyes  Date: 12/21/22         Service Type: Individual      Session Start Time: 10:01am  Session End Time: 10:51am     Session Length: 50 minutes    Session #: 7    Attendees: Client    Service Modality:  Video Visit:      Provider verified identity through the following two step process.  Patient provided:  Patient is known previously to provider    Telemedicine Visit: The patient's condition can be safely assessed and treated via synchronous audio and visual telemedicine encounter.      Reason for Telemedicine Visit: Patient has requested telehealth visit, Patient convenience (e.g. access to timely appointments / distance to available provider) and Services only offered telehealth    Originating Site (Patient Location): Patient's home    Distant Site (Provider Location): Provider Remote Setting- Home Office    Consent:  The patient/guardian has verbally consented to: the potential risks and benefits of telemedicine (video visit) versus in person care; bill my insurance or make self-payment for services provided; and responsibility for payment of non-covered services.     Patient would like the video invitation sent by:  Send to e-mail at: [email or web address]    Mode of Communication:  Video Conference via Amwell    Distant Location (Provider):  Off-site    As the provider I attest to compliance with applicable laws and regulations related to telemedicine.    DATA  Interactive Complexity: No  Crisis: No        Progress Since Last Session (Related to Symptoms / Goals / Homework):   Symptoms: No change Mary reports significant depression (sadness, crying, sleep disturbance) since our last session.  Grief is primary  of depression symptoms, recent loss of .      Homework: Achieved / completed to satisfaction      Episode of Care Goals: Satisfactory progress - PREPARATION (Decided to change -  "considering how); Intervened by negotiating a change plan and determining options / strategies for behavior change, identifying triggers, exploring social supports, and working towards setting a date to begin behavior change     Current / Ongoing Stressors and Concerns:   Intense grief at loss of spouse in May 2022.  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  Client experiencing significant grief, losses and change.     Therapist met with client to review treatment interventions; provided updates to stressors since last session.  Client continues to move through grieving late .  Difficult to live alone, \"I've never been alone\", 11 of 12 kids.  Spiritually not alone, knows god is always with her. \"trying to be a living example to my children and grandchildren\".  Tears - juan.  Reflected changes in session content which indicates more space for conversations and different topics.  Celebrating family tradition of cookie making with grandchildren.  Efraín's bill of rights. Therapist provided active, empathic and reflective listening, validation and processing support to client in session.  Client was engaged in therapeutic content and open to feedback.         Treatment Objective(s) Addressed in This Session:   Increase interest, engagement, and pleasure in doing things  Decrease frequency and intensity of feeling down, depressed, hopeless  increase understanding of steps in the grief process       Intervention:   Motivational Interviewing    MI Intervention: Expressed Empathy/Understanding, Supported Autonomy, Collaboration, Evocation, Open-ended questions, Reflections: simple and complex, Change talk (evoked) and Reframe     Change Talk Expressed by the Patient: Desire to change Ability to change Reasons to change Taking steps    Provider Response to Change Talk: E - Evoked more info from patient about behavior " change, A - Affirmed patient's thoughts, decisions, or attempts at behavior change and R - Reflected patient's change talk      Assessments completed prior to visit:  The following assessments were completed by patient for this visit:  PHQ2:   PHQ-2 ( 1999 Pfizer) 11/16/2022 11/9/2022 11/3/2022 4/18/2022 2/18/2022 11/4/2021 10/14/2021   Q1: Little interest or pleasure in doing things 1 1 1 3 1 0 0   Q2: Feeling down, depressed or hopeless 3 3 2 3 1 0 0   PHQ-2 Score 4 4 3 6 2 0 0   PHQ-2 Total Score (12-17 Years)- Positive if 3 or more points; Administer PHQ-A if positive - - - - - 0 0   Q1: Little interest or pleasure in doing things Several days Several days Several days Nearly every day Several days - Not at all   Q2: Feeling down, depressed or hopeless Nearly every day Nearly every day More than half the days Nearly every day Several days - Not at all   PHQ-2 Score 4 4 3 6 2 - 0     PHQ9:   PHQ-9 SCORE 5/30/2014 4/18/2022 5/27/2022 10/17/2022 11/3/2022 11/9/2022 11/16/2022   PHQ-9 Total Score 1 - - - - - -   PHQ-9 Total Score MyChart - 20 (Severe depression) 3 (Minimal depression) 12 (Moderate depression) 12 (Moderate depression) 11 (Moderate depression) 16 (Moderately severe depression)   PHQ-9 Total Score - 20 3 12 12 11 16     GAD2:   KAYLAN-2 11/3/2022 11/3/2022 11/16/2022   Feeling nervous, anxious, or on edge 1 1 0   Not being able to stop or control worrying 0 0 0   KAYLAN-2 Total Score 1 1 0     GAD7:   KAYLAN-7 SCORE 5/30/2014 10/17/2022   Total Score 1 -   Total Score - 8 (mild anxiety)   Total Score - 8     PROMIS 10-Global Health (only subscores and total score):   PROMIS-10 Scores Only 5/11/2017 7/24/2017 11/3/2022 11/3/2022 11/16/2022   Global Mental Health Score - 18 15 15 11   Global Physical Health Score - 19 18 18 18   PROMIS TOTAL - SUBSCORES - 37 33 33 29   Global Physical Health Score : Raw Score 18 (SUM : G03 - G06 - G07 - G08) 19 (SUM : G03 - G06 - G07 - G08) - - -   Global Mental Health Score :  Raw Score 20 (SUM : G02 - G04 - G05 - G10) 18 (SUM : G02 - G04 - G05 - G10) - - -   Total (Physical + Mental Health Score) 38 37 - - -         ASSESSMENT: Current Emotional / Mental Status (status of significant symptoms):   Risk status (Self / Other harm or suicidal ideation)   Patient denies current fears or concerns for personal safety.   Patient denies current or recent suicidal ideation or behaviors. Family is grounding and supportive if thoughts return.   Patient denies current or recent homicidal ideation or behaviors.   Patient denies current or recent self injurious behavior or ideation.   Patient denies other safety concerns.   Patient reports there has been no change in risk factors since their last session.     Patient reports there has been no change in protective factors since their last session.     Recommended that patient call 911 or go to the local ED should there be a change in any of these risk factors.     Appearance:   Appropriate    Eye Contact:   Good    Psychomotor Behavior: Normal    Attitude:   Cooperative  Interested Pleasant Attentive Rodrigue   Orientation:   All   Speech    Rate / Production: Normal/ Responsive Emotional    Volume:  Normal    Mood:    Depressed  Sad  Grieving   Affect:    Appropriate  Labile  Tearful   Thought Content:  Clear  Rumination    Thought Form:  Coherent  Logical    Insight:    Fair      Medication Review:   No current psychiatric medications prescribed     Medication Compliance:   Yes     Changes in Health Issues:   None reported     Chemical Use Review:   Substance Use: Chemical use reviewed, no active concerns identified      Tobacco Use: No current tobacco use.      Diagnosis:  1. Adjustment disorder with mixed anxiety and depressed mood        Collateral Reports Completed:   Not Applicable    PLAN: (Patient Tasks / Therapist Tasks / Other)  1.  Continue to allow yourself to feel your feelings - remember the burp analogy, better to feel the shame than the  pain.  2.  Review the Mourner's Bill of Rights.  You deserve to grieve in ways that are right for you.      Adriana Siobhan, SW 2023                                                         ______________________________________________________________________    Individual Treatment Plan    Patient's Name: Indy Reyes  YOB: 1931    Date of Creation: 22  Date Treatment Plan Last Reviewed/Revised: 22    DSM5 Diagnoses: Adjustment Disorders  309.28 (F43.23) With mixed anxiety and depressed mood  Psychosocial / Contextual Factors: Mary experienced significant health decline in past year (cognitive decline, loss of speech and functioning) which was a brain tumor.  Spouse  suddenly in May 2022.  Brain surgery in 2022.  PROMIS (reviewed every 90 days): 33 on 11/3/22    Referral / Collaboration:  Referral to another professional/service is not indicated at this time..    Anticipated number of session for this episode of care: 9-12 sessions  Anticipation frequency of session: Every other week  Anticipated Duration of each session: 38-52 minutes  Treatment plan will be reviewed in 90 days or when goals have been changed.       MeasurableTreatment Goal(s) related to diagnosis / functional impairment(s)  Goal 1: Patient will increase understanding     I will know I've met my goal when my heart stops aching and when I can go to bed and wake up without crying, get out of this apartment.      Objective #A (Patient Action)    Patient will increase understanding of steps in the grief process.  Status: New - Date: 22     Intervention(s)  Therapist will provide education regarding grief, support groups, strategies to increase emotional experiencing to move through grief process, finding meaning after loss    Objective #B  Patient will Increase interest, engagement, and pleasure in doing things  Decrease frequency and intensity of feeling down, depressed,  hopeless  Improve quantity and quality of night time sleep / decrease daytime naps.  Status: New - Date: 11/9/22     Intervention(s)  Therapist will assign homework to practice skills and discuss efficacy in session  provide educational materials on grief, mourning, depression, CBT framework, mindfulness.    Patient has reviewed and agreed to the above plan.      Adriana Mccann, MARGARETTE  November 9, 2022

## 2022-12-28 ENCOUNTER — VIRTUAL VISIT (OUTPATIENT)
Dept: PSYCHOLOGY | Facility: CLINIC | Age: 87
End: 2022-12-28
Payer: COMMERCIAL

## 2022-12-28 DIAGNOSIS — F43.23 ADJUSTMENT DISORDER WITH MIXED ANXIETY AND DEPRESSED MOOD: Primary | ICD-10-CM

## 2022-12-28 PROCEDURE — 90834 PSYTX W PT 45 MINUTES: CPT | Mod: 95

## 2022-12-28 NOTE — PROGRESS NOTES
[facility]                                     Progress Note    Patient Name: Indy Reyes  Date: 12/28/2022         Service Type: Individual      Session Start Time: 9:01am  Session End Time: 9:51am     Session Length: 50 minutes    Session #: 7    Attendees: Client and youngest son    Service Modality:  Video Visit:      Provider verified identity through the following two step process.  Patient provided:  Patient is known previously to provider    Telemedicine Visit: The patient's condition can be safely assessed and treated via synchronous audio and visual telemedicine encounter.      Reason for Telemedicine Visit: Patient has requested telehealth visit, Patient convenience (e.g. access to timely appointments / distance to available provider) and Services only offered telehealth    Originating Site (Patient Location): Patient's home    Distant Site (Provider Location): Provider Remote Setting- Home Office    Consent:  The patient/guardian has verbally consented to: the potential risks and benefits of telemedicine (video visit) versus in person care; bill my insurance or make self-payment for services provided; and responsibility for payment of non-covered services.     Patient would like the video invitation sent by:  Send to e-mail at: [email or web address]    Mode of Communication:  Video Conference via Amwell    Distant Location (Provider):  Off-site    As the provider I attest to compliance with applicable laws and regulations related to telemedicine.    DATA  Interactive Complexity: No  Crisis: No        Progress Since Last Session (Related to Symptoms / Goals / Homework):   Symptoms: Worsening Mary reports significant depression (sadness, crying, sleep disturbance) since our last session.  Grief is primary  of depression symptoms, recent loss of .      Homework: Achieved / completed to satisfaction      Episode of Care Goals: Satisfactory progress - PREPARATION  "(Decided to change - considering how); Intervened by negotiating a change plan and determining options / strategies for behavior change, identifying triggers, exploring social supports, and working towards setting a date to begin behavior change     Current / Ongoing Stressors and Concerns:   Intense grief at loss of spouse in May 2022.  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  Client experiencing significant grief, losses and change.     Therapist met with client to review treatment interventions; provided updates to stressors since last session.  Holidays were very difficult.  Significant grief experienced by client and family.  Late  and late daughter's birthday is in 2 days, difficult.  Planning time together as a family. Looking forward to doing nothing \"being lazy\" in [place].  Sad feels - empty, lonely, downright sad, trying to muster pleasant memories, feel heart squeezed.   was \"crutch\", stability.    Love language is acts of service which she has given to spouse and family over the years, now no one to care for but herself.  Difficult to ask for or accept help.  Walking as distraction and avoidance, enjoys freedom to walk, empty my brain or fill it.  Feeling feelings versus stuffing.   Therapist provided active, empathic and reflective listening, validation and processing support to client in session.  Client was engaged in therapeutic content and open to feedback.       Treatment Objective(s) Addressed in This Session:   Increase interest, engagement, and pleasure in doing things  Decrease frequency and intensity of feeling down, depressed, hopeless       Intervention:   Motivational Interviewing    MI Intervention: Expressed Empathy/Understanding, Supported Autonomy, Collaboration, Evocation, Open-ended questions, Reflections: simple and complex, Change talk (evoked) and Reframe     Change " Talk Expressed by the Patient: Desire to change Ability to change Reasons to change Taking steps    Provider Response to Change Talk: E - Evoked more info from patient about behavior change, A - Affirmed patient's thoughts, decisions, or attempts at behavior change and R - Reflected patient's change talk      Assessments completed prior to visit:  The following assessments were completed by patient for this visit:  PHQ2:   PHQ-2 ( 1999 [employer]) 11/16/2022 11/9/2022 11/3/2022 4/18/2022 2/18/2022 11/4/2021 10/14/2021   Q1: Little interest or pleasure in doing things 1 1 1 3 1 0 0   Q2: Feeling down, depressed or hopeless 3 3 2 3 1 0 0   PHQ-2 Score 4 4 3 6 2 0 0   PHQ-2 Total Score (12-17 Years)- Positive if 3 or more points; Administer PHQ-A if positive - - - - - 0 0   Q1: Little interest or pleasure in doing things Several days Several days Several days Nearly every day Several days - Not at all   Q2: Feeling down, depressed or hopeless Nearly every day Nearly every day More than half the days Nearly every day Several days - Not at all   PHQ-2 Score 4 4 3 6 2 - 0     PHQ9:   PHQ-9 SCORE 5/30/2014 4/18/2022 5/27/2022 10/17/2022 11/3/2022 11/9/2022 11/16/2022   PHQ-9 Total Score 1 - - - - - -   PHQ-9 Total Score MyChart - 20 (Severe depression) 3 (Minimal depression) 12 (Moderate depression) 12 (Moderate depression) 11 (Moderate depression) 16 (Moderately severe depression)   PHQ-9 Total Score - 20 3 12 12 11 16     GAD2:   KAYLAN-2 11/3/2022 11/3/2022 11/16/2022   Feeling nervous, anxious, or on edge 1 1 0   Not being able to stop or control worrying 0 0 0   KAYLAN-2 Total Score 1 1 0     GAD7:   KAYLAN-7 SCORE 5/30/2014 10/17/2022   Total Score 1 -   Total Score - 8 (mild anxiety)   Total Score - 8     PROMIS 10-Global Health (only subscores and total score):   PROMIS-10 Scores Only 5/11/2017 7/24/2017 11/3/2022 11/3/2022 11/16/2022   Global Mental Health Score - 18 15 15 11   Global Physical Health Score - 19 18 18 18    PROMIS TOTAL - SUBSCORES - 37 33 33 29   Global Physical Health Score : Raw Score 18 (SUM : G03 - G06 - G07 - G08) 19 (SUM : G03 - G06 - G07 - G08) - - -   Global Mental Health Score : Raw Score 20 (SUM : G02 - G04 - G05 - G10) 18 (SUM : G02 - G04 - G05 - G10) - - -   Total (Physical + Mental Health Score) 38 37 - - -         ASSESSMENT: Current Emotional / Mental Status (status of significant symptoms):   Risk status (Self / Other harm or suicidal ideation)   Patient denies current fears or concerns for personal safety.   Patient denies current or recent suicidal ideation or behaviors.    Patient denies current or recent homicidal ideation or behaviors.   Patient denies current or recent self injurious behavior or ideation.   Patient denies other safety concerns.   Patient reports there has been no change in risk factors since their last session.     Patient reports there has been no change in protective factors since their last session.     Recommended that patient call 911 or go to the local ED should there be a change in any of these risk factors.     Appearance:   Appropriate    Eye Contact:   Good    Psychomotor Behavior: Normal    Attitude:   Cooperative  Interested Pleasant Attentive Rodrigue   Orientation:   All   Speech    Rate / Production: Normal/ Responsive Emotional    Volume:  Normal    Mood:    Depressed  Sad  Grieving   Affect:    Appropriate  Labile  Tearful   Thought Content:  Clear  Rumination    Thought Form:  Coherent  Logical    Insight:    Fair      Medication Review:   No current psychiatric medications prescribed     Medication Compliance:   Yes     Changes in Health Issues:   None reported     Chemical Use Review:   Substance Use: Chemical use reviewed, no active concerns identified      Tobacco Use: No current tobacco use.      Diagnosis:  1. Adjustment disorder with mixed anxiety and depressed mood        Collateral Reports Completed:   Not Applicable    PLAN: (Patient Tasks / Therapist  Tasks / Other)  1.  Allow yourself to feel your feelings - remember the burp analogy, better to feel the shame than the pain.  2.  Review the Mourner's Bill of Rights to remind yourself you can mourn/grieve in your own way.  3.  Begin to build new memories with your self and your family.    Next session:  Meaning and purpose, who am I now?      Adriana Siobhan, [facility] 2022                                                         ______________________________________________________________________    Individual Treatment Plan    Patient's Name: Indy Reyes  YOB: 1931    Date of Creation: 22  Date Treatment Plan Last Reviewed/Revised: 22    DSM5 Diagnoses: Adjustment Disorders  309.28 (F43.23) With mixed anxiety and depressed mood  Psychosocial / Contextual Factors: Mary experienced significant health decline in past year (cognitive decline, loss of speech and functioning) which was a brain tumor.  Spouse  suddenly in May 2022.  Brain surgery in 2022.  PROMIS (reviewed every 90 days): 33 on 11/3/22    Referral / Collaboration:  Referral to another professional/service is not indicated at this time..    Anticipated number of session for this episode of care: 9-12 sessions  Anticipation frequency of session: Every other week  Anticipated Duration of each session: 38-52 minutes  Treatment plan will be reviewed in 90 days or when goals have been changed.       MeasurableTreatment Goal(s) related to diagnosis / functional impairment(s)  Goal 1: Patient will increase understanding     I will know I've met my goal when my heart stops aching and when I can go to bed and wake up without crying, get out of this apartment.      Objective #A (Patient Action)    Patient will increase understanding of steps in the grief process.  Status: New - Date: 22     Intervention(s)  Therapist will provide education regarding grief, support groups, strategies to increase  emotional experiencing to move through grief process, finding meaning after loss    Objective #B  Patient will Increase interest, engagement, and pleasure in doing things  Decrease frequency and intensity of feeling down, depressed, hopeless  Improve quantity and quality of night time sleep / decrease daytime naps.  Status: New - Date: 11/9/22     Intervention(s)  Therapist will assign homework to practice skills and discuss efficacy in session  provide educational materials on grief, mourning, depression, CBT framework, mindfulness.    Patient has reviewed and agreed to the above plan.      Adriana Mccann, ADEN  November 9, 2022

## 2022-12-28 NOTE — PATIENT INSTRUCTIONS
PLAN: (Patient Tasks / Therapist Tasks / Other)  1.  Allow yourself to feel your feelings - remember the burp analogy, better to feel the shame than the pain.  2.  Review the Mourner's Bill of Rights to remind yourself you can mourn/grieve in your own way.  3.  Begin to build new memories with your self and your family.    Next session:  Meaning and purpose, who am I now?

## 2023-01-05 NOTE — PATIENT INSTRUCTIONS
PLAN: (Patient Tasks / Therapist Tasks / Other)  1.  Continue to allow yourself to feel your feelings - remember the burp analogy, better to feel the shame than the pain.  2.  Review the Mourner's Bill of Rights.  You deserve to grieve in ways that are right for you.

## 2023-01-31 ENCOUNTER — VIRTUAL VISIT (OUTPATIENT)
Dept: PSYCHOLOGY | Facility: CLINIC | Age: 88
End: 2023-01-31
Payer: COMMERCIAL

## 2023-01-31 DIAGNOSIS — F43.23 ADJUSTMENT DISORDER WITH MIXED ANXIETY AND DEPRESSED MOOD: Primary | ICD-10-CM

## 2023-01-31 PROCEDURE — 90834 PSYTX W PT 45 MINUTES: CPT | Mod: 95

## 2023-01-31 NOTE — PROGRESS NOTES
[facility]                                     Progress Note    Patient Name: Indy Reyes  Date: 01/31/2023         Service Type: Individual      Session Start Time: 9:01am  Session End Time: 9:51am     Session Length: 50 minutes    Session #: 8    Attendees: Client and youngest son Fady    Service Modality:  Video Visit:      Provider verified identity through the following two step process.  Patient provided:  Patient is known previously to provider    Telemedicine Visit: The patient's condition can be safely assessed and treated via synchronous audio and visual telemedicine encounter.      Reason for Telemedicine Visit: Patient has requested telehealth visit, Patient convenience (e.g. access to timely appointments / distance to available provider) and Services only offered telehealth    Originating Site (Patient Location): Patient's home    Distant Site (Provider Location): Provider Remote Setting- Home Office    Consent:  The patient/guardian has verbally consented to: the potential risks and benefits of telemedicine (video visit) versus in person care; bill my insurance or make self-payment for services provided; and responsibility for payment of non-covered services.     Patient would like the video invitation sent by:  Send to e-mail at: [email or web address]    Mode of Communication:  Video Conference via Amwell    Distant Location (Provider):  Off-site    As the provider I attest to compliance with applicable laws and regulations related to telemedicine.    DATA  Interactive Complexity: No  Crisis: No        Progress Since Last Session (Related to Symptoms / Goals / Homework):   Symptoms: No change Mary reports continued significant depression (sadness, crying, sleep disturbance) since our last session.       Homework: Achieved / completed to satisfaction      Episode of Care Goals: Satisfactory progress - PREPARATION (Decided to change - considering how); Intervened by  "negotiating a change plan and determining options / strategies for behavior change, identifying triggers, exploring social supports, and working towards setting a date to begin behavior change     Current / Ongoing Stressors and Concerns:   Intense grief at loss of spouse in May 2022.  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  Client experiencing significant grief, losses and change.     Therapist met with client to review treatment interventions; provided updates to stressors since last session.  Matriarch of the family now.  Feels \"not good\", decisions to be made which she usually allowed  to make.  Reflections on celebration of life, son made wooden box for father's ashes, tribute from sons.  Time in [place] with family, walked 3x a day.  Feels spouse in her heart.  Purpose - encouragement, cheering for others, \"making an impact\".  GLAD practice, journaling daily intention and support to others as tool to move forward each day.   Therapist provided active, empathic and reflective listening, validation and processing support to client in session.  Client was engaged in therapeutic content and open to feedback.         Treatment Objective(s) Addressed in This Session:   Increase interest, engagement, and pleasure in doing things  Decrease frequency and intensity of feeling down, depressed, hopeless       Intervention:   Motivational Interviewing    MI Intervention: Expressed Empathy/Understanding, Supported Autonomy, Collaboration, Evocation, Open-ended questions, Reflections: simple and complex, Change talk (evoked) and Reframe     Change Talk Expressed by the Patient: Desire to change Ability to change Reasons to change Taking steps    Provider Response to Change Talk: E - Evoked more info from patient about behavior change, A - Affirmed patient's thoughts, decisions, or attempts at behavior change and R - " Reflected patient's change talk      Assessments completed prior to visit:  The following assessments were completed by patient for this visit:  PHQ2:   PHQ-2 ( 1999 [employer]) 11/16/2022 11/9/2022 11/3/2022 4/18/2022 2/18/2022 11/4/2021 10/14/2021   Q1: Little interest or pleasure in doing things 1 1 1 3 1 0 0   Q2: Feeling down, depressed or hopeless 3 3 2 3 1 0 0   PHQ-2 Score 4 4 3 6 2 0 0   PHQ-2 Total Score (12-17 Years)- Positive if 3 or more points; Administer PHQ-A if positive - - - - - 0 0   Q1: Little interest or pleasure in doing things Several days Several days Several days Nearly every day Several days - Not at all   Q2: Feeling down, depressed or hopeless Nearly every day Nearly every day More than half the days Nearly every day Several days - Not at all   PHQ-2 Score 4 4 3 6 2 - 0     PHQ9:   PHQ-9 SCORE 5/30/2014 4/18/2022 5/27/2022 10/17/2022 11/3/2022 11/9/2022 11/16/2022   PHQ-9 Total Score 1 - - - - - -   PHQ-9 Total Score MyChart - 20 (Severe depression) 3 (Minimal depression) 12 (Moderate depression) 12 (Moderate depression) 11 (Moderate depression) 16 (Moderately severe depression)   PHQ-9 Total Score - 20 3 12 12 11 16     GAD2:   KAYLAN-2 11/3/2022 11/3/2022 11/16/2022   Feeling nervous, anxious, or on edge 1 1 0   Not being able to stop or control worrying 0 0 0   KAYLAN-2 Total Score 1 1 0     GAD7:   KAYLAN-7 SCORE 5/30/2014 10/17/2022   Total Score 1 -   Total Score - 8 (mild anxiety)   Total Score - 8     PROMIS 10-Global Health (only subscores and total score):   PROMIS-10 Scores Only 5/11/2017 7/24/2017 11/3/2022 11/3/2022 11/16/2022   Global Mental Health Score - 18 15 15 11   Global Physical Health Score - 19 18 18 18   PROMIS TOTAL - SUBSCORES - 37 33 33 29   Global Physical Health Score : Raw Score 18 (SUM : G03 - G06 - G07 - G08) 19 (SUM : G03 - G06 - G07 - G08) - - -   Global Mental Health Score : Raw Score 20 (SUM : G02 - G04 - G05 - G10) 18 (SUM : G02 - G04 - G05 - G10) - - -   Total  (Physical + Mental Health Score) 38 37 - - -         ASSESSMENT: Current Emotional / Mental Status (status of significant symptoms):   Risk status (Self / Other harm or suicidal ideation)   Patient denies current fears or concerns for personal safety.   Patient denies current or recent suicidal ideation or behaviors.    Patient denies current or recent homicidal ideation or behaviors.   Patient denies current or recent self injurious behavior or ideation.   Patient denies other safety concerns.   Patient reports there has been no change in risk factors since their last session.     Patient reports there has been no change in protective factors since their last session.     Recommended that patient call 911 or go to the local ED should there be a change in any of these risk factors.     Appearance:   Appropriate    Eye Contact:   Good    Psychomotor Behavior: Normal    Attitude:   Cooperative  Interested Pleasant Attentive Rodrigue   Orientation:   All   Speech    Rate / Production: Normal/ Responsive Emotional    Volume:  Normal    Mood:    Depressed  Sad  Grieving   Affect:    Appropriate  Labile  Tearful   Thought Content:  Clear  Rumination    Thought Form:  Coherent  Logical    Insight:    Fair      Medication Review:   No current psychiatric medications prescribed     Medication Compliance:   Yes     Changes in Health Issues:   None reported     Chemical Use Review:   Substance Use: Chemical use reviewed, no active concerns identified      Tobacco Use: No current tobacco use.      Diagnosis:  1. Adjustment disorder with mixed anxiety and depressed mood        Collateral Reports Completed:   Not Applicable    PLAN: (Patient Tasks / Therapist Tasks / Other)  1.  Allow yourself to feel your feelings - remember the burp analogy, better to feel the shame than the pain.  2.  GLAD practice 2x between now and our next session.  Sent via Mobly and by mail.  3.  Walk and encourage someone - write down who you cheered on  that day.    Next session:  Meaning and purpose, who am I now?      Adriana HooksChristina, LGSW 2023                                                         ______________________________________________________________________    Individual Treatment Plan    Patient's Name: Indy Reyes  YOB: 1931    Date of Creation: 22  Date Treatment Plan Last Reviewed/Revised: 22    DSM5 Diagnoses: Adjustment Disorders  309.28 (F43.23) With mixed anxiety and depressed mood  Psychosocial / Contextual Factors: Mary experienced significant health decline in past year (cognitive decline, loss of speech and functioning) which was a brain tumor.  Spouse  suddenly in May 2022.  Brain surgery in 2022.  PROMIS (reviewed every 90 days): 33 on 11/3/22    Referral / Collaboration:  Referral to another professional/service is not indicated at this time..    Anticipated number of session for this episode of care: 9-12 sessions  Anticipation frequency of session: Every other week  Anticipated Duration of each session: 38-52 minutes  Treatment plan will be reviewed in 90 days or when goals have been changed.       MeasurableTreatment Goal(s) related to diagnosis / functional impairment(s)  Goal 1: Patient will increase understanding     I will know I've met my goal when my heart stops aching and when I can go to bed and wake up without crying, get out of this apartment.      Objective #A (Patient Action)    Patient will increase understanding of steps in the grief process.  Status: New - Date: 22     Intervention(s)  Therapist will provide education regarding grief, support groups, strategies to increase emotional experiencing to move through grief process, finding meaning after loss    Objective #B  Patient will Increase interest, engagement, and pleasure in doing things  Decrease frequency and intensity of feeling down, depressed, hopeless  Improve quantity and quality of night time  sleep / decrease daytime naps.  Status: New - Date: 11/9/22     Intervention(s)  Therapist will assign homework to practice skills and discuss efficacy in session  provide educational materials on grief, mourning, depression, CBT framework, mindfulness.    Patient has reviewed and agreed to the above plan.      Adriana Mccann, ADEN  November 9, 2022

## 2023-02-01 ENCOUNTER — OFFICE VISIT (OUTPATIENT)
Dept: FAMILY MEDICINE | Facility: CLINIC | Age: 88
End: 2023-02-01
Payer: COMMERCIAL

## 2023-02-01 ENCOUNTER — ANCILLARY PROCEDURE (OUTPATIENT)
Dept: GENERAL RADIOLOGY | Facility: CLINIC | Age: 88
End: 2023-02-01
Attending: FAMILY MEDICINE
Payer: COMMERCIAL

## 2023-02-01 VITALS
DIASTOLIC BLOOD PRESSURE: 56 MMHG | RESPIRATION RATE: 18 BRPM | HEART RATE: 94 BPM | WEIGHT: 122.4 LBS | SYSTOLIC BLOOD PRESSURE: 118 MMHG | HEIGHT: 61 IN | TEMPERATURE: 98.2 F | OXYGEN SATURATION: 98 % | BODY MASS INDEX: 23.11 KG/M2

## 2023-02-01 DIAGNOSIS — M54.50 CHRONIC BILATERAL LOW BACK PAIN WITHOUT SCIATICA: Primary | ICD-10-CM

## 2023-02-01 DIAGNOSIS — M54.50 CHRONIC BILATERAL LOW BACK PAIN WITHOUT SCIATICA: ICD-10-CM

## 2023-02-01 DIAGNOSIS — G89.29 CHRONIC BILATERAL LOW BACK PAIN WITHOUT SCIATICA: ICD-10-CM

## 2023-02-01 DIAGNOSIS — G89.29 CHRONIC BILATERAL LOW BACK PAIN WITHOUT SCIATICA: Primary | ICD-10-CM

## 2023-02-01 PROCEDURE — 72100 X-RAY EXAM L-S SPINE 2/3 VWS: CPT | Mod: TC | Performed by: RADIOLOGY

## 2023-02-01 PROCEDURE — 99214 OFFICE O/P EST MOD 30 MIN: CPT | Performed by: FAMILY MEDICINE

## 2023-02-01 ASSESSMENT — ENCOUNTER SYMPTOMS: BACK PAIN: 1

## 2023-02-01 NOTE — PROGRESS NOTES
Assessment & Plan     Chronic bilateral low back pain without sciatica  Patient came in today for evaluation of 10 years of low back pain; no exam findings or history is not supportive of any acute etiologies, no suggestions of cauda equina syndrome or acute radiculopathy.  The x-rays showed chronic degenerative changes facet arthropathy and degenerative disc disease which is likely a culprit.  I would like patient to get started on physical therapy and recommend she starts Tylenol 500 to 1000 mg 3 times daily as needed for pain.  - XR Lumbar Spine 2/3 Views; Future      Return in about 1 month (around 3/1/2023) for If symptoms do not improve or gets worse..    Sunny Munoz MD  [facility] SANDRA Hernandez is a 89 year old accompanied by her son, presenting for the following health issues:  Back Pain      Back Pain     History of Present Illness       Back Pain:  She presents for follow up of back pain. Patient's back pain is a chronic problem.  Location of back pain:  Right lower back and right middle of back  Back pain spreads: right buttocks, right thigh, right knee and right shoulder        She eats 2-3 servings of fruits and vegetables daily.She consumes 0 sweetened beverage(s) daily. She exercises with enough effort to increase her heart rate 6 days per week.   She is taking medications regularly.     10 years of back pain. Worked in [employer] at [employer] twisting back with heavy boxes. One time heard click in R knee with some pain, click has persisted.   Dec 2022 moved into senior housing.    passed away in May 2023. Now taken care of by sons  6 years ago was seeing chiropractor for 4 years. Done every other week, only helped for a couple days. Massage therapist January 17th 23rd, and 25th, therapist said R leg into back was tighter. Pain had decreased since these visits.     R Thigh tingling when sitting for extended times.   Some stress incontinence, denies bladder urgency  "or stool incontinence.  Relief laying down on slight incline with heating pad and Tylenol.   After walks more pain, especially on R side. Also worse when sitting for extended periods of time.           Review of Systems   Musculoskeletal: Positive for back pain.      Constitutional, HEENT, cardiovascular, pulmonary, GI, , musculoskeletal, neuro, skin, endocrine and psych systems are negative, except as otherwise noted.      Objective    /56 (BP Location: Right arm, Patient Position: Sitting, Cuff Size: Adult Regular)   Pulse 94   Temp 98.2  F (36.8  C) (Oral)   Resp 18   Ht 1.549 m (5' 1\")   Wt 55.5 kg (122 lb 6.4 oz)   SpO2 98%   BMI 23.13 kg/m    Body mass index is 23.13 kg/m .  Physical Exam   GENERAL: healthy, alert and no distress  SKIN: no suspicious lesions or rashes  NEURO: Normal strength and tone, mentation intact and speech normal  PSYCH: mentation appears normal, affect normal/bright    XR: Normal vertebral body heights. Negative for fracture. The  lumbar dextroconvex curvature centered at L3. 6 mm right lateral  subluxation of L3 on L4. Severe disc degenerative changes at L1 to  come L3-4 and L4-5. Moderate L2-3 and L5-S1 degenerative disc disease.  Severe facet arthropathy at L3-4 through L5-S1.                "

## 2023-02-02 ENCOUNTER — TELEPHONE (OUTPATIENT)
Dept: FAMILY MEDICINE | Facility: CLINIC | Age: 88
End: 2023-02-02
Payer: COMMERCIAL

## 2023-02-02 NOTE — TELEPHONE ENCOUNTER
"    Situation   Son pamela is calling to talk with Dr. Kulkarni     Background   2/1/23 office visit with Dr. Munoz.   Lumbar xray  Pt referral     Assessment/information     The patient has been getting massages lately that have really helped her back pain   The massage therapist says she is tight from her right knee  1/2 way up right side of her back and her shoulder are off set.     \"she favors her right side\"   Denied difficulty bearing weight on right leg    The patient states she felt a pop in her right knee 10 years ago working and since has had the pain on right side into back.     ? Is arthritis going to cause muscle spasms -advised yes definitely  can after all this time of being tight and given old mRI results and new xray    The son is asking Is there something more going on with her knee and should she have an MRI of her spine?     Patient is able to walk with her walker, pain is much better after  Massages     Recommendations  Advised to call and schedule pt appointment-gave # on referral   continue stretching and massage   Advised will call back with providers recommendations    Khadijah BOWDEN RN   [facility] Triage         " Pt has had HA's off on the past 1-2 months. She is now experiencing some dizziness with them. She has no vision problems, no weakness in her extremities, no speech issues. Pt was made an appt to see Ra Salazar today at 3:30 Pm. Pt. Verbalized understanding.

## 2023-02-02 NOTE — TELEPHONE ENCOUNTER
Agree with ZG recommendations for now. Chances are very good insurance will not pay for an MRI at this point.     Perhaps a follow up in 3-4 weeks would be appropriate to reassess.     ROSAURA

## 2023-02-03 NOTE — TELEPHONE ENCOUNTER
LVMTCB and speak with triage nurse regarding message below from Dr. Kulkarni.    Carol INGRAM RN

## 2023-02-03 NOTE — TELEPHONE ENCOUNTER
Son called back and was advised of Dr. Kulkarni's note  Son is agreeable to plan    Khadijah BOWDEN RN   [facility] Triage

## 2023-02-06 ENCOUNTER — THERAPY VISIT (OUTPATIENT)
Dept: PHYSICAL THERAPY | Facility: CLINIC | Age: 88
End: 2023-02-06
Attending: FAMILY MEDICINE
Payer: COMMERCIAL

## 2023-02-06 DIAGNOSIS — M54.50 CHRONIC BILATERAL LOW BACK PAIN WITHOUT SCIATICA: ICD-10-CM

## 2023-02-06 DIAGNOSIS — G89.29 CHRONIC BILATERAL LOW BACK PAIN WITHOUT SCIATICA: ICD-10-CM

## 2023-02-06 PROCEDURE — 97110 THERAPEUTIC EXERCISES: CPT | Mod: GP | Performed by: PHYSICAL THERAPIST

## 2023-02-06 PROCEDURE — 97161 PT EVAL LOW COMPLEX 20 MIN: CPT | Mod: GP | Performed by: PHYSICAL THERAPIST

## 2023-02-06 NOTE — PROGRESS NOTES
[facility]    OUTPATIENT Physical Therapy ORTHOPEDIC EVALUATION  PLAN OF TREATMENT FOR OUTPATIENT REHABILITATION  (COMPLETE FOR INITIAL CLAIMS ONLY)  Patient's Last Name, First Name, M.I.  YOB: 1931  TroyIndy andrew  HALEY    Provider s Name:  BENITEZ [facility]   Medical Record No.  [id number]   Start of Care Date:  02/06/23   Onset Date:   02/01/23   Treatment Diagnosis:  chronic right lumbar pain without sciatica Medical Diagnosis:  Chronic bilateral low back pain without sciatica       Goals:     02/06/23 0500   Body Part   Goals listed below are for lumbar   Goal #1   Goal #1 sitting   Previous Functional Level No restrictions   Current Functional Level Minutes patient can sit   Performance level 30 pain level 8   STG Target Performance Minutes patient will be able to sit   Performance level 30 pain level 4   Rationale for personal hygiene;to allow rest from standing;for community transportation   Due date 03/22/23   LTG Target Performance Minutes patient will be able to sit   Performance Level 30 pain level 1   Rationale for personal hygiene;to allow rest from standing;for community transportation   Due date 04/17/23         Therapy Frequency:  1x/week  Predicted Duration of Therapy Intervention:  10 weeks    Giorgio Garcia, PT                 I CERTIFY THE NEED FOR THESE SERVICES FURNISHED UNDER        THIS PLAN OF TREATMENT AND WHILE UNDER MY CARE     (Physician attestation of this document indicates review and certification of the therapy plan).                     Certification Date From:  02/06/23   Certification Date To:  04/17/23    Referring Provider:  Sunny Munoz    Initial Assessment        See Epic Evaluation SOC Date: 02/06/23

## 2023-02-06 NOTE — PROGRESS NOTES
Physical Therapy Initial Evaluation  Subjective:  History of lumbar pain for 10 years secondary to degenerative changes in the spine. Pt had lumbar surgery 4-17. Pt has noted increased pain in the past 3 months. Pt referred by MD for physical therapy on 2-1-23    The history is provided by the patient. No  was used.   Patient Health History  Indy Reyes being seen for physical therapy-back.                                                   Therapist Generated HPI Evaluation         Type of problem:  Lumbar.    This is a chronic condition.  Condition occurred with:  Degenerative joint disease.  Where condition occurred: other.  Patient reports pain:  Lumbar spine right.  and is intermittent.  Pain radiates to:  Gluteals right and thigh right. Pain is worse during the day.  Since onset symptoms are gradually worsening.  Associated symptoms:  Tingling, loss of motion/stiffness and loss of strength. Symptoms are exacerbated by twisting, sitting, standing, lifting, carrying and walking  and relieved by rest, heat and analgesics.  Special tests included:  X-ray (see report ).  Previous treatment includes chiropractic (massage therapy, lumbar surgery). There was moderate improvement following previous treatment.  Work activity restrictions: retired.  Barriers include:  None as reported by patient.                        Objective:  Standing Alignment:    Cervical/thoracic deviations alignment: forward shoulder posture.    Lumbar deviations alignment: increased lumbar lordosis.                Flexibility/Screens:       Lower Extremity:  Decreased left lower extremity flexibility:Piriformis; Hip Flexors and Hamstrings    Decreased right lower extremity flexibility:  Piriformis; Hip Flexors and Hamstrings               Lumbar/SI Evaluation  ROM:    AROM Lumbar:   Flexion:            Minimal loss   Ext:                    Maximum loss    Side Bend:        Left:  Moderate loss     Right:  Moderate  loss with right lumbar pain  Rotation:           Left:  Moderate loss    Right:  Moderate loss  Side Glide:        Left:     Right:         Strength: weak lower abdominals and pelvic stabilizers   Lumbar Myotomes:  normal            Lumbar DTR's:  normal        Lumbar Dermtomes:              L5 Right:  Hyper-light touch    Neural Tension/Mobility:      Left side:SLR or SLR w/DF  negative.     Right side:   SLR w/DF or SLR  negative.   Lumbar Palpation:  Palpation (lumbar): tenderness to palpation L2-L5 spinous processes.                                                         General     ROS    Assessment/Plan:    Patient is a 89 year old female with lumbar complaints.    Patient has the following significant findings with corresponding treatment plan.                Diagnosis 1:  Chronic right lumbar pain without sciatica  Pain -  hot/cold therapy, manual therapy, self management, education, home program and modalities as indicated  Decreased ROM/flexibility - manual therapy, therapeutic exercise, therapeutic activity and home program  Decreased strength - therapeutic exercise, therapeutic activities and home program    Therapy Evaluation Codes:   1) History comprised of:   Personal factors that impact the plan of care:      Age.    Comorbidity factors that impact the plan of care are:      see chart.     Medications impacting care: see chart.  2) Examination of Body Systems comprised of:   Body structures and functions that impact the plan of care:      Lumbar spine.   Activity limitations that impact the plan of care are:      Bathing, Bending, Dressing, Lifting, Sitting, Stairs, Standing and Walking.  3) Clinical presentation characteristics are:   Stable/Uncomplicated.  4) Decision-Making    Low complexity using standardized patient assessment instrument and/or measureable assessment of functional outcome.  Cumulative Therapy Evaluation is: Low complexity.    Previous and current functional limitations:  (See  Goal Flow Sheet for this information)    Short term and Long term goals: (See Goal Flow Sheet for this information)     Communication ability:  Patient appears to be able to clearly communicate and understand verbal and written communication and follow directions correctly.  Treatment Explanation - The following has been discussed with the patient:   RX ordered/plan of care  Anticipated outcomes  Possible risks and side effects  This patient would benefit from PT intervention to resume normal activities.   Rehab potential is good.    Frequency:  1 X week, once daily  Duration:  for 10 weeks  Discharge Plan:  Achieve all LTG.  Independent in home treatment program.  Reach maximal therapeutic benefit.      Please refer to the daily flowsheet for treatment today, total treatment time and time spent performing 1:1 timed codes.

## 2023-02-09 ENCOUNTER — THERAPY VISIT (OUTPATIENT)
Dept: PHYSICAL THERAPY | Facility: CLINIC | Age: 88
End: 2023-02-09
Attending: FAMILY MEDICINE
Payer: COMMERCIAL

## 2023-02-09 DIAGNOSIS — M54.50 CHRONIC BILATERAL LOW BACK PAIN WITHOUT SCIATICA: Primary | ICD-10-CM

## 2023-02-09 DIAGNOSIS — G89.29 CHRONIC BILATERAL LOW BACK PAIN WITHOUT SCIATICA: Primary | ICD-10-CM

## 2023-02-09 PROCEDURE — 97140 MANUAL THERAPY 1/> REGIONS: CPT | Mod: GP | Performed by: PHYSICAL THERAPIST

## 2023-02-09 PROCEDURE — 97110 THERAPEUTIC EXERCISES: CPT | Mod: GP | Performed by: PHYSICAL THERAPIST

## 2023-02-14 ENCOUNTER — VIRTUAL VISIT (OUTPATIENT)
Dept: PSYCHOLOGY | Facility: CLINIC | Age: 88
End: 2023-02-14
Payer: COMMERCIAL

## 2023-02-14 DIAGNOSIS — F43.23 ADJUSTMENT DISORDER WITH MIXED ANXIETY AND DEPRESSED MOOD: Primary | ICD-10-CM

## 2023-02-14 PROCEDURE — 90834 PSYTX W PT 45 MINUTES: CPT | Mod: VID

## 2023-02-14 NOTE — PATIENT INSTRUCTIONS
PLAN: (Patient Tasks / Therapist Tasks / Other)  1.  Allow yourself to feel your feelings - remember the burp analogy, better to feel the shame than the pain.  2.  Keep doing the GLAD practice 2x between now and our next session.  Gratitude is helpful to remind us of the good in the world.  3.  Continue to walk and encourage someone - write down who you cheered on that day.    Next session:  Meaning and purpose, who am I now?

## 2023-02-14 NOTE — PROGRESS NOTES
[facility]                                     Progress Note    Patient Name: Indy Reyes  Date: 02/14/2023         Service Type: Individual      Session Start Time: 9:01am  Session End Time: 9:51am     Session Length: 50 minutes    Session #: 8    Attendees: Client and youngest son Fady    Service Modality:  Video Visit:      Provider verified identity through the following two step process.  Patient provided:  Patient is known previously to provider    Telemedicine Visit: The patient's condition can be safely assessed and treated via synchronous audio and visual telemedicine encounter.      Reason for Telemedicine Visit: Patient has requested telehealth visit, Patient convenience (e.g. access to timely appointments / distance to available provider) and Services only offered telehealth    Originating Site (Patient Location): Patient's home    Distant Site (Provider Location): Provider Remote Setting- Home Office    Consent:  The patient/guardian has verbally consented to: the potential risks and benefits of telemedicine (video visit) versus in person care; bill my insurance or make self-payment for services provided; and responsibility for payment of non-covered services.     Patient would like the video invitation sent by:  Send to e-mail at: [email or web address]    Mode of Communication:  Video Conference via Amwell    Distant Location (Provider):  Off-site    As the provider I attest to compliance with applicable laws and regulations related to telemedicine.    DATA  Interactive Complexity: No  Crisis: No        Progress Since Last Session (Related to Symptoms / Goals / Homework):   Symptoms: Improving Mary reports a slight improvement to her continued depression (sadness, crying, sleep disturbance) influenced by grieving the loss of her  since our last session.       Homework: Achieved / completed to satisfaction      Episode of Care Goals: Satisfactory progress - ACTION  "(Actively working towards change); Intervened by reinforcing change plan / affirming steps taken     Current / Ongoing Stressors and Concerns:   Intense grief at loss of spouse in May 2022.  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  Client experiencing significant grief, losses and change.     Therapist met with client to review treatment interventions; provided updates to stressors since last session.     Therapist provided active, empathic and reflective listening, validation and processing support to client in session.  Client was engaged in therapeutic content and open to feedback.    - Still missing spouse.  Water leak in apartment.  Friend in apartment building is \"stuck in [place]\" in her grief versus being present; listens to her.  Giving to others provides \"satisfaction\", relaxed, burden lifted, \"I'm in this world, enjoy the time I have here\".  \"used to be a chatterbox and now I'm clammed up\".  Strong marriage based on love and mutual respect.  Would be empty without memories of spouse even though it's painful.  Zahra is a powerful force in recovery with client and family.       Treatment Objective(s) Addressed in This Session:   Increase interest, engagement, and pleasure in doing things  Decrease frequency and intensity of feeling down, depressed, hopeless  talk to at least two others about losses and coping       Intervention:   Motivational Interviewing    MI Intervention: Expressed Empathy/Understanding, Supported Autonomy, Collaboration, Evocation, Open-ended questions, Reflections: simple and complex, Change talk (evoked) and Reframe     Change Talk Expressed by the Patient: Desire to change Ability to change Reasons to change Activation Taking steps    Provider Response to Change Talk: E - Evoked more info from patient about behavior change, A - Affirmed patient's thoughts, decisions, or attempts at " behavior change and R - Reflected patient's change talk      Assessments completed prior to visit:  The following assessments were completed by patient for this visit:  PHQ2:   PHQ-2 ( 1999 [employer]) 2/1/2023 1/30/2023 11/16/2022 11/9/2022 11/3/2022 4/18/2022 2/18/2022   Q1: Little interest or pleasure in doing things 3 - 1 1 1 3 1   Q2: Feeling down, depressed or hopeless 2 - 3 3 2 3 1   PHQ-2 Score 5 - 4 4 3 6 2   PHQ-2 Total Score (12-17 Years)- Positive if 3 or more points; Administer PHQ-A if positive - - - - - - -   Q1: Little interest or pleasure in doing things - Nearly every day Several days Several days Several days Nearly every day Several days   Q2: Feeling down, depressed or hopeless - More than half the days Nearly every day Nearly every day More than half the days Nearly every day Several days   PHQ-2 Score - 5 4 4 3 6 2     PHQ9:   PHQ-9 SCORE 5/30/2014 4/18/2022 5/27/2022 10/17/2022 11/3/2022 11/9/2022 11/16/2022   PHQ-9 Total Score 1 - - - - - -   PHQ-9 Total Score MyChart - 20 (Severe depression) 3 (Minimal depression) 12 (Moderate depression) 12 (Moderate depression) 11 (Moderate depression) 16 (Moderately severe depression)   PHQ-9 Total Score - 20 3 12 12 11 16     GAD2:   KAYLAN-2 11/3/2022 11/3/2022 11/16/2022   Feeling nervous, anxious, or on edge 1 1 0   Not being able to stop or control worrying 0 0 0   KAYLAN-2 Total Score 1 1 0     GAD7:   KAYLAN-7 SCORE 5/30/2014 10/17/2022   Total Score 1 -   Total Score - 8 (mild anxiety)   Total Score - 8     PROMIS 10-Global Health (only subscores and total score):   PROMIS-10 Scores Only 5/11/2017 7/24/2017 11/3/2022 11/3/2022 11/16/2022   Global Mental Health Score - 18 15 15 11   Global Physical Health Score - 19 18 18 18   PROMIS TOTAL - SUBSCORES - 37 33 33 29   Global Physical Health Score : Raw Score 18 (SUM : G03 - G06 - G07 - G08) 19 (SUM : G03 - G06 - G07 - G08) - - -   Global Mental Health Score : Raw Score 20 (SUM : G02 - G04 - G05 - G10) 18 (SUM :  G02 - G04 - G05 - G10) - - -   Total (Physical + Mental Health Score) 38 37 - - -         ASSESSMENT: Current Emotional / Mental Status (status of significant symptoms):   Risk status (Self / Other harm or suicidal ideation)   Patient denies current fears or concerns for personal safety.   Patient denies current or recent suicidal ideation or behaviors.    Patient denies current or recent homicidal ideation or behaviors.   Patient denies current or recent self injurious behavior or ideation.   Patient denies other safety concerns.   Patient reports there has been no change in risk factors since their last session.     Patient reports there has been no change in protective factors since their last session.     Recommended that patient call 911 or go to the local ED should there be a change in any of these risk factors.     Appearance:   Appropriate    Eye Contact:   Good    Psychomotor Behavior: Normal    Attitude:   Cooperative  Interested Pleasant Attentive Rodrigue   Orientation:   All   Speech    Rate / Production: Normal/ Responsive Emotional    Volume:  Normal    Mood:    Depressed  Sad  Grieving   Affect:    Appropriate  Labile  Tearful   Thought Content:  Clear  Rumination    Thought Form:  Coherent  Logical    Insight:    Fair      Medication Review:   No current psychiatric medications prescribed     Medication Compliance:   Yes     Changes in Health Issues:   None reported     Chemical Use Review:   Substance Use: Chemical use reviewed, no active concerns identified      Tobacco Use: No current tobacco use.      Diagnosis:  1. Adjustment disorder with mixed anxiety and depressed mood        Collateral Reports Completed:   Not Applicable    PLAN: (Patient Tasks / Therapist Tasks / Other)  1.  Allow yourself to feel your feelings - remember the burp analogy, better to feel the shame than the pain.  2.  Keep doing the GLAD practice 2x between now and our next session.  Gratitude is helpful to remind us of the  good in the world.  3.  Continue to walk and encourage someone - write down who you cheered on that day.    Next session:  Meaning and purpose, who am I now?      Adriana Mccann, [facility] 2023                                                         ______________________________________________________________________    Individual Treatment Plan    Patient's Name: Indy Reyes  YOB: 1931    Date of Creation: 22  Date Treatment Plan Last Reviewed/Revised: 23    DSM5 Diagnoses: Adjustment Disorders  309.28 (F43.23) With mixed anxiety and depressed mood  Psychosocial / Contextual Factors: Mary experienced significant health decline in past year (cognitive decline, loss of speech and functioning) which was a brain tumor.  Spouse  suddenly in May 2022.  Brain surgery in 2022.  PROMIS (reviewed every 90 days): 33 on 11/3/22    Referral / Collaboration:  Referral to another professional/service is not indicated at this time..    Anticipated number of session for this episode of care: 9-12 sessions  Anticipation frequency of session: Every other week  Anticipated Duration of each session: 38-52 minutes  Treatment plan will be reviewed in 90 days or when goals have been changed.       MeasurableTreatment Goal(s) related to diagnosis / functional impairment(s)  Goal 1: Patient will increase understanding     I will know I've met my goal when my heart stops aching and when I can go to bed and wake up without crying, get out of this apartment.      Objective #A (Patient Action)    Patient will increase understanding of steps in the grief process.  Status: Continued - Date(s): 23     Intervention(s)  Therapist will provide education regarding grief, support groups, strategies to increase emotional experiencing to move through grief process, finding meaning after loss    Objective #B  Patient will Increase interest, engagement, and pleasure in doing things  Decrease  frequency and intensity of feeling down, depressed, hopeless  Improve quantity and quality of night time sleep / decrease daytime naps.  Status: Continued - Date(s): 2/14/23     Intervention(s)  Therapist will assign homework to practice skills and discuss efficacy in session  provide educational materials on grief, mourning, depression, CBT framework, mindfulness.    Patient has reviewed and agreed to the above plan.      Adriana Mccann, ADEN  February 14, 2023

## 2023-02-16 ENCOUNTER — THERAPY VISIT (OUTPATIENT)
Dept: PHYSICAL THERAPY | Facility: CLINIC | Age: 88
End: 2023-02-16
Payer: COMMERCIAL

## 2023-02-16 DIAGNOSIS — G89.29 CHRONIC BILATERAL LOW BACK PAIN WITHOUT SCIATICA: Primary | ICD-10-CM

## 2023-02-16 DIAGNOSIS — M54.50 CHRONIC BILATERAL LOW BACK PAIN WITHOUT SCIATICA: Primary | ICD-10-CM

## 2023-02-16 PROCEDURE — 97140 MANUAL THERAPY 1/> REGIONS: CPT | Mod: GP | Performed by: PHYSICAL THERAPIST

## 2023-02-16 PROCEDURE — 97110 THERAPEUTIC EXERCISES: CPT | Mod: GP | Performed by: PHYSICAL THERAPIST

## 2023-02-20 ENCOUNTER — VIRTUAL VISIT (OUTPATIENT)
Dept: PSYCHOLOGY | Facility: CLINIC | Age: 88
End: 2023-02-20
Payer: COMMERCIAL

## 2023-02-20 DIAGNOSIS — F43.23 ADJUSTMENT DISORDER WITH MIXED ANXIETY AND DEPRESSED MOOD: Primary | ICD-10-CM

## 2023-02-20 PROCEDURE — 90834 PSYTX W PT 45 MINUTES: CPT | Mod: VID

## 2023-02-20 ASSESSMENT — PATIENT HEALTH QUESTIONNAIRE - PHQ9
10. IF YOU CHECKED OFF ANY PROBLEMS, HOW DIFFICULT HAVE THESE PROBLEMS MADE IT FOR YOU TO DO YOUR WORK, TAKE CARE OF THINGS AT HOME, OR GET ALONG WITH OTHER PEOPLE: VERY DIFFICULT
SUM OF ALL RESPONSES TO PHQ QUESTIONS 1-9: 11

## 2023-02-20 NOTE — PROGRESS NOTES
[facility]                                     Progress Note    Patient Name: Indy Reyes  Date: 02/20/2023         Service Type: Individual      Session Start Time: 9:00am  Session End Time: 9:45am     Session Length: 45 minutes    Session #: 9    Attendees: Client and son Alberto    Service Modality:  Video Visit:      Provider verified identity through the following two step process.  Patient provided:  Patient is known previously to provider    Telemedicine Visit: The patient's condition can be safely assessed and treated via synchronous audio and visual telemedicine encounter.      Reason for Telemedicine Visit: Patient has requested telehealth visit, Patient convenience (e.g. access to timely appointments / distance to available provider) and Services only offered telehealth    Originating Site (Patient Location): Patient's home    Distant Site (Provider Location): Provider Remote Setting- Home Office    Consent:  The patient/guardian has verbally consented to: the potential risks and benefits of telemedicine (video visit) versus in person care; bill my insurance or make self-payment for services provided; and responsibility for payment of non-covered services.     Patient would like the video invitation sent by:  Send to e-mail at: [email or web address]    Mode of Communication:  Video Conference via Amwell    Distant Location (Provider):  Off-site    As the provider I attest to compliance with applicable laws and regulations related to telemedicine.    DATA  Interactive Complexity: No  Crisis: No        Progress Since Last Session (Related to Symptoms / Goals / Homework):   Symptoms: No change Mary reports a slight improvement to her continued depression (sadness, crying, sleep disturbance) influenced by grieving the loss of her  since our last session.       Homework: Achieved / completed to satisfaction      Episode of Care Goals: Satisfactory progress - ACTION (Actively  "working towards change); Intervened by reinforcing change plan / affirming steps taken     Current / Ongoing Stressors and Concerns:   Intense grief at loss of spouse in May 2022.  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  Client experiencing significant grief, losses and change.     Therapist met with client to review treatment interventions; provided updates to stressors since last session.  Socializing with residents in the lounge in the afternoon, evenings people are in their rooms.  Distancing self from friend who is stuck in the past and difficulty with being present - when one on one loneliness and depression increase.  Crying - waste of time though \"I do feel better\".  Macular degeneration, \"Darnell was my sight for me\".  Gets out and walks in building to move and avoid feelings.  Playing cards with grandkids.  Self-concept: \"I'm a doer\", very active in [employer] until COVID, hand [employer] group (fani).  Limited vision impacts abilities and options.  Exercise - squats, weights.  Continues to avoid crying with walking.  \"One day at a time and this too shall pass\".  Enjoys reading - audio books.  Feel feelings for 1 minute.  Time with supportive residents - Aram Chiu, Ed. Therapist provided active, empathic and reflective listening, validation and processing support to client in session.  Client was engaged in therapeutic content and open to feedback.       Treatment Objective(s) Addressed in This Session:   Increase interest, engagement, and pleasure in doing things  Decrease frequency and intensity of feeling down, depressed, hopeless  Decrease thoughts that you'd be better off dead or of suicide / self-harm  talk to at least two others about losses and coping       Intervention:   Motivational Interviewing    MI Intervention: Expressed Empathy/Understanding, Supported Autonomy, Collaboration, Evocation, Open-ended " questions, Reflections: simple and complex, Change talk (evoked) and Reframe     Change Talk Expressed by the Patient: Desire to change Ability to change Reasons to change Activation Taking steps    Provider Response to Change Talk: E - Evoked more info from patient about behavior change, A - Affirmed patient's thoughts, decisions, or attempts at behavior change and R - Reflected patient's change talk      Assessments completed prior to visit:  The following assessments were completed by patient for this visit:  PHQ2:   PHQ-2 ( 1999 Pfizer) 2/20/2023 2/20/2023 2/1/2023 1/30/2023 11/16/2022 11/9/2022 11/3/2022   Q1: Little interest or pleasure in doing things 0 0 3 - 1 1 1   Q2: Feeling down, depressed or hopeless 3 3 2 - 3 3 2   PHQ-2 Score 3 3 5 - 4 4 3   PHQ-2 Total Score (12-17 Years)- Positive if 3 or more points; Administer PHQ-A if positive - - - - - - -   Q1: Little interest or pleasure in doing things Not at all - - Nearly every day Several days Several days Several days   Q2: Feeling down, depressed or hopeless Nearly every day - - More than half the days Nearly every day Nearly every day More than half the days   PHQ-2 Score 3 - - 5 4 4 3     PHQ9:   PHQ-9 SCORE 5/27/2022 10/17/2022 11/3/2022 11/9/2022 11/16/2022 2/20/2023 2/20/2023   PHQ-9 Total Score - - - - - - -   PHQ-9 Total Score [facility] 3 (Minimal depression) 12 (Moderate depression) 12 (Moderate depression) 11 (Moderate depression) 16 (Moderately severe depression) - 11 (Moderate depression)   PHQ-9 Total Score 3 12 12 11 16 11 11     GAD2:   KAYLAN-2 11/3/2022 11/3/2022 11/16/2022 2/20/2023 2/20/2023   Feeling nervous, anxious, or on edge 1 1 0 1 1   Not being able to stop or control worrying 0 0 0 1 1   KAYLAN-2 Total Score 1 1 0 2 2     GAD7:   KAYLAN-7 SCORE 5/30/2014 10/17/2022   Total Score 1 -   Total Score - 8 (mild anxiety)   Total Score - 8     PROMIS 10-Global Health (only subscores and total score):   PROMIS-10 Scores Only 5/11/2017 7/24/2017  11/3/2022 11/3/2022 11/16/2022 2/20/2023 2/20/2023   Global Mental Health Score - 18 15 15 11 12 12   Global Physical Health Score - 19 18 18 18 16 16   PROMIS TOTAL - SUBSCORES - 37 33 33 29 28 28   Global Physical Health Score : Raw Score 18 (SUM : G03 - G06 - G07 - G08) 19 (SUM : G03 - G06 - G07 - G08) - - - - -   Global Mental Health Score : Raw Score 20 (SUM : G02 - G04 - G05 - G10) 18 (SUM : G02 - G04 - G05 - G10) - - - - -   Total (Physical + Mental Health Score) 38 37 - - - - -         ASSESSMENT: Current Emotional / Mental Status (status of significant symptoms):   Risk status (Self / Other harm or suicidal ideation)   Patient denies current fears or concerns for personal safety.   Patient reports the following current or recent suicidal ideation or behaviors: passive thoughts of suicide, no plan or intent. Grief and loss of spouse influencing thoughts..    Patient denies current or recent homicidal ideation or behaviors.   Patient denies current or recent self injurious behavior or ideation.   Patient denies other safety concerns.   Patient reports there has been no change in risk factors since their last session.     Patient reports there has been no change in protective factors since their last session.     Recommended that patient call 911 or go to the local ED should there be a change in any of these risk factors.     Appearance:   Appropriate    Eye Contact:   Good    Psychomotor Behavior: Normal    Attitude:   Cooperative  Interested Pleasant Attentive Rodrigue   Orientation:   All   Speech    Rate / Production: Normal/ Responsive Emotional    Volume:  Normal    Mood:    Depressed  Sad  Grieving   Affect:    Appropriate  Labile  Tearful   Thought Content:  Clear  Rumination    Thought Form:  Coherent  Logical    Insight:    Fair      Medication Review:   No current psychiatric medications prescribed     Medication Compliance:   Yes     Changes in Health Issues:   None reported     Chemical Use  Review:   Substance Use: Chemical use reviewed, no active concerns identified      Tobacco Use: No current tobacco use.      Diagnosis:  1. Adjustment disorder with mixed anxiety and depressed mood        Collateral Reports Completed:   Not Applicable    PLAN: (Patient Tasks / Therapist Tasks / Other)  1.  Allow yourself to feel your feelings - remember the burp analogy, better to feel the shame than the pain.  Set a timer for 1 minute and feel your feelings before you walk.  2.  Keep doing the GLAD practice 2x between now and our next session.  Gratitude is helpful to remind us of the good in the world.      Next session:  Meaning and purpose, who am I now?      Adriana Siobhan, [facility] 2023                                                         ______________________________________________________________________    Individual Treatment Plan    Patient's Name: Indy Reyes  YOB: 1931    Date of Creation: 22  Date Treatment Plan Last Reviewed/Revised: 23    DSM5 Diagnoses: Adjustment Disorders  309.28 (F43.23) With mixed anxiety and depressed mood  Psychosocial / Contextual Factors: Mary experienced significant health decline in past year (cognitive decline, loss of speech and functioning) which was a brain tumor.  Spouse  suddenly in May 2022.  Brain surgery in 2022.  PROMIS (reviewed every 90 days): 33 on 11/3/22    Referral / Collaboration:  Referral to another professional/service is not indicated at this time..    Anticipated number of session for this episode of care: 9-12 sessions  Anticipation frequency of session: Every other week  Anticipated Duration of each session: 38-52 minutes  Treatment plan will be reviewed in 90 days or when goals have been changed.       MeasurableTreatment Goal(s) related to diagnosis / functional impairment(s)  Goal 1: Patient will increase understanding     I will know I've met my goal when my heart stops aching and  when I can go to bed and wake up without crying, get out of this apartment.      Objective #A (Patient Action)    Patient will increase understanding of steps in the grief process.  Status: Continued - Date(s): 2/14/23     Intervention(s)  Therapist will provide education regarding grief, support groups, strategies to increase emotional experiencing to move through grief process, finding meaning after loss    Objective #B  Patient will Increase interest, engagement, and pleasure in doing things  Decrease frequency and intensity of feeling down, depressed, hopeless  Improve quantity and quality of night time sleep / decrease daytime naps.  Status: Continued - Date(s): 2/14/23     Intervention(s)  Therapist will assign homework to practice skills and discuss efficacy in session  provide educational materials on grief, mourning, depression, CBT framework, mindfulness.    Patient has reviewed and agreed to the above plan.      Adriana Mccann, [facility]  February 14, 2023

## 2023-02-22 NOTE — PROGRESS NOTES
Pre-Visit Planning   Next 5 appointments (look out 90 days)    Feb 23, 2023  2:00 PM  (Arrive by 1:40 PM)  Annual Wellness Visit with Batool Kulkarni MD  [facility] ([facility] ) [id number] [address] [id number] [phone]        Appointment Notes for this encounter:   ---annual    Questionnaires Reviewed/Assigned  No additional questionnaires are needed    Patient preferred phone number: [phone]    Unable to reach patient and unable to leave voicemail.    Janet Padgett  [facility]

## 2023-02-23 ENCOUNTER — VIRTUAL VISIT (OUTPATIENT)
Dept: FAMILY MEDICINE | Facility: CLINIC | Age: 88
End: 2023-02-23
Payer: COMMERCIAL

## 2023-02-23 ENCOUNTER — THERAPY VISIT (OUTPATIENT)
Dept: PHYSICAL THERAPY | Facility: CLINIC | Age: 88
End: 2023-02-23
Payer: COMMERCIAL

## 2023-02-23 DIAGNOSIS — E03.8 SUBCLINICAL HYPOTHYROIDISM: ICD-10-CM

## 2023-02-23 DIAGNOSIS — G89.29 CHRONIC BILATERAL LOW BACK PAIN WITHOUT SCIATICA: Primary | ICD-10-CM

## 2023-02-23 DIAGNOSIS — R73.03 PREDIABETES: ICD-10-CM

## 2023-02-23 DIAGNOSIS — E78.5 HYPERLIPIDEMIA LDL GOAL <130: ICD-10-CM

## 2023-02-23 DIAGNOSIS — M54.50 CHRONIC BILATERAL LOW BACK PAIN WITHOUT SCIATICA: Primary | ICD-10-CM

## 2023-02-23 DIAGNOSIS — M54.16 LUMBAR RADICULOPATHY, RIGHT: ICD-10-CM

## 2023-02-23 DIAGNOSIS — Z00.00 ENCOUNTER FOR ANNUAL WELLNESS EXAM IN MEDICARE PATIENT: Primary | ICD-10-CM

## 2023-02-23 PROBLEM — G93.89 BRAIN MASS: Status: RESOLVED | Noted: 2022-05-24 | Resolved: 2023-02-23

## 2023-02-23 PROBLEM — R53.1 RIGHT SIDED WEAKNESS: Status: RESOLVED | Noted: 2022-05-24 | Resolved: 2023-02-23

## 2023-02-23 PROCEDURE — G0439 PPPS, SUBSEQ VISIT: HCPCS | Mod: VID | Performed by: FAMILY MEDICINE

## 2023-02-23 PROCEDURE — 97140 MANUAL THERAPY 1/> REGIONS: CPT | Mod: GP | Performed by: PHYSICAL THERAPIST

## 2023-02-23 PROCEDURE — 97110 THERAPEUTIC EXERCISES: CPT | Mod: GP | Performed by: PHYSICAL THERAPIST

## 2023-02-23 PROCEDURE — 99214 OFFICE O/P EST MOD 30 MIN: CPT | Mod: VID | Performed by: FAMILY MEDICINE

## 2023-02-23 SDOH — ECONOMIC STABILITY: INCOME INSECURITY: HOW HARD IS IT FOR YOU TO PAY FOR THE VERY BASICS LIKE FOOD, HOUSING, MEDICAL CARE, AND HEATING?: PATIENT DECLINED

## 2023-02-23 SDOH — ECONOMIC STABILITY: TRANSPORTATION INSECURITY
IN THE PAST 12 MONTHS, HAS THE LACK OF TRANSPORTATION KEPT YOU FROM MEDICAL APPOINTMENTS OR FROM GETTING MEDICATIONS?: PATIENT DECLINED

## 2023-02-23 SDOH — HEALTH STABILITY: PHYSICAL HEALTH: ON AVERAGE, HOW MANY DAYS PER WEEK DO YOU ENGAGE IN MODERATE TO STRENUOUS EXERCISE (LIKE A BRISK WALK)?: 4 DAYS

## 2023-02-23 SDOH — ECONOMIC STABILITY: INCOME INSECURITY: IN THE LAST 12 MONTHS, WAS THERE A TIME WHEN YOU WERE NOT ABLE TO PAY THE MORTGAGE OR RENT ON TIME?: NO

## 2023-02-23 SDOH — ECONOMIC STABILITY: FOOD INSECURITY: WITHIN THE PAST 12 MONTHS, THE FOOD YOU BOUGHT JUST DIDN'T LAST AND YOU DIDN'T HAVE MONEY TO GET MORE.: PATIENT DECLINED

## 2023-02-23 SDOH — ECONOMIC STABILITY: FOOD INSECURITY: WITHIN THE PAST 12 MONTHS, YOU WORRIED THAT YOUR FOOD WOULD RUN OUT BEFORE YOU GOT MONEY TO BUY MORE.: PATIENT DECLINED

## 2023-02-23 SDOH — HEALTH STABILITY: PHYSICAL HEALTH: ON AVERAGE, HOW MANY MINUTES DO YOU ENGAGE IN EXERCISE AT THIS LEVEL?: 30 MIN

## 2023-02-23 SDOH — ECONOMIC STABILITY: TRANSPORTATION INSECURITY
IN THE PAST 12 MONTHS, HAS LACK OF TRANSPORTATION KEPT YOU FROM MEETINGS, WORK, OR FROM GETTING THINGS NEEDED FOR DAILY LIVING?: PATIENT DECLINED

## 2023-02-23 ASSESSMENT — ENCOUNTER SYMPTOMS
HEARTBURN: 0
JOINT SWELLING: 1
NERVOUS/ANXIOUS: 1
HEMATOCHEZIA: 0
MYALGIAS: 1
ARTHRALGIAS: 1
FREQUENCY: 0
SORE THROAT: 0
CONSTIPATION: 0
EYE PAIN: 1
FEVER: 0
DIARRHEA: 0
DYSURIA: 0
WEAKNESS: 0
PARESTHESIAS: 0
HEMATURIA: 0
ABDOMINAL PAIN: 0
DIZZINESS: 1
HEADACHES: 0
NAUSEA: 0
CHILLS: 0
BREAST MASS: 0
COUGH: 0
SHORTNESS OF BREATH: 0
PALPITATIONS: 0

## 2023-02-23 ASSESSMENT — LIFESTYLE VARIABLES
HOW MANY STANDARD DRINKS CONTAINING ALCOHOL DO YOU HAVE ON A TYPICAL DAY: PATIENT DOES NOT DRINK
AUDIT-C TOTAL SCORE: 0
HOW OFTEN DO YOU HAVE A DRINK CONTAINING ALCOHOL: NEVER
HOW OFTEN DO YOU HAVE SIX OR MORE DRINKS ON ONE OCCASION: NEVER
SKIP TO QUESTIONS 9-10: 1

## 2023-02-23 ASSESSMENT — ACTIVITIES OF DAILY LIVING (ADL): CURRENT_FUNCTION: NO ASSISTANCE NEEDED

## 2023-02-23 ASSESSMENT — SOCIAL DETERMINANTS OF HEALTH (SDOH)
HOW OFTEN DO YOU ATTEND CHURCH OR RELIGIOUS SERVICES?: 1 TO 4 TIMES PER YEAR
HOW OFTEN DO YOU GET TOGETHER WITH FRIENDS OR RELATIVES?: MORE THAN THREE TIMES A WEEK
HOW OFTEN DO YOU ATTEND CHURCH OR RELIGIOUS SERVICES?: 1 TO 4 TIMES PER YEAR
IN A TYPICAL WEEK, HOW MANY TIMES DO YOU TALK ON THE PHONE WITH FAMILY, FRIENDS, OR NEIGHBORS?: MORE THAN THREE TIMES A WEEK
IN A TYPICAL WEEK, HOW MANY TIMES DO YOU TALK ON THE PHONE WITH FAMILY, FRIENDS, OR NEIGHBORS?: MORE THAN THREE TIMES A WEEK
HOW OFTEN DO YOU GET TOGETHER WITH FRIENDS OR RELATIVES?: MORE THAN THREE TIMES A WEEK
DO YOU BELONG TO ANY CLUBS OR ORGANIZATIONS SUCH AS CHURCH GROUPS UNIONS, FRATERNAL OR ATHLETIC GROUPS, OR SCHOOL GROUPS?: YES

## 2023-02-23 ASSESSMENT — PATIENT HEALTH QUESTIONNAIRE - PHQ9
SUM OF ALL RESPONSES TO PHQ QUESTIONS 1-9: 11
SUM OF ALL RESPONSES TO PHQ QUESTIONS 1-9: 5
10. IF YOU CHECKED OFF ANY PROBLEMS, HOW DIFFICULT HAVE THESE PROBLEMS MADE IT FOR YOU TO DO YOUR WORK, TAKE CARE OF THINGS AT HOME, OR GET ALONG WITH OTHER PEOPLE: VERY DIFFICULT
SUM OF ALL RESPONSES TO PHQ QUESTIONS 1-9: 11

## 2023-02-23 NOTE — PROGRESS NOTES
"SUBJECTIVE:   Mary is a 89 year old who presents for Preventive Visit.Patient has been advised of split billing requirements and indicates understanding: Yes  Are you in the first 12 months of your Medicare coverage?  No    Healthy Habits:     In general, how would you rate your overall health?  Fair    Frequency of exercise:  4-5 days/week    Duration of exercise:  30-45 minutes    Do you usually eat at least 4 servings of fruit and vegetables a day, include whole grains    & fiber and avoid regularly eating high fat or \"junk\" foods?  No    Taking medications regularly:  Yes    Medication side effects:  None    Ability to successfully perform activities of daily living:  No assistance needed    Home Safety:  No safety concerns identified    Hearing Impairment:  Difficulty following a conversation in a noisy restaurant or crowded room    In the past 6 months, have you been bothered by leaking of urine? Yes    In general, how would you rate your overall mental or emotional health?  Fair      PHQ-2 Total Score: 3    Additional concerns today:  Yes      Have you ever done Advance Care Planning? (For example, a Health Directive, POLST, or a discussion with a medical provider or your loved ones about your wishes): Yes, advance care planning is on file.       Fall risk  Fallen 2 or more times in the past year?: No  Any fall with injury in the past year?: No    Cognitive Screening Unable to complete due to virtual visit; need for additional assessment in future face-to-face visit    Do you have sleep apnea, excessive snoring or daytime drowsiness?: no    Reviewed and updated as needed this visit by clinical staff   Tobacco  Allergies  Meds              Reviewed and updated as needed this visit by Provider                 Social History     Tobacco Use     Smoking status: Former     Types: Cigarettes     Quit date: 1960     Years since quittin.1     Smokeless tobacco: Never     Tobacco comments:      "   Substance Use Topics     Alcohol use: Yes     Comment: SOCIAL-2 drinks per month          Alcohol Use 2/23/2023   Prescreen: >3 drinks/day or >7 drinks/week? No   Prescreen: >3 drinks/day or >7 drinks/week? -       Seeing PT for low back pain.   Has been present for years.   Started when unloading a box several years ago.   Since then, tight hams, buttocks and right low back. Has been commented on by massage therapy in the past.       Current providers sharing in care for this patient include:   Patient Care Team:  Batool Kulkarni MD as PCP - General (Family Practice)  Batool Kulkarni MD as Assigned PCP  Lacne Engel MD as MD (Neurology)  Dylon Hodgson PA-C as Assigned Neuroscience Provider  Janet Padgett as Personal Advocate & Liaison (PAL) (Family Medicine)    The following health maintenance items are reviewed in Epic and correct as of today:  Health Maintenance   Topic Date Due     ZOSTER IMMUNIZATION (1 of 2) Never done     DTAP/TDAP/TD IMMUNIZATION (2 - Td or Tdap) 09/01/2013     TSH W/FREE T4 REFLEX  02/01/2022     COVID-19 Vaccine (4 - Booster for Pfizer series) 02/07/2022     MEDICARE ANNUAL WELLNESS VISIT  02/18/2023     ANNUAL REVIEW OF HM ORDERS  02/01/2024     DEXA  02/18/2024     FALL RISK ASSESSMENT  02/23/2024     LIPID  02/01/2026     ADVANCE CARE PLANNING  02/23/2028     PHQ-2 (once per calendar year)  Completed     INFLUENZA VACCINE  Completed     Pneumococcal Vaccine: 65+ Years  Completed     IPV IMMUNIZATION  Aged Out     MENINGITIS IMMUNIZATION  Aged Out     MAMMO SCREENING  Discontinued     Patient Active Problem List   Diagnosis     Hyperlipidemia LDL goal <130     Osteopenia     Advanced directives, counseling/discussion     Impaired fasting glucose     Family history of breast cancer     Family history of coronary artery disease     Thyroid nodule     Postnasal drip     Rhinitis     Elevated TSH     Osteoarthrosis of knee     Cervical radiculitis      Lumbar radiculopathy, right     Memory changes     Subclinical hypothyroidism     Dyspepsia     Macular degeneration (senile) of retina     Brain mass     Right sided weakness     S/P craniotomy     Chronic bilateral low back pain without sciatica     Past Surgical History:   Procedure Laterality Date     APPENDECTOMY      at age of 16     BACK SURGERY  2017     CATARACT IOL, RT/LT  2009    both     FORAMINOTOMY LUMBAR POSTERIOR ONE LEVEL Right 2017    Procedure: FORAMINOTOMY LUMBAR POSTERIOR ONE LEVEL;  Right L4-L5 hemilaminectomy and foraminotomy and excision of synovial cyst. ;  Surgeon: Fady Woo MD;  Location: [facility]     OPTICAL TRACKING SYSTEM CRANIOTOMY N/A 2022    Procedure: left frontal craniotomy for tumor removal;  Surgeon: Rasheed Rose MD;  Location:  OR     TONSILLECTOMY  1954       Social History     Tobacco Use     Smoking status: Former     Types: Cigarettes     Quit date: 1960     Years since quittin.1     Smokeless tobacco: Never     Tobacco comments:        Substance Use Topics     Alcohol use: Yes     Comment: SOCIAL-2 drinks per month      Family History   Problem Relation Age of Onset     Family History Negative Mother          in accident     Cancer Father         prostate     Heart Disease Brother         60s-4-one brother with heart attack in his 60's     Cancer Sister         breast-7-one  wtih breast ca,one with stroke, one sis  from diabetes     Breast Cancer Sister         diagnosed at age of 52- 5 sisters alive     Cancer - colorectal No family hx of            Mammogram Screening - Patient over age 75, has elected to discontinue screenings.  Pertinent mammograms are reviewed under the imaging tab.    Review of Systems   Constitutional: Negative for chills and fever.   HENT: Positive for hearing loss. Negative for congestion, ear pain and sore throat.    Eyes: Positive for pain and visual disturbance.  "  Respiratory: Negative for cough and shortness of breath.    Cardiovascular: Negative for chest pain, palpitations and peripheral edema.   Gastrointestinal: Negative for abdominal pain, constipation, diarrhea, heartburn, hematochezia and nausea.   Breasts:  Negative for tenderness, breast mass and discharge.   Genitourinary: Negative for dysuria, frequency, genital sores, hematuria, pelvic pain, urgency, vaginal bleeding and vaginal discharge.   Musculoskeletal: Positive for arthralgias, joint swelling and myalgias.   Skin: Negative for rash.   Neurological: Positive for dizziness. Negative for weakness, headaches and paresthesias.   Psychiatric/Behavioral: Negative for mood changes. The patient is nervous/anxious.        OBJECTIVE:   There were no vitals taken for this visit. Estimated body mass index is 23.13 kg/m  as calculated from the following:    Height as of 2/1/23: 1.549 m (5' 1\").    Weight as of 2/1/23: 55.5 kg (122 lb 6.4 oz).  Physical Exam  Unable to perform due to virtual visit.       ASSESSMENT / PLAN:     1. Encounter for annual wellness exam in Medicare patient     2. Subclinical hypothyroidism - surveillance labs near future  - TSH; Future  - T4, free; Future    3. Prediabetes - surveillance labs near future  - Hemoglobin A1c; Future    4. Hyperlipidemia LDL goal <130 - as  above  - Comprehensive metabolic panel (BMP + Alb, Alk Phos, ALT, AST, Total. Bili, TP); Future    5. Lumbar radiculopathy, right - following with PT, discussed possible advanced imaging if pain fails to improve.       COUNSELING:  Reviewed preventive health counseling, as reflected in patient instructions        She reports that she quit smoking about 63 years ago. Her smoking use included cigarettes. She has never used smokeless tobacco.      Appropriate preventive services were discussed with this patient, including applicable screening as appropriate for cardiovascular disease, diabetes, osteopenia/osteoporosis, and " glaucoma.  As appropriate for age/gender, discussed screening for colorectal cancer, prostate cancer, breast cancer, and cervical cancer. Checklist reviewing preventive services available has been given to the patient.    Reviewed patients plan of care and provided an AVS. The Basic Care Plan (routine screening as documented in Health Maintenance) for Indy meets the Care Plan requirement. This Care Plan has been established and reviewed with the Patient and son.      Batool Kulkarni MD  [facility]    Identified Health Risks:

## 2023-02-23 NOTE — PROGRESS NOTES
Subjective:  HPI  Physical Exam                    Objective:  System    Physical Exam    General     ROS    Assessment/Plan:    SUBJECTIVE  Subjective changes as noted by pt:  Pt notes decreased pain and increased strength     Current pain level: 1/10     Changes in function:  Pt notes increased ease with walking and standing     Adverse reaction to treatment or activity:  None    OBJECTIVE  Changes in objective findings:  Pt demonstrates improved hamstring flexibility and abdominal strength        ASSESSMENT  Indy continues to require intervention to meet STG and LTG's: PT  Patient's symptoms are resolving.  Response to therapy has shown an improvement in  pain level, strength and function  Progress made towards STG/LTG?  Yes,     PLAN  Current treatment program is being advanced to more complex exercises.    PTA/ATC plan:  N/A    Please refer to the daily flowsheet for treatment today, total treatment time and time spent performing 1:1 timed codes.

## 2023-02-27 ENCOUNTER — VIRTUAL VISIT (OUTPATIENT)
Dept: PSYCHOLOGY | Facility: CLINIC | Age: 88
End: 2023-02-27
Payer: COMMERCIAL

## 2023-02-27 DIAGNOSIS — F43.23 ADJUSTMENT DISORDER WITH MIXED ANXIETY AND DEPRESSED MOOD: Primary | ICD-10-CM

## 2023-02-27 PROCEDURE — 90834 PSYTX W PT 45 MINUTES: CPT | Mod: VID

## 2023-02-27 NOTE — PROGRESS NOTES
[facility]                                     Progress Note    Patient Name: Indy Reyes  Date: 02/27/2023         Service Type: Individual      Session Start Time: 9:00am  Session End Time: 9:45am     Session Length: 45 minutes    Session #: 10    Attendees: Client and son Fady    Service Modality:  Video Visit:      Provider verified identity through the following two step process.  Patient provided:  Patient is known previously to provider    Telemedicine Visit: The patient's condition can be safely assessed and treated via synchronous audio and visual telemedicine encounter.      Reason for Telemedicine Visit: Patient has requested telehealth visit, Patient convenience (e.g. access to timely appointments / distance to available provider) and Services only offered telehealth    Originating Site (Patient Location): Patient's home    Distant Site (Provider Location): Provider Remote Setting- Home Office    Consent:  The patient/guardian has verbally consented to: the potential risks and benefits of telemedicine (video visit) versus in person care; bill my insurance or make self-payment for services provided; and responsibility for payment of non-covered services.     Patient would like the video invitation sent by:  Send to e-mail at: [email or web address]    Mode of Communication:  Video Conference via Amwell    Distant Location (Provider):  Off-site    As the provider I attest to compliance with applicable laws and regulations related to telemedicine.    DATA  Interactive Complexity: No  Crisis: No        Progress Since Last Session (Related to Symptoms / Goals / Homework):   Symptoms: No change Mary reports a slight improvement to her continued depression (sadness, crying, sleep disturbance) influenced by grieving the loss of her  since our last session.       Homework: Achieved / completed to satisfaction      Episode of Care Goals: Satisfactory progress - ACTION (Actively  "working towards change); Intervened by reinforcing change plan / affirming steps taken     Current / Ongoing Stressors and Concerns:   Intense grief at loss of spouse in May 2022.  Beginning in late  client experienced health decline including loss of speech, cognitive decline; brain tumor was removed in 2022.  Client experiencing significant grief, losses and change.     Therapist met with client to review treatment interventions; provided updates to stressors since last session. Positive thought about  spouse \"now he's pain free, there's some comfort in that\" yet the grief is so painful.  \"Difficult to be left behind\" yet not alone - friends, family.  Memories of keeping spouse warm, poor circulation, cuddled to provide warmth.  \"I am a strong woman\", insight regarding messages of mom not to cry.  Showed off her leg to family, \"inappropriate\" to show knees. \"I'll do it even if it isn't appropriate\" she'll still do it because \"I don't care\".  \"As my brain is healing I'm starting to feel more like myself, spread my wings\", \"a little chick breaking out of an egg\", with their will and strength they can break free.  Renewed interest in old activities, cleaning apartment.  Memories of [place] travel to [place] in a horse drawn wagon.  Writing down memories to share with family.  Niece outing tomorrow to celebrate birthday.  Therapist provided active, empathic and reflective listening, validation and processing support to client in session.  Client was engaged in therapeutic content and open to feedback.       Treatment Objective(s) Addressed in This Session:   Increase interest, engagement, and pleasure in doing things  Decrease frequency and intensity of feeling down, depressed, hopeless  Decrease thoughts that you'd be better off dead or of suicide / self-harm  talk to at least two others about losses and coping       Intervention:   Motivational Interviewing    MI Intervention: Expressed " Empathy/Understanding, Supported Autonomy, Collaboration, Evocation, Open-ended questions, Reflections: simple and complex, Change talk (evoked) and Reframe     Change Talk Expressed by the Patient: Desire to change Ability to change Reasons to change Activation Taking steps    Provider Response to Change Talk: E - Evoked more info from patient about behavior change, A - Affirmed patient's thoughts, decisions, or attempts at behavior change and R - Reflected patient's change talk      Assessments completed prior to visit:  The following assessments were completed by patient for this visit:  PHQ2:   PHQ-2 ( 1999 [employer]) 2/27/2023 2/23/2023 2/23/2023 2/20/2023 2/20/2023 2/1/2023 1/30/2023   Q1: Little interest or pleasure in doing things 1 0 2 0 0 3 -   Q2: Feeling down, depressed or hopeless 1 3 2 3 3 2 -   PHQ-2 Score 2 3 4 3 3 5 -   PHQ-2 Total Score (12-17 Years)- Positive if 3 or more points; Administer PHQ-A if positive - - - - - - -   Q1: Little interest or pleasure in doing things Several days More than half the days Not at all Not at all - - Nearly every day   Q2: Feeling down, depressed or hopeless Several days More than half the days Nearly every day Nearly every day - - More than half the days   PHQ-2 Score 2 4 3 3 - - 5     PHQ9:   PHQ-9 SCORE 11/3/2022 11/9/2022 11/16/2022 2/20/2023 2/20/2023 2/23/2023 2/23/2023   PHQ-9 Total Score - - - - - - -   PHQ-9 Total Score MyChart 12 (Moderate depression) 11 (Moderate depression) 16 (Moderately severe depression) - 11 (Moderate depression) 11 (Moderate depression) 5 (Mild depression)   PHQ-9 Total Score 12 11 16 11 11 5 11     GAD2:   KAYLAN-2 11/3/2022 11/3/2022 11/16/2022 2/20/2023 2/20/2023   Feeling nervous, anxious, or on edge 1 1 0 1 1   Not being able to stop or control worrying 0 0 0 1 1   KAYLAN-2 Total Score 1 1 0 2 2     GAD7:   KAYLAN-7 SCORE 5/30/2014 10/17/2022   Total Score 1 -   Total Score - 8 (mild anxiety)   Total Score - 8     PROMIS 10-Global Health  (only subscores and total score):   PROMIS-10 Scores Only 5/11/2017 7/24/2017 11/3/2022 11/3/2022 11/16/2022 2/20/2023 2/20/2023   Global Mental Health Score - 18 15 15 11 12 12   Global Physical Health Score - 19 18 18 18 16 16   PROMIS TOTAL - SUBSCORES - 37 33 33 29 28 28   Global Physical Health Score : Raw Score 18 (SUM : G03 - G06 - G07 - G08) 19 (SUM : G03 - G06 - G07 - G08) - - - - -   Global Mental Health Score : Raw Score 20 (SUM : G02 - G04 - G05 - G10) 18 (SUM : G02 - G04 - G05 - G10) - - - - -   Total (Physical + Mental Health Score) 38 37 - - - - -         ASSESSMENT: Current Emotional / Mental Status (status of significant symptoms):   Risk status (Self / Other harm or suicidal ideation)   Patient denies current fears or concerns for personal safety.   Patient reports the following current or recent suicidal ideation or behaviors: passive thoughts of suicide, no plan or intent. Grief and loss of spouse influencing thoughts..    Patient denies current or recent homicidal ideation or behaviors.   Patient denies current or recent self injurious behavior or ideation.   Patient denies other safety concerns.   Patient reports there has been no change in risk factors since their last session.     Patient reports there has been no change in protective factors since their last session.     Recommended that patient call 911 or go to the local ED should there be a change in any of these risk factors.     Appearance:   Appropriate    Eye Contact:   Good    Psychomotor Behavior: Normal    Attitude:   Cooperative  Interested Pleasant Attentive Rodrigue   Orientation:   All   Speech    Rate / Production: Normal/ Responsive Emotional    Volume:  Normal    Mood:    Depressed  Sad  Grieving   Affect:    Appropriate  Labile  Tearful   Thought Content:  Clear  Rumination    Thought Form:  Coherent  Logical    Insight:    Fair      Medication Review:   No current psychiatric medications prescribed     Medication  Compliance:   Yes     Changes in Health Issues:   None reported     Chemical Use Review:   Substance Use: Chemical use reviewed, no active concerns identified      Tobacco Use: No current tobacco use.      Diagnosis:  1. Adjustment disorder with mixed anxiety and depressed mood        Collateral Reports Completed:   Not Applicable    PLAN: (Patient Tasks / Therapist Tasks / Other)  1.  Continue to journal your memories and feelings.        Adriana Siobhan, [facility] 2023                                                         ______________________________________________________________________    Individual Treatment Plan    Patient's Name: Indy Reyes  YOB: 1931    Date of Creation: 22  Date Treatment Plan Last Reviewed/Revised: 23    DSM5 Diagnoses: Adjustment Disorders  309.28 (F43.23) With mixed anxiety and depressed mood  Psychosocial / Contextual Factors: Mary experienced significant health decline in past year (cognitive decline, loss of speech and functioning) which was a brain tumor.  Spouse  suddenly in May 2022.  Brain surgery in 2022.  PROMIS (reviewed every 90 days): 33 on 11/3/22    Referral / Collaboration:  Referral to another professional/service is not indicated at this time..    Anticipated number of session for this episode of care: 9-12 sessions  Anticipation frequency of session: Every other week  Anticipated Duration of each session: 38-52 minutes  Treatment plan will be reviewed in 90 days or when goals have been changed.       MeasurableTreatment Goal(s) related to diagnosis / functional impairment(s)  Goal 1: Patient will increase understanding     I will know I've met my goal when my heart stops aching and when I can go to bed and wake up without crying, get out of this apartment.      Objective #A (Patient Action)    Patient will increase understanding of steps in the grief process.  Status: Continued - Date(s): 23      Intervention(s)  Therapist will provide education regarding grief, support groups, strategies to increase emotional experiencing to move through grief process, finding meaning after loss    Objective #B  Patient will Increase interest, engagement, and pleasure in doing things  Decrease frequency and intensity of feeling down, depressed, hopeless  Improve quantity and quality of night time sleep / decrease daytime naps.  Status: Continued - Date(s): 2/14/23     Intervention(s)  Therapist will assign homework to practice skills and discuss efficacy in session  provide educational materials on grief, mourning, depression, CBT framework, mindfulness.    Patient has reviewed and agreed to the above plan.      Adriana Mccann, [facility]  February 14, 2023

## 2023-03-02 ENCOUNTER — THERAPY VISIT (OUTPATIENT)
Dept: PHYSICAL THERAPY | Facility: CLINIC | Age: 88
End: 2023-03-02
Payer: COMMERCIAL

## 2023-03-02 DIAGNOSIS — G89.29 CHRONIC BILATERAL LOW BACK PAIN WITHOUT SCIATICA: Primary | ICD-10-CM

## 2023-03-02 DIAGNOSIS — M54.50 CHRONIC BILATERAL LOW BACK PAIN WITHOUT SCIATICA: Primary | ICD-10-CM

## 2023-03-02 PROCEDURE — 97110 THERAPEUTIC EXERCISES: CPT | Mod: GP | Performed by: PHYSICAL THERAPIST

## 2023-03-02 PROCEDURE — 97140 MANUAL THERAPY 1/> REGIONS: CPT | Mod: GP | Performed by: PHYSICAL THERAPIST

## 2023-03-06 NOTE — PROGRESS NOTES
preop faxed to [phone]. Salma Sheppard      Sarecycline Counseling: Patient advised regarding possible photosensitivity and discoloration of the teeth, skin, lips, tongue and gums.  Patient instructed to avoid sunlight, if possible.  When exposed to sunlight, patients should wear protective clothing, sunglasses, and sunscreen.  The patient was instructed to call the office immediately if the following severe adverse effects occur:  hearing changes, easy bruising/bleeding, severe headache, or vision changes.  The patient verbalized understanding of the proper use and possible adverse effects of sarecycline.  All of the patient's questions and concerns were addressed.

## 2023-03-08 ENCOUNTER — THERAPY VISIT (OUTPATIENT)
Dept: PHYSICAL THERAPY | Facility: CLINIC | Age: 88
End: 2023-03-08
Payer: COMMERCIAL

## 2023-03-08 DIAGNOSIS — M54.50 CHRONIC BILATERAL LOW BACK PAIN WITHOUT SCIATICA: Primary | ICD-10-CM

## 2023-03-08 DIAGNOSIS — G89.29 CHRONIC BILATERAL LOW BACK PAIN WITHOUT SCIATICA: Primary | ICD-10-CM

## 2023-03-08 PROCEDURE — 97110 THERAPEUTIC EXERCISES: CPT | Mod: GP | Performed by: PHYSICAL THERAPIST

## 2023-03-08 PROCEDURE — 97140 MANUAL THERAPY 1/> REGIONS: CPT | Mod: GP | Performed by: PHYSICAL THERAPIST

## 2023-03-08 NOTE — PROGRESS NOTES
Subjective:  HPI  Physical Exam                    Objective:  System    Physical Exam    General     ROS    Assessment/Plan:    DISCHARGE REPORT    Progress reporting period is from 2-6-23 to 3-8-23.       SUBJECTIVE  Subjective changes noted by patient:  Pt notes decreased pain and increased strength in the back .       Current pain level is 2/10  .     Previous pain level was  8/10  .   Changes in function:  Pt notes increased ease with sitting since changing chairs at home. Walking is also improved.  Adverse reaction to treatment or activity: None    OBJECTIVE  Changes noted in objective findings:  Pt notes mild pain with left lateral flexion. Pt demonstrates improved lumbar extension. Lower abdominal strength improved.         ASSESSMENT/PLAN  Updated problem list and treatment plan: Diagnosis 1:  Lumbar pain  Pain -  hot/cold therapy, manual therapy, self management, education and home program  Decreased ROM/flexibility - manual therapy, therapeutic exercise, therapeutic activity and home program  Decreased strength - therapeutic exercise, therapeutic activities and home program  STG/LTGs have been met or progress has been made towards goals:  Yes,   Assessment of Progress: The patient's condition is improving.  Self Management Plans:  Patient has been instructed in a home treatment program.  I have re-evaluated this patient and find that the nature, scope, duration and intensity of the therapy is appropriate for the medical condition of the patient.  Indy continues to require the following intervention to meet STG and LTG's:  PT intervention is no longer required to meet STG/LTG.    Recommendations:  This patient is ready to be discharged from therapy and continue their home treatment program.    Please refer to the daily flowsheet for treatment today, total treatment time and time spent performing 1:1 timed codes.

## 2023-03-13 DIAGNOSIS — R79.89 ELEVATED TSH: ICD-10-CM

## 2023-03-13 RX ORDER — LEVOTHYROXINE SODIUM 25 UG/1
25 TABLET ORAL DAILY
Qty: 90 TABLET | Refills: 1 | Status: SHIPPED | OUTPATIENT
Start: 2023-03-13 | End: 2023-05-02

## 2023-03-13 NOTE — TELEPHONE ENCOUNTER
Routing refill request to provider for review/approval because:  Labs out of range:    TSH   Date Value Ref Range Status   02/01/2021 4.93 (H) 0.40 - 4.00 mU/L [place] INGRAM RN

## 2023-03-15 ENCOUNTER — VIRTUAL VISIT (OUTPATIENT)
Dept: PSYCHOLOGY | Facility: CLINIC | Age: 88
End: 2023-03-15
Payer: COMMERCIAL

## 2023-03-15 DIAGNOSIS — F43.23 ADJUSTMENT DISORDER WITH MIXED ANXIETY AND DEPRESSED MOOD: Primary | ICD-10-CM

## 2023-03-15 DIAGNOSIS — F43.21 COMPLICATED GRIEF: ICD-10-CM

## 2023-03-15 PROCEDURE — 90834 PSYTX W PT 45 MINUTES: CPT | Mod: VID

## 2023-03-15 NOTE — PROGRESS NOTES
[facility]                                     Progress Note    Patient Name: Indy Reyes  Date: 03/15/2023         Service Type: Individual      Session Start Time: 9:00am  Session End Time: 9:45am     Session Length: 45 minutes    Session #: 10    Attendees: Client and son Fady    Service Modality:  Video Visit:      Provider verified identity through the following two step process.  Patient provided:  Patient is known previously to provider    Telemedicine Visit: The patient's condition can be safely assessed and treated via synchronous audio and visual telemedicine encounter.      Reason for Telemedicine Visit: Patient has requested telehealth visit, Patient convenience (e.g. access to timely appointments / distance to available provider) and Services only offered telehealth    Originating Site (Patient Location): Patient's home    Distant Site (Provider Location): Provider Remote Setting- Home Office    Consent:  The patient/guardian has verbally consented to: the potential risks and benefits of telemedicine (video visit) versus in person care; bill my insurance or make self-payment for services provided; and responsibility for payment of non-covered services.     Patient would like the video invitation sent by:  Send to e-mail at: [email or web address]    Mode of Communication:  Video Conference via Amwell    Distant Location (Provider):  Off-site    As the provider I attest to compliance with applicable laws and regulations related to telemedicine.    DATA  Interactive Complexity: No  Crisis: No        Progress Since Last Session (Related to Symptoms / Goals / Homework):   Symptoms: No change Mary reports a slight improvement to her continued depression (sadness, crying, sleep disturbance) influenced by grieving the loss of her  since our last session.       Homework: Achieved / completed to satisfaction      Episode of Care Goals: Satisfactory progress - ACTION (Actively  "working towards change); Intervened by reinforcing change plan / affirming steps taken     Current / Ongoing Stressors and Concerns:   Intense grief at loss of spouse in May 2022.  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  Client experiencing significant grief, losses and change.     Therapist met with client to review treatment interventions; provided updates to stressors since last session. \"I'm left behind\", shouldn't be sad because of family, Gnosticist family who love and support her.  Difficulty accepting spouse is gone and anger about that.  Sadness \"one side of my personality I haven't shown before\".  Activities in assisted living facility - daily walking, games, laughter, exercise classes.  New friend in building who is also a .  \"lost and empty\" + things that fill her up.  Daily prayer.  \"I'm so grateful I'm still here\" - desire to serve.  Balancing broken heart, weakness.  Mantra's - get through this.  Discussed balancing the sadness with positive thoughts of now.  Therapist provided active, empathic and reflective listening, validation and processing support to client in session.  Client was engaged in therapeutic content and open to feedback.       Treatment Objective(s) Addressed in This Session:   Increase interest, engagement, and pleasure in doing things  Decrease frequency and intensity of feeling down, depressed, hopeless  Decrease thoughts that you'd be better off dead or of suicide / self-harm  talk to at least two others about losses and coping       Intervention:   Motivational Interviewing    MI Intervention: Expressed Empathy/Understanding, Supported Autonomy, Collaboration, Evocation, Open-ended questions, Reflections: simple and complex, Change talk (evoked) and Reframe     Change Talk Expressed by the Patient: Desire to change Ability to change Reasons to change Activation Taking " steps    Provider Response to Change Talk: E - Evoked more info from patient about behavior change, A - Affirmed patient's thoughts, decisions, or attempts at behavior change and R - Reflected patient's change talk      Assessments completed prior to visit:  The following assessments were completed by patient for this visit:  PHQ2:   PHQ-2 ( 1999 [employer]) 2/27/2023 2/23/2023 2/23/2023 2/20/2023 2/20/2023 2/1/2023 1/30/2023   Q1: Little interest or pleasure in doing things 1 0 2 0 0 3 -   Q2: Feeling down, depressed or hopeless 1 3 2 3 3 2 -   PHQ-2 Score 2 3 4 3 3 5 -   PHQ-2 Total Score (12-17 Years)- Positive if 3 or more points; Administer PHQ-A if positive - - - - - - -   Q1: Little interest or pleasure in doing things Several days More than half the days Not at all Not at all - - Nearly every day   Q2: Feeling down, depressed or hopeless Several days More than half the days Nearly every day Nearly every day - - More than half the days   PHQ-2 Score 2 4 3 3 - - 5     PHQ9:   PHQ-9 SCORE 11/3/2022 11/9/2022 11/16/2022 2/20/2023 2/20/2023 2/23/2023 2/23/2023   PHQ-9 Total Score - - - - - - -   PHQ-9 Total Score [facility] 12 (Moderate depression) 11 (Moderate depression) 16 (Moderately severe depression) - 11 (Moderate depression) 11 (Moderate depression) 5 (Mild depression)   PHQ-9 Total Score 12 11 16 11 11 5 11     GAD2:   KAYLAN-2 11/3/2022 11/3/2022 11/16/2022 2/20/2023 2/20/2023   Feeling nervous, anxious, or on edge 1 1 0 1 1   Not being able to stop or control worrying 0 0 0 1 1   KAYLAN-2 Total Score 1 1 0 2 2     GAD7:   KAYLAN-7 SCORE 5/30/2014 10/17/2022   Total Score 1 -   Total Score - 8 (mild anxiety)   Total Score - 8     PROMIS 10-Global Health (only subscores and total score):   PROMIS-10 Scores Only 5/11/2017 7/24/2017 11/3/2022 11/3/2022 11/16/2022 2/20/2023 2/20/2023   Global Mental Health Score - 18 15 15 11 12 12   Global Physical Health Score - 19 18 18 18 16 16   PROMIS TOTAL - SUBSCORES - 37 33 33 29  28 28   Global Physical Health Score : Raw Score 18 (SUM : G03 - G06 - G07 - G08) 19 (SUM : G03 - G06 - G07 - G08) - - - - -   Global Mental Health Score : Raw Score 20 (SUM : G02 - G04 - G05 - G10) 18 (SUM : G02 - G04 - G05 - G10) - - - - -   Total (Physical + Mental Health Score) 38 37 - - - - -         ASSESSMENT: Current Emotional / Mental Status (status of significant symptoms):   Risk status (Self / Other harm or suicidal ideation)   Patient denies current fears or concerns for personal safety.   Patient reports the following current or recent suicidal ideation or behaviors: passive thoughts of suicide, no plan or intent. Grief and loss of spouse influencing thoughts..    Patient denies current or recent homicidal ideation or behaviors.   Patient denies current or recent self injurious behavior or ideation.   Patient denies other safety concerns.   Patient reports there has been no change in risk factors since their last session.     Patient reports there has been no change in protective factors since their last session.     Recommended that patient call 911 or go to the local ED should there be a change in any of these risk factors.     Appearance:   Appropriate    Eye Contact:   Good    Psychomotor Behavior: Normal    Attitude:   Cooperative  Interested Pleasant Attentive Rodrigue   Orientation:   All   Speech    Rate / Production: Normal/ Responsive Emotional    Volume:  Normal    Mood:    Depressed  Sad  Grieving   Affect:    Appropriate  Labile  Tearful   Thought Content:  Clear  Rumination    Thought Form:  Coherent  Logical    Insight:    Fair      Medication Review:   No current psychiatric medications prescribed     Medication Compliance:   Yes     Changes in Health Issues:   None reported     Chemical Use Review:   Substance Use: Chemical use reviewed, no active concerns identified      Tobacco Use: No current tobacco use.      Diagnosis:  1. Adjustment disorder with mixed anxiety and depressed mood     2. Complicated grief        Collateral Reports Completed:   Not Applicable    PLAN: (Patient Tasks / Therapist Tasks / Other)  1.  Keep getting out of your apartment - spending time with people, playing games.  Enjoy fun and delight.  2.  Continue to use gratitude each day.          Adriana Mccann, [facility] March 15, 2023                                                         ______________________________________________________________________    Individual Treatment Plan    Patient's Name: Indy Reyes  YOB: 1931    Date of Creation: 22  Date Treatment Plan Last Reviewed/Revised: 23    DSM5 Diagnoses: Adjustment Disorders  309.28 (F43.23) With mixed anxiety and depressed mood  Psychosocial / Contextual Factors: Mary experienced significant health decline in past year (cognitive decline, loss of speech and functioning) which was a brain tumor.  Spouse  suddenly in May 2022.  Brain surgery in 2022.  PROMIS (reviewed every 90 days): 33 on 11/3/22    Referral / Collaboration:  Referral to another professional/service is not indicated at this time..    Anticipated number of session for this episode of care: 9-12 sessions  Anticipation frequency of session: Every other week  Anticipated Duration of each session: 38-52 minutes  Treatment plan will be reviewed in 90 days or when goals have been changed.       MeasurableTreatment Goal(s) related to diagnosis / functional impairment(s)  Goal 1: Patient will increase understanding     I will know I've met my goal when my heart stops aching and when I can go to bed and wake up without crying, get out of this apartment.      Objective #A (Patient Action)    Patient will increase understanding of steps in the grief process.  Status: Continued - Date(s): 23     Intervention(s)  Therapist will provide education regarding grief, support groups, strategies to increase emotional experiencing to move through grief process, finding  meaning after loss    Objective #B  Patient will Increase interest, engagement, and pleasure in doing things  Decrease frequency and intensity of feeling down, depressed, hopeless  Improve quantity and quality of night time sleep / decrease daytime naps.  Status: Continued - Date(s): 2/14/23     Intervention(s)  Therapist will assign homework to practice skills and discuss efficacy in session  provide educational materials on grief, mourning, depression, CBT framework, mindfulness.    Patient has reviewed and agreed to the above plan.      Adriana Mccann, [facility]  February 14, 2023

## 2023-03-22 ENCOUNTER — VIRTUAL VISIT (OUTPATIENT)
Dept: PSYCHOLOGY | Facility: CLINIC | Age: 88
End: 2023-03-22
Payer: COMMERCIAL

## 2023-03-22 DIAGNOSIS — F43.23 ADJUSTMENT DISORDER WITH MIXED ANXIETY AND DEPRESSED MOOD: Primary | ICD-10-CM

## 2023-03-22 DIAGNOSIS — F43.21 COMPLICATED GRIEF: ICD-10-CM

## 2023-03-22 PROCEDURE — 90834 PSYTX W PT 45 MINUTES: CPT | Mod: VID

## 2023-03-22 NOTE — PROGRESS NOTES
[facility]                                     Progress Note    Patient Name: Indy Reyes  Date: 03/22/2023         Service Type: Individual      Session Start Time: 2:30pm  Session End Time: 3:20pm     Session Length: 50 minutes    Session #: 11    Attendees: Client and son Alberto    Service Modality:  Video Visit:      Provider verified identity through the following two step process.  Patient provided:  Patient is known previously to provider    Telemedicine Visit: The patient's condition can be safely assessed and treated via synchronous audio and visual telemedicine encounter.      Reason for Telemedicine Visit: Patient has requested telehealth visit, Patient convenience (e.g. access to timely appointments / distance to available provider) and Services only offered telehealth    Originating Site (Patient Location): Patient's home    Distant Site (Provider Location): Provider Remote Setting- Home Office    Consent:  The patient/guardian has verbally consented to: the potential risks and benefits of telemedicine (video visit) versus in person care; bill my insurance or make self-payment for services provided; and responsibility for payment of non-covered services.     Patient would like the video invitation sent by:  Send to e-mail at: [email or web address]    Mode of Communication:  Video Conference via Amwell    Distant Location (Provider):  Off-site    As the provider I attest to compliance with applicable laws and regulations related to telemedicine.    DATA  Interactive Complexity: No  Crisis: No        Progress Since Last Session (Related to Symptoms / Goals / Homework):   Symptoms: No change Mary reports a slight improvement to her continued depression (sadness, crying, sleep disturbance) influenced by grieving the loss of her  since our last session.       Homework: Achieved / completed to satisfaction      Episode of Care Goals: Satisfactory progress - ACTION (Actively  working towards change); Intervened by reinforcing change plan / affirming steps taken     Current / Ongoing Stressors and Concerns:   Intense grief at loss of spouse in May 2022.  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  Client experiencing significant grief, losses and change.     Therapist met with client to review treatment interventions; provided updates to stressors since last session.  Brain healing from tumor and surgery, memories returning, some good some sad.  Grief group at [facility] starting in April, can walk there independently.  Thoughts improve.  Nightly pattern to hold , warm his cold body, until he fell asleep. Body pillow not the same.  Moved furniture analogy for grief.  Self-soothing in glider - feel feelings in safety. Therapist provided active, empathic and reflective listening, validation and processing support to client in session.  Client was engaged in therapeutic content and open to feedback.       Treatment Objective(s) Addressed in This Session:   Increase interest, engagement, and pleasure in doing things  Decrease frequency and intensity of feeling down, depressed, hopeless  Decrease thoughts that you'd be better off dead or of suicide / self-harm  talk to at least two others about losses and coping       Intervention:   Motivational Interviewing    MI Intervention: Expressed Empathy/Understanding, Supported Autonomy, Collaboration, Evocation, Open-ended questions, Reflections: simple and complex, Change talk (evoked) and Reframe     Change Talk Expressed by the Patient: Desire to change Ability to change Reasons to change Activation Taking steps    Provider Response to Change Talk: E - Evoked more info from patient about behavior change, A - Affirmed patient's thoughts, decisions, or attempts at behavior change and R - Reflected patient's change talk      Assessments completed  prior to visit:  The following assessments were completed by patient for this visit:  PHQ2:   PHQ-2 ( 1999 [employer]) 2/27/2023 2/23/2023 2/23/2023 2/20/2023 2/20/2023 2/1/2023 1/30/2023   Q1: Little interest or pleasure in doing things 1 0 2 0 0 3 -   Q2: Feeling down, depressed or hopeless 1 3 2 3 3 2 -   PHQ-2 Score 2 3 4 3 3 5 -   PHQ-2 Total Score (12-17 Years)- Positive if 3 or more points; Administer PHQ-A if positive - - - - - - -   Q1: Little interest or pleasure in doing things Several days More than half the days Not at all Not at all - - Nearly every day   Q2: Feeling down, depressed or hopeless Several days More than half the days Nearly every day Nearly every day - - More than half the days   PHQ-2 Score 2 4 3 3 - - 5     PHQ9:   PHQ-9 SCORE 11/3/2022 11/9/2022 11/16/2022 2/20/2023 2/20/2023 2/23/2023 2/23/2023   PHQ-9 Total Score - - - - - - -   PHQ[facility]9 [facility] 12 (Moderate [facility]) 11 (Moderate depression) 16 (Moderately severe depression) - 11 (Moderate depression) 11 (Moderate depression) 5 (Mild depression)   PHQ-9 Total Score 12 11 16 11 11 5 11     GAD2:   KAYLAN-2 11/3/2022 11/3/2022 11/16/2022 2/20/2023 2/20/2023   Feeling nervous, anxious, or on edge 1 1 0 1 1   Not being able to stop or control worrying 0 0 0 1 1   KAYLAN-2 Total Score 1 1 0 2 2     GAD7:   KAYLAN-7 SCORE 5/30/2014 10/17/2022   Total Score 1 -   Total Score - 8 (mild anxiety)   Total Score - 8     PROMIS 10-Global Health (only subscores and total score):   PROMIS-10 Scores Only 5/11/2017 7/24/2017 11/3/2022 11/3/2022 11/16/2022 2/20/2023 2/20/2023   Global Mental Health Score - 18 15 15 11 12 12   Global Physical Health Score - 19 18 18 18 16 16   PROMIS TOTAL - SUBSCORES - 37 33 33 29 28 28   Global Physical Health Score : Raw Score 18 (SUM : G03 - G06 - G07 - G08) 19 (SUM : G03 - G06 - G07 - G08) - - - - -   Global Mental Health Score : Raw Score 20 (SUM : G02 - G04 - G05 - G10) 18 (SUM : G02 - G04 - G05 - G10) - - - - -    Total (Physical + Mental Health Score) 38 37 - - - - -         ASSESSMENT: Current Emotional / Mental Status (status of significant symptoms):   Risk status (Self / Other harm or suicidal ideation)   Patient denies current fears or concerns for personal safety.   Patient reports the following current or recent suicidal ideation or behaviors: passive thoughts of suicide, no plan or intent. Grief and loss of spouse influencing thoughts..    Patient denies current or recent homicidal ideation or behaviors.   Patient denies current or recent self injurious behavior or ideation.   Patient denies other safety concerns.   Patient reports there has been no change in risk factors since their last session.     Patient reports there has been no change in protective factors since their last session.     Recommended that patient call 911 or go to the local ED should there be a change in any of these risk factors.     Appearance:   Appropriate    Eye Contact:   Good    Psychomotor Behavior: Normal    Attitude:   Cooperative  Interested Pleasant Attentive Rodrigue   Orientation:   All   Speech    Rate / Production: Normal/ Responsive Emotional    Volume:  Normal    Mood:    Depressed  Sad  Grieving   Affect:    Appropriate  Labile  Tearful   Thought Content:  Clear  Rumination    Thought Form:  Coherent  Logical    Insight:    Fair      Medication Review:   No current psychiatric medications prescribed     Medication Compliance:   Yes     Changes in Health Issues:   None reported     Chemical Use Review:   Substance Use: Chemical use reviewed, no active concerns identified      Tobacco Use: No current tobacco use.      Diagnosis:  1. Adjustment disorder with mixed anxiety and depressed mood    2. Complicated grief        Collateral Reports Completed:   Not Applicable    PLAN: (Patient Tasks / Therapist Tasks / Other)  1.  Keep getting out of your apartment - spending time with people, playing games.  Enjoy fun and delight.  2.   Allow yourself to feel the grief - sadness, tears while you're in your glider (because the glider is inside and safe and feels soothing).  Were you able to let yourself do this? How long did the feelings last?  How did you feel after?        Adriana Mccann, [facility] 2023                                                         ______________________________________________________________________    Individual Treatment Plan    Patient's Name: Indy Reyes  YOB: 1931    Date of Creation: 22  Date Treatment Plan Last Reviewed/Revised: 23    DSM5 Diagnoses: Adjustment Disorders  309.28 (F43.23) With mixed anxiety and depressed mood  Psychosocial / Contextual Factors: Mary experienced significant health decline in past year (cognitive decline, loss of speech and functioning) which was a brain tumor.  Spouse  suddenly in May 2022.  Brain surgery in 2022.  PROMIS (reviewed every 90 days): 33 on 11/3/22    Referral / Collaboration:  Referral to another professional/service is not indicated at this time..    Anticipated number of session for this episode of care: 9-12 sessions  Anticipation frequency of session: Every other week  Anticipated Duration of each session: 38-52 minutes  Treatment plan will be reviewed in 90 days or when goals have been changed.       MeasurableTreatment Goal(s) related to diagnosis / functional impairment(s)  Goal 1: Patient will increase understanding     I will know I've met my goal when my heart stops aching and when I can go to bed and wake up without crying, get out of this apartment.      Objective #A (Patient Action)    Patient will increase understanding of steps in the grief process.  Status: Continued - Date(s): 23     Intervention(s)  Therapist will provide education regarding grief, support groups, strategies to increase emotional experiencing to move through grief process, finding meaning after loss    Objective #B  Patient  will Increase interest, engagement, and pleasure in doing things  Decrease frequency and intensity of feeling down, depressed, hopeless  Improve quantity and quality of night time sleep / decrease daytime naps.  Status: Continued - Date(s): 2/14/23     Intervention(s)  Therapist will assign homework to practice skills and discuss efficacy in session  provide educational materials on grief, mourning, depression, CBT framework, mindfulness.    Patient has reviewed and agreed to the above plan.      Adriana Mccann, [facility]  February 14, 2023

## 2023-03-23 NOTE — PATIENT INSTRUCTIONS
PLAN: (Patient Tasks / Therapist Tasks / Other)  1.  Keep getting out of your apartment - spending time with people, playing games.  Enjoy fun and delight.  2.  Allow yourself to feel the grief - sadness, tears while you're in your glider (because the glider is inside and safe and feels soothing).  Were you able to let yourself do this? How long did the feelings last?  How did you feel after?

## 2023-03-27 ENCOUNTER — TRANSFERRED RECORDS (OUTPATIENT)
Dept: HEALTH INFORMATION MANAGEMENT | Facility: CLINIC | Age: 88
End: 2023-03-27

## 2023-04-05 ENCOUNTER — VIRTUAL VISIT (OUTPATIENT)
Dept: PSYCHOLOGY | Facility: CLINIC | Age: 88
End: 2023-04-05
Payer: COMMERCIAL

## 2023-04-05 DIAGNOSIS — F43.23 ADJUSTMENT DISORDER WITH MIXED ANXIETY AND DEPRESSED MOOD: Primary | ICD-10-CM

## 2023-04-05 DIAGNOSIS — F43.21 COMPLICATED GRIEF: ICD-10-CM

## 2023-04-05 PROCEDURE — 90834 PSYTX W PT 45 MINUTES: CPT | Mod: VID

## 2023-04-05 NOTE — PROGRESS NOTES
[facility]                                     Progress Note    Patient Name: Indy Reyes  Date: 04/05/2023         Service Type: Individual      Session Start Time: 11:00am  Session End Time: 11:50am     Session Length: 50 minutes    Session #: 12    Attendees: Client and son Alberto    Service Modality:  Video Visit:      Provider verified identity through the following two step process.  Patient provided:  Patient is known previously to provider    Telemedicine Visit: The patient's condition can be safely assessed and treated via synchronous audio and visual telemedicine encounter.      Reason for Telemedicine Visit: Patient has requested telehealth visit, Patient convenience (e.g. access to timely appointments / distance to available provider) and Services only offered telehealth    Originating Site (Patient Location): Patient's home    Distant Site (Provider Location): Provider Remote Setting- Home Office    Consent:  The patient/guardian has verbally consented to: the potential risks and benefits of telemedicine (video visit) versus in person care; bill my insurance or make self-payment for services provided; and responsibility for payment of non-covered services.     Patient would like the video invitation sent by:  Send to e-mail at: [email or web address]    Mode of Communication:  Video Conference via Amwell    Distant Location (Provider):  Off-site    As the provider I attest to compliance with applicable laws and regulations related to telemedicine.    DATA  Interactive Complexity: No  Crisis: No        Progress Since Last Session (Related to Symptoms / Goals / Homework):   Symptoms: Improving Mary reports a slight improvement to her continued depression (sadness, crying, sleep disturbance) influenced by grieving the loss of her  since our last session.       Homework: Achieved / completed to satisfaction      Episode of Care Goals: Satisfactory progress - ACTION (Actively  "working towards change); Intervened by reinforcing change plan / affirming steps taken     Current / Ongoing Stressors and Concerns:   Intense grief at loss of spouse in May 2022.  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  Client experiencing significant grief, losses and change.     Therapist met with client to review treatment interventions; provided updates to stressors since last session. Good activity with grandson and great-grandchildren.  \"starting to enjoy things more\"; feels  in her heart, comfort with him being a part of her.  Allowed herself to feel grief while in glider.  Grief support group.  \"your grief is your grief as long as you want it\"  doesn't want it.  Boundaries with resident, assertive communication and permission to set and hold limit.  Feelings connected to relationships with residents.  Participated in fun activities.  Awareness grief could hold her back, decisions to move forward and accept help and support, not give up on herself.  Therapist provided active, empathic and reflective listening, validation and processing support to client in session.  Client was engaged in therapeutic content and open to feedback.       Treatment Objective(s) Addressed in This Session:   Increase interest, engagement, and pleasure in doing things  Decrease frequency and intensity of feeling down, depressed, hopeless  Decrease thoughts that you'd be better off dead or of suicide / self-harm  talk to at least two others about losses and coping       Intervention:   Motivational Interviewing    MI Intervention: Expressed Empathy/Understanding, Supported Autonomy, Collaboration, Evocation, Open-ended questions, Reflections: simple and complex, Change talk (evoked) and Reframe     Change Talk Expressed by the Patient: Desire to change Ability to change Reasons to change Activation Taking steps    Provider " Response to Change Talk: E - Evoked more info from patient about behavior change, A - Affirmed patient's thoughts, decisions, or attempts at behavior change and R - Reflected patient's change talk      Assessments completed prior to visit:  The following assessments were completed by patient for this visit:  PHQ2:       2/27/2023     8:58 AM 2/23/2023     1:43 PM 2/23/2023     1:40 PM 2/20/2023     8:50 AM 2/1/2023     8:58 AM 2/1/2023     8:57 AM 11/16/2022    12:10 PM   PHQ-2 ( 1999 [employer])   Q1: Little interest or pleasure in doing things 1 0 2 0    0 3  1   Q2: Feeling down, depressed or hopeless 1 3 2 3    3 2  3   PHQ-2 Score 2 3 4 3    3 5  4   Q1: Little interest or pleasure in doing things Several days Not at all More than half the days Not at all  Nearly every day Several days   Q2: Feeling down, depressed or hopeless Several days Nearly every day More than half the days Nearly every day  More than half the days Nearly every day   PHQ-2 Score 2 3 4 3  5 4     PHQ9:       10/17/2022     9:51 AM 11/3/2022     2:08 PM 11/9/2022     3:18 PM 11/16/2022    12:10 PM 2/20/2023     8:50 AM 2/23/2023     1:40 PM 2/23/2023     1:43 PM   PHQ-9 SCORE   PHQ-9 Total Score MyChart 12 (Moderate depression) 12 (Moderate depression) 11 (Moderate depression) 16 (Moderately severe depression) 11 (Moderate depression) 5 (Mild depression) 11 (Moderate depression)   PHQ-9 Total Score 12 12 11 16 11    11 5 11     GAD2:       11/3/2022     2:09 PM 11/16/2022    12:11 PM 2/20/2023     8:51 AM   KAYLAN-2   Feeling nervous, anxious, or on edge 1    1 0 1    1   Not being able to stop or control worrying 0    0 0 1    1   KAYLAN-2 Total Score 1    1 0 2    2     GAD7:       5/30/2014    12:06 PM 10/17/2022     9:53 AM   KAYLAN-7 SCORE   Total Score 1    Total Score  8 (mild anxiety)   Total Score  8     PROMIS 10-Global Health (only subscores and total score):       5/11/2017     7:12 PM 7/24/2017    10:55 AM 11/3/2022     2:12 PM 11/16/2022     12:12 PM 2/20/2023     8:55 AM   PROMIS-10 Scores Only   Global Mental Health Score  18 15    15 11 12    12   Global Physical Health Score  19 18    18 18 16    16   PROMIS TOTAL - SUBSCORES  37 33    33 29 28    28   Global Physical Health Score : Raw Score 18 (SUM : G03 - G06 - G07 - G08) 19 (SUM : G03 - G06 - G07 - G08)      Global Mental Health Score : Raw Score 20 (SUM : G02 - G04 - G05 - G10) 18 (SUM : G02 - G04 - G05 - G10)      Total (Physical + Mental Health Score) 38 37            ASSESSMENT: Current Emotional / Mental Status (status of significant symptoms):   Risk status (Self / Other harm or suicidal ideation)   Patient denies current fears or concerns for personal safety.   Patient reports the following current or recent suicidal ideation or behaviors: passive thoughts of suicide, no plan or intent. Grief and loss of spouse influencing thoughts..    Patient denies current or recent homicidal ideation or behaviors.   Patient denies current or recent self injurious behavior or ideation.   Patient denies other safety concerns.   Patient reports there has been no change in risk factors since their last session.     Patient reports there has been no change in protective factors since their last session.     Recommended that patient call 911 or go to the local ED should there be a change in any of these risk factors.     Appearance:   Appropriate    Eye Contact:   Good    Psychomotor Behavior: Normal    Attitude:   Cooperative  Interested Pleasant Attentive Rodrigue   Orientation:   All   Speech    Rate / Production: Normal/ Responsive Emotional    Volume:  Normal    Mood:    Depressed  Sad  Grieving   Affect:    Appropriate  Labile  Tearful   Thought Content:  Clear  Rumination    Thought Form:  Coherent  Logical    Insight:    Good      Medication Review:   No current psychiatric medications prescribed     Medication Compliance:   Yes     Changes in Health Issues:   None reported     Chemical Use  Review:   Substance Use: Chemical use reviewed, no active concerns identified      Tobacco Use: No current tobacco use.      Diagnosis:  1. Adjustment disorder with mixed anxiety and depressed mood    2. Complicated grief        Collateral Reports Completed:   Not Applicable    PLAN: (Patient Tasks / Therapist Tasks / Other)  1.  Keep getting out of your apartment - spending time with people, playing games.  Enjoy fun and delight.  2.  Trust in yourself about how much time you can spend with people - you can say hi and move on.          Adriana Mccann, [facility] 2023                                                         ______________________________________________________________________    Individual Treatment Plan    Patient's Name: Indy Reyes  YOB: 1931    Date of Creation: 22  Date Treatment Plan Last Reviewed/Revised: 23    DSM5 Diagnoses: Adjustment Disorders  309.28 (F43.23) With mixed anxiety and depressed mood  Psychosocial / Contextual Factors: Mary experienced significant health decline in past year (cognitive decline, loss of speech and functioning) which was a brain tumor.  Spouse  suddenly in May 2022.  Brain surgery in 2022.  PROMIS (reviewed every 90 days): 33 on 11/3/22    Referral / Collaboration:  Referral to another professional/service is not indicated at this time..    Anticipated number of session for this episode of care: 9-12 sessions  Anticipation frequency of session: Every other week  Anticipated Duration of each session: 38-52 minutes  Treatment plan will be reviewed in 90 days or when goals have been changed.       MeasurableTreatment Goal(s) related to diagnosis / functional impairment(s)  Goal 1: Patient will increase understanding     I will know I've met my goal when my heart stops aching and when I can go to bed and wake up without crying, get out of this apartment.      Objective #A (Patient Action)    Patient will  increase understanding of steps in the grief process.  Status: Continued - Date(s): 2/14/23     Intervention(s)  Therapist will provide education regarding grief, support groups, strategies to increase emotional experiencing to move through grief process, finding meaning after loss    Objective #B  Patient will Increase interest, engagement, and pleasure in doing things  Decrease frequency and intensity of feeling down, depressed, hopeless  Improve quantity and quality of night time sleep / decrease daytime naps.  Status: Continued - Date(s): 2/14/23     Intervention(s)  Therapist will assign homework to practice skills and discuss efficacy in session  provide educational materials on grief, mourning, depression, CBT framework, mindfulness.    Patient has reviewed and agreed to the above plan.      Adriana Mccann, [facility]  February 14, 2023

## 2023-04-05 NOTE — PATIENT INSTRUCTIONS
PLAN: (Patient Tasks / Therapist Tasks / Other)  1.  Keep getting out of your apartment - spending time with people, playing games.  Enjoy fun and delight.  2.  Trust in yourself about how much time you can spend with people - you can say hi and move on.

## 2023-04-13 ENCOUNTER — TELEPHONE (OUTPATIENT)
Dept: FAMILY MEDICINE | Facility: CLINIC | Age: 88
End: 2023-04-13
Payer: COMMERCIAL

## 2023-04-13 DIAGNOSIS — M48.061 FORAMINAL STENOSIS OF LUMBAR REGION: Primary | ICD-10-CM

## 2023-04-13 NOTE — TELEPHONE ENCOUNTER
Left message with Marlen (on consent to communicate) that they will be getting a call to schedule with Pain clinic.  Lulu El,

## 2023-04-13 NOTE — TELEPHONE ENCOUNTER
History of chronic low back pain, going to PT recently has not given any relief.  Patient's son Alberto calling today to report patient is having ongoing low back tigtness and  pain without relief.  Would like to pursue the lumbar injections as previously discussed.  PHUC Lovelace R.N.

## 2023-04-17 ENCOUNTER — VIRTUAL VISIT (OUTPATIENT)
Dept: PSYCHOLOGY | Facility: CLINIC | Age: 88
End: 2023-04-17
Payer: COMMERCIAL

## 2023-04-17 DIAGNOSIS — F43.21 COMPLICATED GRIEF: ICD-10-CM

## 2023-04-17 DIAGNOSIS — F43.23 ADJUSTMENT DISORDER WITH MIXED ANXIETY AND DEPRESSED MOOD: Primary | ICD-10-CM

## 2023-04-17 PROCEDURE — 90834 PSYTX W PT 45 MINUTES: CPT | Mod: VID

## 2023-04-17 NOTE — PROGRESS NOTES
[facility]                                     Progress Note    Patient Name: Indy Reyes  Date: 04/17/2023         Service Type: Individual      Session Start Time: 7:58am  Session End Time: 8:48am     Session Length: 50 minutes    Session #: 13    Attendees: Client and son Fady    Service Modality:  Video Visit:      Provider verified identity through the following two step process.  Patient provided:  Patient is known previously to provider    Telemedicine Visit: The patient's condition can be safely assessed and treated via synchronous audio and visual telemedicine encounter.      Reason for Telemedicine Visit: Patient has requested telehealth visit, Patient convenience (e.g. access to timely appointments / distance to available provider) and Services only offered telehealth    Originating Site (Patient Location): Patient's home    Distant Site (Provider Location): Provider Remote Setting- Home Office    Consent:  The patient/guardian has verbally consented to: the potential risks and benefits of telemedicine (video visit) versus in person care; bill my insurance or make self-payment for services provided; and responsibility for payment of non-covered services.     Patient would like the video invitation sent by:  Send to e-mail at: [email or web address]    Mode of Communication:  Video Conference via Amwell    Distant Location (Provider):  Off-site    As the provider I attest to compliance with applicable laws and regulations related to telemedicine.    DATA  Interactive Complexity: No  Crisis: No        Progress Since Last Session (Related to Symptoms / Goals / Homework):   Symptoms: Improving Mary reports a slight improvement to her continued depression (sadness, crying, sleep disturbance) influenced by grieving the loss of her  since our last session.       Homework: Achieved / completed to satisfaction      Episode of Care Goals: Satisfactory progress - ACTION (Actively  "working towards change); Intervened by reinforcing change plan / affirming steps taken     Current / Ongoing Stressors and Concerns:   Intense grief at loss of spouse in May 2022.  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  Client experiencing significant grief, losses and change.     Therapist met with client to review treatment interventions; provided updates to stressors since last session. Continued grief expressed, though improvement in mood overall. Difficulty accepting help discussed; being a care  feels \"Warm, cozy, comfortable\".  Explored some possible negative feelings of relief at caregiving burden lifted.    Therapist provided active, empathic and reflective listening, validation and processing support to client in session.  Client was engaged in therapeutic content and open to feedback.         Treatment Objective(s) Addressed in This Session:   Increase interest, engagement, and pleasure in doing things  Decrease frequency and intensity of feeling down, depressed, hopeless  Decrease thoughts that you'd be better off dead or of suicide / self-harm  talk to at least two others about losses and coping       Intervention:   Motivational Interviewing    MI Intervention: Expressed Empathy/Understanding, Supported Autonomy, Collaboration, Evocation, Open-ended questions, Reflections: simple and complex, Change talk (evoked) and Reframe     Change Talk Expressed by the Patient: Desire to change Ability to change Reasons to change Activation Taking steps    Provider Response to Change Talk: E - Evoked more info from patient about behavior change, A - Affirmed patient's thoughts, decisions, or attempts at behavior change and R - Reflected patient's change talk      Assessments completed prior to visit:  The following assessments were completed by patient for this visit:  PHQ2:       2/27/2023     8:58 AM " 2/23/2023     1:43 PM 2/23/2023     1:40 PM 2/20/2023     8:50 AM 2/1/2023     8:58 AM 2/1/2023     8:57 AM 11/16/2022    12:10 PM   PHQ-2 ( 1999 [employer])   Q1: Little interest or pleasure in doing things 1 0 2 0    0 3  1   Q2: Feeling down, depressed or hopeless 1 3 2 3    3 2  3   PHQ-2 Score 2 3 4 3    3 5  4   Q1: Little interest or pleasure in doing things Several days Not at all More than half the days Not at all  Nearly every day Several days   Q2: Feeling down, depressed or hopeless Several days Nearly every day More than half the days Nearly every day  More than half the days Nearly every day   PHQ-2 Score 2 3 4 3  5 4     PHQ9:       10/17/2022     9:51 AM 11/3/2022     2:08 PM 11/9/2022     3:18 PM 11/16/2022    12:10 PM 2/20/2023     8:50 AM 2/23/2023     1:40 PM 2/23/2023     1:43 PM   PHQ-9 SCORE   PHQ-9 Total Score MyChart 12 (Moderate depression) 12 (Moderate depression) 11 (Moderate depression) 16 (Moderately severe depression) 11 (Moderate depression) 5 (Mild depression) 11 (Moderate depression)   PHQ-9 Total Score 12 12 11 16 11    11 5 11     GAD2:       11/3/2022     2:09 PM 11/16/2022    12:11 PM 2/20/2023     8:51 AM   KAYLAN-2   Feeling nervous, anxious, or on edge 1    1 0 1    1   Not being able to stop or control worrying 0    0 0 1    1   KAYLAN-2 Total Score 1    1 0 2    2     GAD7:       5/30/2014    12:06 PM 10/17/2022     9:53 AM   KAYLAN-7 SCORE   Total Score 1    Total Score  8 (mild anxiety)   Total Score  8     PROMIS 10-Global Health (only subscores and total score):       5/11/2017     7:12 PM 7/24/2017    10:55 AM 11/3/2022     2:12 PM 11/16/2022    12:12 PM 2/20/2023     8:55 AM   PROMIS-10 Scores Only   Global Mental Health Score  18 15    15 11 12    12   Global Physical Health Score  19 18    18 18 16    16   PROMIS TOTAL - SUBSCORES  37 33    33 29 28    28   Global Physical Health Score : Raw Score 18 (SUM : G03 - G06 - G07 - G08) 19 (SUM : G03 - G06 - G07 - G08)      Global  Mental Health Score : Raw Score 20 (SUM : G02 - G04 - G05 - G10) 18 (SUM : G02 - G04 - G05 - G10)      Total (Physical + Mental Health Score) 38 37            ASSESSMENT: Current Emotional / Mental Status (status of significant symptoms):   Risk status (Self / Other harm or suicidal ideation)   Patient denies current fears or concerns for personal safety.   Patient denies current or recent suicidal ideation or behaviors.    Patient denies current or recent homicidal ideation or behaviors.   Patient denies current or recent self injurious behavior or ideation.   Patient denies other safety concerns.   Patient reports there has been no change in risk factors since their last session.     Patient reports there has been no change in protective factors since their last session.     Recommended that patient call 911 or go to the local ED should there be a change in any of these risk factors.     Appearance:   Appropriate    Eye Contact:   Good    Psychomotor Behavior: Normal    Attitude:   Cooperative  Interested Pleasant Attentive Rodrigue   Orientation:   All   Speech    Rate / Production: Normal/ Responsive Emotional    Volume:  Normal    Mood:    Depressed  Sad  Grieving   Affect:    Appropriate  Labile  Tearful   Thought Content:  Clear  Rumination    Thought Form:  Coherent  Logical    Insight:    Good      Medication Review:   No current psychiatric medications prescribed     Medication Compliance:   Yes     Changes in Health Issues:   None reported     Chemical Use Review:   Substance Use: Chemical use reviewed, no active concerns identified      Tobacco Use: No current tobacco use.      Diagnosis:  1. Adjustment disorder with mixed anxiety and depressed mood    2. Complicated grief        Collateral Reports Completed:   Not Applicable    PLAN: (Patient Tasks / Therapist Tasks / Other)  1.  Keep letting people care for you.  It creates a positive cycle of good feelings for you and the people who care for you.          Adriana Siobhan, [facility] 2023                                                         ______________________________________________________________________    Individual Treatment Plan    Patient's Name: Indy Reyes  YOB: 1931    Date of Creation: 22  Date Treatment Plan Last Reviewed/Revised: 23    DSM5 Diagnoses: Adjustment Disorders  309.28 (F43.23) With mixed anxiety and depressed mood  Psychosocial / Contextual Factors: Mary experienced significant health decline in past year (cognitive decline, loss of speech and functioning) which was a brain tumor.  Spouse  suddenly in May 2022.  Brain surgery in 2022.  PROMIS (reviewed every 90 days): 33 on 11/3/22    Referral / Collaboration:  Referral to another professional/service is not indicated at this time..    Anticipated number of session for this episode of care: 9-12 sessions  Anticipation frequency of session: Every other week  Anticipated Duration of each session: 38-52 minutes  Treatment plan will be reviewed in 90 days or when goals have been changed.       MeasurableTreatment Goal(s) related to diagnosis / functional impairment(s)  Goal 1: Patient will increase understanding     I will know I've met my goal when my heart stops aching and when I can go to bed and wake up without crying, get out of this apartment.      Objective #A (Patient Action)    Patient will increase understanding of steps in the grief process.  Status: Continued - Date(s): 23     Intervention(s)  Therapist will provide education regarding grief, support groups, strategies to increase emotional experiencing to move through grief process, finding meaning after loss    Objective #B  Patient will Increase interest, engagement, and pleasure in doing things  Decrease frequency and intensity of feeling down, depressed, hopeless  Improve quantity and quality of night time sleep / decrease daytime naps.  Status: Continued -  Date(s): 2/14/23     Intervention(s)  Therapist will assign homework to practice skills and discuss efficacy in session  provide educational materials on grief, mourning, depression, CBT framework, mindfulness.    Patient has reviewed and agreed to the above plan.      Adriana Mccann, [facility]  February 14, 2023

## 2023-04-24 ENCOUNTER — VIRTUAL VISIT (OUTPATIENT)
Dept: PSYCHOLOGY | Facility: CLINIC | Age: 88
End: 2023-04-24
Payer: COMMERCIAL

## 2023-04-24 DIAGNOSIS — F43.23 ADJUSTMENT DISORDER WITH MIXED ANXIETY AND DEPRESSED MOOD: Primary | ICD-10-CM

## 2023-04-24 DIAGNOSIS — F43.21 COMPLICATED GRIEF: ICD-10-CM

## 2023-04-24 PROCEDURE — 90834 PSYTX W PT 45 MINUTES: CPT | Mod: VID

## 2023-04-24 NOTE — PROGRESS NOTES
[facility]                                     Progress Note    Patient Name: Indy Reyes  Date: 04/24/2023         Service Type: Individual      Session Start Time:  9:00am  Session End Time: 9:50am     Session Length: 50 minutes    Session #: 14    Attendees: Client and son Alberto    Service Modality:  Video Visit:      Provider verified identity through the following two step process.  Patient provided:  Patient is known previously to provider    Telemedicine Visit: The patient's condition can be safely assessed and treated via synchronous audio and visual telemedicine encounter.      Reason for Telemedicine Visit: Patient has requested telehealth visit, Patient convenience (e.g. access to timely appointments / distance to available provider) and Services only offered telehealth    Originating Site (Patient Location): Patient's home    Distant Site (Provider Location): Provider Remote Setting- Home Office    Consent:  The patient/guardian has verbally consented to: the potential risks and benefits of telemedicine (video visit) versus in person care; bill my insurance or make self-payment for services provided; and responsibility for payment of non-covered services.     Patient would like the video invitation sent by:  Send to e-mail at: [email or web address]    Mode of Communication:  Video Conference via Amwell    Distant Location (Provider):  Off-site    As the provider I attest to compliance with applicable laws and regulations related to telemedicine.    DATA  Interactive Complexity: No  Crisis: No        Progress Since Last Session (Related to Symptoms / Goals / Homework):   Symptoms: Improving Mary reports a slight improvement to her continued depression (sadness, crying, sleep disturbance) influenced by grieving the loss of her  since our last session.       Homework: Achieved / completed to satisfaction      Episode of Care Goals: Satisfactory progress - ACTION (Actively  working towards change); Intervened by reinforcing change plan / affirming steps taken     Current / Ongoing Stressors and Concerns:   Intense grief at loss of spouse in May 2022.  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  Client experiencing significant grief, losses and change.     Therapist met with client to review treatment interventions; provided updates to stressors since last session. Continued grief expressed, though improvement in mood overall. Prefers to be out of apartment - loneliness, empty, missing .  Decision to stay in current residence.  Journal regularly.  Feels  in her heart which provides comfort.  Memories bring tears which reminds her she's still human.   Therapist provided active, empathic and reflective listening, validation and processing support to client in session.  Client was engaged in therapeutic content and open to feedback.       Treatment Objective(s) Addressed in This Session:   Increase interest, engagement, and pleasure in doing things  Decrease frequency and intensity of feeling down, depressed, hopeless  Decrease thoughts that you'd be better off dead or of suicide / self-harm  talk to at least two others about losses and coping       Intervention:   Motivational Interviewing    MI Intervention: Expressed Empathy/Understanding, Supported Autonomy, Collaboration, Evocation, Open-ended questions, Reflections: simple and complex, Change talk (evoked) and Reframe     Change Talk Expressed by the Patient: Desire to change Ability to change Reasons to change Activation Taking steps    Provider Response to Change Talk: E - Evoked more info from patient about behavior change, A - Affirmed patient's thoughts, decisions, or attempts at behavior change and R - Reflected patient's change talk      Assessments completed prior to visit:  The following assessments were completed  by patient for this visit:  PHQ2:       2/27/2023     8:58 AM 2/23/2023     1:43 PM 2/23/2023     1:40 PM 2/20/2023     8:50 AM 2/1/2023     8:58 AM 2/1/2023     8:57 AM 11/16/2022    12:10 PM   PHQ-2 ( 1999 [employer])   Q1: Little interest or pleasure in doing things 1 0 2 0    0 3  1   Q2: Feeling down, depressed or hopeless 1 3 2 3    3 2  3   PHQ-2 Score 2 3 4 3    3 5  4   Q1: Little interest or pleasure in doing things Several days Not at all More than half the days Not at all  Nearly every day Several days   Q2: Feeling down, depressed or hopeless Several days Nearly every day More than half the days Nearly every day  More than half the days Nearly every day   PHQ-2 Score 2 3 4 3  5 4     PHQ9:       10/17/2022     9:51 AM 11/3/2022     2:08 PM 11/9/2022     3:18 PM 11/16/2022    12:10 PM 2/20/2023     8:50 AM 2/23/2023     1:40 PM 2/23/2023     1:43 PM   PHQ-9 SCORE   PHQ-9 Total Score MyChart 12 (Moderate depression) 12 (Moderate depression) 11 (Moderate depression) 16 (Moderately severe depression) 11 (Moderate depression) 5 (Mild depression) 11 (Moderate depression)   PHQ-9 Total Score 12 12 11 16 11    11 5 11     GAD2:       11/3/2022     2:09 PM 11/16/2022    12:11 PM 2/20/2023     8:51 AM   KAYLAN-2   Feeling nervous, anxious, or on edge 1    1 0 1    1   Not being able to stop or control worrying 0    0 0 1    1   KAYLAN-2 Total Score 1    1 0 2    2     GAD7:       5/30/2014    12:06 PM 10/17/2022     9:53 AM   KAYLAN-7 SCORE   Total Score 1    Total Score  8 (mild anxiety)   Total Score  8     PROMIS 10-Global Health (only subscores and total score):       5/11/2017     7:12 PM 7/24/2017    10:55 AM 11/3/2022     2:12 PM 11/16/2022    12:12 PM 2/20/2023     8:55 AM   PROMIS-10 Scores Only   Global Mental Health Score  18 15    15 11 12    12   Global Physical Health Score  19 18    18 18 16    16   PROMIS TOTAL - SUBSCORES  37 33    33 29 28    28   Global Physical Health Score : Raw Score 18 (SUM : G03 - G06  - G07 - G08) 19 (SUM : G03 - G06 - G07 - G08)      Global Mental Health Score : Raw Score 20 (SUM : G02 - G04 - G05 - G10) 18 (SUM : G02 - G04 - G05 - G10)      Total (Physical + Mental Health Score) 38 37            ASSESSMENT: Current Emotional / Mental Status (status of significant symptoms):   Risk status (Self / Other harm or suicidal ideation)   Patient denies current fears or concerns for personal safety.   Patient denies current or recent suicidal ideation or behaviors.    Patient denies current or recent homicidal ideation or behaviors.   Patient denies current or recent self injurious behavior or ideation.   Patient denies other safety concerns.   Patient reports there has been no change in risk factors since their last session.     Patient reports there has been no change in protective factors since their last session.     Recommended that patient call 911 or go to the local ED should there be a change in any of these risk factors.     Appearance:   Appropriate    Eye Contact:   Good    Psychomotor Behavior: Normal    Attitude:   Cooperative  Interested Pleasant Attentive Rodrigue   Orientation:   All   Speech    Rate / Production: Normal/ Responsive Emotional    Volume:  Normal    Mood:    Depressed  Sad  Grieving   Affect:    Appropriate  Labile  Tearful   Thought Content:  Clear  Rumination    Thought Form:  Coherent  Logical    Insight:    Good      Medication Review:   No current psychiatric medications prescribed     Medication Compliance:   Yes     Changes in Health Issues:   None reported     Chemical Use Review:   Substance Use: Chemical use reviewed, no active concerns identified      Tobacco Use: No current tobacco use.      Diagnosis:  1. Adjustment disorder with mixed anxiety and depressed mood    2. Complicated grief        Collateral Reports Completed:   Not Applicable    PLAN: (Patient Tasks / Therapist Tasks / Other)  1.  Keep getting out and spending time with other people.    2.  Allow  yourself to feel your sadness - it does go away, and, it gets easier to feel it and let it move on versus the pain of fighting it.        Adriana Mccann, [facility] 2023                                                         ______________________________________________________________________    Individual Treatment Plan    Patient's Name: Indy Reyes  YOB: 1931    Date of Creation: 22  Date Treatment Plan Last Reviewed/Revised: 23    DSM5 Diagnoses: Adjustment Disorders  309.28 (F43.23) With mixed anxiety and depressed mood  Psychosocial / Contextual Factors: Mary experienced significant health decline in past year (cognitive decline, loss of speech and functioning) which was a brain tumor.  Spouse  suddenly in May 2022.  Brain surgery in 2022.  PROMIS (reviewed every 90 days): 33 on 11/3/22    Referral / Collaboration:  Referral to another professional/service is not indicated at this time..    Anticipated number of session for this episode of care: 9-12 sessions  Anticipation frequency of session: Every other week  Anticipated Duration of each session: 38-52 minutes  Treatment plan will be reviewed in 90 days or when goals have been changed.       MeasurableTreatment Goal(s) related to diagnosis / functional impairment(s)  Goal 1: Patient will increase understanding     I will know I've met my goal when my heart stops aching and when I can go to bed and wake up without crying, get out of this apartment.      Objective #A (Patient Action)    Patient will increase understanding of steps in the grief process.  Status: Continued - Date(s): 23     Intervention(s)  Therapist will provide education regarding grief, support groups, strategies to increase emotional experiencing to move through grief process, finding meaning after loss    Objective #B  Patient will Increase interest, engagement, and pleasure in doing things  Decrease frequency and intensity of  feeling down, depressed, hopeless  Improve quantity and quality of night time sleep / decrease daytime naps.  Status: Continued - Date(s): 2/14/23     Intervention(s)  Therapist will assign homework to practice skills and discuss efficacy in session  provide educational materials on grief, mourning, depression, CBT framework, mindfulness.    Patient has reviewed and agreed to the above plan.      Adriana Mccann, [facility]  February 14, 2023

## 2023-04-25 NOTE — PROGRESS NOTES
[facility] INTERVENTIONAL CONSULT    Date of visit: 4/28/2023    Reason for consultation:    Indy Reyes is a 89 year old female who is seen in consultation today at the request of her primary care physician, Batool Kulkarni.     Consultation and Evaluation for: PROCEDURE ONLY CONSULT    Review of Electronic Chart: Today I have also reviewed available medical information in the patient's medical record at [facility] ([facility]), including relevant provider notes, laboratory work, and imaging.       Chief Complaint:    Chief Complaint   Patient presents with     Pain       Pain history:  Indy Reyes is a 89 year old female who presents for initial evaluation of chief pain complaint of low back and tailbone pain.     - the patient is here with her oldest son, Alberto kay.   - She spent the winter in [place] and went out walking every day.  Pain started after this.    - Pain is central low back pain without radiation to the legs centered over her tailbone.   - pain is worse with walking but she does not report leg pain with walking longer distances.  It is all in her back.   - She has not previously had injections for her pain.   - Last MRI was in 2018.   - S/P L4-5 spine surgery foraminotomy 4/28/2017 Dr. Woo  - She has done extensive PT without results  - She has also done massage therapy where she was told she has tight muscle bands on her right side.  This has been helpful. Son would like to know if this is going to improve after having an injection and whether or not it is related.       Red Flags: The patient denies bowel or bladder incontinence, parasthesias, weakness, saddle anesthesia, unintentional weight loss, or fever/chills/sweats.       PAIN MEDICATIONS:  NONE    INJECTIONS/SURGICAL HISTORY:     S/P L4-5 spine surgery 4/28/2017 Dr. Woo    IMAGING:  XR LUMBAR SPINE  2/1/2023                                                    IMPRESSION:  Normal vertebral body heights. Negative for fracture. The  lumbar dextroconvex curvature centered at L3. 6 mm right lateral  subluxation of L3 on L4. Severe disc degenerative changes at L1 to  come L3-4 and L4-5. Moderate L2-3 and L5-S1 degenerative disc disease.  Severe facet arthropathy at L3-4 through L5-S1.    MRI LUMBAR SPINE 10/5/2018:                                                                    IMPRESSION:   1. Interval surgical changes at L4-L5 with improvement of what is now  moderate central canal stenosis with mild to moderate left and mild  right neural foraminal stenosis.   2. No other change is demonstrated. There are degenerative changes  elsewhere with no disc herniation demonstrated. There is moderate  bilateral neural foraminal stenosis at L1-L2 with less extensive  stenosis elsewhere as described above.      Past Medical History:  Past Medical History:   Diagnosis Date     Back pain      Thyroid disease        Past Surgical History:  Past Surgical History:   Procedure Laterality Date     APPENDECTOMY      at age of 16     BACK SURGERY  4/2017     CATARACT IOL, RT/LT  05/01/2009    both     FORAMINOTOMY LUMBAR POSTERIOR ONE LEVEL Right 04/28/2017    Procedure: FORAMINOTOMY LUMBAR POSTERIOR ONE LEVEL;  Right L4-L5 hemilaminectomy and foraminotomy and excision of synovial cyst. ;  Surgeon: Fady Woo MD;  Location:  OR     [employer] SYSTEM CRANIOTOMY N/A 6/2/2022    Procedure: left frontal craniotomy for tumor removal;  Surgeon: Rasheed Rose MD;  Location:  OR     TONSILLECTOMY  01/01/1954       Medications:  Current Outpatient Medications   Medication Sig Dispense Refill     Boswellia-Glucosamine-Vit D (OSTEO BI-FLEX ONE PER DAY) TABS Take 2 tablets by mouth daily       cholecalciferol (VITAMIN D3) 25 mcg (1000 units) capsule Take 1 capsule (25 mcg) by mouth daily       levothyroxine (SYNTHROID/LEVOTHROID) 25 MCG tablet Take 1 tablet (25 mcg) by mouth daily 90  tablet 1     Multiple Vitamins-Minerals (EYE VITAMINS) CAPS Take 1 tablet by mouth daily Visiclear, Complete, or Preservision       propylene glycol (SYSTANE COMPLETE) 0.6 % SOLN ophthalmic solution Place 1 drop into both eyes 2 times daily       sodium chloride (WAYLON 128) 5 % ophthalmic ointment 1 Application At Bedtime         Allergies:     Allergies   Allergen Reactions     Tetracycline Rash       Family history:  Family History   Problem Relation Age of Onset     Family History Negative Mother          in accident     Cancer Father         prostate     Heart Disease Brother         60s-4-one brother with heart attack in his 60's     Cancer Sister         breast-7-one  wtih breast ca,one with stroke, one sis  from diabetes     Breast Cancer Sister         diagnosed at age of 52- 5 sisters alive     Cancer - colorectal No family hx of        Physical Exam:  Vitals:    23 1307   BP: (!) 147/77   Pulse: 102   SpO2: 97%       GENERAL: Healthy, alert and no distress  EYES: Eyes grossly normal to inspection.  No discharge or erythema, or obvious scleral/conjunctival abnormalities.  RESP: No audible wheeze, cough, or visible cyanosis.  No visible retractions or increased work of breathing.    SKIN: Visible skin clear. No significant rash, abnormal pigmentation or lesions.  NEURO: Cranial nerves grossly intact.  Mentation and speech appropriate for age.  PSYCH: Mentation appears normal, affect normal/bright, judgement and insight intact, normal speech and appearance well-groomed.      Lumbar spine:  Range of motion within normal limits   Myofascial tenderness:  none  Focal tenderness: No SI joint, gluteal, piriformis, GT, or IT tenderness  SLR: negative    Neurologic exam:  CN:  Cranial nerves 2-12 are grossly intact  Motor Strength:  5/5 symmetric LE strength      ASSESSMENT/PLAN:  The following recommendations were given to the patient. Diagnosis, treatment options, risks, benefits, and alternatives  were discussed, and all questions were answered. The patient expressed understanding of the plan for management.       1. Lumbar radiculopathy  The patient is a 89 year old with an approximately 6 month history of central low back and tailbone pain that does not radiate into her legs.  She has failed conservative therapy including extensive PT, massage therapy and medications.  She is S/P L4/5 foraminotomy with Dr. Woo in 2017 but has never had injections.  I am recommending a caudal CANDELARIA at this time and the procedure was explained in detail with both the patient and her son.      Once insurance has approved the injection they can schedule.   Call [phone] Alberto (son) once approved.    - PAIN INJECTION EVAL/TREAT/FOLLOW UP    2. Foraminal stenosis of lumbar region    - Pain Management  Referral        MEDICATIONS:   No orders of the defined types were placed in this encounter.         - Continue other medications without change           FOLLOW UP: For injection and then follow up with your referring provider      BILLING TIME DOCUMENTATION:   The total TIME spent on this patient on the date of the encounter/appointment was 33 minutes.      TOTAL TIME includes:   Time spent preparing to see the patient (reviewing records and tests) - 4 min  Time spent face to face (or over the phone) with the patient - 21 min  Time spent ordering tests, medications, procedures and referrals - 2 min  Time spent Referring and communicating with other healthcare professionals - 0 min  Time spent documenting clinical information in Epic - 6 min       TAI LIMON MD   Pain Management

## 2023-04-28 ENCOUNTER — OFFICE VISIT (OUTPATIENT)
Dept: PALLIATIVE MEDICINE | Facility: CLINIC | Age: 88
End: 2023-04-28
Payer: COMMERCIAL

## 2023-04-28 VITALS — DIASTOLIC BLOOD PRESSURE: 77 MMHG | HEART RATE: 102 BPM | SYSTOLIC BLOOD PRESSURE: 147 MMHG | OXYGEN SATURATION: 97 %

## 2023-04-28 DIAGNOSIS — M48.061 FORAMINAL STENOSIS OF LUMBAR REGION: ICD-10-CM

## 2023-04-28 DIAGNOSIS — M54.16 LUMBAR RADICULOPATHY: Primary | ICD-10-CM

## 2023-04-28 PROCEDURE — 99203 OFFICE O/P NEW LOW 30 MIN: CPT | Performed by: ANESTHESIOLOGY

## 2023-04-28 ASSESSMENT — ANXIETY QUESTIONNAIRES
1. FEELING NERVOUS, ANXIOUS, OR ON EDGE: SEVERAL DAYS
1. FEELING NERVOUS, ANXIOUS, OR ON EDGE: NEARLY EVERY DAY
GAD7 TOTAL SCORE: 3
GAD7 TOTAL SCORE: 3
IF YOU CHECKED OFF ANY PROBLEMS ON THIS QUESTIONNAIRE, HOW DIFFICULT HAVE THESE PROBLEMS MADE IT FOR YOU TO DO YOUR WORK, TAKE CARE OF THINGS AT HOME, OR GET ALONG WITH OTHER PEOPLE: NOT DIFFICULT AT ALL
4. TROUBLE RELAXING: SEVERAL DAYS
8. IF YOU CHECKED OFF ANY PROBLEMS, HOW DIFFICULT HAVE THESE MADE IT FOR YOU TO DO YOUR WORK, TAKE CARE OF THINGS AT HOME, OR GET ALONG WITH OTHER PEOPLE?: NOT DIFFICULT AT ALL
2. NOT BEING ABLE TO STOP OR CONTROL WORRYING: NOT AT ALL
7. FEELING AFRAID AS IF SOMETHING AWFUL MIGHT HAPPEN: NOT AT ALL
GAD7 TOTAL SCORE: 3
6. BECOMING EASILY ANNOYED OR IRRITABLE: NOT AT ALL
3. WORRYING TOO MUCH ABOUT DIFFERENT THINGS: NOT AT ALL
5. BEING SO RESTLESS THAT IT IS HARD TO SIT STILL: SEVERAL DAYS

## 2023-04-28 ASSESSMENT — PAIN SCALES - GENERAL: PAINLEVEL: MODERATE PAIN (4)

## 2023-04-28 NOTE — PATIENT INSTRUCTIONS
I am recommending a Caudal CANDELARIA and once approved by her insurance you will get a call to schedule this.   An epidural steroid injection is an injection in the back into the epidural space with a combination of local anesthetic and steroid.  It can help with tailbone, low back and radiating leg pain.   She will need a  for the procedure as it can make legs weak for a short time after.     Shagufta Li MD   ----------------------------------------------------------------  [facility] Number:  [phone]   Call with any questions about your care and for scheduling assistance.   Calls are returned Monday through Friday between 8 AM and 4:30 PM. We usually get back to you within 2 business days depending on the issue/request.

## 2023-05-02 ENCOUNTER — OFFICE VISIT (OUTPATIENT)
Dept: FAMILY MEDICINE | Facility: CLINIC | Age: 88
End: 2023-05-02
Payer: COMMERCIAL

## 2023-05-02 VITALS
HEART RATE: 98 BPM | TEMPERATURE: 98.4 F | DIASTOLIC BLOOD PRESSURE: 68 MMHG | SYSTOLIC BLOOD PRESSURE: 123 MMHG | HEIGHT: 62 IN | RESPIRATION RATE: 16 BRPM | WEIGHT: 123.8 LBS | OXYGEN SATURATION: 99 % | BODY MASS INDEX: 22.78 KG/M2

## 2023-05-02 DIAGNOSIS — R73.9 HYPERGLYCEMIA: ICD-10-CM

## 2023-05-02 DIAGNOSIS — R53.83 OTHER FATIGUE: ICD-10-CM

## 2023-05-02 DIAGNOSIS — F41.9 ANXIETY: ICD-10-CM

## 2023-05-02 DIAGNOSIS — Z13.9 SCREENING FOR CONDITION: ICD-10-CM

## 2023-05-02 DIAGNOSIS — E03.9 HYPOTHYROIDISM, UNSPECIFIED TYPE: ICD-10-CM

## 2023-05-02 DIAGNOSIS — Z00.00 MEDICARE ANNUAL WELLNESS VISIT, SUBSEQUENT: Primary | ICD-10-CM

## 2023-05-02 LAB
ALBUMIN SERPL BCG-MCNC: 4.4 G/DL (ref 3.5–5.2)
ALP SERPL-CCNC: 117 U/L (ref 35–104)
ALT SERPL W P-5'-P-CCNC: 19 U/L (ref 10–35)
ANION GAP SERPL CALCULATED.3IONS-SCNC: 12 MMOL/L (ref 7–15)
AST SERPL W P-5'-P-CCNC: 25 U/L (ref 10–35)
BILIRUB SERPL-MCNC: 0.3 MG/DL
BUN SERPL-MCNC: 14.3 MG/DL (ref 8–23)
CALCIUM SERPL-MCNC: 9.5 MG/DL (ref 8.2–9.6)
CHLORIDE SERPL-SCNC: 103 MMOL/L (ref 98–107)
CHOLEST SERPL-MCNC: 220 MG/DL
CREAT SERPL-MCNC: 0.79 MG/DL (ref 0.51–0.95)
DEPRECATED HCO3 PLAS-SCNC: 26 MMOL/L (ref 22–29)
ERYTHROCYTE [DISTWIDTH] IN BLOOD BY AUTOMATED COUNT: 16.3 % (ref 10–15)
GFR SERPL CREATININE-BSD FRML MDRD: 70 ML/MIN/1.73M2
GLUCOSE SERPL-MCNC: 121 MG/DL (ref 70–99)
HBA1C MFR BLD: 5.6 % (ref 0–5.6)
HCT VFR BLD AUTO: 39.9 % (ref 35–47)
HDLC SERPL-MCNC: 56 MG/DL
HGB BLD-MCNC: 13.1 G/DL (ref 11.7–15.7)
LDLC SERPL CALC-MCNC: 136 MG/DL
MCH RBC QN AUTO: 27.6 PG (ref 26.5–33)
MCHC RBC AUTO-ENTMCNC: 32.8 G/DL (ref 31.5–36.5)
MCV RBC AUTO: 84 FL (ref 78–100)
NONHDLC SERPL-MCNC: 164 MG/DL
PLATELET # BLD AUTO: 178 10E3/UL (ref 150–450)
POTASSIUM SERPL-SCNC: 4.6 MMOL/L (ref 3.4–5.3)
PROT SERPL-MCNC: 6.8 G/DL (ref 6.4–8.3)
RBC # BLD AUTO: 4.75 10E6/UL (ref 3.8–5.2)
SODIUM SERPL-SCNC: 141 MMOL/L (ref 136–145)
T4 FREE SERPL-MCNC: 1.35 NG/DL (ref 0.9–1.7)
TRIGL SERPL-MCNC: 140 MG/DL
TSH SERPL DL<=0.005 MIU/L-ACNC: 5.94 UIU/ML (ref 0.3–4.2)
WBC # BLD AUTO: 4.8 10E3/UL (ref 4–11)

## 2023-05-02 PROCEDURE — 99214 OFFICE O/P EST MOD 30 MIN: CPT | Mod: 25 | Performed by: FAMILY MEDICINE

## 2023-05-02 PROCEDURE — 84439 ASSAY OF FREE THYROXINE: CPT | Performed by: FAMILY MEDICINE

## 2023-05-02 PROCEDURE — 80053 COMPREHEN METABOLIC PANEL: CPT | Performed by: FAMILY MEDICINE

## 2023-05-02 PROCEDURE — 83036 HEMOGLOBIN GLYCOSYLATED A1C: CPT | Performed by: FAMILY MEDICINE

## 2023-05-02 PROCEDURE — 84443 ASSAY THYROID STIM HORMONE: CPT | Performed by: FAMILY MEDICINE

## 2023-05-02 PROCEDURE — 36415 COLL VENOUS BLD VENIPUNCTURE: CPT | Performed by: FAMILY MEDICINE

## 2023-05-02 PROCEDURE — G0439 PPPS, SUBSEQ VISIT: HCPCS | Performed by: FAMILY MEDICINE

## 2023-05-02 PROCEDURE — 85027 COMPLETE CBC AUTOMATED: CPT | Performed by: FAMILY MEDICINE

## 2023-05-02 PROCEDURE — 80061 LIPID PANEL: CPT | Performed by: FAMILY MEDICINE

## 2023-05-02 RX ORDER — ESCITALOPRAM OXALATE 5 MG/1
5 TABLET ORAL DAILY
Qty: 90 TABLET | Refills: 1 | Status: SHIPPED | OUTPATIENT
Start: 2023-05-02 | End: 2023-06-23

## 2023-05-02 RX ORDER — LEVOTHYROXINE SODIUM 25 UG/1
25 TABLET ORAL DAILY
Qty: 90 TABLET | Refills: 4 | Status: SHIPPED | OUTPATIENT
Start: 2023-05-02 | End: 2023-06-23

## 2023-05-02 ASSESSMENT — ENCOUNTER SYMPTOMS
SHORTNESS OF BREATH: 0
PARESTHESIAS: 0
CHILLS: 0
HEMATOCHEZIA: 0
FEVER: 0
HEMATURIA: 0
HEADACHES: 0
BREAST MASS: 0
EYE PAIN: 0
FREQUENCY: 0
WEAKNESS: 0
ABDOMINAL PAIN: 0
HEARTBURN: 0
MYALGIAS: 0
CONSTIPATION: 0
NAUSEA: 0
COUGH: 0
JOINT SWELLING: 0
DIZZINESS: 0
DIARRHEA: 0
DYSURIA: 0
NERVOUS/ANXIOUS: 1
SORE THROAT: 0
ARTHRALGIAS: 0
PALPITATIONS: 0

## 2023-05-02 ASSESSMENT — PAIN SCALES - GENERAL: PAINLEVEL: NO PAIN (0)

## 2023-05-02 ASSESSMENT — ACTIVITIES OF DAILY LIVING (ADL): CURRENT_FUNCTION: MEDICATION ADMINISTRATION REQUIRES ASSISTANCE

## 2023-05-02 NOTE — PROGRESS NOTES
"SUBJECTIVE:   Mary is a 89 year old who presents for Preventive Visit.      5/2/2023     9:13 AM   Additional Questions   Roomed by Natasha FRIEND   Accompanied by Daughter in law, Ro     Patient has been advised of split billing requirements and indicates understanding: Yes  Are you in the first 12 months of your Medicare coverage?  No  In clinic for annual checkup.  Lives in a senior apartment building.  Daughter describes she lost her  last year.  Mood is on the lower side.  He is doing therapy with not much response.  Daughter wants to check the option for medication.    Complaint of lower back pain, patient was seen by pain clinic patient has not scheduled yet for injection.    Healthy Habits:     In general, how would you rate your overall health?  Excellent    Frequency of exercise:  6-7 days/week    Duration of exercise:  45-60 minutes    Do you usually eat at least 4 servings of fruit and vegetables a day, include whole grains    & fiber and avoid regularly eating high fat or \"junk\" foods?  Yes    Taking medications regularly:  Yes    Medication side effects:  None    Ability to successfully perform activities of daily living:  Medication administration requires assistance    Home Safety:  No safety concerns identified    Hearing Impairment:  Difficulty following a conversation in a noisy restaurant or crowded room and difficulty understanding soft or whispered speech    In the past 6 months, have you been bothered by leaking of urine? Yes    In general, how would you rate your overall mental or emotional health?  Good      PHQ-2 Total Score: 2    Additional concerns today:  Yes      Have you ever done Advance Care Planning? (For example, a Health Directive, POLST, or a discussion with a medical provider or your loved ones about your wishes): Yes, advance care planning is on file.      Fall risk  Fallen 2 or more times in the past year?: No  Any fall with injury in the past year?: No    Cognitive " Screening   1) Repeat 3 items (Leader, Season, Table)  Yes   2) Clock draw: NORMAL  3) 3 item recall: Recalls 1 object   Results: ABNORMAL clock, 1-2 items recalled: PROBABLE COGNITIVE IMPAIRMENT, **INFORM PROVIDER**    Mini-CogTM Copyright JEREMY Plata. Licensed by the author for use in [facility]; reprinted with permission (melissa@[email or web address]). All rights reserved.      Do you have sleep apnea, excessive snoring or daytime drowsiness?: no    Reviewed and updated as needed this visit by clinical staff   Tobacco  Allergies  Meds              Reviewed and updated as needed this visit by Provider                 Social History     Tobacco Use     Smoking status: Former     Types: Cigarettes     Quit date: 1960     Years since quittin.3     Smokeless tobacco: Never     Tobacco comments:        Vaping Use     Vaping status: Never Used     Passive vaping exposure: Yes   Substance Use Topics     Alcohol use: Yes     Comment: SOCIAL-2 drinks per month              2023     9:07 AM   Alcohol Use   Prescreen: >3 drinks/day or >7 drinks/week? No     Do you have a current opioid prescription? No  Do you use any other controlled substances or medications that are not prescribed by a provider? None          Current providers sharing in care for this patient include:   Patient Care Team:  Batool Kulkarni MD as PCP - General (Family Practice)  Batool Kulkarni MD as Assigned PCP  Lance Engel MD as MD (Neurology)  Dylon Hodgson PA-C as Assigned Neuroscience Provider    The following health maintenance items are reviewed in Epic and correct as of today:  Health Maintenance   Topic Date Due     ZOSTER IMMUNIZATION (1 of 2) Never done     DTAP/TDAP/TD IMMUNIZATION (1 - Tdap) 2003     TSH W/FREE T4 REFLEX  2022     COVID-19 Vaccine (4 - Booster for Pfizer series) 2022     ANNUAL REVIEW OF HM ORDERS  2024     DEXA  2024     MEDICARE ANNUAL  "WELLNESS VISIT  02/23/2024     FALL RISK ASSESSMENT  05/02/2024     LIPID  02/01/2026     ADVANCE CARE PLANNING  02/23/2028     PHQ-2 (once per calendar year)  Completed     INFLUENZA VACCINE  Completed     Pneumococcal Vaccine: 65+ Years  Completed     IPV IMMUNIZATION  Aged Out     MENINGITIS IMMUNIZATION  Aged Out     MAMMO SCREENING  Discontinued       Pertinent mammograms are reviewed under the imaging tab.    Review of Systems   Constitutional: Negative for chills and fever.   HENT: Positive for hearing loss. Negative for congestion, ear pain and sore throat.    Eyes: Positive for visual disturbance. Negative for pain.   Respiratory: Negative for cough and shortness of breath.    Cardiovascular: Negative for chest pain, palpitations and peripheral edema.   Gastrointestinal: Negative for abdominal pain, constipation, diarrhea, heartburn, hematochezia and nausea.   Breasts:  Negative for tenderness, breast mass and discharge.   Genitourinary: Negative for dysuria, frequency, genital sores, hematuria, pelvic pain, urgency, vaginal bleeding and vaginal discharge.   Musculoskeletal: Negative for arthralgias, joint swelling and myalgias.   Skin: Negative for rash.   Neurological: Negative for dizziness, weakness, headaches and paresthesias.   Psychiatric/Behavioral: Negative for mood changes. The patient is nervous/anxious.        OBJECTIVE:   /68 (BP Location: Right arm, Patient Position: Sitting, Cuff Size: Adult Regular)   Pulse 98   Temp 98.4  F (36.9  C) (Oral)   Resp 16   Ht 1.568 m (5' 1.75\")   Wt 56.2 kg (123 lb 12.8 oz)   SpO2 99%   BMI 22.83 kg/m   Estimated body mass index is 22.83 kg/m  as calculated from the following:    Height as of this encounter: 1.568 m (5' 1.75\").    Weight as of this encounter: 56.2 kg (123 lb 12.8 oz).  Physical Exam  GENERAL: healthy, alert and no distress  EYES: Eyes grossly normal to inspection, PERRL and conjunctivae and sclerae normal  HENT: ear canals and TM's " normal, nose and mouth without ulcers or lesions  NECK: no adenopathy, no asymmetry, masses, or scars and thyroid normal to palpation  RESP: lungs clear to auscultation - no rales, rhonchi or wheezes  CV: regular rate and rhythm, normal S1 S2, no S3 or S4, no murmur, click or rub, no peripheral edema and peripheral pulses strong  ABDOMEN: soft, nontender, no hepatosplenomegaly, no masses and bowel sounds normal  MS: no gross musculoskeletal defects noted, no edema  SKIN: no suspicious lesions or rashes  NEURO: Normal strength and tone, mentation intact and speech normal  PSYCH: mentation appears normal, affect normal/bright  tear full     Diagnostic Test Results:  Labs reviewed in Epic    ASSESSMENT / PLAN:   (Z00.00) Medicare annual wellness visit, subsequent  (primary encounter diagnosis)  Comment: Discussed fall risk  Plan: Patient continues to do well in her senior apartment.  Discussed walker  currently deferred by patient.    (R73.9) Hyperglycemia  Comment:  Plan: Hemoglobin A1c            (Z13.9) Screening for condition  Comment:  Plan: Lipid panel reflex to direct LDL Fasting           (R53.83) Other fatigue  Comment:  Plan: CBC with platelets, Comprehensive metabolic         panel (BMP + Alb, Alk Phos, ALT, AST, Total.         Bili, TP)            (E03.9) Hypothyroidism, unspecified type  Comment:   Plan: TSH with free T4 reflex            (F41.9) Anxiety  Comment: recommend to contact with any medication side effect   Plan: escitalopram (LEXAPRO) 5 MG tablet            Lumbar back pain   Scheduled with pain clinic for injection .     COUNSELING:  Reviewed preventive health counseling, as reflected in patient instructions       Regular exercise       Healthy diet/nutrition       Vision screening       Hearing screening        She reports that she quit smoking about 63 years ago. Her smoking use included cigarettes. She has never used smokeless tobacco.      Appropriate preventive services were discussed  with this patient, including applicable screening as appropriate for cardiovascular disease, diabetes, osteopenia/osteoporosis, and glaucoma.  As appropriate for age/gender, discussed screening for colorectal cancer, prostate cancer, breast cancer, and cervical cancer. Checklist reviewing preventive services available has been given to the patient.    Reviewed patients plan of care and provided an AVS. The Basic Care Plan (routine screening as documented in Health Maintenance) for Indy meets the Care Plan requirement. This Care Plan has been established and reviewed with the Patient.          Ava Meléndez MD  [facility]

## 2023-05-03 DIAGNOSIS — E03.9 HYPOTHYROIDISM, UNSPECIFIED TYPE: Primary | ICD-10-CM

## 2023-05-03 RX ORDER — LEVOTHYROXINE SODIUM 25 UG/1
37.5 TABLET ORAL DAILY
Qty: 140 TABLET | Refills: 3 | Status: SHIPPED | OUTPATIENT
Start: 2023-05-03 | End: 2024-05-14

## 2023-05-04 NOTE — PROGRESS NOTES
[facility] - Procedure Note    Date of Service: 5/5/2023    Procedure performed: Caudal epidural steroid injection with fluoroscopic guidance  Diagnosis: Lumbar spondylosis; Lumbar radiculitis/radiculopathy  : Shagufta Li MD  Anesthesia: none    Indications: Indy Reyes is a 89 year old female who is seen at the request of myself (interventional consult)  for a caudal epidural steroid injection. The patient describes central low back (centered over her tailbone) pain without radiation to the legs. The patient has been exhibiting symptoms consistent with lumbar intraspinal inflammation and radiculopathy. Symptoms have been persistent, disabling, and intermittently severe. The patient reports minimal improvement with conservative treatment, including PT and medications.    S/P L4-5 spine surgery 4/28/2017 Dr. Woo    LUMBAR MRI was done on 10/5/2018 which showed                                                                  IMPRESSION:   1. Interval surgical changes at L4-L5 with improvement of what is now  moderate central canal stenosis with mild to moderate left and mild  right neural foraminal stenosis.   2. No other change is demonstrated. There are degenerative changes  elsewhere with no disc herniation demonstrated. There is moderate  bilateral neural foraminal stenosis at L1-L2 with less extensive  stenosis elsewhere as described above.    Allergies:      Allergies   Allergen Reactions     Tetracycline Rash        Vitals:  BP (!) 168/80   Pulse 91   Resp 16   SpO2 98%     Review of Systems: The patient denies recent fever, chills, illness, use of antibiotics or anticoagulants. All other 10-point review of systems negative.       Procedure: The procedure and risks were explained, and informed written consent was obtained from the patient. Risks include but are not limited to: infection, bleeding, increased pain, and damage to soft tissue, nerve, muscle, and  vasculature structures. After getting informed consent, patient was brought into the procedure suite and was placed in a prone position on the procedure table. A Pause for the Cause was performed. Patient was prepped and draped in sterile fashion.     The sacral hiatus and cornua were palpated.  Under lateral fluoroscopic guidance sacral hiatus was identified.  A total of 4.5 mL of Lidocaine 1% with 0.5 mL 8.4% sodium bicarbonate was used to anesthetize the skin and the needle track at a skin entry site.  A 22 gauge 5 inch spinal needle was advanced through the sacral hiatus using intermittent fluoroscopy. The needle angle was decreased to allow entrance into the sacral canal and epidural space.  This was verified in both the AP and lateral view for correct alignment.       The position was then inspected from anteroposterior and lateral views, and the needle adjusted appropriately.  After negative aspiration for heme and CSF, a total of 1 mL of Omnipaque-300 was injected using static and continuous fluoroscopy confirming appropriate position, into the epidural space, with no intravascular or intrathecal uptake. 0 mL of Omnipaque-300 was wasted.    2 mL of 1% lidocaine, 3 mL of preservative free saline, with 80 mg of triamcinolone was injected.  The needle was removed. Hemostasis was achieved, the area was cleaned, and bandaids were placed when appropriate. Images were saved to PACS.    The patient tolerated the procedure well, and was taken to the recovery room, and there was no evidence of procedural complications. No new sensory or motor deficits were noted following the procedure. The patient was stable and able to ambulate on discharge home. Post-procedure instructions were provided.     Pre-procedure pain score: 7/10 in the back, 7/10 in the leg  Post-procedure pain score: 0/10 in the back, 0/10 in the leg    Assessment/Plan: Indy Reyes is a 89 year old female s/p caudal epidural steroid injection  today for lumbar spondylosis, radiculitis/radiculopathy.     1. Following today's procedure, the patient was advised to contact the Pain Management Center for any of the following:   Fever, chills, or night sweats   New onset of pain, numbness, or weakness   Any questions/concerns regarding the procedure  If unable to contact the Pain Center, the patient was instructed to go to a local Emergency Room for any complications.   2. The patient should follow-up with the referring provider in 2 weeks for post-procedure evaluation.    TAI LIMON MD   Pain Management

## 2023-05-05 ENCOUNTER — RADIOLOGY INJECTION OFFICE VISIT (OUTPATIENT)
Dept: PALLIATIVE MEDICINE | Facility: CLINIC | Age: 88
End: 2023-05-05
Attending: ANESTHESIOLOGY
Payer: COMMERCIAL

## 2023-05-05 VITALS
OXYGEN SATURATION: 98 % | RESPIRATION RATE: 16 BRPM | DIASTOLIC BLOOD PRESSURE: 76 MMHG | SYSTOLIC BLOOD PRESSURE: 164 MMHG | HEART RATE: 96 BPM

## 2023-05-05 DIAGNOSIS — M54.16 LUMBAR RADICULOPATHY: Primary | ICD-10-CM

## 2023-05-05 PROCEDURE — 62323 NJX INTERLAMINAR LMBR/SAC: CPT | Performed by: ANESTHESIOLOGY

## 2023-05-05 RX ORDER — TRIAMCINOLONE ACETONIDE 40 MG/ML
40 INJECTION, SUSPENSION INTRA-ARTICULAR; INTRAMUSCULAR ONCE
Status: COMPLETED | OUTPATIENT
Start: 2023-05-05 | End: 2023-05-05

## 2023-05-05 RX ADMIN — TRIAMCINOLONE ACETONIDE 40 MG: 40 INJECTION, SUSPENSION INTRA-ARTICULAR; INTRAMUSCULAR at 13:17

## 2023-05-05 ASSESSMENT — PAIN SCALES - GENERAL: PAINLEVEL: SEVERE PAIN (7)

## 2023-05-05 NOTE — NURSING NOTE
Pre-procedure Intake  If YES to any questions or NO to having a   Please complete laminated checklist and leave on the computer keyboard for Provider, verbally inform provider if able.    For SCS Trial, RFA's or any sedation procedure:  Have you been fasting? NA    If yes, for how long? na    Are you taking any any blood thinners such as Coumadin, Warfarin, Jantoven, Pradaxa Xarelto, Eliquis, Edoxaban, Enoxaparin, Lovenox, Heparin, Arixtra, Fondaparinux, or Fragmin? OR Antiplatelet medication such as Plavix, Brilinta, or Effient?   No     If yes, when did you take your last dose? na    Do you take aspirin?  No    If cervical procedure, have you held aspirin for 6 days?   NA    Do you have any allergies to contrast dye, iodine, steroid and/or numbing medications?  NO    Are you currently taking antibiotics or have an active infection?  NO    Have you had a fever/elevated temperature within the past week? NO    Are you currently taking oral steroids? NO    Do you have a ? Yes    Are you pregnant or breastfeeding?  Not Applicable      Notify provider and RNs if systolic BP >170, diastolic BP >100, P >100 or O2 sats < 90%      Sherman Plata, ELENA  Patient Care Supervisor   [facility]

## 2023-05-05 NOTE — NURSING NOTE
Discharge Information    IV Discontiued Time:  NA    Amount of Fluid Infused:  NA    Discharge Criteria = When patient returns to baseline or as per MD order    Consciousness:  Pt is fully awake    Circulation:  BP +/- 20% of pre-procedure level    Respiration:  Patient is able to breathe deeply    O2 Sat:  Patient is able to maintain O2 Sat >92% on room air    Activity:  Moves 4 extremities on command    Ambulation:  Patient is able to stand and walk or stand and pivot into wheelchair    Dressing:  Clean/dry or No Dressing    Notes:   Discharge instructions and AVS given to patient    Patient meets criteria for discharge?  YES    Admitted to PCU?  No    Responsible adult present to accompany patient home?  Yes    Signature/Title:    Lisa Enrique RN  RN Care Coordinator  [facility]

## 2023-05-05 NOTE — PATIENT INSTRUCTIONS
[facility] Procedure Discharge Instructions    Today you saw:   Dr. Shagufta Li     Your procedure:  Epidural steroid injection       Medications used:  Lidocaine (anesthetic)  Kenalog (steroid)  Omnipaque (contrast)    Saline        Be cautious when walking as numbness and/or weakness in the legs may occur up to 6-8 hours after the procedure due to effect of the local anesthetic  Do not drive for 6 hours. The effect of the local anesthetic could slow your reflexes.   Avoid strenuous activity for the first 24 hours. You may resume your regular activities after that.   You may shower, however avoid swimming, tub baths or hot tubs for 24 hours following your procedure  You may have a mild to moderate increase in pain for several days following the injection.    You may use ice packs for 10-15 minutes, 3 to 4 times a day at the injection site for comfort  Do not use heat to painful areas for 6 to 8 hours. This will give the local anesthetic time to wear off and prevent you from accidentally burning your skin.  Unless you have been directed to avoid the use of anti-inflammatory medications (NSAIDS-ibuprofen, Aleve, Motrin), you may use these medications or Tylenol for pain control if needed.   Possible side effects of steroids that you may experience include flushing, elevated blood pressure, increased appetite, mild headaches and restlessness.  All of these symptoms will get better with time.  It may take up to 14 days for the steroid medication to start working although you may feel the effect as early as a few days after the procedure.   Follow up with Dr Kulkarni in 2-3 weeks    If you experience any of the following, call the pain center line during work hours at [phone] or on-call physician after hours at [phone]:  -Fever over 100 degree F  -Swelling, bleeding, redness, drainage, warmth at the injection site  -Progressive weakness or numbness in your legs or arms  -Loss of bowel or  bladder function  -Unusual headache that is not relieved by Tylenol or your regular headache medication  -Unusual new onset of pain that is not improving

## 2023-06-05 ENCOUNTER — MYC MEDICAL ADVICE (OUTPATIENT)
Dept: FAMILY MEDICINE | Facility: CLINIC | Age: 88
End: 2023-06-05
Payer: COMMERCIAL

## 2023-06-05 NOTE — TELEPHONE ENCOUNTER
Any of he sleeping aid increases the risk of fall in elderly patient .  Can we please arrange video visit so we can talk about options .     Also will need to document the need for disability parking can be addressed by virtual visit .    Ava Meléndez MD .

## 2023-06-19 NOTE — TELEPHONE ENCOUNTER
Spoke with son Fady, he would like to set up a video appointment for his mother and he will be present also.  This would be to discuss starting sleep medication and possibly stop the antidepressant as it has caused weight gain.  Please contact Fady to set up video appointment.

## 2023-06-23 ENCOUNTER — VIRTUAL VISIT (OUTPATIENT)
Dept: FAMILY MEDICINE | Facility: CLINIC | Age: 88
End: 2023-06-23
Payer: COMMERCIAL

## 2023-06-23 DIAGNOSIS — G47.9 SLEEP DISTURBANCE: ICD-10-CM

## 2023-06-23 DIAGNOSIS — F32.A DEPRESSION, UNSPECIFIED DEPRESSION TYPE: Primary | ICD-10-CM

## 2023-06-23 PROCEDURE — 99214 OFFICE O/P EST MOD 30 MIN: CPT | Mod: VID | Performed by: FAMILY MEDICINE

## 2023-06-23 RX ORDER — ESCITALOPRAM OXALATE 10 MG/1
10 TABLET ORAL DAILY
Qty: 90 TABLET | Refills: 1 | Status: SHIPPED | OUTPATIENT
Start: 2023-06-23 | End: 2024-06-18

## 2023-06-23 ASSESSMENT — ENCOUNTER SYMPTOMS
NERVOUS/ANXIOUS: 1
DYSPHORIC MOOD: 1
AGITATION: 1

## 2023-06-23 NOTE — PROGRESS NOTES
Mary is a 89 year old who is being evaluated via a billable video visit.      How would you like to obtain your AVS? MyChart  If the video visit is dropped, the invitation should be resent by: Text to cell phone: [phone]  Will anyone else be joining your video visit? No          Assessment & Plan     Depression, unspecified depression type  Recommend to increase the dose to 10 mg   Discussed relaxation activity     - escitalopram (LEXAPRO) 10 MG tablet; Take 1 tablet (10 mg) by mouth daily    Sleep disturbance  Discussed relaxation activity and exercise .  Will monitor .  Discussed increased risk of fall with sleeping aid .      Parking permit signed .        Ava Meléndez MD  [facility]    Gerardo Hernandez is a 89 year old, presenting for the following health issues:  Recheck Medication (Sleep medication, Antidepressants )        6/23/2023    12:59 PM   Additional Questions   Roomed by Natasha FRIEND     Forms 6/23/2023   Any forms needing to be completed Yes     History of Present Illness       Mental Health Follow-up:  Patient presents to follow-up on Depression.Patient's depression since last visit has been:  Better  The patient is not having other symptoms associated with depression.      Any significant life events: No  Patient is not feeling anxious or having panic attacks.  Patient has no concerns about alcohol or drug use.    She eats 2-3 servings of fruits and vegetables daily.She consumes 2 sweetened beverage(s) daily.She exercises with enough effort to increase her heart rate 20 to 29 minutes per day.  She exercises with enough effort to increase her heart rate 7 days per week.   She is taking medications regularly.         Review of Systems   Psychiatric/Behavioral: Positive for agitation, behavioral problems, dysphoric mood and mood changes. The patient is nervous/anxious.             Objective    Vitals - Patient Reported  Weight (Patient Reported): 58.1 kg (128 lb)  Height  "(Patient Reported): 156.8 cm (5' 1.75\")  BMI (Based on Pt Reported Ht/Wt): 23.6        Physical Exam   GENERAL: Healthy, alert and no distress  EYES: Eyes grossly normal to inspection.  No discharge or erythema, or obvious scleral/conjunctival abnormalities.  RESP: No audible wheeze, cough, or visible cyanosis.  No visible retractions or increased work of breathing.    SKIN: Visible skin clear. No significant rash, abnormal pigmentation or lesions.  NEURO: Cranial nerves grossly intact.  Mentation and speech appropriate for age.  PSYCH: Mentation appears normal, affect normal/bright, judgement and insight intact, normal speech and appearance well-groomed.ines         Video-Visit Details    Type of service:  Video Visit   Video Start Time: 3:50 PM  Video End Time:4:30  PM    Originating Location (pt. Location): Home    Distant Location (provider location):  On-site  Platform used for Video Visit: Rickey"

## 2023-06-30 ENCOUNTER — TELEPHONE (OUTPATIENT)
Dept: FAMILY MEDICINE | Facility: CLINIC | Age: 88
End: 2023-06-30
Payer: COMMERCIAL

## 2023-06-30 NOTE — TELEPHONE ENCOUNTER
FYI - Status Update    Who is Calling: family member,     Update: Pt's grandson picked up prescription at - 06/30/2023    Does caller want a call/response back: No

## 2023-08-03 ENCOUNTER — VIRTUAL VISIT (OUTPATIENT)
Dept: FAMILY MEDICINE | Facility: CLINIC | Age: 88
End: 2023-08-03
Payer: COMMERCIAL

## 2023-08-03 DIAGNOSIS — R53.83 OTHER FATIGUE: ICD-10-CM

## 2023-08-03 DIAGNOSIS — R32 URINARY INCONTINENCE, UNSPECIFIED TYPE: Primary | ICD-10-CM

## 2023-08-03 DIAGNOSIS — R73.9 HYPERGLYCEMIA: ICD-10-CM

## 2023-08-03 DIAGNOSIS — E03.9 HYPOTHYROIDISM, UNSPECIFIED TYPE: ICD-10-CM

## 2023-08-03 DIAGNOSIS — F41.9 ANXIETY: ICD-10-CM

## 2023-08-03 PROCEDURE — 99214 OFFICE O/P EST MOD 30 MIN: CPT | Mod: VID | Performed by: FAMILY MEDICINE

## 2023-08-03 ASSESSMENT — ANXIETY QUESTIONNAIRES
7. FEELING AFRAID AS IF SOMETHING AWFUL MIGHT HAPPEN: SEVERAL DAYS
3. WORRYING TOO MUCH ABOUT DIFFERENT THINGS: NOT AT ALL
GAD7 TOTAL SCORE: 3
GAD7 TOTAL SCORE: 3
4. TROUBLE RELAXING: NOT AT ALL
IF YOU CHECKED OFF ANY PROBLEMS ON THIS QUESTIONNAIRE, HOW DIFFICULT HAVE THESE PROBLEMS MADE IT FOR YOU TO DO YOUR WORK, TAKE CARE OF THINGS AT HOME, OR GET ALONG WITH OTHER PEOPLE: SOMEWHAT DIFFICULT
5. BEING SO RESTLESS THAT IT IS HARD TO SIT STILL: SEVERAL DAYS
6. BECOMING EASILY ANNOYED OR IRRITABLE: NOT AT ALL
1. FEELING NERVOUS, ANXIOUS, OR ON EDGE: SEVERAL DAYS
2. NOT BEING ABLE TO STOP OR CONTROL WORRYING: NOT AT ALL

## 2023-08-03 NOTE — PROGRESS NOTES
Mary is a 89 year old who is being evaluated via a billable video visit.      How would you like to obtain your AVS? MyChart  If the video visit is dropped, the invitation should be resent by: Text to cell phone: [phone]  Will anyone else be joining your video visit? No        Assessment & Plan     (R32) Urinary incontinence, unspecified type  (primary encounter diagnosis)  Comment: Discussed to cut down on coffee .  Discussed pelvic floor exercise   Plan: Incontinence Supplies Order for DME - ONLY FOR         DME            (R73.9) Hyperglycemia  Comment:   Plan: Hemoglobin A1c            (E03.9) Hypothyroidism, unspecified type  Comment:   Plan: TSH with free T4 reflex            (R53.83) Other fatigue  Comment:   Plan: CBC with platelets, Basic metabolic panel  (Ca,        Cl, CO2, Creat, Gluc, K, Na, BUN)          Anxiety     Much better controlled .  Recomemnd to continue with Lexapro .       Ava Meléndez MD  [facility]    Gerardo Hernandez is a 89 year old, presenting for the following health issues:   Follow Up        8/3/2023     1:58 PM   Additional Questions   Roomed by Joy MCKINNEY CMA   Accompanied by SonJohn       History of Present Illness       Mental Health Follow-up:  Patient presents to follow-up on Depression & Anxiety.Patient's depression since last visit has been:  Better  The patient is not having other symptoms associated with depression.  Patient's anxiety since last visit has been:  Better  The patient is having other symptoms associated with anxiety.  Any significant life events: health concerns and other  Patient is feeling anxious or having panic attacks.  Patient has no concerns about alcohol or drug use.    She eats 2-3 servings of fruits and vegetables daily.She consumes 2 sweetened beverage(s) daily.She exercises with enough effort to increase her heart rate 30 to 60 minutes per day.  She exercises with enough effort to increase her heart rate 5 days  per week.   She is not taking prescribed medications regularly due to remembering to take.         2/20/2023     8:50 AM 2/23/2023     1:40 PM 2/23/2023     1:43 PM   PHQ-9 SCORE   PHQ-9 Total Score MyChart 11 (Moderate depression) 5 (Mild depression) 11 (Moderate depression)   PHQ-9 Total Score 11    11 5 11         10/17/2022     9:53 AM 4/28/2023     1:00 PM 8/3/2023     8:00 AM   KAYLAN-7 SCORE   Total Score 8 (mild anxiety) 3 (minimal anxiety) 3 (minimal anxiety)   Total Score 8 3 3     Fever blisters on her upper lip and corner of her nose.  Pt has noticed some bladder leaks since her brain surgery.           Review of Systems   Musculoskeletal:  Negative for arthralgias and back pain.   Skin:  Negative for color change.   Neurological:  Negative for light-headedness and headaches.   Psychiatric/Behavioral:  Positive for agitation, behavioral problems, decreased concentration and dysphoric mood.             Objective           Vitals:  No vitals were obtained today due to virtual visit.    Physical Exam   GENERAL: Healthy, alert and no distress  EYES: Eyes grossly normal to inspection.  No discharge or erythema, or obvious scleral/conjunctival abnormalities.  RESP: No audible wheeze, cough, or visible cyanosis.  No visible retractions or increased work of breathing.    SKIN: Visible skin clear. No significant rash, abnormal pigmentation or lesions.  NEURO: Cranial nerves grossly intact.  Mentation and speech appropriate for age.  PSYCH: Mentation appears normal, affect normal/bright, judgement and insight intact, normal speech and appearance well-groomed.            Video-Visit Details    Type of service:  Video Visit   Video Start Time: 1:00 pm   Video End Time:1:17 pm    Originating Location (pt. Location): Home    Distant Location (provider location):  On-site  Platform used for Video Visit: [facility]

## 2023-08-04 ASSESSMENT — ENCOUNTER SYMPTOMS
DECREASED CONCENTRATION: 1
BACK PAIN: 0
HEADACHES: 0
AGITATION: 1
DYSPHORIC MOOD: 1
COLOR CHANGE: 0
LIGHT-HEADEDNESS: 0
ARTHRALGIAS: 0

## 2023-09-14 ENCOUNTER — LAB (OUTPATIENT)
Dept: LAB | Facility: CLINIC | Age: 88
End: 2023-09-14
Payer: COMMERCIAL

## 2023-09-14 DIAGNOSIS — R53.83 OTHER FATIGUE: ICD-10-CM

## 2023-09-14 DIAGNOSIS — R73.9 HYPERGLYCEMIA: ICD-10-CM

## 2023-09-14 DIAGNOSIS — E03.9 HYPOTHYROIDISM, UNSPECIFIED TYPE: ICD-10-CM

## 2023-09-14 LAB
ANION GAP SERPL CALCULATED.3IONS-SCNC: 12 MMOL/L (ref 7–15)
BUN SERPL-MCNC: 16.1 MG/DL (ref 8–23)
CALCIUM SERPL-MCNC: 9.5 MG/DL (ref 8.2–9.6)
CHLORIDE SERPL-SCNC: 99 MMOL/L (ref 98–107)
CREAT SERPL-MCNC: 0.83 MG/DL (ref 0.51–0.95)
DEPRECATED HCO3 PLAS-SCNC: 27 MMOL/L (ref 22–29)
EGFRCR SERPLBLD CKD-EPI 2021: 66 ML/MIN/1.73M2
ERYTHROCYTE [DISTWIDTH] IN BLOOD BY AUTOMATED COUNT: 15.7 % (ref 10–15)
GLUCOSE SERPL-MCNC: 95 MG/DL (ref 70–99)
HBA1C MFR BLD: 5.9 % (ref 0–5.6)
HCT VFR BLD AUTO: 38.6 % (ref 35–47)
HGB BLD-MCNC: 12.3 G/DL (ref 11.7–15.7)
MCH RBC QN AUTO: 26.8 PG (ref 26.5–33)
MCHC RBC AUTO-ENTMCNC: 31.9 G/DL (ref 31.5–36.5)
MCV RBC AUTO: 84 FL (ref 78–100)
PLATELET # BLD AUTO: 183 10E3/UL (ref 150–450)
POTASSIUM SERPL-SCNC: 5 MMOL/L (ref 3.4–5.3)
RBC # BLD AUTO: 4.59 10E6/UL (ref 3.8–5.2)
SODIUM SERPL-SCNC: 138 MMOL/L (ref 136–145)
TSH SERPL DL<=0.005 MIU/L-ACNC: 4.59 UIU/ML (ref 0.3–4.2)
WBC # BLD AUTO: 7.3 10E3/UL (ref 4–11)

## 2023-09-14 PROCEDURE — 84439 ASSAY OF FREE THYROXINE: CPT

## 2023-09-14 PROCEDURE — 84443 ASSAY THYROID STIM HORMONE: CPT

## 2023-09-14 PROCEDURE — 36415 COLL VENOUS BLD VENIPUNCTURE: CPT

## 2023-09-14 PROCEDURE — 85027 COMPLETE CBC AUTOMATED: CPT

## 2023-09-14 PROCEDURE — 83036 HEMOGLOBIN GLYCOSYLATED A1C: CPT

## 2023-09-14 PROCEDURE — 80048 BASIC METABOLIC PNL TOTAL CA: CPT

## 2023-09-15 LAB — T4 FREE SERPL-MCNC: 1.31 NG/DL (ref 0.9–1.7)

## 2023-09-17 DIAGNOSIS — E03.9 HYPOTHYROIDISM, UNSPECIFIED TYPE: Primary | ICD-10-CM

## 2023-09-18 ENCOUNTER — TELEPHONE (OUTPATIENT)
Dept: FAMILY MEDICINE | Facility: CLINIC | Age: 88
End: 2023-09-18
Payer: COMMERCIAL

## 2023-09-18 NOTE — TELEPHONE ENCOUNTER
----- Message from vAa Meléndez MD sent at 9/17/2023 10:48 AM CDT -----  Mary     Reviewed your results .  Normal electrolyte panel blood count .  TSH mildly elevated as FT4 in range no medication change required at current time .  Will plan to recheck TSH in 4 months ( labs ordered )   Blood sugar mildly elevated , recommend low sugar diet .  We will continue to monitor .    Thanks   Ava

## 2023-09-18 NOTE — TELEPHONE ENCOUNTER
Writer called and informed her son John of results. Consent in chart.  They will make follow up lab appt. For patient.

## 2023-09-25 ENCOUNTER — APPOINTMENT (OUTPATIENT)
Dept: CT IMAGING | Facility: CLINIC | Age: 88
End: 2023-09-25
Attending: EMERGENCY MEDICINE
Payer: COMMERCIAL

## 2023-09-25 ENCOUNTER — HOSPITAL ENCOUNTER (EMERGENCY)
Facility: CLINIC | Age: 88
Discharge: LEFT BEFORE DISCHARGE | End: 2023-09-25
Admitting: EMERGENCY MEDICINE
Payer: COMMERCIAL

## 2023-09-25 VITALS
HEART RATE: 90 BPM | OXYGEN SATURATION: 98 % | RESPIRATION RATE: 18 BRPM | TEMPERATURE: 97.9 F | BODY MASS INDEX: 24.72 KG/M2 | WEIGHT: 134.04 LBS | SYSTOLIC BLOOD PRESSURE: 158 MMHG | DIASTOLIC BLOOD PRESSURE: 81 MMHG

## 2023-09-25 PROCEDURE — 99281 EMR DPT VST MAYX REQ PHY/QHP: CPT

## 2023-09-25 PROCEDURE — 72125 CT NECK SPINE W/O DYE: CPT

## 2023-09-25 PROCEDURE — 70450 CT HEAD/BRAIN W/O DYE: CPT

## 2023-09-25 NOTE — ED TRIAGE NOTES
Pt arrives with family who reports that pt took a fall last night when stepping into the apartment, pt not on thinners. Pt reports headache. PT denies pain to [facility]. PT VSS and BEFAST negative. No LOC

## 2023-12-28 ENCOUNTER — TRANSFERRED RECORDS (OUTPATIENT)
Dept: HEALTH INFORMATION MANAGEMENT | Facility: CLINIC | Age: 88
End: 2023-12-28
Payer: COMMERCIAL

## 2024-01-04 ENCOUNTER — MYC MEDICAL ADVICE (OUTPATIENT)
Dept: PALLIATIVE MEDICINE | Facility: CLINIC | Age: 89
End: 2024-01-04
Payer: COMMERCIAL

## 2024-01-04 DIAGNOSIS — M54.16 LUMBAR RADICULOPATHY: Primary | ICD-10-CM

## 2024-01-05 NOTE — TELEPHONE ENCOUNTER
Called Pt son Nasir. Confirmed pain is the same, no new areas of pain. Would like repeat injection.    Discussed with provider, last MRI 2018, no need to repeat imaging  Please call Alberto [phone] for scheduling.     Routing to provider to review repeat caudal CANDELARIA orders    Yarely MARCANO, RN Care Coordinator  [facility]  Pain Management

## 2024-01-08 ENCOUNTER — TELEPHONE (OUTPATIENT)
Dept: PALLIATIVE MEDICINE | Facility: CLINIC | Age: 89
End: 2024-01-08
Payer: COMMERCIAL

## 2024-01-08 DIAGNOSIS — M54.16 LUMBAR RADICULOPATHY: Primary | ICD-10-CM

## 2024-01-08 NOTE — TELEPHONE ENCOUNTER
Epidural Steroid Injections (CANDELARIA)Effective July 1, 2023  Indications: Epidural Steroid Injections (Interlaminar, Caudal, or Transforaminal)      Treatment of Presumed Radiculopathy  There has been a failure to respond to at least four (4) weeks of conservative treatment (e.g., exercise, physical therapy, chiropractic care, or medications to include NSAIDS or analgesics)  The individual is participating in a comprehensive pain management program that includes ALL of the following: physical therapy, patient education, psychosocial support, and oral medications  Presence of pain, dysesthesia(s), or paresthesia(s) reported by the individual in a level-specific referral pattern of an involved named spinal root(s) causing significant functional limitations, (i.e., diminished quality of life and impaired age-appropriate activities of daily living), and EITHER of the following:   Documentation of ONE or MORE of the following, concordant with nerve root compression of the involved named spinal root(s) demonstrated on a detailed neurologic examination within the prior three (3) months:  Loss of strength of specific named muscle(s) or myotomal distribution(s)  Altered sensation to light touch, pressure, pin prick, or temperature in the sensory distribution  Diminished, absent, or asymmetric reflex(es)  Documentation of EITHER of the following studies performed within the prior 24 months:  A concordant radiologist's interpretation of an advanced diagnostic imaging study (MRI or CT) of the spine demonstrating compression of the involved named spinal nerve root(s)  Electrodiagnostic studies (EMG/NCVs) diagnostic of nerve root compression of the involved named spinal nerve root(s).  Advanced diagnostic imaging within 24 months is required for cervical/thoracic interlaminar and transforaminal epidural steroid injections        Repeat Interlaminar, Caudal, or Transforaminal Epidural Steroid Injection (CANDELARIA)  A repeat interlaminar,  caudal, or transforaminal epidural steroid injection (CANDELARIA) is considered medically necessary when ALL of the following criteria are met:  It has been at least fourteen (14) days since the prior epidural steroid injection (CANDELARIA)  There has been 50% or greater relief of radicular pain for two (2) or more weeks duration and ONE of the following additional criteria are met:  Increase in the level of function/physical activity (e.g., return to work)  Reduction in the use of pain medication and/or additional medical services such as physical therapy/chiropractic care  Advanced diagnostic imaging within 24 months is required for cervical/thoracic interlaminar and transforaminal epidural steroid injections                Routing to review and advise    Marybeth Waddell   [facility]

## 2024-01-08 NOTE — TELEPHONE ENCOUNTER
"Screening Questions for Radiology Injections:    Injection to be done at which interventional clinic site? [facility]    If choosing [facility] for location, please inform patient:  \"[facility] is a Hospital based clinic. Before your visit, you should check with your insurance about how it covers the charges for facility services in a hospital-based clinic.     Procedure ordered by Li     Procedure ordered? Caudal   Transforaminal Cervical CANDELARIA - [place] [facility] - No [facility] providers perform this procedure    What insurance would patient like us to bill for this procedure? BC & Medicare   IF SCHEDULING IN [facility] PLEASE SCHEDULE AT LEAST 7-10 BUSINESS DAYS OUT SO A PA CAN BE OBTAINED  Worker's comp or MVA (motor vehicle accident) -Any injection DO NOT SCHEDULE and route to Kevin Rosado.    [employer] insurance - For SI joint injections, DO NOT SCHEDULE and route to Marybeth Mata.     ALL BCBS, [employer] and [employer] - DO NOT SCHEDULE and route to Marybeth Mata  MEDICA- facet joint injections, route to Marybeth Mata    Is patient scheduled at [facility]?    If YES, route every encounter to [facility] SPINE CENTER CARE NAVIGATION POOL [[phone]]    Is an  needed? No     Patient has a  home? (Review Grid) YES: Informed     Any chance of pregnancy? Not Applicable   If YES, do NOT schedule and route to RN pool  - Dr. Ewing route to Oralia Mckeon and PM&R Nurse  [[id number]]      Is patient actively being treated for cancer or immunocompromised? No  If YES, do NOT schedule and route to RN pool/ Dr. Ewing's Team    Does the patient have a bleeding or clotting disorder? No   If YES, okay to schedule AND route to RN nurse / Dr. Ewing's Team   (For any patients with platelet count <100, RN must forward to provider)    Is patient taking any Blood Thinners OR Antiplatelet medication?  No   If hold needed, do NOT schedule, route to RN pool/ " Dr. Ewing's Team  Examples:   Blood Thinners: (Coumadin, Warfarin, Jantoven, Pradaxa, Xarelto, Eliquis, Edoxaban, Enoxaparin, Lovenox, Heparin, Arixtra, Fondaparinux or Fragmin)  Antiplatelet Medications: (Plavix, Brilinta or Effient)     Is patient taking any aspirin products (includes Excedrin and Fiorinal)? No   If more than 325mg/day, OK to schedule; Instruct Pt to decrease to less than 325 mg for 7 days AND route to RN pool/ Dr. Ewing's Team   For CERVICAL procedures, hold all aspirin products for 6 days.   Tell Pt that if aspirin product is not held for 6 days, the procedure WILL BE cancelled.     Any allergies to contrast dye, iodine, shellfish, or numbing and steroid medications? No  If YES, schedule and add allergy information to appointment notes AND route to the RN pool/ Dr. Ewing's Team  If CANDELARIA and Contrast Dye / Iodine Allergy? DO NOT SCHEDULE, route to RN pool/ Dr. Ewing's Team  Allergies: Tetracycline     Does patient have an active infection or treated for one within the past week? No  Is patient currently taking any antibiotics or steroid medications?  No   For patients on chronic, preventative, or prophylactic antibiotics, procedures may be scheduled.   For patients on antibiotics for active or recent infection, schedule 4 days after completed.  For patients on steroid medications, schedule 4 days after completed.     Has the patient had a flu shot or any other vaccinations within the past 7 days? No  If yes, explain that for the vaccine to work best they need to:     wait 1 week before and 1 week after getting any Vaccine  wait 1 week before and 2 weeks after getting any Covid Vaccine   If patient has concerns about the timing, send to RN pool/ Dr. Ewing's Team    Does patient have an MRI/CT?  Not Applicable Include Date and Check Procedure Scheduling Grid to see if required.  Was the MRI/CT done within the last 3 years?  NA   If no route to RN Pool/ Dr. Ewing's Team  If yes, where was the  MRI/CT done?    Refer to PACS Transmissions list for approved external locations and route to RN Pool High Priority/ Dr. Trotters Team  If MRI was not done at approved external location do NOT schedule and route to RN pool/ Dr. Ewing's Team    If patient has an imaging disc, the injection MAY be scheduled but patient must bring disc to appt or appt will be cancelled.    Is patient able to transfer to a procedure table with minimal or no assistance? Yes   If no, do NOT schedule and route to  Pool/ Dr. Ewing's Team    Procedure Specific Instructions:  If celiac plexus block, informed patient NPO for 6 hours and that it is okay to take medications with sips of water, especially blood pressure medications Not Applicable       If this is for a cervical procedure, informed patient that aspirin needs to be held for 6 days.   Not Applicable    Sedation, If Sedation is ordered for any procedure, patient must be NPO for 6 hours prior to procedure Not Applicable    If IV needed:  Do not schedule procedures requiring IV placement in the first appointment of the day or first appointment after lunch. Do NOT schedule at 0745, 0815 or 1245.   Instructed patient to arrive 30 minutes early for IV start if required. (Check Procedure Scheduling Grid)  Not Applicable    Reminders:  If you are started on any steroids or antibiotics between now and your appointment, you must contact us because the procedure may need to be cancelled.  Yes    As a reminder, receiving steroids can decrease your body's ability to fight infection.   Would you still like to move forward with scheduling the injection?  Yes    IV Sedation is not provided for procedures. If oral anti-anxiety medication is needed, the patient should request this from their referring provider.    Instruct patient to arrive as directed prior to the scheduled appointment time:  If IV needed 30 minutes before appointment time     For patients 85 or older we recommend having an  adult stay w/ them for the remainder of the day.     If the patient is Diabetic, remind them to bring their glucometer.      Does the patient have any questions?  NO  [facility]

## 2024-01-09 NOTE — TELEPHONE ENCOUNTER
Called Joy to call back and ask for nursing    Yarely MARCANO, RN Care Coordinator  [facility]  Pain [facility]

## 2024-01-09 NOTE — TELEPHONE ENCOUNTER
Recvd call back-     Reports that pain is significantly improved until about 2 weeks ago. Last injection was 05/2023, lasting 6 mnths. Estimates pain relief at >90% for this timeframe.     States in the past 2 weeks, pain level is averaging around a 5/10.    Was able to walk more upright and greater distances following injection, was able to participate in silver sneakers and go to gym, as injection wearing off in past few weeks has not been able to participate as much in si;sid sneaker and is walking less distance and going to gym less.     *routing for review of above insurance criteria, seems to meet for repeat    Yarely MARCANO, RN Care Coordinator  [facility]  Pain Management

## 2024-01-10 NOTE — TELEPHONE ENCOUNTER
Authorization Number: [id number]  Review Date: 1/10/2024 10:33:02 AM  Expiration Date: 3/10/2024  Status: Your case has been Approved.    OKAY TO SCHEDULE CAUDAL CANDELARIA WITH DR. ASHLY GAMBLE  Complex   [facility]

## 2024-01-12 NOTE — TELEPHONE ENCOUNTER
Scheduled 02/21/24    Yarely MADSENN, RN Care Coordinator  [facility]  Pain [facility]

## 2024-01-15 NOTE — TELEPHONE ENCOUNTER
Is scheduled 02/21/24    Yarely MARCANO, RN Care Coordinator  [facility]  Pain [facility]

## 2024-02-15 NOTE — PROGRESS NOTES
[facility] - Procedure Note    Date of Service: 2/21/2024    Procedure performed: Caudal epidural steroid injection with fluoroscopic guidance  Diagnosis: Lumbar spondylosis; Lumbar radiculitis/radiculopathy  : Shagufta Li MD  Anesthesia: none    Indications: Indy Reyes is a 89 year old female who is seen at the request of myself (interventional consult)  for a caudal epidural steroid injection. The patient describes central low back (centered over her tailbone) pain without radiation to the legs. The patient has been exhibiting symptoms consistent with lumbar intraspinal inflammation and radiculopathy. Symptoms have been persistent, disabling, and intermittently severe. The patient reports minimal improvement with conservative treatment, including PT and medications.    This is a repeat injection.  Previous CAUDAL CANDELARIA done by myself on 5/5/2023 provided good pain relief for a period of 6 months.     S/P L4-5 spine surgery 4/28/2017 Dr. Woo    LUMBAR MRI was done on 10/5/2018 which showed                                                                  IMPRESSION:   1. Interval surgical changes at L4-L5 with improvement of what is now  moderate central canal stenosis with mild to moderate left and mild  right neural foraminal stenosis.   2. No other change is demonstrated. There are degenerative changes  elsewhere with no disc herniation demonstrated. There is moderate  bilateral neural foraminal stenosis at L1-L2 with less extensive  stenosis elsewhere as described above.    Allergies:      Allergies   Allergen Reactions    Tetracycline Rash        Vitals:  /71   Pulse 95   SpO2 98%     Review of Systems: The patient denies recent fever, chills, illness, use of antibiotics or anticoagulants. All other 10-point review of systems negative.       Procedure: The procedure and risks were explained, and informed written consent was obtained from the patient. Risks  include but are not limited to: infection, bleeding, increased pain, and damage to soft tissue, nerve, muscle, and vasculature structures. After getting informed consent, patient was brought into the procedure suite and was placed in a prone position on the procedure table. A Pause for the Cause was performed. Patient was prepped and draped in sterile fashion.     The sacral hiatus and cornua were palpated.  Under lateral fluoroscopic guidance sacral hiatus was identified.  A total of 4.5 mL of Lidocaine 1% with 0.5 mL 8.4% sodium bicarbonate was used to anesthetize the skin and the needle track at a skin entry site.  A 22 gauge 5 inch spinal needle was advanced through the sacral hiatus using intermittent fluoroscopy. The needle angle was decreased to allow entrance into the sacral canal and epidural space.  This was verified in both the AP and lateral view for correct alignment.       The position was then inspected from anteroposterior and lateral views, and the needle adjusted appropriately.  After negative aspiration for heme and CSF, a total of 1 mL of Omnipaque-300 was injected using static and continuous fluoroscopy confirming appropriate position, into the epidural space, with no intravascular or intrathecal uptake. 0 mL of Omnipaque-300 was wasted.    2 mL of 1% lidocaine, 3 mL of preservative free saline, with 80 mg of triamcinolone was injected.  The needle was removed. Hemostasis was achieved, the area was cleaned, and bandaids were placed when appropriate. Images were saved to PACS.    The patient tolerated the procedure well, and was taken to the recovery room, and there was no evidence of procedural complications. No new sensory or motor deficits were noted following the procedure. The patient was stable and able to ambulate on discharge home. Post-procedure instructions were provided.     Pre-procedure pain score: 8/10 in the back, 8/10 in the leg  Post-procedure pain score: 0/10 in the back, 0/10  in the leg    Assessment/Plan: Indy Reyes is a 89 year old female s/p caudal epidural steroid injection today for lumbar spondylosis, radiculitis/radiculopathy.     1. Following today's procedure, the patient was advised to contact the Pain Management Center for any of the following:   Fever, chills, or night sweats   New onset of pain, numbness, or weakness   Any questions/concerns regarding the procedure  If unable to contact the Pain Center, the patient was instructed to go to a local Emergency Room for any complications.   2. The patient should follow-up with the referring provider in 2 weeks for post-procedure evaluation.    TAI LIMON MD   Pain Management

## 2024-02-21 ENCOUNTER — RADIOLOGY INJECTION OFFICE VISIT (OUTPATIENT)
Dept: PALLIATIVE MEDICINE | Facility: CLINIC | Age: 89
End: 2024-02-21
Payer: COMMERCIAL

## 2024-02-21 VITALS — DIASTOLIC BLOOD PRESSURE: 88 MMHG | SYSTOLIC BLOOD PRESSURE: 159 MMHG | HEART RATE: 89 BPM | OXYGEN SATURATION: 98 %

## 2024-02-21 DIAGNOSIS — M54.16 LUMBAR RADICULOPATHY: Primary | ICD-10-CM

## 2024-02-21 PROCEDURE — 62323 NJX INTERLAMINAR LMBR/SAC: CPT | Performed by: ANESTHESIOLOGY

## 2024-02-21 RX ORDER — TRIAMCINOLONE ACETONIDE 40 MG/ML
40 INJECTION, SUSPENSION INTRA-ARTICULAR; INTRAMUSCULAR ONCE
Status: COMPLETED | OUTPATIENT
Start: 2024-02-21 | End: 2024-02-21

## 2024-02-21 RX ADMIN — TRIAMCINOLONE ACETONIDE 40 MG: 40 INJECTION, SUSPENSION INTRA-ARTICULAR; INTRAMUSCULAR at 10:27

## 2024-02-21 ASSESSMENT — PAIN SCALES - GENERAL: PAINLEVEL: NO PAIN (0)

## 2024-02-21 NOTE — PATIENT INSTRUCTIONS
[facility]   Procedure Discharge Instructions    Today you saw:  Dr. Shagufta Li     You had an:  Epidural steroid injection           Be cautious when walking. Numbness and/or weakness in the lower extremities may occur for up to 6-8 hours after the procedure due to effect of the local anesthetic  Do not drive for 6 hours. The effect of the local anesthetic could slow your reflexes.   You may resume your regular activities after 24 hours  Avoid strenuous activity for the first 24 hours  You may shower, however avoid swimming, tub baths or hot tubs for 24 hours following your procedure  You may have a mild to moderate increase in pain for several days following the injection.  It may take up to 14 days for the steroid medication to start working although you may feel the effect as early as a few days after the procedure.     You may use ice packs for 10-15 minutes, 3 to 4 times a day at the injection site for comfort  Do not use heat to painful areas for 6 to 8 hours. This will give the local anesthetic time to wear off and prevent you from accidentally burning your skin.   Unless you have been directed to avoid the use of anti-inflammatory medications (NSAIDS), you may use medications such as ibuprofen, Aleve or Tylenol for pain control if needed.   If you were fasting, you may resume your normal diet and medications after the procedure  If you have diabetes, check your blood sugar more frequently than usual as your blood sugar may be higher than normal for 10-14 days following a steroid injection. Contact your doctor who manages your diabetes if your blood sugar is higher than usual  Possible side effects of steroids that you may experience include flushing, elevated blood pressure, increased appetite, mild headaches and restlessness.  All of these symptoms will get better with time.  If you experience any of the following, call the Pain Clinic during work hours (Mon-Friday 8-4:30  pm) at [phone] or the Provider Line after hours at [phone]:  -Fever over 100 degree F  -Swelling, bleeding, redness, drainage, warmth at the injection site  -Progressive weakness or numbness in your legs or arms  -Loss of bowel or bladder function  -Unusual headache that is not relieved by Tylenol or other pain reliever  -Unusual new onset of pain that is not improving

## 2024-02-21 NOTE — NURSING NOTE
Pre-procedure Intake  If YES to any questions or NO to having a   Please complete laminated checklist and leave on the computer keyboard for Provider, verbally inform provider if able.    For SCS Trial, RFA's or any sedation procedure:  Have you been fasting? N/A   If yes, for how long?     Are you taking any any blood thinners such as Coumadin, Warfarin, Jantoven, Pradaxa Xarelto, Eliquis, Edoxaban, Enoxaparin, Lovenox, Heparin, Arixtra, Fondaparinux, or Fragmin? OR Antiplatelet medication such as Plavix, Brilinta, or Effient?   NO   If yes, when did you take your last dose?     Do you take aspirin?  NO  If cervical procedure, have you held aspirin for 6 days?   N/A     Do you have any allergies to contrast dye, iodine, steroid and/or numbing medications?  NO     Are you currently taking antibiotics or have an active infection?  NO     Have you had a fever/elevated temperature within the past week? NO     Are you currently taking oral steroids? NO     Do you have a ?  Yes     Are you pregnant or breastfeeding? N/A     Have you received any vaccines in the past week? NO     Are you planning to get any vaccines in the next week or two? NO    Notify provider and RNs if systolic BP >170, diastolic BP >100, P >100 or O2 sats < 90%

## 2024-02-21 NOTE — NURSING NOTE
Discharge Information    IV Discontiued Time:  NA    Amount of Fluid Infused:  NA    Discharge Criteria = When patient returns to baseline or as per MD order    Consciousness:  Pt is fully awake    Circulation:  BP +/- 20% of pre-procedure level    Respiration:  Patient is able to breathe deeply    O2 Sat:  Patient is able to maintain O2 Sat >92% on room air    Activity:  Moves 4 extremities on command    Ambulation:  Patient is able to stand and walk or stand and pivot into wheelchair    Dressing:  Clean/dry or No Dressing    Notes:   Discharge instructions and AVS given to patient    Patient meets criteria for discharge?  YES    Admitted to PCU?  No    Responsible adult present to accompany patient home?  Yes    Signature/Title:    Batool Montes RN  RN Care Coordinator  [facility]

## 2024-03-11 ENCOUNTER — TRANSFERRED RECORDS (OUTPATIENT)
Dept: HEALTH INFORMATION MANAGEMENT | Facility: CLINIC | Age: 89
End: 2024-03-11
Payer: COMMERCIAL

## 2024-04-02 ENCOUNTER — PATIENT OUTREACH (OUTPATIENT)
Dept: CARE COORDINATION | Facility: CLINIC | Age: 89
End: 2024-04-02
Payer: COMMERCIAL

## 2024-04-16 ENCOUNTER — PATIENT OUTREACH (OUTPATIENT)
Dept: CARE COORDINATION | Facility: CLINIC | Age: 89
End: 2024-04-16
Payer: COMMERCIAL

## 2024-04-18 ENCOUNTER — TRANSFERRED RECORDS (OUTPATIENT)
Dept: HEALTH INFORMATION MANAGEMENT | Facility: CLINIC | Age: 89
End: 2024-04-18
Payer: COMMERCIAL

## 2024-05-14 ENCOUNTER — OFFICE VISIT (OUTPATIENT)
Dept: FAMILY MEDICINE | Facility: CLINIC | Age: 89
End: 2024-05-14
Payer: COMMERCIAL

## 2024-05-14 VITALS
WEIGHT: 128.8 LBS | HEART RATE: 101 BPM | BODY MASS INDEX: 23.7 KG/M2 | SYSTOLIC BLOOD PRESSURE: 130 MMHG | DIASTOLIC BLOOD PRESSURE: 76 MMHG | OXYGEN SATURATION: 97 % | RESPIRATION RATE: 20 BRPM | TEMPERATURE: 98.4 F | HEIGHT: 62 IN

## 2024-05-14 DIAGNOSIS — E03.9 HYPOTHYROIDISM, UNSPECIFIED TYPE: ICD-10-CM

## 2024-05-14 DIAGNOSIS — L98.9 SKIN LESION: ICD-10-CM

## 2024-05-14 DIAGNOSIS — F33.1 MODERATE EPISODE OF RECURRENT MAJOR DEPRESSIVE DISORDER (H): Primary | ICD-10-CM

## 2024-05-14 LAB
T4 FREE SERPL-MCNC: 1.36 NG/DL (ref 0.9–1.7)
TSH SERPL DL<=0.005 MIU/L-ACNC: 5.67 UIU/ML (ref 0.3–4.2)

## 2024-05-14 PROCEDURE — 36415 COLL VENOUS BLD VENIPUNCTURE: CPT | Performed by: FAMILY MEDICINE

## 2024-05-14 PROCEDURE — 88305 TISSUE EXAM BY PATHOLOGIST: CPT | Performed by: FAMILY MEDICINE

## 2024-05-14 PROCEDURE — 84439 ASSAY OF FREE THYROXINE: CPT | Performed by: FAMILY MEDICINE

## 2024-05-14 PROCEDURE — 99214 OFFICE O/P EST MOD 30 MIN: CPT | Mod: 25 | Performed by: FAMILY MEDICINE

## 2024-05-14 PROCEDURE — 84443 ASSAY THYROID STIM HORMONE: CPT | Performed by: FAMILY MEDICINE

## 2024-05-14 PROCEDURE — 11106 INCAL BX SKN SINGLE LES: CPT | Performed by: FAMILY MEDICINE

## 2024-05-14 PROCEDURE — 96127 BRIEF EMOTIONAL/BEHAV ASSMT: CPT | Performed by: FAMILY MEDICINE

## 2024-05-14 RX ORDER — LEVOTHYROXINE SODIUM 25 UG/1
37.5 TABLET ORAL DAILY
Qty: 140 TABLET | Refills: 3 | Status: SHIPPED | OUTPATIENT
Start: 2024-05-14 | End: 2024-06-18 | Stop reason: DRUGHIGH

## 2024-05-14 RX ORDER — ESCITALOPRAM OXALATE 5 MG/1
5 TABLET ORAL DAILY
Qty: 90 TABLET | Refills: 0 | Status: SHIPPED | OUTPATIENT
Start: 2024-05-14 | End: 2024-06-18

## 2024-05-14 RX ORDER — RESPIRATORY SYNCYTIAL VIRUS VACCINE 120MCG/0.5
0.5 KIT INTRAMUSCULAR ONCE
Qty: 1 EACH | Refills: 0 | Status: CANCELLED | OUTPATIENT
Start: 2024-05-14 | End: 2024-05-14

## 2024-05-14 ASSESSMENT — PATIENT HEALTH QUESTIONNAIRE - PHQ9: SUM OF ALL RESPONSES TO PHQ QUESTIONS 1-9: 17

## 2024-05-14 NOTE — PROGRESS NOTES
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    Plan: escitalopram (LEXAPRO) 5 MG tablet            (E03.9) Hypothyroidism, unspecified type  Comment: will recheck labs .  Plan: TSH with free T4 reflex            (L98.9) Skin lesion  Comment:   Plan: Surgical Pathology Exam    Procedure note .  Area cleaned   0.25 cm skin lesion on forehead     0.5 ml lidocaine used to numb the skin   Biopsied with the scalpal   send for exam .   No complication   Skin care discussed .              Depression Screening Follow Up        5/14/2024    11:51 AM   PHQ   PHQ-9 Total Score 17   Q9: Thoughts of better off dead/self-harm past 2 weeks Nearly every day         { Link to C-SSRS ([facility]) Flowsheet :150      Follow Up Actions Taken  Crisis resource information provided in the After Visit Summary    Lives in senior living       Subjective   Mary is a 89 year old, presenting for the following health issues:  Derm Problem (Skin tag removal forehead, get back on depress med)      5/14/2024    11:13 AM   Additional Questions   Roomed by Madhu   Accompanied by son Alberto MEJIA     Feeling more down   Tearfull   Depressed   Discontinued medication in end of December       Review of Systems  CONSTITUTIONAL: NEGATIVE for fever, chills, change in weight  RESP: NEGATIVE for significant cough or SOB  BREAST: NEGATIVE for masses, tenderness or discharge  CV: NEGATIVE for chest pain, palpitations or peripheral edema  GI: NEGATIVE for nausea, abdominal pain, heartburn, or change in bowel habits  : NEGATIVE for frequency, dysuria, or hematuria  MUSCULOSKELETAL: NEGATIVE for significant arthralgias or myalgia  PSYCHIATRIC:low mood and depression .      Objective    /76 (BP Location: Right arm, Patient Position: Sitting, Cuff Size: Adult Regular)   Pulse 101   Temp 98.4  F  "(36.9  C) (Oral)   Resp 20   Ht 1.562 m (5' 1.5\")   Wt 58.4 kg (128 lb 12.8 oz)   SpO2 97%   BMI 23.94 kg/m    Body mass index is 23.94 kg/m .  Physical Exam   GENERAL: alert and no distress    CV: regular rate and rhythm, normal S1 S2, no S3 or S4, no murmur, click or rub, no peripheral edema  ABDOMEN: soft, nontender, no hepatosplenomegaly, no masses and bowel sounds normal  MS: no gross musculoskeletal defects noted, no edema  SKIN: no suspicious lesions or rashes  Skin lesion forehead 0.25 cm forehead .  NEURO: Normal strength and tone, mentation intact and speech normal  PSYCH:tearfull low mood depression         Signed Electronically by: Ava Meléndez MD    "

## 2024-05-15 ENCOUNTER — TELEPHONE (OUTPATIENT)
Dept: FAMILY MEDICINE | Facility: CLINIC | Age: 89
End: 2024-05-15
Payer: COMMERCIAL

## 2024-05-15 DIAGNOSIS — E03.9 HYPOTHYROIDISM, UNSPECIFIED TYPE: Primary | ICD-10-CM

## 2024-05-15 RX ORDER — LEVOTHYROXINE SODIUM 50 UG/1
50 TABLET ORAL DAILY
Qty: 90 TABLET | Refills: 1 | Status: SHIPPED | OUTPATIENT
Start: 2024-05-15 | End: 2024-06-18

## 2024-05-15 NOTE — TELEPHONE ENCOUNTER
Writer called patient's son Fady. Informed of lab results and change in dosage of medication. Son is in agreement and will make changes.

## 2024-05-15 NOTE — TELEPHONE ENCOUNTER
----- Message from Ava Meléndez MD sent at 5/15/2024 11:23 AM CDT -----  Patient live in assisted living .  Team   Please inform patient Tsh above goal .  Recommend to increase the dose of levothyroxine to 50 mcg .   New prescription send to pharmacy .  She can finish her current bottle by taking 2 tab instead of 1.5 tab .  Will recheck labs in 4 months .    Thanks   Ava Meléndez MD.

## 2024-05-16 LAB
PATH REPORT.COMMENTS IMP SPEC: NORMAL
PATH REPORT.COMMENTS IMP SPEC: NORMAL
PATH REPORT.FINAL DX SPEC: NORMAL
PATH REPORT.GROSS SPEC: NORMAL
PATH REPORT.MICROSCOPIC SPEC OTHER STN: NORMAL
PATH REPORT.RELEVANT HX SPEC: NORMAL
PHOTO IMAGE: NORMAL

## 2024-06-02 ENCOUNTER — HEALTH MAINTENANCE LETTER (OUTPATIENT)
Age: 89
End: 2024-06-02

## 2024-06-18 ENCOUNTER — OFFICE VISIT (OUTPATIENT)
Dept: FAMILY MEDICINE | Facility: CLINIC | Age: 89
End: 2024-06-18
Payer: COMMERCIAL

## 2024-06-18 VITALS
HEIGHT: 62 IN | OXYGEN SATURATION: 96 % | TEMPERATURE: 98.2 F | DIASTOLIC BLOOD PRESSURE: 81 MMHG | BODY MASS INDEX: 24.22 KG/M2 | WEIGHT: 131.6 LBS | HEART RATE: 95 BPM | RESPIRATION RATE: 16 BRPM | SYSTOLIC BLOOD PRESSURE: 138 MMHG

## 2024-06-18 DIAGNOSIS — M54.50 CHRONIC MIDLINE LOW BACK PAIN WITHOUT SCIATICA: ICD-10-CM

## 2024-06-18 DIAGNOSIS — Z00.00 ENCOUNTER FOR MEDICARE ANNUAL WELLNESS EXAM: ICD-10-CM

## 2024-06-18 DIAGNOSIS — E03.9 HYPOTHYROIDISM, UNSPECIFIED TYPE: ICD-10-CM

## 2024-06-18 DIAGNOSIS — Z13.220 LIPID SCREENING: ICD-10-CM

## 2024-06-18 DIAGNOSIS — R73.01 IMPAIRED FASTING GLUCOSE: ICD-10-CM

## 2024-06-18 DIAGNOSIS — F32.4 MAJOR DEPRESSIVE DISORDER WITH SINGLE EPISODE, IN PARTIAL REMISSION (H): ICD-10-CM

## 2024-06-18 DIAGNOSIS — F33.1 MODERATE EPISODE OF RECURRENT MAJOR DEPRESSIVE DISORDER (H): ICD-10-CM

## 2024-06-18 DIAGNOSIS — G89.29 CHRONIC MIDLINE LOW BACK PAIN WITHOUT SCIATICA: ICD-10-CM

## 2024-06-18 PROBLEM — F32.9 MAJOR DEPRESSION, SINGLE EPISODE: Status: ACTIVE | Noted: 2024-06-18

## 2024-06-18 LAB
ANION GAP SERPL CALCULATED.3IONS-SCNC: 9 MMOL/L (ref 7–15)
BUN SERPL-MCNC: 16.6 MG/DL (ref 8–23)
CALCIUM SERPL-MCNC: 9.8 MG/DL (ref 8.2–9.6)
CHLORIDE SERPL-SCNC: 103 MMOL/L (ref 98–107)
CHOLEST SERPL-MCNC: 238 MG/DL
CREAT SERPL-MCNC: 0.79 MG/DL (ref 0.51–0.95)
DEPRECATED HCO3 PLAS-SCNC: 27 MMOL/L (ref 22–29)
EGFRCR SERPLBLD CKD-EPI 2021: 69 ML/MIN/1.73M2
ERYTHROCYTE [DISTWIDTH] IN BLOOD BY AUTOMATED COUNT: 15.5 % (ref 10–15)
FASTING STATUS PATIENT QL REPORTED: NO
FASTING STATUS PATIENT QL REPORTED: NO
GLUCOSE SERPL-MCNC: 103 MG/DL (ref 70–99)
HBA1C MFR BLD: 5.8 % (ref 0–5.6)
HCT VFR BLD AUTO: 39.2 % (ref 35–47)
HDLC SERPL-MCNC: 51 MG/DL
HGB BLD-MCNC: 12.3 G/DL (ref 11.7–15.7)
LDLC SERPL CALC-MCNC: 148 MG/DL
MCH RBC QN AUTO: 26.2 PG (ref 26.5–33)
MCHC RBC AUTO-ENTMCNC: 31.4 G/DL (ref 31.5–36.5)
MCV RBC AUTO: 84 FL (ref 78–100)
NONHDLC SERPL-MCNC: 187 MG/DL
PLATELET # BLD AUTO: 184 10E3/UL (ref 150–450)
POTASSIUM SERPL-SCNC: 4.4 MMOL/L (ref 3.4–5.3)
RBC # BLD AUTO: 4.69 10E6/UL (ref 3.8–5.2)
SODIUM SERPL-SCNC: 139 MMOL/L (ref 135–145)
TRIGL SERPL-MCNC: 197 MG/DL
TSH SERPL DL<=0.005 MIU/L-ACNC: 3.96 UIU/ML (ref 0.3–4.2)
WBC # BLD AUTO: 6.6 10E3/UL (ref 4–11)

## 2024-06-18 PROCEDURE — 83036 HEMOGLOBIN GLYCOSYLATED A1C: CPT | Performed by: FAMILY MEDICINE

## 2024-06-18 PROCEDURE — 84443 ASSAY THYROID STIM HORMONE: CPT | Performed by: FAMILY MEDICINE

## 2024-06-18 PROCEDURE — 80048 BASIC METABOLIC PNL TOTAL CA: CPT | Performed by: FAMILY MEDICINE

## 2024-06-18 PROCEDURE — 99214 OFFICE O/P EST MOD 30 MIN: CPT | Mod: 25 | Performed by: FAMILY MEDICINE

## 2024-06-18 PROCEDURE — 85027 COMPLETE CBC AUTOMATED: CPT | Performed by: FAMILY MEDICINE

## 2024-06-18 PROCEDURE — G0439 PPPS, SUBSEQ VISIT: HCPCS | Performed by: FAMILY MEDICINE

## 2024-06-18 PROCEDURE — 36415 COLL VENOUS BLD VENIPUNCTURE: CPT | Performed by: FAMILY MEDICINE

## 2024-06-18 PROCEDURE — 80061 LIPID PANEL: CPT | Performed by: FAMILY MEDICINE

## 2024-06-18 RX ORDER — ESCITALOPRAM OXALATE 5 MG/1
5 TABLET ORAL DAILY
Qty: 90 TABLET | Refills: 4 | Status: SHIPPED | OUTPATIENT
Start: 2024-06-18 | End: 2024-07-29

## 2024-06-18 RX ORDER — LEVOTHYROXINE SODIUM 50 UG/1
50 TABLET ORAL DAILY
Qty: 90 TABLET | Refills: 4 | Status: SHIPPED | OUTPATIENT
Start: 2024-06-18

## 2024-06-18 RX ORDER — RESPIRATORY SYNCYTIAL VIRUS VACCINE 120MCG/0.5
0.5 KIT INTRAMUSCULAR ONCE
Qty: 1 EACH | Refills: 0 | Status: CANCELLED | OUTPATIENT
Start: 2024-06-18 | End: 2024-06-18

## 2024-06-18 SDOH — HEALTH STABILITY: PHYSICAL HEALTH: ON AVERAGE, HOW MANY MINUTES DO YOU ENGAGE IN EXERCISE AT THIS LEVEL?: 20 MIN

## 2024-06-18 SDOH — HEALTH STABILITY: PHYSICAL HEALTH: ON AVERAGE, HOW MANY DAYS PER WEEK DO YOU ENGAGE IN MODERATE TO STRENUOUS EXERCISE (LIKE A BRISK WALK)?: 7 DAYS

## 2024-06-18 ASSESSMENT — PATIENT HEALTH QUESTIONNAIRE - PHQ9
10. IF YOU CHECKED OFF ANY PROBLEMS, HOW DIFFICULT HAVE THESE PROBLEMS MADE IT FOR YOU TO DO YOUR WORK, TAKE CARE OF THINGS AT HOME, OR GET ALONG WITH OTHER PEOPLE: NOT DIFFICULT AT ALL
SUM OF ALL RESPONSES TO PHQ QUESTIONS 1-9: 12
SUM OF ALL RESPONSES TO PHQ QUESTIONS 1-9: 12

## 2024-06-18 ASSESSMENT — SOCIAL DETERMINANTS OF HEALTH (SDOH): HOW OFTEN DO YOU GET TOGETHER WITH FRIENDS OR RELATIVES?: MORE THAN THREE TIMES A WEEK

## 2024-06-18 NOTE — PROGRESS NOTES
Preventive Care Visit  [facility]  Ava Meléndez MD, Family Medicine  Jun 18, 2024      Assessment & Plan     (Z00.00) Encounter for Medicare annual wellness exam  Comment: Discussed healthy eating   Discussed maintaining ideal weight   Discussed fall prevention   Discussed regular exercise .    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 (H)  Comment:   Plan: escitalopram (LEXAPRO) 5 MG tablet            (E03.9) Hypothyroidism, unspecified type  Comment:   Plan: levothyroxine (SYNTHROID/LEVOTHROID) 50 MCG         tablet            (Z13.220) Lipid screening  Comment:   Plan: Lipid panel reflex to direct LDL Fasting                Depression Screening Follow Up        Follow Up Actions Taken  Crisis resource information provided in After Visit Summary     Counseling  Appropriate preventive services were discussed with this patient, including applicable screening as appropriate for fall prevention, nutrition, physical activity, Tobacco-use cessation, weight loss and cognition.  Checklist reviewing preventive services available has been given to the patient.  Reviewed patient's diet, addressing concerns and/or questions.   She is at risk for psychosocial distress and has been provided with information to reduce risk.   The patient was provided with written information regarding signs of hearing loss.   Information on urinary incontinence and treatment options given to patient.   The patient's PHQ-9 score is consistent with moderate depression. She was provided with  information regarding depression.         Subjective   Mary is a 89 year old, presenting for the following:  Medicare Visit        6/18/2024     9:47 AM   Additional Questions   Roomed by DAPHNE Tinoco   Accompanied by Alberto - Son       Health Care Directive  Patient has a Health Care Directive on file  Advance care planning document is on file and is current.    HPI        6/18/2024   General Health   How would you rate your overall physical health? Excellent   Feel stress (tense, anxious, or unable to sleep) Only a little   (!) STRESS CONCERN      6/18/2024   Nutrition   Diet: Regular (no restrictions)         6/18/2024   Exercise   Days per week of moderate/strenous exercise 7 days   Average minutes spent exercising at this level 20 min         6/18/2024   Social Factors   Frequency of gathering with friends or relatives More than three times a week   Worry food won't last until get money to buy more No   Food not last or not have enough money for food? No   Do you have housing?  Yes   Are you worried about losing your housing? No   Lack of transportation? No   Unable to get utilities (heat,electricity)? No         6/18/2024   Fall Risk   Fallen 2 or more times in the past year? No   Trouble with walking or balance? No          6/18/2024   Activities of Daily Living- Home Safety   Needs help with the following daily activites None of the above   Safety concerns in the home None of the above         6/18/2024   Dental   Dentist two times every year? Yes         6/18/2024   Hearing Screening   Hearing concerns? (!) I NEED TO ASK PEOPLE TO SPEAK UP OR REPEAT THEMSELVES.    (!) IT'S HARDER TO UNDERSTAND WOMEN'S VOICES THAN MEN'S VOICES.    (!) IT'S HARD TO FOLLOW A CONVERSATION IN A NOISY RESTAURANT OR CROWDED ROOM.    (!) TROUBLE UNDESTANDING A SPEAKER IN A PUBLIC MEETING OR [employer] SERVICE.    (!) TROUBLE FOLLOWING DIALOGUE IN THE THEATHER.    (!) TROUBLE UNDERSTANDING SOFT OR WHISPERED SPEECH.          2024   Driving Risk Screening   Patient/family members have concerns about driving No         2024   General Alertness/Fatigue Screening   Have you been more tired than usual lately? No         2024   Urinary Incontinence Screening   Bothered by leaking urine in past 6 months Yes         2024   TB Screening   Were you born outside of the [place]? No       Today's PHQ-9 Score:       2024     9:33 AM   PHQ-9 SCORE   PHQ-9 Total Score MyChart 12 (Moderate depression)   PHQ-9 Total Score 12         2024   Substance Use   Alcohol more than 3/day or more than 7/wk Not Applicable   Do you have a current opioid prescription? No   How severe/bad is pain from 1 to 10? 0/10 (No Pain)   Do you use any other substances recreationally? No     Social History     Tobacco Use    Smoking status: Former     Current packs/day: 0.00     Types: Cigarettes     Quit date: 1960     Years since quittin.5    Smokeless tobacco: Never    Tobacco comments:        Vaping Use    Vaping status: Never Used   Substance Use Topics    Alcohol use: Yes     Comment: SOCIAL-2 drinks per month     Drug use: No          Mammogram Screening - Mammogram every 1-2 years updated in Health Maintenance based on mutual decision making          Reviewed and updated as needed this visit by Provider                    Past Medical History:   Diagnosis Date    Back pain     Thyroid disease      Past Surgical History:   Procedure Laterality Date    APPENDECTOMY      at age of 16    BACK SURGERY  2017    CATARACT IOL, RT/LT  2009    both    FORAMINOTOMY LUMBAR POSTERIOR ONE LEVEL Right 2017    Procedure: FORAMINOTOMY LUMBAR POSTERIOR ONE LEVEL;  Right L4-L5 hemilaminectomy and foraminotomy and excision of synovial cyst. ;  Surgeon: Fady Woo MD;  Location:  OR    [employer] SYSTEM CRANIOTOMY N/A 2022    Procedure: left frontal craniotomy for tumor removal;  Surgeon: Rasheed Rose MD;   Location: [facility] OR    TONSILLECTOMY  01/01/1954     Current providers sharing in care for this patient include:  Patient Care Team:  Ava Meléndez MD as PCP - General (Family Medicine)  Lance Engel MD as MD (Neurology)  Ava Meléndez MD as Assigned PCP  Adriana Mccann LICSW as Assigned Behavioral Health Provider    The following health maintenance items are reviewed in Epic and correct as of today:  Health Maintenance   Topic Date Due    DEPRESSION ACTION PLAN  Never done    ZOSTER IMMUNIZATION (1 of 2) Never done    RSV VACCINE (Pregnancy & 60+) (1 - 1-dose 60+ series) Never done    DTAP/TDAP/TD IMMUNIZATION (1 - Tdap) 09/02/2003    COVID-19 Vaccine (4 - 2023-24 season) 09/01/2023    ANNUAL REVIEW OF HM ORDERS  02/01/2024    DEXA  02/18/2024    MEDICARE ANNUAL WELLNESS VISIT  05/02/2024    INFLUENZA VACCINE (Season Ended) 09/01/2024    PHQ-9  12/18/2024    TSH W/FREE T4 REFLEX  05/14/2025    FALL RISK ASSESSMENT  06/18/2025    LIPID  05/02/2028    ADVANCE CARE PLANNING  05/02/2028    Pneumococcal Vaccine: 65+ Years  Completed    IPV IMMUNIZATION  Aged Out    HPV IMMUNIZATION  Aged Out    MENINGITIS IMMUNIZATION  Aged Out    RSV MONOCLONAL ANTIBODY  Aged Out    MAMMO SCREENING  Discontinued         Review of Systems  CONSTITUTIONAL: NEGATIVE for fever, chills, change in weight  INTEGUMENTARY/SKIN: NEGATIVE for worrisome rashes, moles or lesions  EYES: NEGATIVE for vision changes or irritation  ENT/MOUTH: NEGATIVE for ear, mouth and throat problems  RESP: NEGATIVE for significant cough or SOB  BREAST: NEGATIVE for masses, tenderness or discharge  CV: NEGATIVE for chest pain, palpitations or peripheral edema  GI: NEGATIVE for nausea, abdominal pain, heartburn, or change in bowel habits  : NEGATIVE for frequency, dysuria, or hematuria  MUSCULOSKELETAL: NEGATIVE for significant arthralgias or myalgia  NEURO: NEGATIVE for weakness, dizziness or paresthesias  ENDOCRINE: NEGATIVE for  "temperature intolerance, skin/hair changes  HEME: NEGATIVE for bleeding problems  PSYCHIATRIC: NEGATIVE for changes in mood or affect     Objective    Exam  /81 (BP Location: Right arm, Patient Position: Sitting, Cuff Size: Adult Regular)   Pulse 95   Temp 98.2  F (36.8  C) (Oral)   Resp 16   Ht 1.562 m (5' 1.5\")   Wt 59.7 kg (131 lb 9.6 oz)   LMP  (LMP Unknown)   SpO2 96%   Breastfeeding No   BMI 24.46 kg/m     Estimated body mass index is 24.46 kg/m  as calculated from the following:    Height as of this encounter: 1.562 m (5' 1.5\").    Weight as of this encounter: 59.7 kg (131 lb 9.6 oz).    Physical Exam  GENERAL: alert and no distress    NECK: no adenopathy, no asymmetry, masses, or scars  RESP: lungs clear to auscultation - no rales, rhonchi or wheezes  CV: regular rate and rhythm, normal S1 S2, no S3 or S4, no murmur, click or rub, no peripheral edema  ABDOMEN: soft, nontender, no hepatosplenomegaly, no masses and bowel sounds normal  MS: no gross musculoskeletal defects noted, no edema  SKIN: no suspicious lesions or rashes  NEURO: Normal strength and tone, mentation intact and speech normal  PSYCH: mentation appears normal, affect normal/bright        6/18/2024   Mini Cog   Clock Draw Score 2 Normal   3 Item Recall 3 objects recalled   Mini Cog Total Score 5              Signed Electronically by: Ava Meléndez MD    Answers submitted by the patient for this visit:  Patient Health Questionnaire (Submitted on 6/18/2024)  If you checked off any problems, how difficult have these problems made it for you to do your work, take care of things at home, or get along with other people?: Not difficult at all  PHQ9 TOTAL SCORE: 12    "

## 2024-07-02 ENCOUNTER — TRANSFERRED RECORDS (OUTPATIENT)
Dept: HEALTH INFORMATION MANAGEMENT | Facility: CLINIC | Age: 89
End: 2024-07-02
Payer: COMMERCIAL

## 2024-07-09 NOTE — PROGRESS NOTES
[facility]                                     Progress Note    Patient Name: Indy Reyes  Date: 11/16/22         Service Type: Individual      Session Start Time: 12:30pm  Session End Time: 1:20pm     Session Length: 50 minutes    Session #: 4    Attendees: Client    Service Modality:  Video Visit:      Provider verified identity through the following two step process.  Patient provided:  Patient is known previously to provider    Telemedicine Visit: The patient's condition can be safely assessed and treated via synchronous audio and visual telemedicine encounter.      Reason for Telemedicine Visit: Patient has requested telehealth visit, Patient convenience (e.g. access to timely appointments / distance to available provider) and Services only offered telehealth    Originating Site (Patient Location): Patient's home    Distant Site (Provider Location): Provider Remote Setting- Home Office    Consent:  The patient/guardian has verbally consented to: the potential risks and benefits of telemedicine (video visit) versus in person care; bill my insurance or make self-payment for services provided; and responsibility for payment of non-covered services.     Patient would like the video invitation sent by:  My Chart    Mode of Communication:  Video Conference via Amwell    Distant Location (Provider):  Off-site    As the provider I attest to compliance with applicable laws and regulations related to telemedicine.    DATA  Interactive Complexity: No  Crisis: No        Progress Since Last Session (Related to Symptoms / Goals / Homework):   Symptoms: No change Mary reports significant depression (sadness, crying, sleep disturbance) since our last session.  Grief is primary  of depression symptoms, recent loss of .  Passive SI, no plan or intent, desire to join late .    Homework: Achieved / completed to satisfaction      Episode of Care Goals: Satisfactory progress -  Called. No answer. LMOM   "CONTEMPLATION (Considering change and yet undecided); Intervened by assessing the negative and positive thinking (ambivalence) about behavior change     Current / Ongoing Stressors and Concerns:   Intense grief at loss of spouse in May 2022.  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  Client experiencing significant grief, losses and change.     Therapist met with client to review treatment interventions; provided updates to stressors since last session. Friend in building, time together and reminiscing, friend shared her grief. Support, encouragement to friend, walked with them, metaphor for grief support.  Patience - stop and think before you speak, kinder words, softer tone of voice.   was her \"eyes and mouthpiece\" due to macular degeneration.  Service to others, accepting help from others.  \"Keeping him alive in her heart and in her mind\".  Experience with holding spouse in heart when younger and living apart.  Softening of grief over time.  Therapist provided active, empathic and reflective listening, validation and processing support to client in session.  Client was engaged in therapeutic content and open to feedback.       Treatment Objective(s) Addressed in This Session:   Increase interest, engagement, and pleasure in doing things  Decrease frequency and intensity of feeling down, depressed, hopeless  Improve quantity and quality of night time sleep / decrease daytime naps  Decrease thoughts that you'd be better off dead or of suicide / self-harm       Intervention:   Motivational Interviewing    MI Intervention: Expressed Empathy/Understanding, Supported Autonomy, Collaboration, Evocation, Open-ended questions, Reflections: simple and complex, Change talk (evoked) and Reframe     Change Talk Expressed by the Patient: Desire to change Ability to change Reasons to change Taking steps    Provider Response " to Change Talk: E - Evoked more info from patient about behavior change, A - Affirmed patient's thoughts, decisions, or attempts at behavior change and R - Reflected patient's change talk      Assessments completed prior to visit:  The following assessments were completed by patient for this visit:  PHQ2:   PHQ-2 ( 1999 [employer]) 11/16/2022 11/9/2022 11/3/2022 4/18/2022 2/18/2022 11/4/2021 10/14/2021   Q1: Little interest or pleasure in doing things 1 1 1 3 1 0 0   Q2: Feeling down, depressed or hopeless 3 3 2 3 1 0 0   PHQ-2 Score 4 4 3 6 2 0 0   PHQ-2 Total Score (12-17 Years)- Positive if 3 or more points; Administer PHQ-A if positive - - - - - 0 0   Q1: Little interest or pleasure in doing things Several days Several days Several days Nearly every day Several days - Not at all   Q2: Feeling down, depressed or hopeless Nearly every day Nearly every day More than half the days Nearly every day Several days - Not at all   PHQ-2 Score 4 4 3 6 2 - 0     PHQ9:   PHQ-9 SCORE 5/30/2014 4/18/2022 5/27/2022 10/17/2022 11/3/2022 11/9/2022 11/16/2022   PHQ-9 Total Score 1 - - - - - -   PHQ-9 Total Score MyChart - 20 (Severe depression) 3 (Minimal depression) 12 (Moderate depression) 12 (Moderate depression) 11 (Moderate depression) 16 (Moderately severe depression)   PHQ-9 Total Score - 20 3 12 12 11 16     GAD2:   KAYLAN-2 11/3/2022 11/3/2022 11/16/2022   Feeling nervous, anxious, or on edge 1 1 0   Not being able to stop or control worrying 0 0 0   KAYLAN-2 Total Score 1 1 0     GAD7:   KAYLAN-7 SCORE 5/30/2014 10/17/2022   Total Score 1 -   Total Score - 8 (mild anxiety)   Total Score - 8     PROMIS 10-Global Health (only subscores and total score):   PROMIS-10 Scores Only 5/11/2017 7/24/2017 11/3/2022 11/3/2022 11/16/2022   Global Mental Health Score - 18 15 15 11   Global Physical Health Score - 19 18 18 18   PROMIS TOTAL - SUBSCORES - 37 33 33 29   Global Physical Health Score : Raw Score 18 (SUM : G03 - G06 - G07 - G08) 19 (SUM  : G03 - G06 - G07 - G08) - - -   Global Mental Health Score : Raw Score 20 (SUM : G02 - G04 - G05 - G10) 18 (SUM : G02 - G04 - G05 - G10) - - -   Total (Physical + Mental Health Score) 38 37 - - -         ASSESSMENT: Current Emotional / Mental Status (status of significant symptoms):   Risk status (Self / Other harm or suicidal ideation)   Patient denies current fears or concerns for personal safety.   Patient reports the following current or recent suicidal ideation or behaviors: passive thoughts of joining her late .  No plan or intent.. Family is grounding and supportive.   Patient denies current or recent homicidal ideation or behaviors.   Patient denies current or recent self injurious behavior or ideation.   Patient denies other safety concerns.   Patient reports there has been no change in risk factors since their last session.     Patient reports there has been no change in protective factors since their last session.     Recommended that patient call 911 or go to the local ED should there be a change in any of these risk factors.     Appearance:   Appropriate    Eye Contact:   Good    Psychomotor Behavior: Normal    Attitude:   Cooperative  Interested Pleasant Attentive Rodrigue   Orientation:   All   Speech    Rate / Production: Normal/ Responsive Emotional    Volume:  Normal    Mood:    Depressed  Sad  Grieving   Affect:    Appropriate  Labile  Tearful   Thought Content:  Clear  Rumination    Thought Form:  Coherent  Logical    Insight:    Fair      Medication Review:   No current psychiatric medications prescribed     Medication Compliance:   Yes     Changes in Health Issues:   None reported     Chemical Use Review:   Substance Use: Chemical use reviewed, no active concerns identified      Tobacco Use: No current tobacco use.      Diagnosis:  1. Adjustment disorder with mixed anxiety and depressed mood        Collateral Reports Completed:   Not Applicable    PLAN: (Patient Tasks / Therapist Tasks /  Other)  1.  Continue to accept love and support from family and friends.        Adriana HooksLondonel, [facility] 2022                                                         ______________________________________________________________________    Individual Treatment Plan    Patient's Name: Indy Reyes  YOB: 1931    Date of Creation: 22  Date Treatment Plan Last Reviewed/Revised: 22    DSM5 Diagnoses: Adjustment Disorders  309.28 (F43.23) With mixed anxiety and depressed mood  Psychosocial / Contextual Factors: Mary experienced significant health decline in past year (cognitive decline, loss of speech and functioning) which was a brain tumor.  Spouse  suddenly in May 2022.  Brain surgery in 2022.  PROMIS (reviewed every 90 days): 33 on 11/3/22    Referral / Collaboration:  Referral to another professional/service is not indicated at this time..    Anticipated number of session for this episode of care: 9-12 sessions  Anticipation frequency of session: Every other week  Anticipated Duration of each session: 38-52 minutes  Treatment plan will be reviewed in 90 days or when goals have been changed.       MeasurableTreatment Goal(s) related to diagnosis / functional impairment(s)  Goal 1: Patient will increase understanding     I will know I've met my goal when my heart stops aching and when I can go to bed and wake up without crying, get out of this apartment.      Objective #A (Patient Action)    Patient will increase understanding of steps in the grief process.  Status: New - Date: 22     Intervention(s)  Therapist will provide education regarding grief, support groups, strategies to increase emotional experiencing to move through grief process, finding meaning after loss    Objective #B  Patient will Increase interest, engagement, and pleasure in doing things  Decrease frequency and intensity of feeling down, depressed, hopeless  Improve quantity and quality of  night time sleep / decrease daytime naps.  Status: New - Date: 11/9/22     Intervention(s)  Therapist will assign homework to practice skills and discuss efficacy in session  provide educational materials on grief, mourning, depression, CBT framework, mindfulness.    Patient has reviewed and agreed to the above plan.      Adriana Mccann, ADEN  November 9, 2022

## 2024-07-29 ENCOUNTER — MYC REFILL (OUTPATIENT)
Dept: FAMILY MEDICINE | Facility: CLINIC | Age: 89
End: 2024-07-29
Payer: COMMERCIAL

## 2024-07-29 ENCOUNTER — MYC MEDICAL ADVICE (OUTPATIENT)
Dept: FAMILY MEDICINE | Facility: CLINIC | Age: 89
End: 2024-07-29
Payer: COMMERCIAL

## 2024-07-29 DIAGNOSIS — F33.1 MODERATE EPISODE OF RECURRENT MAJOR DEPRESSIVE DISORDER (H): ICD-10-CM

## 2024-07-30 RX ORDER — ESCITALOPRAM OXALATE 5 MG/1
5 TABLET ORAL DAILY
Qty: 90 TABLET | Refills: 3 | Status: SHIPPED | OUTPATIENT
Start: 2024-07-30

## 2024-08-01 ENCOUNTER — TELEPHONE (OUTPATIENT)
Dept: PALLIATIVE MEDICINE | Facility: CLINIC | Age: 89
End: 2024-08-01
Payer: COMMERCIAL

## 2024-08-01 DIAGNOSIS — M54.16 LUMBAR RADICULOPATHY: Primary | ICD-10-CM

## 2024-08-01 NOTE — TELEPHONE ENCOUNTER
Reason for call:  Other   Patient called regarding (reason for call): appointment and call back  Additional comments: Patient's son is calling back to request to have another order for the pt to repeat the same injection she had back in February of this year. Please review and placed a new order if appropriate and call her son for any further questions regarding this request.     Phone number to reach patient:  Home number on file [phone] (home)     Best Time: Any     Can we leave a detailed message on this number?  YES    Stephanie Perkins      [facility]  Pain Management

## 2024-08-02 NOTE — TELEPHONE ENCOUNTER
Called and spoke to son Nasir. States that previous injections helpful. Pain has begun to return.   Mid low back pain, no radiation into legs. Reports this is not a change to her pain. Would like repeat caudal.   Advised that would route for review.     Yarely MARCANO, RN Care Coordinator  [facility]  Pain Management

## 2024-08-02 NOTE — TELEPHONE ENCOUNTER
Please call marilin Dominguez for scheduling.     Caudal CANDELARIA with Dr Li.     Yarely MADSENN, RN Care Coordinator  [facility]  Pain [facility]

## 2024-08-06 NOTE — TELEPHONE ENCOUNTER
"Gamaliel [phone] call son        Screening Questions for Radiology Injections:    Injection to be done at which interventional clinic site? [facility]    If choosing [facility] for location, please inform patient:  \"[facility] is a Hospital based clinic. Before your visit, you should check with your insurance about how it covers the charges for facility services in a hospital-based clinic.     Procedure ordered by Buron    Procedure ordered? Caudal CANDELARIA   Transforaminal Cervical CANDELARIA - [facility] - No [facility] Site providers perform this procedure    What insurance would patient like us to bill for this procedure? BC  IF SCHEDULING IN [place] PAIN OR SPINE PLEASE SCHEDULE AT LEAST 7-10 BUSINESS DAYS OUT SO A PA CAN BE OBTAINED  Worker's comp or MVA (motor vehicle accident) -Any injection DO NOT SCHEDULE and route to Kevin Rosado.    [employer] insurance - For SI joint injections, DO NOT SCHEDULE and route to Marybeth Mata.     ALL BCBS, [employer] and [employer] CIGNA - DO NOT SCHEDULE and route to Marybeth Mata  MEDICA- ALL INJECTIONS- route to Marybeth Mata    Is patient scheduled at [facility]? no   If YES, route every encounter to [facility] SPINE CENTER CARE NAVIGATION POOL [[phone]]    Is an  needed? No     Patient has a  home? (Review Grid) YES: ok    Any chance of pregnancy? NO   If YES, do NOT schedule and route to RN pool  - Dr. Ewing route to Oralia Mckeon and PM&R Nurse  [[id number]]      Is patient actively being treated for cancer or immunocompromised? No  If YES, do NOT schedule and route to RN pool/ Dr. Ewing's Team    Does the patient have a bleeding or clotting disorder? No   If YES, okay to schedule AND route to RN nurse / Dr. Ewing's Team   (For any patients with platelet count <100, RN must forward to provider)    Is patient taking any Blood Thinners OR Antiplatelet medication?  No   If hold needed, do NOT schedule, route to RN pool/ " Dr. Ewing's Team  Examples:   Blood Thinners: (Coumadin, Warfarin, Jantoven, Pradaxa, Xarelto, Eliquis, Edoxaban, Enoxaparin, Lovenox, Heparin, Arixtra, Fondaparinux or Fragmin)  Antiplatelet Medications: (Plavix, Brilinta or Effient)     Is patient taking any aspirin products (includes Excedrin and Fiorinal)? No   If yes route to RN pool/ Dr. Ewing's Team - Do not schedule    Is patient taking any GLP-1 Antagonist (hold needed for sedation patients only) No   (semaglutide (Ozempic, Wegovy), dulaglutide (Trulicity), exenatide ER (Bydureon), tirzepatide (Mounjaro), Liraglutide (Saxenda, Victoza), semaglutide (Rybelsus), Terzepatide (Zepbound)  If YES, okay to schedule AND route to RN nurse / Dr. Ewing's Team      Any allergies to contrast dye, iodine, shellfish, or numbing and steroid medications? No  If YES, schedule and add allergy information to appointment notes AND route to the RN pool/ Dr. Ewing's Team  If CANDELARIA and Contrast Dye / Iodine Allergy? DO NOT SCHEDULE, route to RN pool/ Dr. Ewing's Team  Allergies: Tetracycline     Does patient have an active infection or treated for one within the past week? No  Is patient currently taking any antibiotics or steroid medications?  No   For patients on chronic, preventative, or prophylactic antibiotics, procedures may be scheduled.   For patients on antibiotics for active or recent infection, schedule 4 days after completed.  For patients on steroid medications, schedule 4 days after completed.     Has the patient had a flu shot or any other vaccinations within the past 7 days? No  If yes, explain that for the vaccine to work best they need to:     wait 1 week before and 1 week after getting any Vaccine  wait 1 week before and 2 weeks after getting any Covid Vaccine   If patient has concerns about the timing, send to RN pool/ Dr. Ewing's Team    Does patient have an MRI/CT?  Not Applicable Include Date and Check Procedure Scheduling Grid to see if required.  Was  the MRI/CT done within the last 3 years?  Yes   If no route to RN Pool/ Dr. Ewing's Team  If yes, where was the MRI/CT done?    Refer to PACS Transmissions list for approved external locations and route to RN Pool High Priority/ Dr. Trotters Team  If MRI was not done at approved external location do NOT schedule and route to RN pool/ Dr. Ewing's Team    If patient has an imaging disc, the injection MAY be scheduled but patient must bring disc to appt or appt will be cancelled.    Is patient able to transfer to a procedure table with minimal or no assistance? Yes   If no, do NOT schedule and route to RN Pool/ Dr. Ewing's Team    Procedure Specific Instructions:  If celiac plexus block, informed patient NPO for 6 hours and that it is okay to take medications with sips of water, especially blood pressure medications Not Applicable       If this is for a cervical procedure, informed patient that aspirin needs to be held for 6 days.   Not Applicable    Sedation, If Sedation is ordered for any procedure, patient must be NPO for 6 hours prior to procedure Not Applicable    If IV needed:  Do not schedule procedures requiring IV placement in the first appointment of the day or first appointment after lunch. Do NOT schedule at 0745, 0815 or 1245. ok  Instructed patient to arrive 30 minutes early for IV start if required. (Check Procedure Scheduling Grid)  Not Applicable    Reminders:  If you are started on any steroids or antibiotics between now and your appointment, you must contact us because the procedure may need to be cancelled.  Yes    As a reminder, receiving steroids can decrease your body's ability to fight infection.   Would you still like to move forward with scheduling the injection?  Yes    IV Sedation is not provided for procedures. If oral anti-anxiety medication is needed, the patient should request this from their referring provider.    Instruct patient to arrive as directed prior to the scheduled  appointment time:  If IV needed 30 minutes before appointment time     For patients 85 or older we recommend having an adult stay w/ them for the remainder of the day.     If the patient is Diabetic, remind them to bring their glucometer.      Does the patient have any questions?  NO  Keila Rosado  [facility]

## 2024-08-06 NOTE — TELEPHONE ENCOUNTER
SHA to schedule repeat CAUDAL CANDELARIA           Kevin Rosado  Complex   [facility]  Pain Management

## 2024-08-07 NOTE — TELEPHONE ENCOUNTER
Routing for review, pt appears to meet ins criteria       Criteria:  A repeat interlaminar, caudal, or transforaminal epidural steroid injection (CANDELARIA) is considered medically necessary when ALL of the following criteria are met:    Date of last service: 2/21/2024  Procedure performed/requesting to repeat: Caudal epidural steroid injection with fluoroscopic guidance    It has been at least fourteen (14) days since the prior epidural steroid injection (CANDELARIA): Date of last service: 2/21/2024 (met)    There has been 50% or greater relief of radicular pain for two (2) or more weeks duration: Yes, states ? Than 3 months of 50% pain relief (met)      And ONE of the following additional criteria are met:    Increase in the level of function/physical activity (e.g., return to work) Back to walking routine after, able to return to participation in exercise classes at [facility] (met)  Reduction in the use of pain medication and/or additional medical services such as physical therapy/chiropractic care     Advanced diagnostic imaging within 24 months is required for cervical/thoracic interlaminar and transforaminal epidural steroid injections: N/a (caudal)      Lisa REDDY RN Care Coordinator  [facility]

## 2024-08-07 NOTE — TELEPHONE ENCOUNTER
Epidural Steroid Injections (CANDELARIA)Effective July 1, 2023  Indications: Epidural Steroid Injections (Interlaminar, Caudal, or Transforaminal)    Indications    Repeat Interlaminar, Caudal, or Transforaminal Epidural Steroid Injection (CANDELARIA)  A repeat interlaminar, caudal, or transforaminal epidural steroid injection (CANDELARIA) is considered medically necessary when ALL of the following criteria are met:  It has been at least fourteen (14) days since the prior epidural steroid injection (CANDELARIA)  There has been 50% or greater relief of radicular pain for two (2) or more weeks duration and ONE of the following additional criteria are met:  Increase in the level of function/physical activity (e.g., return to work)  Reduction in the use of pain medication and/or additional medical services such as physical therapy/chiropractic care  Advanced diagnostic imaging within 24 months is required for cervical/thoracic interlaminar and transforaminal epidural steroid injections  Frequency & Number of Injections/Procedures  When criteria in the Indications section is met, up to a total of three (3) sessions of epidural steroid injections (IESIs and/or TFESIs) per episode of pain, per region may be performed in six (6) months, not to exceed four (4) sessions of epidural steroid injections (IESIs and/or TFESIs) per region (cervical, thoracic, lumbar) is permitted in a rolling 12 months.  There is insufficient scientific evidence to support the scheduling of a  series-of-three  injections in either a diagnostic or therapeutic approach. The medical necessity of subsequent injections should be evaluated individually and be based on the response of the individual to the previous injection with regard to clinically relevant sustained reductions in pain, decreased need for medication, and improvement in the individual's functional abilities.  When criteria in the Indications section is met, only one invasive modality or procedure will be performed  on the same date of service. The following is the ONLY exception allowed:  When criteria in the Indications section is met, an exception is allowed for a transforaminal epidural steroid injection (TFESI) that is performed with a synovial cyst aspiration on the same date of service.                Routing to review and advise       Marybeth Waddell   [facility]

## 2024-08-09 NOTE — TELEPHONE ENCOUNTER
Authorization Number: [id number]  Case Number: [id number]  Review Date: 8/9/2024 10:29:29 AM  Expiration Date: 10/8/2024  Status: Your case has been Approved    OKAY TO SCHEDULE CAUDAL CANDELARIA WITH DR. ASHLY GAMBLE  Complex   [facility]

## 2024-08-13 NOTE — TELEPHONE ENCOUNTER
[facility] 8/28    Lisa REDDY RN Care Coordinator  [facility]

## 2024-08-28 ENCOUNTER — RADIOLOGY INJECTION OFFICE VISIT (OUTPATIENT)
Dept: PALLIATIVE MEDICINE | Facility: CLINIC | Age: 89
End: 2024-08-28
Attending: ANESTHESIOLOGY
Payer: COMMERCIAL

## 2024-08-28 VITALS — HEART RATE: 86 BPM | DIASTOLIC BLOOD PRESSURE: 85 MMHG | SYSTOLIC BLOOD PRESSURE: 177 MMHG | OXYGEN SATURATION: 97 %

## 2024-08-28 DIAGNOSIS — M54.16 LUMBAR RADICULOPATHY: Primary | ICD-10-CM

## 2024-08-28 PROCEDURE — 62323 NJX INTERLAMINAR LMBR/SAC: CPT | Performed by: ANESTHESIOLOGY

## 2024-08-28 RX ORDER — TRIAMCINOLONE ACETONIDE 40 MG/ML
40 INJECTION, SUSPENSION INTRA-ARTICULAR; INTRAMUSCULAR ONCE
Status: COMPLETED | OUTPATIENT
Start: 2024-08-28 | End: 2024-08-28

## 2024-08-28 RX ADMIN — TRIAMCINOLONE ACETONIDE 40 MG: 40 INJECTION, SUSPENSION INTRA-ARTICULAR; INTRAMUSCULAR at 13:45

## 2024-08-28 NOTE — PATIENT INSTRUCTIONS
[facility] Procedure Discharge Instructions    Today you saw:   Dr. Shagufta Li     Your procedure:  Epidural steroid injection      Medications used:  Lidocaine (anesthetic)  Kenalog (steroid)  Omnipaque (contrast)   Saline       Be cautious when walking as numbness and/or weakness in the legs may occur up to 6-8 hours after the procedure due to effect of the local anesthetic  Do not drive for 6 hours. The effect of the local anesthetic could slow your reflexes.   Avoid strenuous activity for the first 24 hours. You may resume your regular activities after that.   You may shower, however avoid swimming, tub baths or hot tubs for 24 hours following your procedure  You may have a mild to moderate increase in pain for several days following the injection.    You may use ice packs for 10-15 minutes, 3 to 4 times a day at the injection site for comfort  Do not use heat to painful areas for 6 to 8 hours. This will give the local anesthetic time to wear off and prevent you from accidentally burning your skin.  Unless you have been directed to avoid the use of anti-inflammatory medications (NSAIDS-ibuprofen, Aleve, Motrin), you may use these medications or Tylenol for pain control if needed.   Possible side effects of steroids that you may experience include flushing, elevated blood pressure, increased appetite, mild headaches and restlessness.  All of these symptoms will get better with time.  It may take up to 14 days for the steroid medication to start working although you may feel the effect as early as a few days after the procedure.   Follow up as needed for repeat injections    If you experience any of the following, call the pain center line during work hours at [phone] or on-call physician after hours at [phone]:  -Fever over 100 degree F  -Swelling, bleeding, redness, drainage, warmth at the injection site  -Progressive weakness or numbness in your legs or arms  -Loss of bowel or  bladder function  -Unusual headache that is not relieved by Tylenol or your regular headache medication  -Unusual new onset of pain that is not improving

## 2024-08-28 NOTE — NURSING NOTE
Discharge Information    IV Discontiued Time:  NA    Amount of Fluid Infused:  NA    Discharge Criteria = When patient returns to baseline or as per MD order    Consciousness:  Pt is fully awake    Circulation:  BP +/- 20% of pre-procedure level    Respiration:  Patient is able to breathe deeply    O2 Sat:  Patient is able to maintain O2 Sat >92% on room air    Activity:  Moves 4 extremities on command    Ambulation:  Patient is able to stand and walk or stand and pivot into wheelchair    Dressing:  Clean/dry or No Dressing    Notes:   Discharge instructions and AVS given to patient    Patient meets criteria for discharge?  YES    Admitted to PCU?  No    Responsible adult present to accompany patient home?  Yes    Signature/Title:    Lisa Enrique RN  RN Care Coordinator  [facility] [place]

## 2024-08-28 NOTE — PROGRESS NOTES
[facility] - Procedure Note    Date of Service: 8/28/2024    Procedure performed: Caudal epidural steroid injection with fluoroscopic guidance  Diagnosis: Lumbar spondylosis; Lumbar radiculitis/radiculopathy  : Shagufta Li MD  Anesthesia: none    Indications: Indy Reyes is a 89 year old female who is seen at the request of myself (interventional consult)  for a caudal epidural steroid injection. The patient describes central low back (centered over her tailbone) pain without radiation to the legs. The patient has been exhibiting symptoms consistent with lumbar intraspinal inflammation and radiculopathy. Symptoms have been persistent, disabling, and intermittently severe. The patient reports minimal improvement with conservative treatment, including PT and medications.    This is a repeat injection.  Previous CAUDAL CANDELARIA done by myself on 2/21/2024, 5/5/2023 provided good pain relief for a period of 6 months.     S/P L4-5 spine surgery 4/28/2017 Dr. Woo    LUMBAR MRI was done on 10/5/2018 which showed                                                                  IMPRESSION:   1. Interval surgical changes at L4-L5 with improvement of what is now  moderate central canal stenosis with mild to moderate left and mild  right neural foraminal stenosis.   2. No other change is demonstrated. There are degenerative changes  elsewhere with no disc herniation demonstrated. There is moderate  bilateral neural foraminal stenosis at L1-L2 with less extensive  stenosis elsewhere as described above.    Allergies:      Allergies   Allergen Reactions    Tetracycline Rash        Vitals:  BP (!) 167/86 (BP Location: Left arm, Patient Position: Chair, Cuff Size: Adult Regular)   Pulse 90   LMP  (LMP Unknown)   SpO2 97%     Review of Systems: The patient denies recent fever, chills, illness, use of antibiotics or anticoagulants. All other 10-point review of systems negative.        Procedure: The procedure and risks were explained, and informed written consent was obtained from the patient. Risks include but are not limited to: infection, bleeding, increased pain, and damage to soft tissue, nerve, muscle, and vasculature structures. After getting informed consent, patient was brought into the procedure suite and was placed in a prone position on the procedure table. A Pause for the Cause was performed. Patient was prepped and draped in sterile fashion.     The sacral hiatus and cornua were palpated.  Under lateral fluoroscopic guidance sacral hiatus was identified.  A total of 4.5 mL of Lidocaine 1% with 0.5 mL 8.4% sodium bicarbonate was used to anesthetize the skin and the needle track at a skin entry site.  A 22 gauge 5 inch spinal needle was advanced through the sacral hiatus using intermittent fluoroscopy. The needle angle was decreased to allow entrance into the sacral canal and epidural space.  This was verified in both the AP and lateral view for correct alignment.       The position was then inspected from anteroposterior and lateral views, and the needle adjusted appropriately.  After negative aspiration for heme and CSF, a total of 1 mL of Omnipaque-300 was injected using static and continuous fluoroscopy confirming appropriate position, into the epidural space, with no intravascular or intrathecal uptake. 0 mL of Omnipaque-300 was wasted.    2 mL of 1% lidocaine, 3 mL of preservative free saline, with 80 mg of triamcinolone was injected.  The needle was removed. Hemostasis was achieved, the area was cleaned, and bandaids were placed when appropriate. Images were saved to PACS.    The patient tolerated the procedure well, and was taken to the recovery room, and there was no evidence of procedural complications. No new sensory or motor deficits were noted following the procedure. The patient was stable and able to ambulate on discharge home. Post-procedure instructions were  provided.     Pre-procedure pain score:  6/10 in the back, 6/10 in the leg  Post-procedure pain score:  0/10 in the back, 0/10 in the leg    Assessment/Plan: Indy Reyes is a 89 year old female s/p caudal epidural steroid injection today for lumbar spondylosis, radiculitis/radiculopathy.     1. Following today's procedure, the patient was advised to contact the Pain Management Center for any of the following:   Fever, chills, or night sweats   New onset of pain, numbness, or weakness   Any questions/concerns regarding the procedure  If unable to contact the Pain Center, the patient was instructed to go to a local Emergency Room for any complications.   2. The patient should follow-up with the referring provider in 2 weeks for post-procedure evaluation.    TAI LIMON MD   Pain Management

## 2024-08-28 NOTE — NURSING NOTE
Pre-procedure Intake    If YES to any questions or NO to having a   Please complete laminated checklist and leave on the computer keyboard for Provider, verbally inform provider if able.    For SCS Trial, RFA's or any sedation procedure: Have you been fasting? NA  If yes, for how long?    NA     For any sedation procedure:  Do you take any GLP-1 Antagonist? NO   If yes, when did you take your last dose? N/A    Are you taking any any blood thinners such as Coumadin, Warfarin, Jantoven, Pradaxa Xarelto, Eliquis, Edoxaban, Enoxaparin, Lovenox, Heparin, Arixtra, Fondaparinux, or Fragmin? OR Antiplatelet medication such as Plavix, Brilinta, or Effient? N/A  If yes, when did you take your last dose?   NA     Do you take aspirin?   NO  If cervical procedure, have you held aspirin for 6 days?   NA    Do you have any allergies to contrast dye, iodine, steroid and/or numbing medications?  NO     Are you currently taking antibiotics or have an active infection?  NO     Have you had a fever/elevated temperature within the past week? NO     Are you currently taking oral steroids? NO     Do you have a ? Yes     Are you pregnant or breastfeeding? No    Have you received any vaccines in the past 2 weeks? NO     Notify provider and RNs if systolic BP >170, diastolic BP >100, P >100 or O2 sats < 90%

## 2024-11-04 ENCOUNTER — TELEPHONE (OUTPATIENT)
Dept: PALLIATIVE MEDICINE | Facility: CLINIC | Age: 89
End: 2024-11-04
Payer: COMMERCIAL

## 2024-11-04 DIAGNOSIS — M54.16 LUMBAR RADICULOPATHY: Primary | ICD-10-CM

## 2024-11-04 NOTE — TELEPHONE ENCOUNTER
[facility] Call Center    Phone Message    May a detailed message be left on voicemail: yes     Reason for Call: Other: Son calling stating they will be leaving to go to [place] the first of the year.  He is requesting to have her get another injection before they leave.  Please call him back to advise.      Action Taken: Other: [facility]    Travel Screening: Not Applicable     Date of Service:

## 2024-11-05 ENCOUNTER — TRANSFERRED RECORDS (OUTPATIENT)
Dept: HEALTH INFORMATION MANAGEMENT | Facility: CLINIC | Age: 89
End: 2024-11-05
Payer: COMMERCIAL

## 2024-11-12 NOTE — TELEPHONE ENCOUNTER
Called Alberto, Pain is the same location/ symptoms. Previous injections helpful but wearing off. Pain is in the mid low back pain, no radiation into legs.Would like repeat caudal.   Advised that would route for review.     Yarely MARCANO, RN Care Coordinator  [facility]  Pain Management

## 2024-11-13 ENCOUNTER — TELEPHONE (OUTPATIENT)
Dept: PALLIATIVE MEDICINE | Facility: CLINIC | Age: 89
End: 2024-11-13
Payer: COMMERCIAL

## 2024-11-13 NOTE — TELEPHONE ENCOUNTER
"Screening Questions for Radiology Injections:    Injection to be done at which interventional clinic site? [facility]    If choosing [facility] for location, please inform patient:  \"[facility] is a Hospital based clinic. Before your visit, you should check with your insurance about how it covers the charges for facility services in a hospital-based clinic.     Procedure ordered by Li    Procedure ordered? Caudal   Transforaminal Cervical CANDELARIA - Send to [facility]) - No [facility] Site providers perform this procedure    What insurance would patient like us to bill for this procedure? BC/Medicare  IF SCHEDULING IN [facility] OR [facility] PLEASE SCHEDULE AT LEAST 7-10 BUSINESS DAYS OUT SO A PA CAN BE OBTAINED  Worker's comp or MVA (motor vehicle accident) -Any injection DO NOT SCHEDULE and route to Kevin Rosado.    [employer] insurance - For ALL INJECTIONS DO NOT SCHEDULE and route to Marybeth Mata.     ALL BCBS, [employer] and [employer] CIGNA - DO NOT SCHEDULE and route to Marybeth Traorey  MEDICA- ALL INJECTIONS- route to Marybeth Mata    Is patient scheduled at [facility]? no   If YES, route every encounter to [facility] CENTER CARE NAVIGATION POOL [[phone]]    Is an  needed? No     Patient has a  home? (Review Grid) YES: ok    Any chance of pregnancy? NO   If YES, do NOT schedule and route to RN pool  - Dr. Ewing route to PM&R Nurse  [[id number]]      Is patient actively being treated for cancer or immunocompromised? No  If YES, do NOT schedule and route to RN pool/ Dr. Ewing's Team    Does the patient have a bleeding or clotting disorder? No   If YES, okay to schedule AND route to RN nurse / Dr. Ewing's Team   (For any patients with platelet count <100, RN must forward to provider)    Is patient taking any Blood Thinners OR Antiplatelet medication?  No   If hold needed, do NOT schedule, route to RN pool/ Dr. Ewing's Team  Examples:   Blood Thinners: (Coumadin, " Warfarin, Jantoven, Pradaxa, Xarelto, Eliquis, Edoxaban, Enoxaparin, Lovenox, Heparin, Arixtra, Fondaparinux or Fragmin)  Antiplatelet Medications: (Plavix, Brilinta or Effient)     Is patient taking any aspirin products (includes Excedrin and Fiorinal)? No   If yes route to RN pool/ Dr. Ewing's Team - Do not schedule    Is patient taking any GLP-1 Antagonist (hold needed for sedation patients only) No   (semaglutide (Ozempic, Wegovy), dulaglutide (Trulicity), exenatide ER (Bydureon), tirzepatide (Mounjaro), Liraglutide (Saxenda, Victoza), semaglutide (Rybelsus), Terzepatide (Zepbound)  If YES, okay to schedule AND route to RN nurse / Dr. Ewing's Team      Any allergies to contrast dye, iodine, shellfish, or numbing and steroid medications? No  If YES, schedule and add allergy information to appointment notes AND route to the RN pool/ Dr. Ewing's Team  If CANDELARIA and Contrast Dye / Iodine Allergy? DO NOT SCHEDULE, route to RN pool/ Dr. Ewing's Team  Allergies: Tetracycline     Does patient have an active infection or treated for one within the past week? No  Is patient currently taking any antibiotics or steroid medications?  No   For patients on chronic, preventative, or prophylactic antibiotics, procedures may be scheduled.   For patients on antibiotics for active or recent infection, schedule 4 days after completed.  For patients on steroid medications, schedule 4 days after completed.     Has the patient had a flu shot or any other vaccinations within the past 7 days? No  If yes, explain that for the vaccine to work best they need to:     wait 1 week before and 1 week after getting any Vaccine  wait 1 week before and 2 weeks after getting any Covid Vaccine   If patient has concerns about the timing, send to RN pool/ Dr. Ewing's Team    Does patient have an MRI/CT?  YES: mri Include Date and Check Procedure Scheduling Grid to see if required.  Was the MRI/CT done within the last 3 years?  Yes   If no route to RN  [place]/ Dr. Ewing's Team  If yes, where was the MRI/CT done? [facility]   Refer to PACS Transmissions list for approved external locations and route to RN Pool High Priority/ Dr. Trotters Team  If MRI was not done at approved external location do NOT schedule and route to RN pool/ Dr. Trotters Team    If patient has an imaging disc, the injection MAY be scheduled but patient must bring disc to appt or appt will be cancelled.    Is patient able to transfer to a procedure table with minimal or no assistance? Yes   If no, do NOT schedule and route to RN Pool/ Dr. Ewing's Team    Procedure Specific Instructions:  If celiac plexus block, informed patient NPO for 6 hours and that it is okay to take medications with sips of water, especially blood pressure medications Not Applicable       If this is for a cervical procedure, informed patient that aspirin needs to be held for 6 days.   Not Applicable    Sedation, If Sedation is ordered for any procedure, patient must be NPO for 6 hours prior to procedure Not Applicable    If IV needed:  Do not schedule procedures requiring IV placement in the first appointment of the day or first appointment after lunch. Do NOT schedule at 0745, 0815 or 1245.   Instructed patient to arrive 30 minutes early for IV start if required. (Check Procedure Scheduling Grid)  Not Applicable    Reminders:  If you are started on any steroids or antibiotics between now and your appointment, you must contact us because the procedure may need to be cancelled.  Yes    As a reminder, receiving steroids can decrease your body's ability to fight infection.   Would you still like to move forward with scheduling the injection?  Yes    IV Sedation is not provided for procedures. If oral anti-anxiety medication is needed, the patient should request this from their referring provider.    Instruct patient to arrive as directed prior to the scheduled appointment time:  If IV needed 30 minutes before appointment  time     For patients 85 or older we recommend having an adult stay w/ them for the remainder of the day.     If the patient is Diabetic, remind them to bring their glucometer.      Does the patient have any questions?  NO  Keila Rosado  [facility] [place]

## 2024-11-14 NOTE — TELEPHONE ENCOUNTER
Called marilin Dominguez and ELEANOR to call back to discuss    Yarely MARCANO, RN Care Coordinator  [facility]  Pain Counts include 234 beds at the [facility]

## 2024-11-14 NOTE — TELEPHONE ENCOUNTER
Epidural Steroid Injections (CANDELARIA)Effective September 1, 2024    Indications: Epidural Steroid Injections (Interlaminar, Caudal, or Transforaminal)          Repeat Interlaminar, Caudal, or Transforaminal Epidural Steroid Injection (CANDELARIA)  A repeat interlaminar, caudal, or transforaminal epidural steroid injection (CANDELARIA) is considered medically necessary when ALL of the following criteria have been met:  It has been at least 14 days since the prior epidural steroid injection (CANDELARIA)  There has been 50% or greater relief of radicular pain for two (2) or more weeks duration and EITHER of the following additional criteria has been met:  Increase in the level of function/physical activity (e.g., return to work)  Reduction in the use of pain medication and/or additional medical services such as physical therapy/chiropractic care  Advanced diagnostic imaging within 24 months is required for cervical/thoracic interlaminar and transforaminal epidural steroid injections.              Routing to advise if patient meets criteria          Marybeth G.  Complex   [facility]

## 2024-11-15 NOTE — TELEPHONE ENCOUNTER
Authorization Number: [id number]  Case Number: [id number]  Review Date: 11/15/2024 11:05:28 AM  Expiration Date: 1/14/2025  Status: Your case has been Approved.        TEE TO SCHEDULE CAUDAL CANDELARIA WITH DR. ASHLY GAMBLE  Complex   [facility]

## 2024-11-15 NOTE — TELEPHONE ENCOUNTER
Recvd call back from Pt son Alberto.     States that pain relief was 100%. Last injection helped until recently- generally helps 4-6 months. Would like injection end of December. States that walks and is more active with injections. Is able to participate in activities at care facility with injections    Meets criteria, is scheduled for 12/30/24 already.     Yarely MARCANO, RN Care Coordinator  [facility]  Pain Management

## 2024-12-19 ENCOUNTER — TRANSFERRED RECORDS (OUTPATIENT)
Dept: HEALTH INFORMATION MANAGEMENT | Facility: CLINIC | Age: 89
End: 2024-12-19
Payer: COMMERCIAL

## 2024-12-27 NOTE — PROGRESS NOTES
[facility] - Procedure Note    Date of Service: 12/30/2024    Procedure performed: Caudal epidural steroid injection with fluoroscopic guidance  Diagnosis: Lumbar spondylosis; Lumbar radiculitis/radiculopathy  : Shagufta Li MD  Anesthesia: none    Indications: Indy Reyes is a 89 year old female who is seen at the request of myself (interventional consult)  for a caudal epidural steroid injection. The patient describes central low back (centered over her tailbone) pain without radiation to the legs. The patient has been exhibiting symptoms consistent with lumbar intraspinal inflammation and radiculopathy. Symptoms have been persistent, disabling, and intermittently severe. The patient reports minimal improvement with conservative treatment, including PT and medications.    This is a repeat injection.  Previous CAUDAL CANDELARIA done by myself on 8/28/2024, 2/21/2024, 5/5/2023 provided good pain relief for a period of 6 months.     S/P L4-5 spine surgery 4/28/2017 Dr. Woo    LUMBAR MRI was done on 10/5/2018 which showed                                                                  IMPRESSION:   1. Interval surgical changes at L4-L5 with improvement of what is now  moderate central canal stenosis with mild to moderate left and mild  right neural foraminal stenosis.   2. No other change is demonstrated. There are degenerative changes  elsewhere with no disc herniation demonstrated. There is moderate  bilateral neural foraminal stenosis at L1-L2 with less extensive  stenosis elsewhere as described above.    Allergies:      Allergies   Allergen Reactions    Tetracycline Rash        Vitals:  BP (!) 158/84 (BP Location: Left arm, Patient Position: Chair, Cuff Size: Adult Regular)   Pulse 102   LMP  (LMP Unknown)   SpO2 96%     Review of Systems: The patient denies recent fever, chills, illness, use of antibiotics or anticoagulants. All other 10-point review of systems  negative.       Procedure: The procedure and risks were explained, and informed written consent was obtained from the patient. Risks include but are not limited to: infection, bleeding, increased pain, and damage to soft tissue, nerve, muscle, and vasculature structures. After getting informed consent, patient was brought into the procedure suite and was placed in a prone position on the procedure table. A Pause for the Cause was performed. Patient was prepped and draped in sterile fashion.     The sacral hiatus and cornua were palpated.  Under lateral fluoroscopic guidance sacral hiatus was identified.  A total of 4.5 mL of Lidocaine 1% with 0.5 mL 8.4% sodium bicarbonate was used to anesthetize the skin and the needle track at a skin entry site.  A 22 gauge 5 inch spinal needle was advanced through the sacral hiatus using intermittent fluoroscopy. The needle angle was decreased to allow entrance into the sacral canal and epidural space.  This was verified in both the AP and lateral view for correct alignment.       The position was then inspected from anteroposterior and lateral views, and the needle adjusted appropriately.  After negative aspiration for heme and CSF, a total of 1 mL of Omnipaque-300 was injected using static and continuous fluoroscopy confirming appropriate position, into the epidural space, with no intravascular or intrathecal uptake. 0 mL of Omnipaque-300 was wasted.    2 mL of 1% lidocaine, 3 mL of preservative free saline, with 80 mg of triamcinolone was injected.  The needle was removed. Hemostasis was achieved, the area was cleaned, and bandaids were placed when appropriate. Images were saved to PACS.    The patient tolerated the procedure well, and was taken to the recovery room, and there was no evidence of procedural complications. No new sensory or motor deficits were noted following the procedure. The patient was stable and able to ambulate on discharge home. Post-procedure  instructions were provided.     Pre-procedure pain score:  0/10 in the back, 0/10 in the leg  Post-procedure pain score:  0/10 in the back, 0/10 in the leg    Assessment/Plan: Indy Reyes is a 89 year old female s/p caudal epidural steroid injection today for lumbar spondylosis, radiculitis/radiculopathy.     1. Following today's procedure, the patient was advised to contact the Pain Management Center for any of the following:   Fever, chills, or night sweats   New onset of pain, numbness, or weakness   Any questions/concerns regarding the procedure  If unable to contact the Pain Center, the patient was instructed to go to a local Emergency Room for any complications.   2. The patient should follow-up with the referring provider in 2 weeks for post-procedure evaluation.    TAI LIMON MD   Pain Management

## 2024-12-30 ENCOUNTER — RADIOLOGY INJECTION OFFICE VISIT (OUTPATIENT)
Dept: PALLIATIVE MEDICINE | Facility: CLINIC | Age: 89
End: 2024-12-30
Attending: ANESTHESIOLOGY
Payer: COMMERCIAL

## 2024-12-30 VITALS — DIASTOLIC BLOOD PRESSURE: 82 MMHG | HEART RATE: 92 BPM | OXYGEN SATURATION: 96 % | SYSTOLIC BLOOD PRESSURE: 168 MMHG

## 2024-12-30 DIAGNOSIS — M54.16 LUMBAR RADICULOPATHY: Primary | ICD-10-CM

## 2024-12-30 PROCEDURE — 62323 NJX INTERLAMINAR LMBR/SAC: CPT | Performed by: ANESTHESIOLOGY

## 2024-12-30 RX ORDER — TRIAMCINOLONE ACETONIDE 40 MG/ML
40 INJECTION, SUSPENSION INTRA-ARTICULAR; INTRAMUSCULAR ONCE
Status: COMPLETED | OUTPATIENT
Start: 2024-12-30 | End: 2024-12-30

## 2024-12-30 RX ADMIN — TRIAMCINOLONE ACETONIDE 40 MG: 40 INJECTION, SUSPENSION INTRA-ARTICULAR; INTRAMUSCULAR at 13:16

## 2024-12-30 ASSESSMENT — PAIN SCALES - GENERAL: PAINLEVEL_OUTOF10: NO PAIN (0)

## 2024-12-30 NOTE — NURSING NOTE
Discharge Information    IV Discontiued Time:  NA    Amount of Fluid Infused:  NA    Discharge Criteria = When patient returns to baseline or as per MD order    Consciousness:  Pt is fully awake    Circulation:  BP +/- 20% of pre-procedure level    Respiration:  Patient is able to breathe deeply    O2 Sat:  Patient is able to maintain O2 Sat >92% on room air    Activity:  Moves 4 extremities on command    Ambulation:  Patient is able to stand and walk or stand and pivot into wheelchair    Dressing:  Clean/dry or No Dressing    Notes:   Discharge instructions and AVS given to patient    Patient meets criteria for discharge?  YES    Admitted to PCU?  No    Responsible adult present to accompany patient home?  Yes    Signature/Title:    Caryn Graham RN  RN Care Coordinator  [facility] [place]

## 2024-12-30 NOTE — PATIENT INSTRUCTIONS
[facility] Procedure Discharge Instructions    Today you saw:   Dr. Shagufta Li     Your procedure:  Caudal Epidural steroid injection       Medications used:  Lidocaine (anesthetic)   Kenalog (steroid)  Omnipaque (contrast)        If you were holding your blood thinning medication, please restart taking it: N/A        Be cautious when walking as numbness and/or weakness in the legs may occur up to 6-8 hours after the procedure due to effect of the local anesthetic  Do not drive for 6 hours. The effect of the local anesthetic could slow your reflexes.   Avoid strenuous activity for the first 24 hours. You may resume your regular activities after that.   You may shower, however avoid swimming, tub baths or hot tubs for 24 hours following your procedure  You may have a mild to moderate increase in pain for several days following the injection.    You may use ice packs for 10-15 minutes, 3 to 4 times a day at the injection site for comfort  Do not use heat to painful areas for 6 to 8 hours. This will give the local anesthetic time to wear off and prevent you from accidentally burning your skin.  Unless you have been directed to avoid the use of anti-inflammatory medications (NSAIDS-ibuprofen, Aleve, Motrin), you may use these medications or Tylenol for pain control if needed.   With diabetes, check your blood sugar more frequently than usual as your blood sugar may be higher than normal for 10-14 days following a steroid injection. Contact your doctor who manages your diabetes if your blood sugar is higher than usual  Possible side effects of steroids that you may experience include flushing, elevated blood pressure, increased appetite, mild headaches and restlessness.  All of these symptoms will get better with time.  It may take up to 14 days for the steroid medication to start working although you may feel the effect as early as a few days after the procedure.   Follow up with your referring  provider in 2-3 weeks    If you experience any of the following, call the pain center line during work hours at [phone] or on-call physician after hours at [phone]:  -Fever over 100 degree F  -Swelling, bleeding, redness, drainage, warmth at the injection site  -Progressive weakness or numbness in your legs or arms  -Loss of bowel or bladder function  -Unusual headache that is not relieved by Tylenol or your regular headache medication  -Unusual new onset of pain that is not improving

## 2025-03-05 ENCOUNTER — TRANSFERRED RECORDS (OUTPATIENT)
Dept: HEALTH INFORMATION MANAGEMENT | Facility: CLINIC | Age: OVER 89
End: 2025-03-05
Payer: COMMERCIAL

## 2025-03-21 ENCOUNTER — TELEPHONE (OUTPATIENT)
Dept: FAMILY MEDICINE | Facility: CLINIC | Age: OVER 89
End: 2025-03-21

## 2025-03-21 NOTE — TELEPHONE ENCOUNTER
Call attempt 1/3.    LVM for patient to schedule annual wellness exam due June. On callback, please assist patient in scheduling Medicare AWV.    Please close encounter when scheduled.

## 2025-03-31 NOTE — TELEPHONE ENCOUNTER
Patients son called back will need to check calendars first before scheduling, will do online       Julia Phillips/

## 2025-03-31 NOTE — TELEPHONE ENCOUNTER
Call attempt 2/3.    LVM for patient to schedule annual wellness exam due June. On callback, please assist patient in scheduling Medicare AWV.    Please close encounter when scheduled.      Topher Fry

## 2025-04-28 DIAGNOSIS — Z98.890 S/P CRANIOTOMY: Primary | ICD-10-CM

## 2025-04-28 DIAGNOSIS — D32.9 MENINGIOMA (H): ICD-10-CM

## 2025-04-28 NOTE — PROGRESS NOTES
Dr. Woo states: Can we get her back to see me with a MRI brain with and without    Order placed. Routed to scheduling.

## 2025-05-13 ENCOUNTER — OFFICE VISIT (OUTPATIENT)
Dept: NEUROSURGERY | Facility: CLINIC | Age: OVER 89
End: 2025-05-13
Attending: NEUROLOGICAL SURGERY
Payer: COMMERCIAL

## 2025-05-13 ENCOUNTER — HOSPITAL ENCOUNTER (OUTPATIENT)
Dept: MRI IMAGING | Facility: CLINIC | Age: OVER 89
Discharge: HOME | End: 2025-05-13
Attending: NEUROLOGICAL SURGERY
Payer: COMMERCIAL

## 2025-05-13 VITALS — SYSTOLIC BLOOD PRESSURE: 136 MMHG | HEART RATE: 81 BPM | DIASTOLIC BLOOD PRESSURE: 72 MMHG | OXYGEN SATURATION: 98 %

## 2025-05-13 DIAGNOSIS — Z98.890 S/P CRANIOTOMY: ICD-10-CM

## 2025-05-13 DIAGNOSIS — D32.0 BENIGN NEOPLASM OF CEREBRAL MENINGES (H): Primary | ICD-10-CM

## 2025-05-13 DIAGNOSIS — D32.9 MENINGIOMA (H): ICD-10-CM

## 2025-05-13 PROCEDURE — 255N000002 HC RX 255 OP 636: Performed by: NEUROLOGICAL SURGERY

## 2025-05-13 PROCEDURE — 70553 MRI BRAIN STEM W/O & W/DYE: CPT

## 2025-05-13 PROCEDURE — A9585 GADOBUTROL INJECTION: HCPCS | Performed by: NEUROLOGICAL SURGERY

## 2025-05-13 PROCEDURE — G0463 HOSPITAL OUTPT CLINIC VISIT: HCPCS | Performed by: NEUROLOGICAL SURGERY

## 2025-05-13 RX ORDER — GADOBUTROL 604.72 MG/ML
6 INJECTION INTRAVENOUS ONCE
Status: COMPLETED | OUTPATIENT
Start: 2025-05-13 | End: 2025-05-13

## 2025-05-13 RX ADMIN — GADOBUTROL 6 ML: 604.72 INJECTION INTRAVENOUS at 08:29

## 2025-05-13 NOTE — LETTER
"5/13/2025      Indy Reyes  [address]  [place] [facility] [place] [facility] [address]      Dear Colleague,    Thank you for referring your patient, Indy Reyes, to the [facility] [place]. Please see a copy of my visit note below.    It was a pleasure to see Indy Reyes today in Neurosurgery Clinic. She is a 89 year old female who underwent craniotomy for a left frontal meningioma with my former partner Dr. Rose.  Her family has noticed that she has had some falls recently and occasional veering or stumbling to the left.  It has been a few years since her most recent MRI so they were concerned that there might be tumor recurrence.  She returns today for further evaluation.      She denies any other specific Neurologic symptoms, other than some brief vertigo when she lays down at night.    Vitals:    05/13/25 1316   BP: 136/72   Pulse: 81   SpO2: 98%     Estimated body mass index is 24.46 kg/m  as calculated from the following:    Height as of 6/18/24: 1.562 m (5' 1.5\").    Weight as of 6/18/24: 59.7 kg (131 lb 9.6 oz).  Data Unavailable    Awake and alert  No pronator drift symmetric orbit of the hands and index fingers      Imaging: Review of her most recent MRI does not show any evidence of recurrent tumor.  There is a small left frontal meningioma that was present in 2022 that appears similar in size.  The imaging was reviewed with the patient shown to the patient in clinic today.    Assessment: Status post craniotomy for meningioma.  Stable left frontal meningioma    Plan: I think a repeat MRI in 2 to 3 years with the advisable.  Otherwise if she continues to have these issues then I  think they would be best further investigated by her primary care provider as there does not seem to be a clear CNS cause for her symptoms.       Again, thank you for allowing me to participate in the care of your patient.        Sincerely,        Fady Woo, " MD    Electronically signed

## 2025-05-13 NOTE — NURSING NOTE
"Indy Reyes is a 89 year old female who presents for:  Chief Complaint   Patient presents with    Results     MRI review  Meningioma        Vitals:    Vitals:    05/13/25 1316   BP: 136/72   Pulse: 81   SpO2: 98%       BMI:  Estimated body mass index is 24.46 kg/m  as calculated from the following:    Height as of 6/18/24: 5' 1.5\" (1.562 m).    Weight as of 6/18/24: 131 lb 9.6 oz (59.7 kg).          Amendo Phomichaeln      "

## 2025-05-23 ENCOUNTER — TELEPHONE (OUTPATIENT)
Dept: PALLIATIVE MEDICINE | Facility: CLINIC | Age: OVER 89
End: 2025-05-23
Payer: COMMERCIAL

## 2025-05-23 DIAGNOSIS — M54.16 LUMBAR RADICULOPATHY: Primary | ICD-10-CM

## 2025-05-23 NOTE — TELEPHONE ENCOUNTER
Spoke to patient and she will pick it up to go to Neuro [facility] Call Center    Phone Message    May a detailed message be left on voicemail: yes     Reason for Call: Other: Patient's son Alberto called requesting for Dr. Li to place an order for patient to receive another injection. He states patient has had consistent symptoms for the past year and half which he states Dr. Li is aware of. He states after the injection wears off, the symptoms return. Most recently, symptoms returned around 5/8/2025, Please review.     Action Taken: Message routed to:  Other: [facility]    Travel Screening: Not Applicable     Date of Service:

## 2025-05-27 NOTE — TELEPHONE ENCOUNTER
Routing to provider to review request for repeat caudal injection.      -----------------------------  Date of Service: 12/30/2024     Procedure performed: Caudal epidural steroid injection with fluoroscopic guidance

## 2025-06-02 NOTE — TELEPHONE ENCOUNTER
Please call marilin Dominguez to schedule    Interventional Evaluation:    Interventional Injection Only - Type of Injection: caudal injection  Radiology? Yes

## 2025-06-03 NOTE — TELEPHONE ENCOUNTER
I have attempted to contact this patient by phone with the following results: left message to return my call to schedule this procedure- caudal injection, I will continue to try later.    Stephanie Perkins      [facility]  Pain [facility]

## 2025-06-03 NOTE — TELEPHONE ENCOUNTER
"Screening Questions for Radiology Injections:    Injection to be done at which interventional clinic site? [facility]    If choosing [facility] for location, please inform patient:  \"[facility] is a Hospital based clinic. Before your visit, you should check with your insurance about how it covers the charges for facility services in a hospital-based clinic.     Procedure ordered by Dr. Li     Procedure ordered? caudal injection   Transforaminal Cervical CANDELARIA - Send to [facility]) - No [facility] providers perform this procedure    What insurance would patient like us to bill for this procedure? BC/MEDICARE  IF SCHEDULING IN [place] PA[facility] OR SPINE PLEASE SCHEDULE AT LEAST 7-10 BUSINESS DAYS OUT SO A PA CAN BE OBTAINED  Worker's comp or MVA (motor vehicle accident) -Any injection DO NOT SCHEDULE and route to Kevin Rosado.    [employer] insurance - For ALL INJECTIONS DO NOT SCHEDULE and route to Marybeth Mata.     ALL BCBS, [employer] and [employer] CIGNA - DO NOT SCHEDULE and route to Marybeth Mata  MEDICA- ALL INJECTIONS- route to Marybeth Mata    Is patient scheduled at [facility]? NO   If YES, route every encounter to [facility] SPINE CENTER CARE NAVIGATION POOL [[phone]]    Is an  needed? No     Patient has a  home? (Review Grid) YES: Clarence, Alberto     Any chance of pregnancy? NO   If YES, do NOT schedule and route to RN pool  - Dr. Ewing route to PM&R Nurse  [[id number]]      Is patient actively being treated for cancer or immunocompromised? No  If YES, do NOT schedule and route to RN pool/ Dr. Ewing's Team    Does the patient have a bleeding or clotting disorder? No   If YES, okay to schedule AND route to RN nurse / Dr. Ewing's Team   (For any patients with platelet count <100, RN must forward to provider)    Is patient taking any Blood Thinners OR Antiplatelet medication?  No   If hold needed, do NOT schedule, route to RN pool/ Dr. Ewing's Team  Examples: "   Blood Thinners: (Coumadin, Warfarin, Jantoven, Pradaxa, Xarelto, Eliquis, Edoxaban, Enoxaparin, Lovenox, Heparin, Arixtra, Fondaparinux or Fragmin)  Antiplatelet Medications: (Plavix, Brilinta or Effient)     Is patient taking any aspirin products (includes: Aspirin 81 mg, Excedrin, and Fiorinal)? No.    If yes route to RN pool/ Dr. Ewing's Team - Do not schedule    Is patient taking any GLP-1 Antagonist (hold needed for sedation patients only) No   (semaglutide (Ozempic, Wegovy), dulaglutide (Trulicity), exenatide ER (Bydureon), tirzepatide (Mounjaro), Liraglutide (Saxenda, Victoza), semaglutide (Rybelsus), Terzepatide (Zepbound)  If YES, okay to schedule AND route to RN nurse / Dr. Ewing's Team      Any allergies to contrast dye, iodine, shellfish, or numbing and steroid medications? No  If YES, schedule and add allergy information to appointment notes AND route to the RN pool/ Dr. Ewing's Team  If CANDELARIA and Contrast Dye / Iodine Allergy? DO NOT SCHEDULE, route to RN pool/ Dr. Ewing's Team  Allergies: Tetracycline     Does patient have an active infection or treated for one within the past week? No  Is patient currently taking any antibiotics or steroid medications?  No   For patients on chronic, preventative, or prophylactic antibiotics, procedures may be scheduled.   For patients on antibiotics for active or recent infection, schedule 4 days after completed.  For patients on steroid medications, schedule 4 days after completed.     Has the patient had a flu shot or any other vaccinations within the past 7 days? No  If yes, explain that for the vaccine to work best they need to:     wait 1 week before and 1 week after getting any Vaccine  wait 1 week before and 2 weeks after getting any Covid Vaccine   If patient has concerns about the timing, send to RN pool/ Dr. Ewing's Team    Does patient have an MRI/CT?  Not Applicable Include Date and Check Procedure Scheduling Grid to see if required.  Was the MRI/CT  done within the last 3 years?  Yes   If no route to RN Pool/ Dr. Ewing's Team  If yes, where was the MRI/CT done? [facility]   Refer to PACS Transmissions list for approved external locations and route to RN Pool High Priority/ Dr. Trotters Team  If MRI was not done at approved external location do NOT schedule and route to RN pool/ Dr. Ewing's Team    If patient has an imaging disc, the injection MAY be scheduled but patient must bring disc to appt or appt will be cancelled.    Is patient able to transfer to a procedure table with minimal or no assistance? Yes   If no, do NOT schedule and route to RN Pool/ Dr. Ewing's Team    Procedure Specific Instructions:  If celiac plexus block, informed patient NPO for 6 hours and that it is okay to take medications with sips of water, especially blood pressure medications Not Applicable       If this is for a cervical procedure, informed patient that aspirin needs to be held for 6 days.   Not Applicable    Sedation, If Sedation is ordered for any procedure, patient must be NPO for 6 hours prior to procedure Not Applicable    If IV needed:  Do not schedule procedures requiring IV placement in the first appointment of the day or first appointment after lunch. Do NOT schedule at 0745, 0815 or 1245.     Instructed patient to arrive 30 minutes early for IV start if required. (Check Procedure Scheduling Grid)  Not Applicable    Reminders:  If you are started on any steroids or antibiotics between now and your appointment, you must contact us because the procedure may need to be cancelled.  Yes    As a reminder, receiving steroids can decrease your body's ability to fight infection.   Would you still like to move forward with scheduling the injection?  Yes    IV Sedation is not provided for procedures. If oral anti-anxiety medication is needed, the patient should request this from their referring provider.    Instruct patient to arrive as directed prior to the scheduled appointment  time:  If IV needed 30 minutes before appointment time     For patients 85 or older we recommend having an adult stay w/ them for the remainder of the day.     If the patient is Diabetic, remind them to bring their glucometer.    Dr. Calle Pt's - Imaging Orders Needed   Please send all injections to RN Pool Not Applicable   Red Flags? Not Applicable    Does the patient have any questions?  NO  Stephanie Perkins  [facility]

## 2025-06-04 NOTE — TELEPHONE ENCOUNTER
Epidural Steroid Injections   Repeat Interlaminar, Caudal, or Transforaminal Epidural Steroid Injection (CANDELARIA)  A repeat interlaminar, caudal, or transforaminal epidural steroid injection (CANDELARIA) is considered medically necessary when ALL of the following criteria have been met:  It has been at least 14 days since the prior epidural steroid injection (CANDELARIA)  There has been 50% or greater relief of radicular pain for two (2) or more weeks duration and EITHER of the following additional criteria has been met:  Increase in the level of function/physical activity (e.g., return to work)  Reduction in the use of pain medication and/or additional medical services such as physical therapy/chiropractic care  Advanced diagnostic imaging within 24 months is required for cervical/thoracic interlaminar and transforaminal epidural steroid injections.            Routing to review and advise        Marybeth Waddell   [facility]

## 2025-06-05 NOTE — TELEPHONE ENCOUNTER
"Recv call back.   States that was 100% relief for 4 month and then tapered off from there. States that was a \"rockstar\" when it was effective, Able to complete daily living and walk/move with less pain. Improved quality of life    Routing for review but appears to meet criteria for repeat  "

## 2025-06-05 NOTE — TELEPHONE ENCOUNTER
Called pt/son Alberto. LM to call back.     Yarely MARCANO, RN Care Coordinator  [facility]  Pain [facility]

## 2025-06-25 ENCOUNTER — OFFICE VISIT (OUTPATIENT)
Dept: FAMILY MEDICINE | Facility: CLINIC | Age: OVER 89
End: 2025-06-25
Payer: COMMERCIAL

## 2025-06-25 VITALS
BODY MASS INDEX: 24.73 KG/M2 | HEIGHT: 62 IN | HEART RATE: 90 BPM | DIASTOLIC BLOOD PRESSURE: 77 MMHG | SYSTOLIC BLOOD PRESSURE: 150 MMHG | TEMPERATURE: 98 F | OXYGEN SATURATION: 97 % | WEIGHT: 134.4 LBS | RESPIRATION RATE: 12 BRPM

## 2025-06-25 DIAGNOSIS — F33.1 MODERATE EPISODE OF RECURRENT MAJOR DEPRESSIVE DISORDER (H): ICD-10-CM

## 2025-06-25 DIAGNOSIS — H35.30 MACULAR DEGENERATION (SENILE) OF RETINA: ICD-10-CM

## 2025-06-25 DIAGNOSIS — M81.0 AGE-RELATED OSTEOPOROSIS WITHOUT CURRENT PATHOLOGICAL FRACTURE: ICD-10-CM

## 2025-06-25 DIAGNOSIS — E03.8 SUBCLINICAL HYPOTHYROIDISM: Primary | ICD-10-CM

## 2025-06-25 DIAGNOSIS — Z00.00 ENCOUNTER FOR MEDICARE ANNUAL WELLNESS EXAM: ICD-10-CM

## 2025-06-25 DIAGNOSIS — E03.9 HYPOTHYROIDISM, UNSPECIFIED TYPE: ICD-10-CM

## 2025-06-25 DIAGNOSIS — R73.01 IMPAIRED FASTING GLUCOSE: ICD-10-CM

## 2025-06-25 DIAGNOSIS — L91.8 SKIN TAG: ICD-10-CM

## 2025-06-25 LAB
ERYTHROCYTE [DISTWIDTH] IN BLOOD BY AUTOMATED COUNT: 15.6 % (ref 10–15)
EST. AVERAGE GLUCOSE BLD GHB EST-MCNC: 120 MG/DL
HBA1C MFR BLD: 5.8 % (ref 0–5.6)
HCT VFR BLD AUTO: 36.1 % (ref 35–47)
HGB BLD-MCNC: 11.6 G/DL (ref 11.7–15.7)
MCH RBC QN AUTO: 25.1 PG (ref 26.5–33)
MCHC RBC AUTO-ENTMCNC: 32.1 G/DL (ref 31.5–36.5)
MCV RBC AUTO: 78 FL (ref 78–100)
PLATELET # BLD AUTO: 192 10E3/UL (ref 150–450)
RBC # BLD AUTO: 4.63 10E6/UL (ref 3.8–5.2)
WBC # BLD AUTO: 6.6 10E3/UL (ref 4–11)

## 2025-06-25 PROCEDURE — 85027 COMPLETE CBC AUTOMATED: CPT

## 2025-06-25 PROCEDURE — 83036 HEMOGLOBIN GLYCOSYLATED A1C: CPT

## 2025-06-25 PROCEDURE — 36415 COLL VENOUS BLD VENIPUNCTURE: CPT

## 2025-06-25 RX ORDER — LEVOTHYROXINE SODIUM 50 UG/1
50 TABLET ORAL DAILY
Qty: 90 TABLET | Refills: 4 | Status: SHIPPED | OUTPATIENT
Start: 2025-06-25

## 2025-06-25 RX ORDER — ESCITALOPRAM OXALATE 5 MG/1
5 TABLET ORAL DAILY
Qty: 90 TABLET | Refills: 3 | Status: SHIPPED | OUTPATIENT
Start: 2025-06-25

## 2025-06-25 SDOH — HEALTH STABILITY: PHYSICAL HEALTH: ON AVERAGE, HOW MANY DAYS PER WEEK DO YOU ENGAGE IN MODERATE TO STRENUOUS EXERCISE (LIKE A BRISK WALK)?: 5 DAYS

## 2025-06-25 ASSESSMENT — SOCIAL DETERMINANTS OF HEALTH (SDOH): HOW OFTEN DO YOU GET TOGETHER WITH FRIENDS OR RELATIVES?: MORE THAN THREE TIMES A WEEK

## 2025-06-25 ASSESSMENT — PATIENT HEALTH QUESTIONNAIRE - PHQ9: SUM OF ALL RESPONSES TO PHQ QUESTIONS 1-9: 4

## 2025-06-25 NOTE — PROGRESS NOTES
Preventive Care Visit  [facility]  RAMANA Martinez CNP, Family Medicine  Jun 25, 2025      Assessment & Plan     Encounter for Medicare annual wellness exam  Reviewed age and gender appropriate screenings and lifestyle modifications with patient.   Reviewed screenings with son and patient. Recommend discontinue lipid screening-she would prefer not to screen.     Subclinical hypothyroidism  Stable chronic, refills, labs  - TSH WITH FREE T4 REFLEX    Hypothyroidism, unspecified type  Stable chronic, refills  - levothyroxine (SYNTHROID/LEVOTHROID) 50 MCG tablet  Dispense: 90 tablet; Refill: 4    Impaired fasting glucose  Recheck, no changes if stable.   - CBC with platelets  - Hemoglobin A1c  - Basic metabolic panel  (Ca, Cl, CO2, Creat, Gluc, K, Na, BUN)    Age-related osteoporosis without current pathological fracture  Repeat scan, continue vit D if not high  - DEXA HIP/PELVIS/SPINE - Future  - Vitamin D Deficiency    Skin tag  Large base, recommend evaluation and management  - Adult Dermatology  Referral    Macular degeneration (senile) of retina  Sees ophthalmology for management    Moderate episode of recurrent major depressive disorder (H)  Stable with escitalopram. Continue.   - escitalopram (LEXAPRO) 5 MG tablet  Dispense: 90 tablet; Refill: 3    The longitudinal plan of care for the diagnosis(es)/condition(s) as documented were addressed during this visit. Due to the added complexity in care, I will continue to support Mary in the subsequent management and with ongoing continuity of care.            Counseling  Appropriate preventive services were addressed with this patient via screening, questionnaire, or discussion as appropriate for fall prevention, nutrition, physical activity, Tobacco-use cessation, social engagement, weight loss and cognition.  Checklist reviewing preventive services available has been given to the patient.  Reviewed patient's diet, addressing  concerns and/or questions.   The patient was provided with written information regarding signs of hearing loss.   Information on urinary incontinence and treatment options given to patient.             Gerardo Hernandez is a 89 year old, presenting for the following:  Medicare Visit (Pt here for annual wellness visit. )        6/25/2025    10:44 AM   Additional Questions   Roomed by Qian Barber MA Learner   Accompanied by Son (Alberto)          JACKIE  - Mary Reyes, 89-year-old female  - Here for annual exam  - Taking levothyroxine for thyroid management  - Taking escitalopram (Lexapro) for mood management, despite son's suggestion to stop  - Undergoing eye treatments every 7 weeks for advanced macular degeneration  - Previous hemoglobin A1c was slightly elevated last year  - Concern about a large skin tag on the thigh, potential for bleeding if hooked        Advance Care Planning    Document on file is a Health Care Directive or POLST.        6/25/2025   General Health   How would you rate your overall physical health? Good   Feel stress (tense, anxious, or unable to sleep) Only a little   (!) STRESS CONCERN      6/25/2025   Nutrition   Diet: Regular (no restrictions)         6/25/2025   Exercise   Days per week of moderate/strenous exercise 5 days         6/25/2025   Social Factors   Frequency of gathering with friends or relatives More than three times a week   Worry food won't last until get money to buy more No   Food not last or not have enough money for food? No   Do you have housing? (Housing is defined as stable permanent housing and does not include staying outside in a car, in a tent, in an abandoned building, in an overnight shelter, or couch-surfing.) Yes   Are you worried about losing your housing? No   Lack of transportation? No   Unable to get utilities (heat,electricity)? No         6/25/2025   Fall Risk   Fallen 2 or more times in the past year? Yes   Trouble with walking or balance? No    Gait Speed Test (Document in seconds) 4   Gait Speed Test Interpretation Less than or equal to 5.00 seconds - PASS          2025   Activities of Daily Living- Home Safety   Needs help with the following daily activites None of the above   Safety concerns in the home None of the above         2025   Dental   Dentist two times every year? Yes         2025   Hearing Screening   Hearing concerns? (!) I FEEL THAT PEOPLE ARE MUMBLING OR NOT SPEAKING CLEARLY.    (!) I NEED TO ASK PEOPLE TO SPEAK UP OR REPEAT THEMSELVES.    (!) IT'S HARDER TO UNDERSTAND WOMEN'S VOICES THAN MEN'S VOICES.    (!) IT'S HARD TO FOLLOW A CONVERSATION IN A NOISY RESTAURANT OR CROWDED ROOM.    (!) TROUBLE UNDESTANDING A SPEAKER IN A PUBLIC MEETING OR Muslim SERVICE.    (!) TROUBLE FOLLOWING DIALOGUE IN THE THEATHER.    (!) TROUBLE UNDERSTANDING SOFT OR WHISPERED SPEECH.   Would you like a referral for hearing testing? I already have hearing aids       Multiple values from one day are sorted in reverse-chronological order         2025   Driving Risk Screening   Patient/family members have concerns about driving (!) DECLINE         2025   General Alertness/Fatigue Screening   Have you been more tired than usual lately? No         2025   Urinary Incontinence Screening   Bothered by leaking urine in past 6 months Yes       Today's PHQ-9 Score:       2025    10:46 AM   PHQ-9 SCORE   PHQ-9 Total Score 4         2025   Substance Use   Alcohol more than 3/day or more than 7/wk Not Applicable   Do you have a current opioid prescription? No   How severe/bad is pain from 1 to 10? 0/10 (No Pain)   Do you use any other substances recreationally? No     Social History     Tobacco Use    Smoking status: Former     Current packs/day: 0.00     Types: Cigarettes     Quit date: 1960     Years since quittin.5    Smokeless tobacco: Never    Tobacco comments:        Vaping Use    Vaping status: Never Used    Substance Use Topics    Alcohol use: Yes     Comment: SOCIAL-2 drinks per month     Drug use: No          Mammogram Screening - After age 74- determine frequency with patient based on health status, life expectancy and patient goals          Reviewed and updated as needed this visit by Provider                    Past Medical History:   Diagnosis Date    Back pain     Thyroid disease      Past Surgical History:   Procedure Laterality Date    APPENDECTOMY      at age of 16    BACK SURGERY  2017    CATARACT IOL, RT/LT  2009    both    FORAMINOTOMY LUMBAR POSTERIOR ONE LEVEL Right 2017    Procedure: FORAMINOTOMY LUMBAR POSTERIOR ONE LEVEL;  Right L4-L5 hemilaminectomy and foraminotomy and excision of synovial cyst. ;  Surgeon: Fady Woo MD;  Location: [facility]    OPTICAL TRACKING SYSTEM CRANIOTOMY N/A 2022    Procedure: left frontal craniotomy for tumor removal;  Surgeon: Rasheed Rose MD;  Location:  OR    TONSILLECTOMY  1954     OB History    Para Term  AB Living   4 0 0 0 0 4   SAB IAB Ectopic Multiple Live Births   0 0 0 0 0      # Outcome Date GA Lbr Fuentes/2nd Weight Sex Type Anes PTL Lv   4             3             2             1                Obstetric Comments   Lmp-?ago-at age of 60, no vagnal bleeding ,ocassional   hot flushes,no hx of abnormal pap in past   4    No fhx of gyn cancers or personal hx of abnormal pap     Current providers sharing in care for this patient include:  Patient Care Team:  Ava Meléndez MD as PCP - General (Family Medicine)  Lance Engel MD as MD (Neurology)  Ava Meléndez MD as Assigned PCP  Fady Woo MD as Assigned Neuroscience Provider    The following health maintenance items are reviewed in Epic and correct as of today:  Health Maintenance   Topic Date Due    DEPRESSION ACTION PLAN  Never done    ZOSTER VACCINE (1 of 2) Never done    DTAP/TDAP/TD VACCINE  "(1 - Tdap) 09/02/2003    RSV VACCINE (1 - 1-dose 75+ series) Never done    DEXA  02/18/2024    COVID-19 VACCINE (4 - 2024-25 season) 09/01/2024    TSH W/FREE T4 REFLEX  06/18/2025    INFLUENZA VACCINE (Season Ended) 09/01/2025    PHQ-9  12/25/2025    MEDICARE ANNUAL WELLNESS VISIT  06/25/2026    ANNUAL REVIEW OF HM ORDERS  06/25/2026    FALL RISK ASSESSMENT  06/25/2026    LIPID  06/18/2029    ADVANCE CARE PLANNING  06/18/2029    PNEUMOCOCCAL VACCINE 50+ YEARS  Completed    HPV VACCINE  Aged Out    MENINGITIS VACCINE  Aged Out    MAMMO SCREENING  Discontinued      ROS: 10 point ROS neg other than the symptoms noted above in the HPI.       Objective    Exam  BP (!) 150/77 (BP Location: Right arm, Patient Position: Sitting, Cuff Size: Adult Regular)   Pulse 90   Temp 98  F (36.7  C) (Oral)   Resp 12   Ht 1.576 m (5' 2.05\")   Wt 61 kg (134 lb 6.4 oz)   LMP  (LMP Unknown)   SpO2 97%   BMI 24.54 kg/m     Estimated body mass index is 24.54 kg/m  as calculated from the following:    Height as of this encounter: 1.576 m (5' 2.05\").    Weight as of this encounter: 61 kg (134 lb 6.4 oz).    Physical Exam  Vitals and nursing note reviewed.   Constitutional:       General: She is not in acute distress.     Appearance: Normal appearance. She is not ill-appearing, toxic-appearing or diaphoretic.   HENT:      Head: Normocephalic and atraumatic.      Right Ear: Tympanic membrane, ear canal and external ear normal.      Left Ear: Tympanic membrane, ear canal and external ear normal.      Nose: Nose normal.      Mouth/Throat:      Mouth: Mucous membranes are moist.      Pharynx: No oropharyngeal exudate or posterior oropharyngeal erythema.   Eyes:      General: Lids are normal.      Extraocular Movements: Extraocular movements intact.      Conjunctiva/sclera: Conjunctivae normal.      Pupils: Pupils are equal, round, and reactive to light.   Neck:      Thyroid: No thyroid mass, thyromegaly or thyroid tenderness. "   Cardiovascular:      Rate and Rhythm: Normal rate and regular rhythm.      Pulses: Normal pulses.      Heart sounds: Normal heart sounds.   Pulmonary:      Effort: Pulmonary effort is normal.      Breath sounds: Normal breath sounds.   Abdominal:      General: Abdomen is flat. Bowel sounds are normal.      Palpations: Abdomen is soft.      Tenderness: There is no abdominal tenderness.      Hernia: No hernia is present.   Musculoskeletal:         General: Normal range of motion.      Cervical back: Normal range of motion and neck supple. No rigidity or tenderness.   Lymphadenopathy:      Cervical: No cervical adenopathy.   Skin:     General: Skin is warm and dry.      Capillary Refill: Capillary refill takes less than 2 seconds.   Neurological:      General: No focal deficit present.      Mental Status: She is alert.      Deep Tendon Reflexes: Reflexes are normal and symmetric.   Psychiatric:         Mood and Affect: Mood normal.         Speech: Speech normal.         Behavior: Behavior normal. Behavior is cooperative.         Thought Content: Thought content normal.         Judgment: Judgment normal.               6/25/2025   Mini Cog   Clock Draw Score 2 Normal   3 Item Recall 3 objects recalled   Mini Cog Total Score 5              Signed Electronically by: RAMANA Martinez CNP    Answers submitted by the patient for this visit:  Patient Health Questionnaire (Submitted on 6/25/2025)  PHQ9 TOTAL SCORE: Incomplete

## 2025-06-25 NOTE — PATIENT INSTRUCTIONS
Patient Education   Preventive Care Advice   This is general advice given by our system to help you stay healthy. However, your care team may have specific advice just for you. Please talk to your care team about your preventive care needs.  Nutrition  Eat 5 or more servings of fruits and vegetables each day.  Try wheat bread, brown rice and whole grain pasta (instead of white bread, rice, and pasta).  Get enough calcium and vitamin D. Check the label on foods and aim for 100% of the RDA (recommended daily allowance).  Lifestyle  Exercise at least 150 minutes each week  (30 minutes a day, 5 days a week).  Do muscle strengthening activities 2 days a week. These help control your weight and prevent disease.  No smoking.  Wear sunscreen to prevent skin cancer.  Have a dental exam and cleaning every 6 months.  Yearly exams  See your health care team every year to talk about:  Any changes in your health.  Any medicines your care team has prescribed.  Preventive care, family planning, and ways to prevent chronic diseases.  Shots (vaccines)   HPV shots (up to age 26), if you've never had them before.  Hepatitis B shots (up to age 59), if you've never had them before.  COVID-19 shot: Get this shot when it's due.  Flu shot: Get a flu shot every year.  Tetanus shot: Get a tetanus shot every 10 years.  Pneumococcal, hepatitis A, and RSV shots: Ask your care team if you need these based on your risk.  Shingles shot (for age 50 and up)  General health tests  Diabetes screening:  Starting at age 35, Get screened for diabetes at least every 3 years.  If you are younger than age 35, ask your care team if you should be screened for diabetes.  Cholesterol test: At age 39, start having a cholesterol test every 5 years, or more often if advised.  Bone density scan (DEXA): At age 50, ask your care team if you should have this scan for osteoporosis (brittle bones).  Hepatitis C: Get tested at least once in your life.  STIs (sexually  transmitted infections)  Before age 24: Ask your care team if you should be screened for STIs.  After age 24: Get screened for STIs if you're at risk. You are at risk for STIs (including HIV) if:  You are sexually active with more than one person.  You don't use condoms every time.  You or a partner was diagnosed with a sexually transmitted infection.  If you are at risk for HIV, ask about PrEP medicine to prevent HIV.  Get tested for HIV at least once in your life, whether you are at risk for HIV or not.  Cancer screening tests  Cervical cancer screening: If you have a cervix, begin getting regular cervical cancer screening tests starting at age 21.  Breast cancer scan (mammogram): If you've ever had breasts, begin having regular mammograms starting at age 40. This is a scan to check for breast cancer.  Colon cancer screening: It is important to start screening for colon cancer at age 45.  Have a colonoscopy test every 10 years (or more often if you're at risk) Or, ask your provider about stool tests like a FIT test every year or Cologuard test every 3 years.  To learn more about your testing options, visit:   .  For help making a decision, visit:   https://bit.ly/rs45667.  Prostate cancer screening test: If you have a prostate, ask your care team if a prostate cancer screening test (PSA) at age 55 is right for you.  Lung cancer screening: If you are a current or former smoker ages 50 to 80, ask your care team if ongoing lung cancer screenings are right for you.  For informational purposes only. Not to replace the advice of your health care provider. Copyright   2023 [facility]. All rights reserved. Clinically reviewed by the [facility]. [employer] 162393 - REV 01/24.  Preventing Falls: Care Instructions  Injuries and health problems such as trouble walking or poor eyesight can increase your risk of falling. So can some medicines. But there are things you can do to help  "prevent falls. You can exercise to get stronger. You can also arrange your home to make it safer.    Talk to your doctor about the medicines you take. Ask if any of them increase the risk of falls and whether they can be changed or stopped.   Try to exercise regularly. It can help improve your strength and balance. This can help lower your risk of falling.         Practice fall safety and prevention.   Wear low-heeled shoes that fit well and give your feet good support. Talk to your doctor if you have foot problems that make this hard.  Carry a cellphone or wear a medical alert device that you can use to call for help.  Use stepladders instead of chairs to reach high objects. Don't climb if you're at risk for falls. Ask for help, if needed.  Wear the correct eyeglasses, if you need them.        Make your home safer.   Remove rugs, cords, clutter, and furniture from walkways.  Keep your house well lit. Use night-lights in hallways and bathrooms.  Install and use sturdy handrails on stairways.  Wear nonskid footwear, even inside. Don't walk barefoot or in socks without shoes.        Be safe outside.   Use handrails, curb cuts, and ramps whenever possible.  Keep your hands free by using a shoulder bag or backpack.  Try to walk in well-lit areas. Watch out for uneven ground, changes in pavement, and debris.  Be careful in the winter. Walk on the grass or gravel when sidewalks are slippery. Use de-icer on steps and walkways. Add non-slip devices to shoes.    Put grab bars and nonskid mats in your shower or tub and near the toilet. Try to use a shower chair or bath bench when bathing.   Get into a tub or shower by putting in your weaker leg first. Get out with your strong side first. Have a phone or medical alert device in the bathroom with you.   Where can you learn more?  Go to https://www.[employer]wise.net/patiented  Enter G117 in the search box to learn more about \"Preventing Falls: Care Instructions.\"  Current as of: " July 31, 2024  Content Version: 14.5    0791-3692 [employer].   Care instructions adapted under license by your healthcare professional. If you have questions about a medical condition or this instruction, always ask your healthcare professional. [employer] disclaims any warranty or liability for your use of this information.    Hearing Loss: Care Instructions  Overview     Hearing loss is a sudden or slow decrease in how well you hear. It can range from slight to profound. Permanent hearing loss can occur with aging. It also can happen when you are exposed long-term to loud noise. Examples include listening to loud music, riding motorcycles, or being around other loud machines.  Hearing loss can affect your work and home life. It can make you feel lonely or depressed. You may feel that you have lost your independence. But hearing aids and other devices can help you hear better and feel connected to others.  Follow-up care is a key part of your treatment and safety. Be sure to make and go to all appointments, and call your doctor if you are having problems. It's also a good idea to know your test results and keep a list of the medicines you take.  How can you care for yourself at home?  Avoid loud noises whenever possible. This helps keep your hearing from getting worse.  Always wear hearing protection around loud noises.  Wear a hearing aid as directed.  A professional can help you pick a hearing aid that will work best for you.  You can also get hearing aids over the counter for mild to moderate hearing loss.  Have hearing tests as your doctor suggests. They can show whether your hearing has changed. Your hearing aid may need to be adjusted.  Use other devices as needed. These may include:  Telephone amplifiers and hearing aids that can connect to a television, stereo, radio, or microphone.  Devices that use lights or vibrations. These alert you to the doorbell, a ringing telephone, or a  "baby monitor.  Television closed-captioning. This shows the words at the bottom of the screen. Most new TVs can do this.  TTY (text telephone). This lets you type messages back and forth on the telephone instead of talking or listening. These devices are also called TDD. When messages are typed on the keyboard, they are sent over the phone line to a receiving TTY. The message is shown on a monitor.  Use text messaging, social media, and email if it is hard for you to communicate by telephone.  Try to learn a listening technique called speechreading. It is not lipreading. You pay attention to people's gestures, expressions, posture, and tone of voice. These clues can help you understand what a person is saying. Face the person you are talking to, and have them face you. Make sure the lighting is good. You need to see the other person's face clearly.  Think about counseling if you need help to adjust to your hearing loss.  When should you call for help?  Watch closely for changes in your health, and be sure to contact your doctor if:    You think your hearing is getting worse.     You have new symptoms, such as dizziness or nausea.   Where can you learn more?  Go to https://www.[employer].net/patiented  Enter R798 in the search box to learn more about \"Hearing Loss: Care Instructions.\"  Current as of: October 27, 2024  Content Version: 14.5 2024-2025 [employer].   Care instructions adapted under license by your healthcare professional. If you have questions about a medical condition or this instruction, always ask your healthcare professional. [employer] disclaims any warranty or liability for your use of this information.    Bladder Training: Care Instructions  Your Care Instructions     Bladder training is used to treat urge incontinence and stress incontinence. Urge incontinence means that the need to urinate comes on so fast that you can't get to a toilet in time. Stress incontinence " means that you leak urine because of pressure on your bladder. For example, it may happen when you laugh, cough, or lift something heavy.  Bladder training can increase how long you can wait before you have to urinate. It can also help your bladder hold more urine. And it can give you better control over the urge to urinate.  It is important to remember that bladder training takes a few weeks to a few months to make a difference. You may not see results right away, but don't give up.  Follow-up care is a key part of your treatment and safety. Be sure to make and go to all appointments, and call your doctor if you are having problems. It's also a good idea to know your test results and keep a list of the medicines you take.  How can you care for yourself at home?  Work with your doctor to come up with a bladder training program that is right for you. You may use one or more of the following methods.  Delayed urination  In the beginning, try to keep from urinating for 5 minutes after you first feel the need to go.  While you wait, take deep, slow breaths to relax. Kegel exercises can also help you delay the need to go to the bathroom.  After some practice, when you can easily wait 5 minutes to urinate, try to wait 10 minutes before you urinate.  Slowly increase the waiting period until you are able to control when you have to urinate.  Scheduled urination  Empty your bladder when you first wake up in the morning.  Schedule times throughout the day when you will urinate.  Start by going to the bathroom every hour, even if you don't need to go.  Slowly increase the time between trips to the bathroom.  When you have found a schedule that works well for you, keep doing it.  If you wake up during the night and have to urinate, do it. Apply your schedule to waking hours only.  Kegel exercises  These tighten and strengthen pelvic muscles, which can help you control the flow of urine. (If doing these exercises causes pain,  "stop doing them and talk with your doctor.) To do Kegel exercises:  Squeeze your muscles as if you were trying not to pass gas. Or squeeze your muscles as if you were stopping the flow of urine. Your belly, legs, and buttocks shouldn't move.  Hold the squeeze for 3 seconds, then relax for 5 to 10 seconds.  Start with 3 seconds, then add 1 second each week until you are able to squeeze for 10 seconds.  Repeat the exercise 10 times a session. Do 3 to 8 sessions a day.  When should you call for help?  Watch closely for changes in your health, and be sure to contact your doctor if:    Your incontinence is getting worse.     You do not get better as expected.   Where can you learn more?  Go to https://www.[employer].net/patiented  Enter V684 in the search box to learn more about \"Bladder Training: Care Instructions.\"  Current as of: April 30, 2024  Content Version: 14.5    9969-4944 [employer].   Care instructions adapted under license by your healthcare professional. If you have questions about a medical condition or this instruction, always ask your healthcare professional. [employer] disclaims any warranty or liability for your use of this information.       "

## 2025-06-26 ENCOUNTER — PATIENT OUTREACH (OUTPATIENT)
Dept: CARE COORDINATION | Facility: CLINIC | Age: OVER 89
End: 2025-06-26
Payer: COMMERCIAL

## 2025-06-26 LAB
ANION GAP SERPL CALCULATED.3IONS-SCNC: 13 MMOL/L (ref 7–15)
BUN SERPL-MCNC: 15.3 MG/DL (ref 8–23)
CALCIUM SERPL-MCNC: 9.4 MG/DL (ref 8.8–10.4)
CHLORIDE SERPL-SCNC: 100 MMOL/L (ref 98–107)
CREAT SERPL-MCNC: 0.71 MG/DL (ref 0.51–0.95)
EGFRCR SERPLBLD CKD-EPI 2021: 78 ML/MIN/1.73M2
GLUCOSE SERPL-MCNC: 88 MG/DL (ref 70–99)
HCO3 SERPL-SCNC: 24 MMOL/L (ref 22–29)
POTASSIUM SERPL-SCNC: 4.6 MMOL/L (ref 3.4–5.3)
SODIUM SERPL-SCNC: 137 MMOL/L (ref 135–145)
T4 FREE SERPL-MCNC: 1.33 NG/DL (ref 0.9–1.7)
TSH SERPL DL<=0.005 MIU/L-ACNC: 5.17 UIU/ML (ref 0.3–4.2)
VIT D+METAB SERPL-MCNC: 49 NG/ML (ref 20–50)

## 2025-06-27 ENCOUNTER — RESULTS FOLLOW-UP (OUTPATIENT)
Dept: FAMILY MEDICINE | Facility: CLINIC | Age: OVER 89
End: 2025-06-27

## 2025-06-30 ENCOUNTER — PATIENT OUTREACH (OUTPATIENT)
Dept: CARE COORDINATION | Facility: CLINIC | Age: OVER 89
End: 2025-06-30
Payer: COMMERCIAL

## 2025-07-01 ENCOUNTER — TRANSFERRED RECORDS (OUTPATIENT)
Dept: HEALTH INFORMATION MANAGEMENT | Facility: CLINIC | Age: OVER 89
End: 2025-07-01
Payer: COMMERCIAL

## (undated) DEVICE — GUN RANEY W/30 CLIPS CG8900

## (undated) DEVICE — SU VICRYL 2-0 CT-2 CR 8X18" J726D

## (undated) DEVICE — CASSETTE SONOPET IRRIG SUCTION STRL 5500-573-000

## (undated) DEVICE — TIP ASPIRATOR SONOPET IQ LARGE 5500-25S-308

## (undated) DEVICE — WIPES FOLEY CARE SURESTEP PROVON DFC100

## (undated) DEVICE — DRSG TELFA 3X8" 1238

## (undated) DEVICE — GLOVE PROTEXIS BLUE W/NEU-THERA 8.0  2D73EB80

## (undated) DEVICE — SU VICRYL 0 CT-1 CR 8X18" J740D

## (undated) DEVICE — PACK NEURO SNE15SNFSA

## (undated) DEVICE — PREP CHLORAPREP 26ML TINTED ORANGE  260815

## (undated) DEVICE — DRAPE C-ARM W/STRAPS 42X72" 07-CA104

## (undated) DEVICE — LINEN TOWEL PACK X5 5464

## (undated) DEVICE — DRAPE MAYO STAND 23X54 8337

## (undated) DEVICE — DRAPE POUCH INSTRUMENT 1018

## (undated) DEVICE — DRAPE STERI TOWEL LG 1010

## (undated) DEVICE — SU NUROLON 4-0 TF CR 8X18" C584D

## (undated) DEVICE — TOOL DISSECT MIDAS MR8 F2/7CM TAPER 2.3MM DI MR8-F2/7TA23

## (undated) DEVICE — SOL NACL 0.9% IRRIG 1000ML BOTTLE 2F7124

## (undated) DEVICE — PACK NEURO MINOR UMMC SNE32MNMU4

## (undated) DEVICE — PREP DURAPREP 26ML APL 8630

## (undated) DEVICE — CUP AND LID 2PK 2OZ STERILE  SSK9006A

## (undated) DEVICE — TOOL DISSECT MIDAS MR8 9CM MATCH HEAD 3MM DI MR8-9MH30

## (undated) DEVICE — SPONGE COTTONOID 1/2X1/2" 20-04S

## (undated) DEVICE — BLADE CLIPPER 4412A

## (undated) DEVICE — CATH TRAY FOLEY SURESTEP 16FR W/URNE MTR STLK LATEX A303316A

## (undated) DEVICE — TUBING SET IRR MALIS BIPOLAR CORD INTEGRATE 6790-100-003

## (undated) DEVICE — PREP CHLORAPREP CLEAR 3ML 260400

## (undated) DEVICE — GLOVE PROTEXIS MICRO 7.5  2D73PM75

## (undated) DEVICE — SOL WATER IRRIG 1000ML BOTTLE 2F7114

## (undated) DEVICE — SPONGE SURGIFOAM 100 1974

## (undated) DEVICE — ESU GROUND PAD UNIVERSAL W/O CORD

## (undated) DEVICE — MARKER SPHERES PASSIVE MEDT PACK 5 8801075

## (undated) DEVICE — DRAPE MICROSCOPE LEICA 54X150" AR8033650

## (undated) DEVICE — STPL SKIN 35W ROTATING HEAD PRW35

## (undated) DEVICE — MANIFOLD NEPTUNE 4 PORT 700-20

## (undated) DEVICE — SUCTION CANISTER BEMIS HI FLOW 006772-901

## (undated) DEVICE — ESU GROUND PAD ADULT W/CORD E7507

## (undated) DEVICE — SU DERMABOND ADVANCED .7ML DNX12

## (undated) DEVICE — SU ETHILON 3-0 FS-1 18" 669H

## (undated) DEVICE — GLOVE PROTEXIS BLUE W/NEU-THERA 8.5  2D73EB85

## (undated) DEVICE — SUCTION MANIFOLD DORNOCH ULTRA CART UL-CL500

## (undated) DEVICE — TOOL DISSECT MIDAS MR8 7CM TAPER 1.1MM DIA MR8-7TA11

## (undated) RX ORDER — FENTANYL CITRATE 50 UG/ML
INJECTION, SOLUTION INTRAMUSCULAR; INTRAVENOUS
Status: DISPENSED
Start: 2022-06-02

## (undated) RX ORDER — HYDROMORPHONE HYDROCHLORIDE 1 MG/ML
INJECTION, SOLUTION INTRAMUSCULAR; INTRAVENOUS; SUBCUTANEOUS
Status: DISPENSED
Start: 2017-04-28

## (undated) RX ORDER — GINSENG 100 MG
CAPSULE ORAL
Status: DISPENSED
Start: 2022-06-02

## (undated) RX ORDER — BACITRACIN 50000 [IU]/1
INJECTION, POWDER, FOR SOLUTION INTRAMUSCULAR
Status: DISPENSED
Start: 2017-04-28

## (undated) RX ORDER — SODIUM CHLORIDE 9 MG/ML
INJECTION, SOLUTION INTRAVENOUS
Status: DISPENSED
Start: 2017-04-28

## (undated) RX ORDER — FENTANYL CITRATE 50 UG/ML
INJECTION, SOLUTION INTRAMUSCULAR; INTRAVENOUS
Status: DISPENSED
Start: 2017-04-28

## (undated) RX ORDER — LIDOCAINE HYDROCHLORIDE 20 MG/ML
INJECTION, SOLUTION EPIDURAL; INFILTRATION; INTRACAUDAL; PERINEURAL
Status: DISPENSED
Start: 2022-06-02

## (undated) RX ORDER — BUPIVACAINE HYDROCHLORIDE AND EPINEPHRINE 2.5; 5 MG/ML; UG/ML
INJECTION, SOLUTION EPIDURAL; INFILTRATION; INTRACAUDAL; PERINEURAL
Status: DISPENSED
Start: 2017-04-28

## (undated) RX ORDER — LABETALOL HYDROCHLORIDE 5 MG/ML
INJECTION, SOLUTION INTRAVENOUS
Status: DISPENSED
Start: 2022-06-02

## (undated) RX ORDER — ONDANSETRON 2 MG/ML
INJECTION INTRAMUSCULAR; INTRAVENOUS
Status: DISPENSED
Start: 2022-06-02

## (undated) RX ORDER — FENTANYL CITRATE 0.05 MG/ML
INJECTION, SOLUTION INTRAMUSCULAR; INTRAVENOUS
Status: DISPENSED
Start: 2022-06-02

## (undated) RX ORDER — ONDANSETRON 2 MG/ML
INJECTION INTRAMUSCULAR; INTRAVENOUS
Status: DISPENSED
Start: 2017-04-28

## (undated) RX ORDER — PROPOFOL 10 MG/ML
INJECTION, EMULSION INTRAVENOUS
Status: DISPENSED
Start: 2022-06-02

## (undated) RX ORDER — DEXAMETHASONE SODIUM PHOSPHATE 4 MG/ML
INJECTION, SOLUTION INTRA-ARTICULAR; INTRALESIONAL; INTRAMUSCULAR; INTRAVENOUS; SOFT TISSUE
Status: DISPENSED
Start: 2022-06-02

## (undated) RX ORDER — CEFAZOLIN SODIUM/WATER 2 G/20 ML
SYRINGE (ML) INTRAVENOUS
Status: DISPENSED
Start: 2022-06-02

## (undated) RX ORDER — CEFAZOLIN SODIUM 2 G/100ML
INJECTION, SOLUTION INTRAVENOUS
Status: DISPENSED
Start: 2017-04-28